# Patient Record
Sex: FEMALE | Race: WHITE | NOT HISPANIC OR LATINO | Employment: OTHER | URBAN - METROPOLITAN AREA
[De-identification: names, ages, dates, MRNs, and addresses within clinical notes are randomized per-mention and may not be internally consistent; named-entity substitution may affect disease eponyms.]

---

## 2017-01-16 RX ORDER — PANTOPRAZOLE SODIUM 40 MG/1
TABLET, DELAYED RELEASE ORAL
Qty: 90 TABLET | Refills: 1 | Status: SHIPPED | OUTPATIENT
Start: 2017-01-16 | End: 2017-05-30 | Stop reason: SDUPTHER

## 2017-02-09 ENCOUNTER — LAB (OUTPATIENT)
Dept: LAB | Facility: HOSPITAL | Age: 82
End: 2017-02-09
Attending: INTERNAL MEDICINE

## 2017-02-09 ENCOUNTER — OFFICE VISIT (OUTPATIENT)
Dept: ONCOLOGY | Facility: CLINIC | Age: 82
End: 2017-02-09
Attending: INTERNAL MEDICINE

## 2017-02-09 VITALS
RESPIRATION RATE: 16 BRPM | DIASTOLIC BLOOD PRESSURE: 70 MMHG | HEART RATE: 80 BPM | BODY MASS INDEX: 26.32 KG/M2 | HEIGHT: 61 IN | TEMPERATURE: 98.3 F | WEIGHT: 139.4 LBS | SYSTOLIC BLOOD PRESSURE: 122 MMHG

## 2017-02-09 DIAGNOSIS — E78.5 HYPERLIPIDEMIA, UNSPECIFIED HYPERLIPIDEMIA TYPE: Primary | ICD-10-CM

## 2017-02-09 DIAGNOSIS — C50.812 MALIGNANT NEOPLASM OF OVERLAPPING SITES OF LEFT FEMALE BREAST (HCC): Primary | ICD-10-CM

## 2017-02-09 LAB
ALBUMIN SERPL-MCNC: 4.1 G/DL (ref 3.5–5.2)
ALBUMIN/GLOB SERPL: 1.6 G/DL (ref 1.1–2.4)
ALP SERPL-CCNC: 51 U/L (ref 38–116)
ALT SERPL W P-5'-P-CCNC: 14 U/L (ref 0–33)
ANION GAP SERPL CALCULATED.3IONS-SCNC: 11.5 MMOL/L
AST SERPL-CCNC: 22 U/L (ref 0–32)
BILIRUB SERPL-MCNC: 0.4 MG/DL (ref 0.1–1.2)
BUN BLD-MCNC: 26 MG/DL (ref 6–20)
BUN/CREAT SERPL: 17.6 (ref 7.3–30)
CALCIUM SPEC-SCNC: 9.3 MG/DL (ref 8.5–10.2)
CHLORIDE SERPL-SCNC: 103 MMOL/L (ref 98–107)
CO2 SERPL-SCNC: 27.5 MMOL/L (ref 22–29)
CREAT BLD-MCNC: 1.48 MG/DL (ref 0.6–1.1)
GFR SERPL CREATININE-BSD FRML MDRD: 34 ML/MIN/1.73
GLOBULIN UR ELPH-MCNC: 2.5 GM/DL (ref 1.8–3.5)
GLUCOSE BLD-MCNC: 111 MG/DL (ref 74–124)
POTASSIUM BLD-SCNC: 4.3 MMOL/L (ref 3.5–4.7)
PROT SERPL-MCNC: 6.6 G/DL (ref 6.3–8)
SODIUM BLD-SCNC: 142 MMOL/L (ref 134–145)

## 2017-02-09 PROCEDURE — 36415 COLL VENOUS BLD VENIPUNCTURE: CPT

## 2017-02-09 PROCEDURE — 85025 COMPLETE CBC W/AUTO DIFF WBC: CPT | Performed by: INTERNAL MEDICINE

## 2017-02-09 PROCEDURE — 99214 OFFICE O/P EST MOD 30 MIN: CPT | Performed by: INTERNAL MEDICINE

## 2017-02-09 PROCEDURE — 80053 COMPREHEN METABOLIC PANEL: CPT

## 2017-02-09 NOTE — PROGRESS NOTES
Subjective .     REASONS FOR FOLLOWUP:  Breast cancer    HISTORY OF PRESENT ILLNESS:  The patient is a 83 y.o. year old female  who is here for follow-up with the above-mentioned history.  No change in baseline dizziness.  Denies nausea   Denies weight loss.  Denies pain.  No significant problems with hot flashes.        Past Medical History   Diagnosis Date   • Arthritis    • Breast cancer      Locally recurrent left breast   • Dizziness    • Drug therapy    • GERD (gastroesophageal reflux disease)    • History of breast cancer      LEFT   • Hyperlipidemia    • Hypertension    • Osteopenia      Past Surgical History   Procedure Laterality Date   • Breast lumpectomy Left 1995   • Mastectomy Left 2011   • Appendectomy  1943   • Hysterectomy  1964   • Colonoscopy N/A 5/12/2016     Procedure: COLONOSCOPY;  Surgeon: Israel Vance MD;  Location: [facility] ENDOSCOPY;  Service:    • Breast biopsy         HEMATOLOGIC/ONCOLOGIC HISTORY:  (History from previous dates can be found in the separate document.)    MEDICATIONS    Current Outpatient Prescriptions:   •  anastrozole (ARIMIDEX) 1 MG chemo tablet, TAKE 1 TABLET EVERY DAY, Disp: 90 tablet, Rfl: 4  •  calcium carbonate (OS-BORIS) 600 MG tablet, Take 600 mg by mouth 2 (two) times a day., Disp: , Rfl:   •  clonazePAM (KlonoPIN) 0.5 MG tablet, Take  by mouth., Disp: , Rfl:   •  meclizine (ANTIVERT) 25 MG tablet, Take 1 tablet by mouth 3 (three) times a day as needed for dizziness., Disp: 60 tablet, Rfl: 1  •  pantoprazole (PROTONIX) 40 MG EC tablet, TAKE 1 TABLET BY MOUTH DAILY., Disp: 90 tablet, Rfl: 1  •  simvastatin (ZOCOR) 20 MG tablet, TAKE 1 TABLET EVERY DAY, Disp: 90 tablet, Rfl: 1  •  triamterene-hydrochlorothiazide (DYAZIDE) 37.5-25 MG per capsule, Take  by mouth., Disp: , Rfl:     ALLERGIES:     Allergies   Allergen Reactions   • Penicillins Hives       SOCIAL HISTORY:       Social History     Social History   • Marital status:      Spouse name: Yves   •  "Number of children: N/A   • Years of education: High School     Occupational History   •  Retired     Social History Main Topics   • Smoking status: Never Smoker   • Smokeless tobacco: Not on file   • Alcohol use Yes      Comment: occassional   • Drug use: No   • Sexual activity: Defer     Other Topics Concern   • Not on file     Social History Narrative         FAMILY HISTORY:  Family History   Problem Relation Age of Onset   • Hypertension Mother    • Cancer Neg Hx        REVIEW OF SYSTEMS:  GENERAL: No change in appetite or weight;   No fevers, chills, sweats.    SKIN: No nonhealing lesions.   No rashes.  HEME/LYMPH: No easy bruising, bleeding.   No swollen nodes.   EYES: No vision changes or diplopia.   ENT: No tinnitus, hearing loss, gum bleeding, epistaxis, hoarseness or dysphagia.   RESPIRATORY: No cough, shortness of breath, hemoptysis or wheezing.   CVS: No chest pain, palpitations, orthopnea, dyspnea on exertion or PND.   GI: No melena or hematochezia.   No abdominal pain.  No nausea, vomiting, constipation, diarrhea  : No lower tract obstructive symptoms, dysuria or hematuria.   MUSCULOSKELETAL: No bone pain.  No joint stiffness.   NEUROLOGICAL: see HPI    PSYCHIATRIC: No increased nervousness, mood changes or depression.     Objective    Vitals:    02/09/17 1344   BP: 122/70   Pulse: 80   Resp: 16   Temp: 98.3 °F (36.8 °C)   Weight: 139 lb 6.4 oz (63.2 kg)   Height: 61.42\" (156 cm)  Comment: new ht.   PainSc: 0-No pain     Current Status 2/9/2017   ECOG score 0      PHYSICAL EXAM:    GENERAL:  Well-developed, well-nourished in no acute distress.   SKIN:  Warm, dry without rashes, purpura or petechiae.  HEAD:  Normocephalic.  EYES:  Pupils equal, round and reactive to light.  EOMs intact.  Conjunctivae normal.  EARS:  Hearing intact.  NOSE:  Septum midline.  No excoriations or nasal discharge.  MOUTH:  Tongue is well-papillated; no stomatitis or ulcers.  Lips normal.  THROAT:  " Oropharynx without lesions or exudates.  NECK:  Supple with good range of motion; no thyromegaly or masses, no JVD.  LYMPHATICS:  No cervical, supraclavicular, axillary or inguinal adenopathy.  CHEST:  Lungs clear to percussion and auscultation. Good airflow.  CARDIAC:  Regular rate and rhythm without murmurs, rubs or gallops. Normal S1,S2.  BREAST: no masses by palpation or inspection; left mastectomy  ABDOMEN:  Soft, nontender with no organomegaly or masses.  EXTREMITIES:  No clubbing, cyanosis or edema.  NEUROLOGICAL:  Cranial Nerves II-XII grossly intact.  No focal neurological deficits.  PSYCHIATRIC:  Normal affect and mood.      RECENT LABS:        WBC   Date/Time Value Ref Range Status   02/09/2017 01:35 PM 5.88 4.00 - 10.00 10*3/mm3 Final     HEMOGLOBIN   Date/Time Value Ref Range Status   02/09/2017 01:35 PM 12.4 11.5 - 14.9 g/dL Final     PLATELETS   Date/Time Value Ref Range Status   02/09/2017 01:35  (L) 150 - 375 10*3/mm3 Final       Assessment/Plan   Malignant neoplasm of overlapping sites of left female breast  - CBC & Differential  - Comprehensive Metabolic Panel      1.  T3N0M0 (stage IIB) invasive ductal breast carcinoma. Left lumpectomy and radiation therapy 1995. Five years of adjuvant tamoxifen treatment.   Per patient report, subsequent lumpectomy 04/17/2001, benign pathology. The patient states she did not require mastectomy or any radiation therapy; however, her medical oncologist gave her tamoxifen from April 2001 through October 2003. This was changed to Femara secondary to atypical glandular formations of the left breast. Femara was given from October 2003 through June 2007.     2.  T4Nx M0 poorly differentiated multifocal carcinoma.  Left mastectomy 09/02/2011. ER/TN strongly positive, HER-2/tatum negative. TC chemotherapy q. 3 weeks x 6 cycles, completed in [place] in February 2012. Anastrozole 1 mg q.d. since February 2012. Plan to continue this indefinitely.     3.  Osteopenia. She  is on calcium citrate and vitamin D. Due to problem with GERD, she is on a proton pump inhibitor.     Last bone density 7/13/16, worst T score -1.2 and left femoral neck, compared to -1.4 two years prior.        4.  Thrombocytopenia.  Mild.  New problem today.  Since it is so mild, I do not think he needs any further workup at this time.  Check this again in 6 months.  Patient informed.    PLAN:   · Continue anastrozole indefinitely.   · No surveillance scans were recommended to the patient.   · Last DEXA 7/13/16  · (MD visits every 6 months with CBC and CMP on day of MD visits.)   · Last mammogram 07/13/2016, BIRADS 1.

## 2017-03-28 RX ORDER — SIMVASTATIN 20 MG
TABLET ORAL
Qty: 90 TABLET | Refills: 1 | Status: SHIPPED | OUTPATIENT
Start: 2017-03-28 | End: 2017-09-27 | Stop reason: SDUPTHER

## 2017-05-22 ENCOUNTER — HOSPITAL ENCOUNTER (OUTPATIENT)
Dept: GENERAL RADIOLOGY | Facility: HOSPITAL | Age: 82
Discharge: HOME | End: 2017-05-22
Attending: INTERNAL MEDICINE

## 2017-05-22 ENCOUNTER — OFFICE VISIT (OUTPATIENT)
Dept: INTERNAL MEDICINE | Facility: CLINIC | Age: 82
End: 2017-05-22

## 2017-05-22 ENCOUNTER — HOSPITAL ENCOUNTER (OUTPATIENT)
Dept: GENERAL RADIOLOGY | Facility: HOSPITAL | Age: 82
Discharge: HOME | End: 2017-05-22
Attending: INTERNAL MEDICINE | Admitting: INTERNAL MEDICINE

## 2017-05-22 VITALS
OXYGEN SATURATION: 98 % | BODY MASS INDEX: 25.68 KG/M2 | HEART RATE: 68 BPM | WEIGHT: 136 LBS | SYSTOLIC BLOOD PRESSURE: 130 MMHG | TEMPERATURE: 97.5 F | RESPIRATION RATE: 16 BRPM | HEIGHT: 61 IN | DIASTOLIC BLOOD PRESSURE: 70 MMHG

## 2017-05-22 DIAGNOSIS — N18.30 CHRONIC KIDNEY DISEASE, STAGE 3 (MODERATE): ICD-10-CM

## 2017-05-22 DIAGNOSIS — M53.3 PAIN OF RIGHT SACROILIAC JOINT: ICD-10-CM

## 2017-05-22 DIAGNOSIS — Z85.3 HISTORY OF LEFT BREAST CANCER: ICD-10-CM

## 2017-05-22 DIAGNOSIS — I10 ESSENTIAL HYPERTENSION: Primary | ICD-10-CM

## 2017-05-22 DIAGNOSIS — K21.00 GASTROESOPHAGEAL REFLUX DISEASE WITH ESOPHAGITIS: ICD-10-CM

## 2017-05-22 DIAGNOSIS — I10 ESSENTIAL HYPERTENSION: ICD-10-CM

## 2017-05-22 DIAGNOSIS — R73.02 IMPAIRED GLUCOSE TOLERANCE: ICD-10-CM

## 2017-05-22 DIAGNOSIS — E78.5 HYPERLIPIDEMIA, UNSPECIFIED HYPERLIPIDEMIA TYPE: ICD-10-CM

## 2017-05-22 DIAGNOSIS — M85.80 OSTEOPENIA: ICD-10-CM

## 2017-05-22 PROCEDURE — 72100 X-RAY EXAM L-S SPINE 2/3 VWS: CPT

## 2017-05-22 PROCEDURE — 71020 HC CHEST PA AND LATERAL: CPT

## 2017-05-22 PROCEDURE — 99214 OFFICE O/P EST MOD 30 MIN: CPT | Performed by: INTERNAL MEDICINE

## 2017-05-23 LAB
ALBUMIN SERPL-MCNC: 4.3 G/DL (ref 3.5–5.2)
ALBUMIN/GLOB SERPL: 1.3 G/DL
ALP SERPL-CCNC: 57 U/L (ref 39–117)
ALT SERPL-CCNC: 13 U/L (ref 1–33)
APPEARANCE UR: CLEAR
AST SERPL-CCNC: 24 U/L (ref 1–32)
BACTERIA #/AREA URNS HPF: NORMAL /HPF
BILIRUB SERPL-MCNC: 0.5 MG/DL (ref 0.1–1.2)
BILIRUB UR QL STRIP: NEGATIVE
BUN SERPL-MCNC: 26 MG/DL (ref 8–23)
BUN/CREAT SERPL: 22 (ref 7–25)
CALCIUM SERPL-MCNC: 10 MG/DL (ref 8.6–10.5)
CASTS URNS MICRO: NORMAL
CHLORIDE SERPL-SCNC: 102 MMOL/L (ref 98–107)
CHOLEST SERPL-MCNC: 166 MG/DL (ref 0–200)
CO2 SERPL-SCNC: 25.4 MMOL/L (ref 22–29)
COLOR UR: YELLOW
CREAT SERPL-MCNC: 1.18 MG/DL (ref 0.57–1)
EPI CELLS #/AREA URNS HPF: NORMAL /HPF
GLOBULIN SER CALC-MCNC: 3.2 GM/DL
GLUCOSE SERPL-MCNC: 100 MG/DL (ref 65–99)
GLUCOSE UR QL: NEGATIVE
HDLC SERPL-MCNC: 48 MG/DL (ref 40–60)
HGB UR QL STRIP: NEGATIVE
KETONES UR QL STRIP: NEGATIVE
LDLC SERPL CALC-MCNC: 88 MG/DL (ref 0–100)
LEUKOCYTE ESTERASE UR QL STRIP: NEGATIVE
NITRITE UR QL STRIP: NEGATIVE
PH UR STRIP: 6 [PH] (ref 5–8)
POTASSIUM SERPL-SCNC: 4 MMOL/L (ref 3.5–5.2)
PROT SERPL-MCNC: 7.5 G/DL (ref 6–8.5)
PROT UR QL STRIP: NEGATIVE
RBC #/AREA URNS HPF: NORMAL /HPF
SODIUM SERPL-SCNC: 143 MMOL/L (ref 136–145)
SP GR UR: 1.01 (ref 1–1.03)
TRIGL SERPL-MCNC: 149 MG/DL (ref 0–150)
UROBILINOGEN UR STRIP-MCNC: (no result) MG/DL
VLDLC SERPL CALC-MCNC: 29.8 MG/DL (ref 5–40)
WBC #/AREA URNS HPF: NORMAL /HPF

## 2017-05-30 RX ORDER — PANTOPRAZOLE SODIUM 40 MG/1
TABLET, DELAYED RELEASE ORAL
Qty: 90 TABLET | Refills: 1 | Status: SHIPPED | OUTPATIENT
Start: 2017-05-30 | End: 2018-07-13 | Stop reason: SDUPTHER

## 2017-05-30 RX ORDER — ANASTROZOLE 1 MG/1
TABLET ORAL
Qty: 90 TABLET | Refills: 4 | Status: SHIPPED | OUTPATIENT
Start: 2017-05-30 | End: 2018-09-07 | Stop reason: SDUPTHER

## 2017-07-20 ENCOUNTER — TRANSCRIBE ORDERS (OUTPATIENT)
Dept: ADMINISTRATIVE | Facility: HOSPITAL | Age: 82
End: 2017-07-20

## 2017-07-20 DIAGNOSIS — Z12.31 VISIT FOR SCREENING MAMMOGRAM: Primary | ICD-10-CM

## 2017-07-27 ENCOUNTER — HOSPITAL ENCOUNTER (OUTPATIENT)
Dept: MAMMOGRAPHY | Facility: HOSPITAL | Age: 82
Discharge: HOME | End: 2017-07-27
Attending: INTERNAL MEDICINE | Admitting: INTERNAL MEDICINE

## 2017-07-27 DIAGNOSIS — Z12.31 VISIT FOR SCREENING MAMMOGRAM: ICD-10-CM

## 2017-07-27 PROCEDURE — G0202 SCR MAMMO BI INCL CAD: HCPCS

## 2017-08-08 ENCOUNTER — APPOINTMENT (OUTPATIENT)
Dept: LAB | Facility: HOSPITAL | Age: 82
End: 2017-08-08

## 2017-08-22 ENCOUNTER — LAB (OUTPATIENT)
Dept: LAB | Facility: HOSPITAL | Age: 82
End: 2017-08-22

## 2017-08-22 ENCOUNTER — OFFICE VISIT (OUTPATIENT)
Dept: ONCOLOGY | Facility: CLINIC | Age: 82
End: 2017-08-22

## 2017-08-22 VITALS
HEART RATE: 75 BPM | WEIGHT: 137.2 LBS | RESPIRATION RATE: 16 BRPM | OXYGEN SATURATION: 97 % | BODY MASS INDEX: 25.9 KG/M2 | HEIGHT: 61 IN | TEMPERATURE: 98.4 F | SYSTOLIC BLOOD PRESSURE: 138 MMHG | DIASTOLIC BLOOD PRESSURE: 64 MMHG

## 2017-08-22 DIAGNOSIS — I77.9 DISORDER OF AORTA (HCC): Primary | ICD-10-CM

## 2017-08-22 DIAGNOSIS — C50.812 MALIGNANT NEOPLASM OF OVERLAPPING SITES OF LEFT FEMALE BREAST (HCC): ICD-10-CM

## 2017-08-22 DIAGNOSIS — C50.812 MALIGNANT NEOPLASM OF OVERLAPPING SITES OF LEFT FEMALE BREAST (HCC): Primary | ICD-10-CM

## 2017-08-22 LAB
ALBUMIN SERPL-MCNC: 4.1 G/DL (ref 3.5–5.2)
ALBUMIN/GLOB SERPL: 1.4 G/DL (ref 1.1–2.4)
ALP SERPL-CCNC: 51 U/L (ref 38–116)
ALT SERPL W P-5'-P-CCNC: 14 U/L (ref 0–33)
ANION GAP SERPL CALCULATED.3IONS-SCNC: 12.8 MMOL/L
AST SERPL-CCNC: 20 U/L (ref 0–32)
BASOPHILS # BLD AUTO: 0.01 10*3/MM3 (ref 0–0.1)
BASOPHILS NFR BLD AUTO: 0.2 % (ref 0–1.1)
BILIRUB SERPL-MCNC: 0.4 MG/DL (ref 0.1–1.2)
BUN BLD-MCNC: 23 MG/DL (ref 6–20)
BUN/CREAT SERPL: 16.9 (ref 7.3–30)
CALCIUM SPEC-SCNC: 9.5 MG/DL (ref 8.5–10.2)
CHLORIDE SERPL-SCNC: 101 MMOL/L (ref 98–107)
CO2 SERPL-SCNC: 26.2 MMOL/L (ref 22–29)
CREAT BLD-MCNC: 1.36 MG/DL (ref 0.6–1.1)
DEPRECATED RDW RBC AUTO: 41.9 FL (ref 37–49)
EOSINOPHIL # BLD AUTO: 0.27 10*3/MM3 (ref 0–0.36)
EOSINOPHIL NFR BLD AUTO: 4.5 % (ref 1–5)
ERYTHROCYTE [DISTWIDTH] IN BLOOD BY AUTOMATED COUNT: 12.4 % (ref 11.7–14.5)
GFR SERPL CREATININE-BSD FRML MDRD: 37 ML/MIN/1.73
GLOBULIN UR ELPH-MCNC: 2.9 GM/DL (ref 1.8–3.5)
GLUCOSE BLD-MCNC: 105 MG/DL (ref 74–124)
HCT VFR BLD AUTO: 39.8 % (ref 34–45)
HGB BLD-MCNC: 13.5 G/DL (ref 11.5–14.9)
HOLD SPECIMEN: NORMAL
IMM GRANULOCYTES # BLD: 0.01 10*3/MM3 (ref 0–0.03)
IMM GRANULOCYTES NFR BLD: 0.2 % (ref 0–0.5)
LYMPHOCYTES # BLD AUTO: 2.07 10*3/MM3 (ref 1–3.5)
LYMPHOCYTES NFR BLD AUTO: 34.3 % (ref 20–49)
MCH RBC QN AUTO: 31.2 PG (ref 27–33)
MCHC RBC AUTO-ENTMCNC: 33.9 G/DL (ref 32–35)
MCV RBC AUTO: 91.9 FL (ref 83–97)
MONOCYTES # BLD AUTO: 0.59 10*3/MM3 (ref 0.25–0.8)
MONOCYTES NFR BLD AUTO: 9.8 % (ref 4–12)
NEUTROPHILS # BLD AUTO: 3.08 10*3/MM3 (ref 1.5–7)
NEUTROPHILS NFR BLD AUTO: 51 % (ref 39–75)
NRBC BLD MANUAL-RTO: 0 /100 WBC (ref 0–0)
PLATELET # BLD AUTO: 148 10*3/MM3 (ref 150–375)
PMV BLD AUTO: 10.1 FL (ref 8.9–12.1)
POTASSIUM BLD-SCNC: 4.6 MMOL/L (ref 3.5–4.7)
PROT SERPL-MCNC: 7 G/DL (ref 6.3–8)
RBC # BLD AUTO: 4.33 10*6/MM3 (ref 3.9–5)
SODIUM BLD-SCNC: 140 MMOL/L (ref 134–145)
WBC NRBC COR # BLD: 6.03 10*3/MM3 (ref 4–10)

## 2017-08-22 PROCEDURE — 36415 COLL VENOUS BLD VENIPUNCTURE: CPT | Performed by: INTERNAL MEDICINE

## 2017-08-22 PROCEDURE — 99214 OFFICE O/P EST MOD 30 MIN: CPT | Performed by: INTERNAL MEDICINE

## 2017-08-22 PROCEDURE — 80053 COMPREHEN METABOLIC PANEL: CPT | Performed by: INTERNAL MEDICINE

## 2017-08-22 PROCEDURE — 85025 COMPLETE CBC W/AUTO DIFF WBC: CPT | Performed by: INTERNAL MEDICINE

## 2017-08-22 NOTE — PROGRESS NOTES
Subjective .     REASONS FOR FOLLOWUP:  Breast cancer    HISTORY OF PRESENT ILLNESS:  The patient is a 84 y.o. year old female  who is here for follow-up with the above-mentioned history.  Denies neurological symptoms.  Denies nausea   Denies weight loss.  Denies pain.  No significant problems with hot flashes.      Past Medical History:   Diagnosis Date   • Arthritis    • Breast cancer     Locally recurrent left breast   • Dizziness    • Drug therapy    • GERD (gastroesophageal reflux disease)    • History of breast cancer     LEFT   • Hyperlipidemia    • Hypertension    • Osteopenia      Past Surgical History:   Procedure Laterality Date   • APPENDECTOMY  1943   • BREAST BIOPSY     • BREAST LUMPECTOMY Left 1995   • COLONOSCOPY N/A 5/12/2016    Procedure: COLONOSCOPY;  Surgeon: Israel Vance MD;  Location: [facility] ENDOSCOPY;  Service:    • HYSTERECTOMY  1964   • MASTECTOMY Left 2011       HEMATOLOGIC/ONCOLOGIC HISTORY:  (History from previous dates can be found in the separate document.)    MEDICATIONS    Current Outpatient Prescriptions:   •  anastrozole (ARIMIDEX) 1 MG tablet, TAKE 1 TABLET EVERY DAY, Disp: 90 tablet, Rfl: 4  •  calcium carbonate (OS-BORIS) 600 MG tablet, Take 600 mg by mouth 2 (two) times a day., Disp: , Rfl:   •  clonazePAM (KlonoPIN) 0.5 MG tablet, Take  by mouth., Disp: , Rfl:   •  meclizine (ANTIVERT) 25 MG tablet, Take 1 tablet by mouth 3 (three) times a day as needed for dizziness., Disp: 60 tablet, Rfl: 1  •  pantoprazole (PROTONIX) 40 MG EC tablet, TAKE 1 TABLET EVERY DAY, Disp: 90 tablet, Rfl: 1  •  simvastatin (ZOCOR) 20 MG tablet, TAKE 1 TABLET EVERY DAY, Disp: 90 tablet, Rfl: 1  •  triamterene-hydrochlorothiazide (DYAZIDE) 37.5-25 MG per capsule, Take  by mouth., Disp: , Rfl:     ALLERGIES:     Allergies   Allergen Reactions   • Penicillins Hives       SOCIAL HISTORY:       Social History     Social History   • Marital status:      Spouse name: Yves   • Number of children:  "N/A   • Years of education: High School     Occupational History   •  Retired     Social History Main Topics   • Smoking status: Never Smoker   • Smokeless tobacco: Not on file   • Alcohol use Yes      Comment: occassional   • Drug use: No   • Sexual activity: Defer     Other Topics Concern   • Not on file     Social History Narrative         FAMILY HISTORY:  Family History   Problem Relation Age of Onset   • Hypertension Mother    • Cancer Neg Hx        REVIEW OF SYSTEMS:  GENERAL: No change in appetite or weight;   No fevers, chills, sweats.    SKIN: No nonhealing lesions.   No rashes.  HEME/LYMPH: No easy bruising, bleeding.   No swollen nodes.   EYES: No vision changes or diplopia.   ENT: No tinnitus, hearing loss, gum bleeding, epistaxis, hoarseness or dysphagia.   RESPIRATORY: No cough, shortness of breath, hemoptysis or wheezing.   CVS: No chest pain, palpitations, orthopnea, dyspnea on exertion or PND.   GI: No melena or hematochezia.   No abdominal pain.  No nausea, vomiting, constipation, diarrhea  : No lower tract obstructive symptoms, dysuria or hematuria.   MUSCULOSKELETAL: No bone pain.  No joint stiffness.   NEUROLOGICAL: No global weakness, loss of consciousness or seizures.   PSYCHIATRIC: No increased nervousness, mood changes or depression.         Objective    Vitals:    08/22/17 1133   BP: 138/64   Pulse: 75   Resp: 16   Temp: 98.4 °F (36.9 °C)   TempSrc: Oral   SpO2: 97%   Weight: 137 lb 3.2 oz (62.2 kg)   Height: 61.42\" (156 cm)   PainSc: 0-No pain     Current Status 8/22/2017   ECOG score 0      PHYSICAL EXAM:    GENERAL:  Well-developed, well-nourished in no acute distress.   SKIN:  Warm, dry without rashes, purpura or petechiae.  HEAD:  Normocephalic.  EYES:  Pupils equal, round and reactive to light.  EOMs intact.  Conjunctivae normal.  EARS:  Hearing intact.  NOSE:  Septum midline.  No excoriations or nasal discharge.  MOUTH:  Tongue is well-papillated; no stomatitis " or ulcers.  Lips normal.  THROAT:  Oropharynx without lesions or exudates.  NECK:  Supple with good range of motion; no thyromegaly or masses, no JVD.  LYMPHATICS:  No cervical, supraclavicular, axillary or inguinal adenopathy.  CHEST:  Lungs clear to percussion and auscultation. Good airflow.  CARDIAC:  Regular rate and rhythm without murmurs, rubs or gallops. Normal S1,S2.  BREAST: no masses by palpation or inspection; left mastectomy  ABDOMEN:  Soft, nontender with no organomegaly or masses.  EXTREMITIES:  No clubbing, cyanosis or edema.  NEUROLOGICAL:  Cranial Nerves II-XII grossly intact.  No focal neurological deficits.  PSYCHIATRIC:  Normal affect and mood.      RECENT LABS:        WBC   Date/Time Value Ref Range Status   08/22/2017 11:20 AM 6.03 4.00 - 10.00 10*3/mm3 Final     Hemoglobin   Date/Time Value Ref Range Status   08/22/2017 11:20 AM 13.5 11.5 - 14.9 g/dL Final     Platelets   Date/Time Value Ref Range Status   08/22/2017 11:20  (L) 150 - 375 10*3/mm3 Final       Assessment/Plan   Malignant neoplasm of overlapping sites of left female breast  - CBC & Differential  - Comprehensive Metabolic Panel    1.  T3N0M0 (stage IIB) invasive ductal breast carcinoma. Left lumpectomy and radiation therapy 1995. Five years of adjuvant tamoxifen treatment.   Per patient report, subsequent lumpectomy 04/17/2001, benign pathology. The patient states she did not require mastectomy or any radiation therapy; however, her medical oncologist gave her tamoxifen from April 2001 through October 2003. This was changed to Femara secondary to atypical glandular formations of the left breast. Femara was given from October 2003 through June 2007.     2.  T4Nx M0 poorly differentiated multifocal carcinoma.  Left mastectomy 09/02/2011. ER/PA strongly positive, HER-2/tatum negative. TC chemotherapy q. 3 weeks x 6 cycles, completed in [place] in February 2012. Anastrozole 1 mg q.d. since February 2012. Plan to continue this  indefinitely.     3.  Osteopenia. She is on calcium citrate and vitamin D. Due to problem with GERD, she is on a proton pump inhibitor.     Last bone density 7/13/16, worst T score -1.2 and left femoral neck, compared to -1.4 two years prior.        4.  Thrombocytopenia.  Mild.  First noted February 2017.  Stable.      PLAN:   · Continue anastrozole indefinitely.   · No surveillance scans were recommended to the patient.   · Last DEXA 7/13/16  · (MD visits every 6 months with CBC and CMP on day of MD visits.)   · Last mammogram 7/27/17, BIRADS 1.

## 2017-09-27 RX ORDER — SIMVASTATIN 20 MG
TABLET ORAL
Qty: 90 TABLET | Refills: 1 | Status: SHIPPED | OUTPATIENT
Start: 2017-09-27 | End: 2018-02-12 | Stop reason: SDUPTHER

## 2017-10-06 ENCOUNTER — CLINICAL SUPPORT (OUTPATIENT)
Dept: INTERNAL MEDICINE | Facility: CLINIC | Age: 82
End: 2017-10-06

## 2017-10-06 DIAGNOSIS — Z23 NEED FOR INFLUENZA VACCINATION: Primary | ICD-10-CM

## 2017-10-06 PROCEDURE — G0008 ADMIN INFLUENZA VIRUS VAC: HCPCS | Performed by: INTERNAL MEDICINE

## 2017-10-06 PROCEDURE — 90662 IIV NO PRSV INCREASED AG IM: CPT | Performed by: INTERNAL MEDICINE

## 2017-11-15 ENCOUNTER — OFFICE VISIT (OUTPATIENT)
Dept: INTERNAL MEDICINE | Facility: CLINIC | Age: 82
End: 2017-11-15

## 2017-11-15 VITALS
RESPIRATION RATE: 16 BRPM | WEIGHT: 138 LBS | DIASTOLIC BLOOD PRESSURE: 76 MMHG | TEMPERATURE: 97.4 F | HEART RATE: 88 BPM | SYSTOLIC BLOOD PRESSURE: 136 MMHG | BODY MASS INDEX: 26.06 KG/M2 | OXYGEN SATURATION: 98 % | HEIGHT: 61 IN

## 2017-11-15 DIAGNOSIS — M85.80 OSTEOPENIA, UNSPECIFIED LOCATION: ICD-10-CM

## 2017-11-15 DIAGNOSIS — R73.02 IMPAIRED GLUCOSE TOLERANCE: ICD-10-CM

## 2017-11-15 DIAGNOSIS — I10 ESSENTIAL HYPERTENSION: Primary | ICD-10-CM

## 2017-11-15 DIAGNOSIS — K21.00 GASTROESOPHAGEAL REFLUX DISEASE WITH ESOPHAGITIS: ICD-10-CM

## 2017-11-15 DIAGNOSIS — Z85.3 HISTORY OF LEFT BREAST CANCER: ICD-10-CM

## 2017-11-15 DIAGNOSIS — E78.5 HYPERLIPIDEMIA, UNSPECIFIED HYPERLIPIDEMIA TYPE: ICD-10-CM

## 2017-11-15 DIAGNOSIS — N18.30 STAGE 3 CHRONIC KIDNEY DISEASE (HCC): ICD-10-CM

## 2017-11-15 PROCEDURE — 99213 OFFICE O/P EST LOW 20 MIN: CPT | Performed by: INTERNAL MEDICINE

## 2017-11-15 NOTE — PROGRESS NOTES
Subjective   Radha Luke is a 84 y.o. female.   9/27/2017  Wt Readings from Last 1 Encounters:   11/15/17 138 lb (62.6 kg)   She has last seen in May 2017    She returns for follow-up of hypertension, hyperlipidemia, chronic kidney disease, osteopenia, esophageal reflux, history left breast cancer    History of Present Illness   She has hypertension treated with triamterene/HCTZ 37.5/25 one each day.  She does not have any known heart disease in denies having exertional chest pain.    She has esophageal reflux treated with pantoprazole 40 mg each one with good results.  No dysphagia.    She has history of left breast cancer and had a left meniscectomy in 2011.  She is followed by the [facility] and is to continue Arimidex indefinitely.    She has chronic kidney disease which is being followed.  No uremic symptoms.  Laboratory studies in August showed serum creatinine level I.36 with estimated GFR 37 mL per minute.    She has hyperlipidemia treated with simvastatin 20 mg each evening.  No medication problems described.    She has some chronic back pain.  X-rays showed some degenerative disease.  No radiculopathy described.    She has osteopenia and continues with calcium and vitamin D supplements.    No new problems are described.  The following portions of the patient's history were reviewed and updated as appropriate: allergies, current medications, past family history, past medical history, past social history, past surgical history and problem list.    Review of Systems   Constitutional: Negative.    HENT: Negative.    Eyes: Negative.    Respiratory: Negative.    Cardiovascular: Negative.    Endocrine: Negative.    Genitourinary: Negative.    Musculoskeletal: Positive for back pain.   Skin: Negative.    Neurological: Negative.    Hematological: Negative.    Psychiatric/Behavioral: Negative.        Objective   Physical Exam   Constitutional: She appears well-developed and well-nourished.   HENT:   Head:  Normocephalic and atraumatic.   Cardiovascular: Normal rate and regular rhythm.  Exam reveals no gallop and no friction rub.    Murmur heard.  Grade 2/6 start murmur at the apex radiating to the base   Pulmonary/Chest: Effort normal and breath sounds normal. No respiratory distress. She has no wheezes. She has no rales.   Abdominal: Soft. Bowel sounds are normal. She exhibits no distension and no mass. There is no tenderness.   Neurological: She is alert.   Skin: Skin is warm.   Psychiatric: Her behavior is normal.   Vitals reviewed.      Assessment/Plan   Radha was seen today for hypertension, hyperlipidemia and heartburn.    Diagnoses and all orders for this visit:    Essential hypertension  Comments:  Continue triamterene/HCTZ 37.5/25 one each day  Orders:  -     Comprehensive Metabolic Panel  -     Urinalysis With Microscopic - Urine, Clean Catch    Hyperlipidemia, unspecified hyperlipidemia type  Comments:  Continue simvastatin 20 mg each evening, we will check chemistries and lipids  Orders:  -     Lipid Panel    Gastroesophageal reflux disease with esophagitis  Comments:  Continue pantoprazole 40 mg daily before breakfast    Impaired glucose tolerance  Comments:  We will check hemoglobin A1c level  Orders:  -     Hemoglobin A1c    Osteopenia, unspecified location  Comments:  Continue calcium supplement    Stage 3 chronic kidney disease  Comments:  We will check renal function and electrolytes    History of left breast cancer  Comments:  Followed by [facility], on Arimidex 1 mg daily indefinitely, right breast mammogram negative in July 2017          Schedule office follow-up about 6 months, return sooner if needed                     EMR Dragon/Transcription disclaimer:      Much of this encounter note is an electronic transcription/translation of spoken language to printed text. The electronic translation of spoken language may permit erroneous, or at times, nonsensical words or phrases to be  inadvertently transcribed; Although I have reviewed the note for such errors, some may still exist.

## 2017-11-16 LAB
ALBUMIN SERPL-MCNC: 3.9 G/DL (ref 3.5–5.2)
ALBUMIN/GLOB SERPL: 1.3 G/DL
ALP SERPL-CCNC: 55 U/L (ref 39–117)
ALT SERPL-CCNC: 9 U/L (ref 1–33)
APPEARANCE UR: CLEAR
AST SERPL-CCNC: 18 U/L (ref 1–32)
BACTERIA #/AREA URNS HPF: NORMAL /HPF
BILIRUB SERPL-MCNC: 0.4 MG/DL (ref 0.1–1.2)
BILIRUB UR QL STRIP: NEGATIVE
BUN SERPL-MCNC: 24 MG/DL (ref 8–23)
BUN/CREAT SERPL: 16.4 (ref 7–25)
CALCIUM SERPL-MCNC: 9.7 MG/DL (ref 8.6–10.5)
CHLORIDE SERPL-SCNC: 107 MMOL/L (ref 98–107)
CHOLEST SERPL-MCNC: 142 MG/DL (ref 0–200)
CO2 SERPL-SCNC: 25.8 MMOL/L (ref 22–29)
COLOR UR: YELLOW
CREAT SERPL-MCNC: 1.46 MG/DL (ref 0.57–1)
EPI CELLS #/AREA URNS HPF: NORMAL /HPF
GFR SERPLBLD CREATININE-BSD FMLA CKD-EPI: 34 ML/MIN/1.73
GFR SERPLBLD CREATININE-BSD FMLA CKD-EPI: 41 ML/MIN/1.73
GLOBULIN SER CALC-MCNC: 2.9 GM/DL
GLUCOSE SERPL-MCNC: 81 MG/DL (ref 65–99)
GLUCOSE UR QL: NEGATIVE
HBA1C MFR BLD: 5.82 % (ref 4.8–5.6)
HDLC SERPL-MCNC: 44 MG/DL (ref 40–60)
HGB UR QL STRIP: NEGATIVE
KETONES UR QL STRIP: NEGATIVE
LDLC SERPL CALC-MCNC: 73 MG/DL (ref 0–100)
LEUKOCYTE ESTERASE UR QL STRIP: NEGATIVE
NITRITE UR QL STRIP: NEGATIVE
PH UR STRIP: 7.5 [PH] (ref 5–8)
POTASSIUM SERPL-SCNC: 4.7 MMOL/L (ref 3.5–5.2)
PROT SERPL-MCNC: 6.8 G/DL (ref 6–8.5)
PROT UR QL STRIP: NEGATIVE
RBC #/AREA URNS HPF: NORMAL /HPF
SODIUM SERPL-SCNC: 146 MMOL/L (ref 136–145)
SP GR UR: 1.02 (ref 1–1.03)
TRIGL SERPL-MCNC: 127 MG/DL (ref 0–150)
UROBILINOGEN UR STRIP-MCNC: (no result) MG/DL
VLDLC SERPL CALC-MCNC: 25.4 MG/DL (ref 5–40)
WBC #/AREA URNS HPF: NORMAL /HPF

## 2017-12-01 ENCOUNTER — OFFICE VISIT (OUTPATIENT)
Dept: INTERNAL MEDICINE | Facility: CLINIC | Age: 82
End: 2017-12-01

## 2017-12-01 VITALS
SYSTOLIC BLOOD PRESSURE: 118 MMHG | WEIGHT: 139 LBS | TEMPERATURE: 97.7 F | HEIGHT: 61 IN | HEART RATE: 92 BPM | RESPIRATION RATE: 16 BRPM | OXYGEN SATURATION: 95 % | BODY MASS INDEX: 26.24 KG/M2 | DIASTOLIC BLOOD PRESSURE: 72 MMHG

## 2017-12-01 DIAGNOSIS — J01.00 ACUTE NON-RECURRENT MAXILLARY SINUSITIS: Primary | ICD-10-CM

## 2017-12-01 PROCEDURE — 99213 OFFICE O/P EST LOW 20 MIN: CPT | Performed by: NURSE PRACTITIONER

## 2017-12-01 RX ORDER — AZITHROMYCIN 250 MG/1
TABLET, FILM COATED ORAL
Qty: 6 TABLET | Refills: 0 | Status: SHIPPED | OUTPATIENT
Start: 2017-12-01 | End: 2017-12-20

## 2017-12-01 NOTE — PROGRESS NOTES
Vitals:    12/01/17 1245   BP: 118/72   Pulse: 92   Resp: 16   Temp: 97.7 °F (36.5 °C)   SpO2: 95%     Last 2 weights    12/01/17  1245   Weight: 139 lb (63 kg)     Social History   Substance Use Topics   • Smoking status: Never Smoker   • Smokeless tobacco: Not on file   • Alcohol use Yes      Comment: occassional       Subjective     HPI  Pt presents to office today with cough, weakness, fatigue, sinus pressure/pain, runny nose, sore throat for the 10 days. She has not tried anything for relief but does state she feels like it is progressively getting worse.    The following portions of the patient's history were reviewed and updated as appropriate: allergies, current medications, past medical history, past social history and problem list.    Review of Systems   Constitutional: Positive for fatigue.   HENT: Positive for congestion, postnasal drip, sinus pain, sinus pressure and sore throat.    Respiratory: Positive for cough.    Neurological: Positive for headaches.       Objective     Physical Exam   Constitutional: She is oriented to person, place, and time. Vital signs are normal. She appears well-developed and well-nourished.   HENT:   Head: Normocephalic and atraumatic.   Nose: Right sinus exhibits maxillary sinus tenderness. Left sinus exhibits maxillary sinus tenderness.   Mouth/Throat: Posterior oropharyngeal erythema present.   Neck: Normal range of motion.   Cardiovascular: Normal rate, regular rhythm and normal heart sounds.    Pulmonary/Chest: Effort normal and breath sounds normal.   Musculoskeletal: Normal range of motion.   Neurological: She is oriented to person, place, and time.   Nursing note and vitals reviewed.      Assessment/Plan   Radha was seen today for uri.    Diagnoses and all orders for this visit:    Acute non-recurrent maxillary sinusitis  -     azithromycin (ZITHROMAX) 250 MG tablet; Take 2 tablets the first day, then 1 tablet daily for 4 days.           -antibiotic  -cont fluid  intake  -steam therapy  -cont home meds  -FU prn or if symptoms persist/worsen

## 2017-12-05 ENCOUNTER — TELEPHONE (OUTPATIENT)
Dept: INTERNAL MEDICINE | Facility: CLINIC | Age: 82
End: 2017-12-05

## 2017-12-05 RX ORDER — DOXYCYCLINE 100 MG/1
100 CAPSULE ORAL 2 TIMES DAILY
Qty: 20 CAPSULE | Refills: 0 | Status: SHIPPED | OUTPATIENT
Start: 2017-12-05 | End: 2017-12-20

## 2017-12-05 RX ORDER — DOXYCYCLINE HYCLATE 100 MG/1
100 CAPSULE ORAL 2 TIMES DAILY
Qty: 60 CAPSULE | Refills: 0 | Status: SHIPPED | OUTPATIENT
Start: 2017-12-05 | End: 2017-12-05 | Stop reason: DRUGHIGH

## 2017-12-05 NOTE — TELEPHONE ENCOUNTER
Pt called and said she saw Maria Esther and she prescribed a z-pack and she is still not any better and would like to know if we could send in another prescription or something different please advise.

## 2017-12-05 NOTE — TELEPHONE ENCOUNTER
Please give her doxycycline 100 twice a day #20.  I accidentally prescribed #60 electronically and it should be #20.

## 2017-12-20 ENCOUNTER — HOSPITAL ENCOUNTER (OUTPATIENT)
Dept: GENERAL RADIOLOGY | Facility: HOSPITAL | Age: 82
Discharge: HOME | End: 2017-12-20
Attending: INTERNAL MEDICINE | Admitting: INTERNAL MEDICINE

## 2017-12-20 ENCOUNTER — OFFICE VISIT (OUTPATIENT)
Dept: INTERNAL MEDICINE | Facility: CLINIC | Age: 82
End: 2017-12-20

## 2017-12-20 VITALS
WEIGHT: 139.4 LBS | BODY MASS INDEX: 26.32 KG/M2 | OXYGEN SATURATION: 96 % | HEART RATE: 104 BPM | DIASTOLIC BLOOD PRESSURE: 80 MMHG | HEIGHT: 61 IN | RESPIRATION RATE: 16 BRPM | TEMPERATURE: 98 F | SYSTOLIC BLOOD PRESSURE: 120 MMHG

## 2017-12-20 DIAGNOSIS — R05.9 COUGH: ICD-10-CM

## 2017-12-20 DIAGNOSIS — R73.02 IMPAIRED GLUCOSE TOLERANCE: ICD-10-CM

## 2017-12-20 DIAGNOSIS — I10 ESSENTIAL HYPERTENSION: ICD-10-CM

## 2017-12-20 DIAGNOSIS — R05.9 COUGH: Primary | ICD-10-CM

## 2017-12-20 PROCEDURE — 71020 HC CHEST PA AND LATERAL: CPT

## 2017-12-20 PROCEDURE — 99213 OFFICE O/P EST LOW 20 MIN: CPT | Performed by: INTERNAL MEDICINE

## 2017-12-20 RX ORDER — LEVOFLOXACIN 500 MG/1
TABLET, FILM COATED ORAL
Qty: 7 TABLET | Refills: 0 | Status: SHIPPED | OUTPATIENT
Start: 2017-12-20 | End: 2017-12-22

## 2017-12-20 NOTE — PROGRESS NOTES
Subjective   Radha Luke is a 84 y.o. female.   11/15/2017  Wt Readings from Last 1 Encounters:   12/20/17 63.2 kg (139 lb 6.4 oz)   She was last seen by me in mid-November.    She returns for acute care visit because of persistent cough    History of Present Illness   She was seen in December 1 and at that time was sinusitis.  She was prescribed Zithromax but failed to respond and a few days later was prescribed a course of doxycycline 100 mg twice a day for 10 days.  She continues to have a lot of of cough and feel bad in general.  She has not had any GI disturbance.  She says that she does not think that she had a sinus infection previously.  She does not smoke and has no history of chronic lung disease.  The following portions of the patient's history were reviewed and updated as appropriate: allergies, current medications, past family history, past medical history, past social history, past surgical history and problem list.    Review of Systems   HENT: Positive for rhinorrhea.    Respiratory: Positive for cough.        Objective   Physical Exam   Constitutional: She appears well-developed and well-nourished.   HENT:   Head: Normocephalic and atraumatic.   Cardiovascular: Normal rate and regular rhythm.  Exam reveals no gallop and no friction rub.    No murmur heard.  Pulmonary/Chest: Effort normal. No respiratory distress. She has no wheezes. She has rales.   Inspiratory rales lower right chest   Abdominal: Soft. Bowel sounds are normal. She exhibits no distension and no mass. There is no tenderness.   Neurological: She is alert.   Skin: Skin is warm.   Psychiatric: Her behavior is normal.   Vitals reviewed.      Assessment/Plan   Radha was seen today for cough.    Diagnoses and all orders for this visit:    Cough  Comments:  We'll obtain CBC and chest x-ray, treat with levofloxacin 500 mg daily for 7 days.  HIV taken at the same time as calcium or dairy products.  Orders:  -     CBC & Differential  -     XR  Chest PA & Lateral; Future    Essential hypertension  Comments:  Continue triamterene/HCTZ 37.5/25 daily  Orders:  -     Comprehensive Metabolic Panel    Impaired glucose tolerance  -     Comprehensive Metabolic Panel    Other orders  -     levoFLOXacin (LEVAQUIN) 500 MG tablet; 1 by mouth daily until all taken    Keep follow-up as scheduled, return sooner if needed                       EMR Dragon/Transcription disclaimer:      Much of this encounter note is an electronic transcription/translation of spoken language to printed text. The electronic translation of spoken language may permit erroneous, or at times, nonsensical words or phrases to be inadvertently transcribed; Although I have reviewed the note for such errors, some may still exist.

## 2017-12-21 LAB
ALBUMIN SERPL-MCNC: 4 G/DL (ref 3.5–5.2)
ALBUMIN/GLOB SERPL: 1.3 G/DL
ALP SERPL-CCNC: 61 U/L (ref 39–117)
ALT SERPL-CCNC: 33 U/L (ref 1–33)
AST SERPL-CCNC: 42 U/L (ref 1–32)
BASOPHILS # BLD AUTO: 0.01 10*3/MM3 (ref 0–0.2)
BASOPHILS NFR BLD AUTO: 0.1 % (ref 0–1.5)
BILIRUB SERPL-MCNC: 0.3 MG/DL (ref 0.1–1.2)
BUN SERPL-MCNC: 32 MG/DL (ref 8–23)
BUN/CREAT SERPL: 23.7 (ref 7–25)
CALCIUM SERPL-MCNC: 9.8 MG/DL (ref 8.6–10.5)
CHLORIDE SERPL-SCNC: 103 MMOL/L (ref 98–107)
CO2 SERPL-SCNC: 21.2 MMOL/L (ref 22–29)
CREAT SERPL-MCNC: 1.35 MG/DL (ref 0.57–1)
EOSINOPHIL # BLD AUTO: 0.21 10*3/MM3 (ref 0–0.7)
EOSINOPHIL NFR BLD AUTO: 2.4 % (ref 0.3–6.2)
ERYTHROCYTE [DISTWIDTH] IN BLOOD BY AUTOMATED COUNT: 13.9 % (ref 11.7–13)
GLOBULIN SER CALC-MCNC: 3.1 GM/DL
GLUCOSE SERPL-MCNC: 138 MG/DL (ref 65–99)
HCT VFR BLD AUTO: 44.1 % (ref 35.6–45.5)
HGB BLD-MCNC: 14.1 G/DL (ref 11.9–15.5)
IMM GRANULOCYTES # BLD: 0 10*3/MM3 (ref 0–0.03)
IMM GRANULOCYTES NFR BLD: 0 % (ref 0–0.5)
LYMPHOCYTES # BLD AUTO: 2.22 10*3/MM3 (ref 0.9–4.8)
LYMPHOCYTES NFR BLD AUTO: 24.9 % (ref 19.6–45.3)
MCH RBC QN AUTO: 30.5 PG (ref 26.9–32)
MCHC RBC AUTO-ENTMCNC: 32 G/DL (ref 32.4–36.3)
MCV RBC AUTO: 95.2 FL (ref 80.5–98.2)
MONOCYTES # BLD AUTO: 0.85 10*3/MM3 (ref 0.2–1.2)
MONOCYTES NFR BLD AUTO: 9.5 % (ref 5–12)
NEUTROPHILS # BLD AUTO: 5.64 10*3/MM3 (ref 1.9–8.1)
NEUTROPHILS NFR BLD AUTO: 63.1 % (ref 42.7–76)
PLATELET # BLD AUTO: 194 10*3/MM3 (ref 140–500)
POTASSIUM SERPL-SCNC: 4.9 MMOL/L (ref 3.5–5.2)
PROT SERPL-MCNC: 7.1 G/DL (ref 6–8.5)
RBC # BLD AUTO: 4.63 10*6/MM3 (ref 3.9–5.2)
SODIUM SERPL-SCNC: 141 MMOL/L (ref 136–145)
WBC # BLD AUTO: 8.93 10*3/MM3 (ref 4.5–10.7)

## 2017-12-22 RX ORDER — SULFAMETHOXAZOLE AND TRIMETHOPRIM 400; 80 MG/1; MG/1
1 TABLET ORAL 2 TIMES DAILY
Qty: 16 TABLET | Refills: 0 | Status: SHIPPED | OUTPATIENT
Start: 2017-12-22 | End: 2017-12-22

## 2017-12-26 ENCOUNTER — TELEPHONE (OUTPATIENT)
Dept: INTERNAL MEDICINE | Facility: CLINIC | Age: 82
End: 2017-12-26

## 2017-12-26 NOTE — TELEPHONE ENCOUNTER
Pt called stating she had been taking Levaquin but had an allergic reaction to it causing swelling in ankles and legs.

## 2017-12-27 ENCOUNTER — OFFICE VISIT (OUTPATIENT)
Dept: INTERNAL MEDICINE | Facility: CLINIC | Age: 82
End: 2017-12-27

## 2017-12-27 VITALS
BODY MASS INDEX: 26.58 KG/M2 | HEART RATE: 96 BPM | WEIGHT: 140.8 LBS | HEIGHT: 61 IN | TEMPERATURE: 97.9 F | DIASTOLIC BLOOD PRESSURE: 70 MMHG | RESPIRATION RATE: 16 BRPM | OXYGEN SATURATION: 97 % | SYSTOLIC BLOOD PRESSURE: 132 MMHG

## 2017-12-27 DIAGNOSIS — R05.9 COUGH: Primary | ICD-10-CM

## 2017-12-27 DIAGNOSIS — I10 ESSENTIAL HYPERTENSION: ICD-10-CM

## 2017-12-27 PROCEDURE — 99213 OFFICE O/P EST LOW 20 MIN: CPT | Performed by: INTERNAL MEDICINE

## 2017-12-27 RX ORDER — FUROSEMIDE 20 MG/1
TABLET ORAL
Qty: 30 TABLET | Refills: 1 | Status: SHIPPED | OUTPATIENT
Start: 2017-12-27 | End: 2018-01-24

## 2017-12-27 NOTE — PROGRESS NOTES
Subjective   Radha Luke is a 84 y.o. female.   12/26/2017  Wt Readings from Last 1 Encounters:   12/27/17 63.9 kg (140 lb 12.8 oz)   She was last seen December 20, 2017 due to lower respiratory infection.    She returns for follow-up of lower respiratory infection    History of Present Illness   She was seen December 20 persistent cough.  She is been previously treated with Zithromax and doxycycline but failed to respond.  Clinical examination was consistent with pneumonia, probably partially treated due to previous antibiotic therapy.  She had inspiratory rales in the lower lobes.  She was prescribed levofloxacin 500 mg to be taken once a day for 7 days.  She took the medication for 6 days but then did not take the last pill because of a reaction.  She developed edema of her lower extremities.  She says that her cough is much less.  She is not had fever or chills.  Her CBC was normal with hemoglobin 14.1 g and platelet count of 8930.  Her CMP was stable with serum creatinine 1.35, estimated GFR 37 mL per minute.    She has history of esophageal reflux treated with pantoprazole 40 mg each morning.    She has hyperlipidemia treated with simvastatin 20 mg each evening.    She has hypertension treated with triamterene/HCTZ 37.5/25 one each day.    She has impaired glucose tolerance for which healthy diet and regular exercise been recommended.  Her hemoglobin A1c level in November was 5.2%.  The following portions of the patient's history were reviewed and updated as appropriate: allergies, current medications, past family history, past medical history, past social history, past surgical history and problem list.    Review of Systems   Respiratory: Positive for cough.    Cardiovascular: Positive for leg swelling.       Objective   Physical Exam   Constitutional: She appears well-developed and well-nourished.   HENT:   Head: Normocephalic and atraumatic.   Cardiovascular: Normal rate and regular rhythm.  Exam reveals  no gallop and no friction rub.    No murmur heard.  Pulmonary/Chest: Effort normal and breath sounds normal. No respiratory distress. She has no wheezes. She has no rales.   Some decreased breath sounds at the bases, right more than left    Oxygen saturation 96% on room air breast    Occasional cough noted   Abdominal: Soft. Bowel sounds are normal. She exhibits no distension and no mass. There is no tenderness.   Musculoskeletal: She exhibits edema.   2+ pretibial edema below mid tibia level bilaterally   Neurological: She is alert.   Skin: Skin is warm.   Psychiatric: Her behavior is normal.   Vitals reviewed.      Assessment/Plan   Radha was seen today for cough, hypertension, hyperlipidemia and prediabetes.    Diagnoses and all orders for this visit:    Cough  Comments:  We'll prescribe Robitussin-AC to use one teaspoonful every 4 hours as needed for cough    Essential hypertension  Comments:  Continue triamterene/HCTZ 37.5/25 one each day, start furosemide 20 mg daily for edema    Other orders  -     furosemide (LASIX) 20 MG tablet; 1 by mouth every morning for swelling          Schedule office follow-up 3-4 weeks                     EMR Dragon/Transcription disclaimer:      Much of this encounter note is an electronic transcription/translation of spoken language to printed text. The electronic translation of spoken language may permit erroneous, or at times, nonsensical words or phrases to be inadvertently transcribed; Although I have reviewed the note for such errors, some may still exist.

## 2018-01-08 ENCOUNTER — OFFICE VISIT (OUTPATIENT)
Dept: INTERNAL MEDICINE | Facility: CLINIC | Age: 83
End: 2018-01-08

## 2018-01-08 ENCOUNTER — HOSPITAL ENCOUNTER (OUTPATIENT)
Dept: GENERAL RADIOLOGY | Facility: HOSPITAL | Age: 83
Discharge: HOME | End: 2018-01-08
Attending: INTERNAL MEDICINE | Admitting: INTERNAL MEDICINE

## 2018-01-08 VITALS
BODY MASS INDEX: 26.06 KG/M2 | HEART RATE: 106 BPM | HEIGHT: 61 IN | WEIGHT: 138 LBS | SYSTOLIC BLOOD PRESSURE: 120 MMHG | OXYGEN SATURATION: 96 % | DIASTOLIC BLOOD PRESSURE: 80 MMHG | RESPIRATION RATE: 16 BRPM | TEMPERATURE: 97.9 F

## 2018-01-08 DIAGNOSIS — R05.9 COUGH: ICD-10-CM

## 2018-01-08 DIAGNOSIS — R60.9 EDEMA, UNSPECIFIED TYPE: ICD-10-CM

## 2018-01-08 DIAGNOSIS — R05.9 COUGH: Primary | ICD-10-CM

## 2018-01-08 DIAGNOSIS — I44.7 LEFT BUNDLE BRANCH BLOCK: ICD-10-CM

## 2018-01-08 PROCEDURE — 93000 ELECTROCARDIOGRAM COMPLETE: CPT | Performed by: INTERNAL MEDICINE

## 2018-01-08 PROCEDURE — 71046 X-RAY EXAM CHEST 2 VIEWS: CPT

## 2018-01-08 PROCEDURE — 99213 OFFICE O/P EST LOW 20 MIN: CPT | Performed by: INTERNAL MEDICINE

## 2018-01-08 RX ORDER — OXYBUTYNIN CHLORIDE 5 MG/1
TABLET ORAL
Refills: 0 | COMMUNITY
Start: 2018-01-04 | End: 2018-11-28

## 2018-01-08 RX ORDER — CARVEDILOL 3.12 MG/1
TABLET ORAL
Qty: 60 TABLET | Refills: 1 | Status: SHIPPED | OUTPATIENT
Start: 2018-01-08 | End: 2018-03-19 | Stop reason: SDUPTHER

## 2018-01-08 RX ORDER — CARVEDILOL 3.12 MG/1
TABLET ORAL
Qty: 60 TABLET | Refills: 1 | Status: SHIPPED | OUTPATIENT
Start: 2018-01-08 | End: 2018-01-08 | Stop reason: SDUPTHER

## 2018-01-08 NOTE — PROGRESS NOTES
Subjective   Radha Luke is a 84 y.o. female.   12/27/2017  Wt Readings from Last 1 Encounters:   01/08/18 62.6 kg (138 lb)   She was last seen December 27.    She returns for follow-up of respiratory symptoms    History of Present Illness   She has history of present illness a day back into the December.  She was seen December 1 80 with persistent cough which have been previously treated with Zithromax and doxycycline.  Clinical examination was consistent with pneumonia and she was changed to levofloxacin 500 mg daily for 7 days.  She took 6 days of therapy but had reaction and did not take the seventh pill.  She says that her cough is only occasionally present.  She denies having fever or chills.  She however describes orthopnea and dyspnea on exertion.  She does not have any history of coronary disease.  She had some edema previously and was prescribed furosemide 20 g to be taken in addition to triamterene/HCTZ 37.5/25.  Her weight is 2 pounds less then on December 27.    She had a chest x-ray obtained on December 20 which showed some bilateral pleural effusions and also some adjacent atelectasis or pneumonia.  The following portions of the patient's history were reviewed and updated as appropriate: allergies, current medications, past family history, past medical history, past social history, past surgical history and problem list.    Review of Systems   Constitutional: Negative.    HENT: Negative.    Eyes: Negative.    Respiratory: Positive for shortness of breath.    Cardiovascular: Positive for leg swelling.   Endocrine: Negative.    Genitourinary: Negative.    Skin: Negative.    Neurological: Negative.    Hematological: Negative.    Psychiatric/Behavioral: Negative.        Objective   Physical Exam   Constitutional: She appears well-developed and well-nourished.   HENT:   Head: Normocephalic and atraumatic.   Cardiovascular: Normal rate.  Exam reveals no gallop and no friction rub.    No murmur  heard.  Irregularly irregular rhythm   Pulmonary/Chest: Effort normal. No respiratory distress. She has no wheezes. She has no rales.   No rales rhonchi but breath sounds diminished in the lower chest bilaterally    Oxygen saturation 97% on room air at rest   Abdominal: Soft. Bowel sounds are normal. She exhibits no distension and no mass. There is no tenderness.   Musculoskeletal: She exhibits edema.   3+ pretibial edema bilateral lower extremities   Neurological: She is alert.   Skin: Skin is warm.   Psychiatric: Her behavior is normal.   Vitals reviewed.        ECG 12 Lead  Date/Time: 1/8/2018 1:20 PM  Performed by: CHAPARRITA LANG  Authorized by: CHAPARRITA LANG   Comparison: compared with previous ECG from 6/25/2012  Comparison to previous ECG: The EKG of 6/25/12 did not have left bundle branch block  Rhythm: sinus rhythm  Rate: tachycardic  BPM: 100  Conduction: left bundle branch block  QRS axis: left  Clinical impression: abnormal ECG  Comments: The resting EKG shows a sinus tachycardia 100/m, left bundle branch block.  A prior EKG of 6/25/2012, in [employer], did not have LBBB          Assessment/Plan   Radah was seen today for cough and leg swelling.    Diagnoses and all orders for this visit:    Cough  Comments:  We will recheck chest x-ray, compared with December 20  Orders:  -     ECG 12 Lead  -     CBC & Differential  -     Comprehensive Metabolic Panel  -     TSH  -     XR Chest PA & Lateral; Future  -     Ambulatory Referral to Cardiology    Edema, unspecified type   Comments:  Increase furosemide to 2 each morning, 20 mg each  Orders:  -     TSH    Left bundle branch block  Comments:  Left bundle-branch block, tachycardia on EKG, will add carvedilol 3.125 mg twice a day, recheck chest x-ray, check lab studies, refer to cardiology    Other orders  -     carvedilol (COREG) 3.125 MG tablet; 1 by mouth twice a day        I keep January 24                       EMR Dragon/Transcription disclaimer:       Much of this encounter note is an electronic transcription/translation of spoken language to printed text. The electronic translation of spoken language may permit erroneous, or at times, nonsensical words or phrases to be inadvertently transcribed; Although I have reviewed the note for such errors, some may still exist.

## 2018-01-09 LAB
ALBUMIN SERPL-MCNC: 4.1 G/DL (ref 3.5–5.2)
ALBUMIN/GLOB SERPL: 1.4 G/DL
ALP SERPL-CCNC: 68 U/L (ref 39–117)
ALT SERPL-CCNC: 15 U/L (ref 1–33)
AST SERPL-CCNC: 23 U/L (ref 1–32)
BASOPHILS # BLD AUTO: 0.01 10*3/MM3 (ref 0–0.2)
BASOPHILS NFR BLD AUTO: 0.1 % (ref 0–1.5)
BILIRUB SERPL-MCNC: 0.4 MG/DL (ref 0.1–1.2)
BUN SERPL-MCNC: 33 MG/DL (ref 8–23)
BUN/CREAT SERPL: 22.6 (ref 7–25)
CALCIUM SERPL-MCNC: 10.1 MG/DL (ref 8.6–10.5)
CHLORIDE SERPL-SCNC: 102 MMOL/L (ref 98–107)
CO2 SERPL-SCNC: 25.7 MMOL/L (ref 22–29)
CREAT SERPL-MCNC: 1.46 MG/DL (ref 0.57–1)
EOSINOPHIL # BLD AUTO: 0.13 10*3/MM3 (ref 0–0.7)
EOSINOPHIL NFR BLD AUTO: 1.4 % (ref 0.3–6.2)
ERYTHROCYTE [DISTWIDTH] IN BLOOD BY AUTOMATED COUNT: 13.8 % (ref 11.7–13)
GLOBULIN SER CALC-MCNC: 3 GM/DL
GLUCOSE SERPL-MCNC: 123 MG/DL (ref 65–99)
HCT VFR BLD AUTO: 45.6 % (ref 35.6–45.5)
HGB BLD-MCNC: 14.8 G/DL (ref 11.9–15.5)
IMM GRANULOCYTES # BLD: 0.02 10*3/MM3 (ref 0–0.03)
IMM GRANULOCYTES NFR BLD: 0.2 % (ref 0–0.5)
LYMPHOCYTES # BLD AUTO: 1.55 10*3/MM3 (ref 0.9–4.8)
LYMPHOCYTES NFR BLD AUTO: 16.8 % (ref 19.6–45.3)
MCH RBC QN AUTO: 31 PG (ref 26.9–32)
MCHC RBC AUTO-ENTMCNC: 32.5 G/DL (ref 32.4–36.3)
MCV RBC AUTO: 95.4 FL (ref 80.5–98.2)
MONOCYTES # BLD AUTO: 0.76 10*3/MM3 (ref 0.2–1.2)
MONOCYTES NFR BLD AUTO: 8.2 % (ref 5–12)
NEUTROPHILS # BLD AUTO: 6.75 10*3/MM3 (ref 1.9–8.1)
NEUTROPHILS NFR BLD AUTO: 73.3 % (ref 42.7–76)
PLATELET # BLD AUTO: 238 10*3/MM3 (ref 140–500)
POTASSIUM SERPL-SCNC: 4.9 MMOL/L (ref 3.5–5.2)
PROT SERPL-MCNC: 7.1 G/DL (ref 6–8.5)
RBC # BLD AUTO: 4.78 10*6/MM3 (ref 3.9–5.2)
SODIUM SERPL-SCNC: 143 MMOL/L (ref 136–145)
TSH SERPL DL<=0.005 MIU/L-ACNC: 2.59 MIU/ML (ref 0.27–4.2)
WBC # BLD AUTO: 9.22 10*3/MM3 (ref 4.5–10.7)

## 2018-01-24 ENCOUNTER — OFFICE VISIT (OUTPATIENT)
Dept: INTERNAL MEDICINE | Facility: CLINIC | Age: 83
End: 2018-01-24

## 2018-01-24 VITALS
TEMPERATURE: 97.8 F | RESPIRATION RATE: 16 BRPM | BODY MASS INDEX: 25.45 KG/M2 | SYSTOLIC BLOOD PRESSURE: 90 MMHG | HEART RATE: 59 BPM | HEIGHT: 61 IN | DIASTOLIC BLOOD PRESSURE: 70 MMHG | OXYGEN SATURATION: 95 % | WEIGHT: 134.8 LBS

## 2018-01-24 DIAGNOSIS — C50.812 MALIGNANT NEOPLASM OF OVERLAPPING SITES OF LEFT BREAST IN FEMALE, ESTROGEN RECEPTOR POSITIVE (HCC): ICD-10-CM

## 2018-01-24 DIAGNOSIS — Z17.0 MALIGNANT NEOPLASM OF OVERLAPPING SITES OF LEFT BREAST IN FEMALE, ESTROGEN RECEPTOR POSITIVE (HCC): ICD-10-CM

## 2018-01-24 DIAGNOSIS — I38 CONGESTIVE HEART FAILURE DUE TO VALVULAR DISEASE (HCC): Primary | ICD-10-CM

## 2018-01-24 DIAGNOSIS — I50.9 CONGESTIVE HEART FAILURE DUE TO VALVULAR DISEASE (HCC): Primary | ICD-10-CM

## 2018-01-24 DIAGNOSIS — E78.5 HYPERLIPIDEMIA, UNSPECIFIED HYPERLIPIDEMIA TYPE: ICD-10-CM

## 2018-01-24 DIAGNOSIS — N18.30 STAGE 3 CHRONIC KIDNEY DISEASE (HCC): ICD-10-CM

## 2018-01-24 DIAGNOSIS — I10 ESSENTIAL HYPERTENSION: ICD-10-CM

## 2018-01-24 PROCEDURE — 99213 OFFICE O/P EST LOW 20 MIN: CPT | Performed by: INTERNAL MEDICINE

## 2018-01-24 RX ORDER — FUROSEMIDE 20 MG/1
TABLET ORAL
Qty: 90 TABLET | Refills: 1 | Status: ON HOLD | OUTPATIENT
Start: 2018-01-24 | End: 2018-02-02

## 2018-01-24 NOTE — PROGRESS NOTES
Juliana Luke is a 84 y.o. female.   1/8/2018  Wt Readings from Last 1 Encounters:   01/24/18 61.1 kg (134 lb 12.8 oz)   She was last seen January 8, weight 138 pounds then, now 134 pounds.    She returns for follow-up of congestive heart failure    History of Present Illness   She was seen January 8.  She had some lower extremity edema and continued pulmonary complaints.  Her chest x-ray of that date showed cardiac enlargement and small bilateral pleural effusions.  No gross pulmonary edema was noted and no acute infiltrates seen.  This is fairly similar to chest x-ray in December 2017.  Her chest x-ray back in early 2017 indicated that the heart size was unremarkable.  She has been placed on diuretic therapy with furosemide.  Her breathing is much better.  She still has some peripheral edema.  She has not had any chest pain.  She is scheduled to see Dr. sadaf Earl for cardiology consultation on January 30.    She has chronic kidney disease stage III.  Laboratory studies in December showed serum creatinine level I.35, estimated GFR 37 mL per minute.  On January 8 the serum creatinine level was 1.46 and estimated GFR 34 mL per minute.    She has hypercholesterolemia treated with simvastatin 20 mg each evening.    She has soft reflux treated with pantoprazole 40 mg daily before breakfast.    She has history of breast cancer and is on hormonal therapy with Arimidex 1 mg daily.  She is followed by the [facility] for oncology.  The following portions of the patient's history were reviewed and updated as appropriate: allergies, current medications, past family history, past medical history, past social history, past surgical history and problem list.    Review of Systems   Constitutional: Negative.    HENT: Negative.    Eyes: Negative.    Respiratory: Negative.    Cardiovascular: Positive for leg swelling.   Endocrine: Negative.    Genitourinary: Negative.    Skin: Negative.    Neurological: Negative.     Hematological: Negative.    Psychiatric/Behavioral: Negative.        Objective   Physical Exam   Constitutional: She appears well-developed and well-nourished.   HENT:   Head: Normocephalic and atraumatic.   Cardiovascular: Normal rate and regular rhythm.  Exam reveals no gallop and no friction rub.    Murmur heard.  Grade 2/6 harsh systolic murmur at the apex with radiation base   Pulmonary/Chest: Effort normal and breath sounds normal. No respiratory distress. She has no wheezes. She has no rales.       Oxygen saturation 95% on room air breast   Abdominal: Soft. Bowel sounds are normal. She exhibits no distension and no mass. There is no tenderness.   Musculoskeletal: She exhibits edema.   3+ edema bilateral lower extremities below the knees   Neurological: She is alert.   Skin: Skin is warm.   Psychiatric: Her behavior is normal.   Vitals reviewed.      Assessment/Plan   Radha was seen today for cough, shortness of breath, hypertension and heartburn.    Diagnoses and all orders for this visit:    Congestive heart failure due to valvular disease  Comments:  Change furosemide 20 mg to 2 each morning one each afternoon, keep cardiology appointment January 30 with Dr. Earl    Essential hypertension  Comments:  Blood pressure 134/70 by my determination, right arm sitting  Orders:  -     Basic Metabolic Panel    Stage 3 chronic kidney disease  Comments:  We will check renal function and electrolytes  Orders:  -     Basic Metabolic Panel    Hyperlipidemia, unspecified hyperlipidemia type  Comments:  Continue simvastatin 20 mg each evening    Malignant neoplasm of overlapping sites of left breast in female, estrogen receptor positive    Other orders  -     furosemide (LASIX) 20 MG tablet; 1 by mouth every morning and one each afternoon for swelling          Schedule office follow-up about 2 months, return sooner if needed                     EMR Dragon/Transcription disclaimer:      Much of this encounter note is an  electronic transcription/translation of spoken language to printed text. The electronic translation of spoken language may permit erroneous, or at times, nonsensical words or phrases to be inadvertently transcribed; Although I have reviewed the note for such errors, some may still exist.

## 2018-01-25 LAB
BUN SERPL-MCNC: 35 MG/DL (ref 8–23)
BUN/CREAT SERPL: 22.6 (ref 7–25)
CALCIUM SERPL-MCNC: 8.6 MG/DL (ref 8.6–10.5)
CHLORIDE SERPL-SCNC: 99 MMOL/L (ref 98–107)
CO2 SERPL-SCNC: 25.6 MMOL/L (ref 22–29)
CREAT SERPL-MCNC: 1.55 MG/DL (ref 0.57–1)
GFR SERPLBLD CREATININE-BSD FMLA CKD-EPI: 32 ML/MIN/1.73
GFR SERPLBLD CREATININE-BSD FMLA CKD-EPI: 39 ML/MIN/1.73
GLUCOSE SERPL-MCNC: 157 MG/DL (ref 65–99)
POTASSIUM SERPL-SCNC: 4.2 MMOL/L (ref 3.5–5.2)
SODIUM SERPL-SCNC: 143 MMOL/L (ref 136–145)

## 2018-01-30 ENCOUNTER — APPOINTMENT (OUTPATIENT)
Dept: GENERAL RADIOLOGY | Facility: HOSPITAL | Age: 83
End: 2018-01-30
Attending: INTERNAL MEDICINE

## 2018-01-30 ENCOUNTER — HOSPITAL ENCOUNTER (OUTPATIENT)
Dept: CARDIOLOGY | Facility: HOSPITAL | Age: 83
Discharge: HOME | End: 2018-01-30
Attending: INTERNAL MEDICINE

## 2018-01-30 ENCOUNTER — OFFICE VISIT (OUTPATIENT)
Dept: CARDIOLOGY | Facility: CLINIC | Age: 83
End: 2018-01-30

## 2018-01-30 ENCOUNTER — HOSPITAL ENCOUNTER (INPATIENT)
Facility: HOSPITAL | Age: 83
LOS: 3 days | Discharge: HOME HEALTH | End: 2018-02-02
Attending: INTERNAL MEDICINE | Admitting: INTERNAL MEDICINE

## 2018-01-30 VITALS
DIASTOLIC BLOOD PRESSURE: 64 MMHG | HEIGHT: 61 IN | WEIGHT: 137.5 LBS | SYSTOLIC BLOOD PRESSURE: 132 MMHG | BODY MASS INDEX: 25.96 KG/M2 | HEART RATE: 94 BPM

## 2018-01-30 VITALS
DIASTOLIC BLOOD PRESSURE: 64 MMHG | SYSTOLIC BLOOD PRESSURE: 132 MMHG | HEIGHT: 61 IN | WEIGHT: 137 LBS | OXYGEN SATURATION: 95 % | HEART RATE: 96 BPM | BODY MASS INDEX: 25.86 KG/M2

## 2018-01-30 DIAGNOSIS — R06.02 SHORTNESS OF BREATH: ICD-10-CM

## 2018-01-30 DIAGNOSIS — R60.0 LOCALIZED EDEMA: ICD-10-CM

## 2018-01-30 DIAGNOSIS — M62.81 MUSCLE WEAKNESS (GENERALIZED): Primary | ICD-10-CM

## 2018-01-30 DIAGNOSIS — N18.30 STAGE 3 CHRONIC KIDNEY DISEASE (HCC): ICD-10-CM

## 2018-01-30 DIAGNOSIS — E78.5 HYPERLIPIDEMIA, UNSPECIFIED HYPERLIPIDEMIA TYPE: ICD-10-CM

## 2018-01-30 DIAGNOSIS — I50.9 ACUTE CONGESTIVE HEART FAILURE, UNSPECIFIED CONGESTIVE HEART FAILURE TYPE: ICD-10-CM

## 2018-01-30 DIAGNOSIS — I50.9 ACUTE CONGESTIVE HEART FAILURE, UNSPECIFIED CONGESTIVE HEART FAILURE TYPE: Primary | ICD-10-CM

## 2018-01-30 DIAGNOSIS — R26.81 UNSTEADY GAIT: ICD-10-CM

## 2018-01-30 DIAGNOSIS — R06.01 ORTHOPNEA: ICD-10-CM

## 2018-01-30 DIAGNOSIS — I50.23 ACUTE ON CHRONIC SYSTOLIC CONGESTIVE HEART FAILURE (HCC): ICD-10-CM

## 2018-01-30 DIAGNOSIS — I10 ESSENTIAL HYPERTENSION: ICD-10-CM

## 2018-01-30 LAB
ALBUMIN SERPL-MCNC: 3.6 G/DL (ref 3.5–5.2)
ALBUMIN/GLOB SERPL: 1 G/DL
ALP SERPL-CCNC: 78 U/L (ref 39–117)
ALT SERPL W P-5'-P-CCNC: 30 U/L (ref 1–33)
ANION GAP SERPL CALCULATED.3IONS-SCNC: 15.9 MMOL/L
ANION GAP SERPL CALCULATED.3IONS-SCNC: 15.9 MMOL/L
AST SERPL-CCNC: 37 U/L (ref 1–32)
BH CV ECHO MEAS - ACS: 1 CM
BH CV ECHO MEAS - AI DEC SLOPE: 292 CM/SEC^2
BH CV ECHO MEAS - AI MAX PG: 46 MMHG
BH CV ECHO MEAS - AI MAX VEL: 339.1 CM/SEC
BH CV ECHO MEAS - AI P1/2T: 340.2 MSEC
BH CV ECHO MEAS - AO MAX PG (FULL): 24.6 MMHG
BH CV ECHO MEAS - AO MAX PG: 26.1 MMHG
BH CV ECHO MEAS - AO MEAN PG (FULL): 11.9 MMHG
BH CV ECHO MEAS - AO MEAN PG: 12.6 MMHG
BH CV ECHO MEAS - AO ROOT AREA (BSA CORRECTED): 1.8
BH CV ECHO MEAS - AO ROOT AREA: 6.9 CM^2
BH CV ECHO MEAS - AO ROOT DIAM: 3 CM
BH CV ECHO MEAS - AO V2 MAX: 255.6 CM/SEC
BH CV ECHO MEAS - AO V2 MEAN: 154.6 CM/SEC
BH CV ECHO MEAS - AO V2 VTI: 41.4 CM
BH CV ECHO MEAS - AVA(I,A): 0.79 CM^2
BH CV ECHO MEAS - AVA(I,D): 0.79 CM^2
BH CV ECHO MEAS - AVA(V,A): 0.65 CM^2
BH CV ECHO MEAS - AVA(V,D): 0.65 CM^2
BH CV ECHO MEAS - BSA(HAYCOCK): 1.7 M^2
BH CV ECHO MEAS - BSA: 1.6 M^2
BH CV ECHO MEAS - BZI_BMI: 25.9 KILOGRAMS/M^2
BH CV ECHO MEAS - BZI_METRIC_HEIGHT: 154.9 CM
BH CV ECHO MEAS - BZI_METRIC_WEIGHT: 62.1 KG
BH CV ECHO MEAS - CONTRAST EF (2CH): 28.8 ML/M^2
BH CV ECHO MEAS - CONTRAST EF 4CH: 27.8 ML/M^2
BH CV ECHO MEAS - EDV(MOD-SP2): 132 ML
BH CV ECHO MEAS - EDV(MOD-SP4): 115 ML
BH CV ECHO MEAS - EDV(TEICH): 159.1 ML
BH CV ECHO MEAS - EF(CUBED): 49.3 %
BH CV ECHO MEAS - EF(MOD-SP2): 28.8 %
BH CV ECHO MEAS - EF(MOD-SP4): 27.8 %
BH CV ECHO MEAS - EF(TEICH): 40.9 %
BH CV ECHO MEAS - ESV(MOD-SP2): 94 ML
BH CV ECHO MEAS - ESV(MOD-SP4): 83 ML
BH CV ECHO MEAS - ESV(TEICH): 94 ML
BH CV ECHO MEAS - FS: 20.3 %
BH CV ECHO MEAS - IVS/LVPW: 1
BH CV ECHO MEAS - IVSD: 0.92 CM
BH CV ECHO MEAS - LAT PEAK E' VEL: 5 CM/SEC
BH CV ECHO MEAS - LV DIASTOLIC VOL/BSA (35-75): 71.5 ML/M^2
BH CV ECHO MEAS - LV MASS(C)D: 202.8 GRAMS
BH CV ECHO MEAS - LV MASS(C)DI: 126.1 GRAMS/M^2
BH CV ECHO MEAS - LV MAX PG: 1.5 MMHG
BH CV ECHO MEAS - LV MEAN PG: 0.7 MMHG
BH CV ECHO MEAS - LV SYSTOLIC VOL/BSA (12-30): 51.6 ML/M^2
BH CV ECHO MEAS - LV V1 MAX: 62.1 CM/SEC
BH CV ECHO MEAS - LV V1 MEAN: 37.2 CM/SEC
BH CV ECHO MEAS - LV V1 VTI: 12.2 CM
BH CV ECHO MEAS - LVIDD: 5.7 CM
BH CV ECHO MEAS - LVIDS: 4.5 CM
BH CV ECHO MEAS - LVLD AP2: 8.7 CM
BH CV ECHO MEAS - LVLD AP4: 7.9 CM
BH CV ECHO MEAS - LVLS AP2: 7.6 CM
BH CV ECHO MEAS - LVLS AP4: 7 CM
BH CV ECHO MEAS - LVOT AREA (M): 2.8 CM^2
BH CV ECHO MEAS - LVOT AREA: 2.7 CM^2
BH CV ECHO MEAS - LVOT DIAM: 1.9 CM
BH CV ECHO MEAS - LVPWD: 0.92 CM
BH CV ECHO MEAS - MED PEAK E' VEL: 5 CM/SEC
BH CV ECHO MEAS - MR MAX PG: 110.5 MMHG
BH CV ECHO MEAS - MR MAX VEL: 525.7 CM/SEC
BH CV ECHO MEAS - MR MEAN PG: 61.9 MMHG
BH CV ECHO MEAS - MR MEAN VEL: 361.1 CM/SEC
BH CV ECHO MEAS - MR VTI: 146.2 CM
BH CV ECHO MEAS - MV A DUR: 0.11 SEC
BH CV ECHO MEAS - MV A MAX VEL: 110.9 CM/SEC
BH CV ECHO MEAS - MV DEC SLOPE: 653.9 CM/SEC^2
BH CV ECHO MEAS - MV DEC TIME: 0.19 SEC
BH CV ECHO MEAS - MV E MAX VEL: 136 CM/SEC
BH CV ECHO MEAS - MV E/A: 1.2
BH CV ECHO MEAS - MV MAX PG: 9.2 MMHG
BH CV ECHO MEAS - MV MEAN PG: 4.4 MMHG
BH CV ECHO MEAS - MV P1/2T MAX VEL: 134.6 CM/SEC
BH CV ECHO MEAS - MV P1/2T: 60.3 MSEC
BH CV ECHO MEAS - MV V2 MAX: 152 CM/SEC
BH CV ECHO MEAS - MV V2 MEAN: 96.5 CM/SEC
BH CV ECHO MEAS - MV V2 VTI: 25.2 CM
BH CV ECHO MEAS - MVA P1/2T LCG: 1.6 CM^2
BH CV ECHO MEAS - MVA(P1/2T): 3.6 CM^2
BH CV ECHO MEAS - MVA(VTI): 1.3 CM^2
BH CV ECHO MEAS - PA MAX PG (FULL): 0.99 MMHG
BH CV ECHO MEAS - PA MAX PG: 1.7 MMHG
BH CV ECHO MEAS - PA V2 MAX: 64.3 CM/SEC
BH CV ECHO MEAS - PULM DIAS VEL: 21.3 CM/SEC
BH CV ECHO MEAS - PULM S/D: 2
BH CV ECHO MEAS - PULM SYS VEL: 43.2 CM/SEC
BH CV ECHO MEAS - PVA(V,A): 1.9 CM^2
BH CV ECHO MEAS - PVA(V,D): 1.9 CM^2
BH CV ECHO MEAS - QP/QS: 0.52
BH CV ECHO MEAS - RAP SYSTOLE: 15 MMHG
BH CV ECHO MEAS - RV MAX PG: 0.66 MMHG
BH CV ECHO MEAS - RV MEAN PG: 0.21 MMHG
BH CV ECHO MEAS - RV V1 MAX: 40.7 CM/SEC
BH CV ECHO MEAS - RV V1 MEAN: 18.7 CM/SEC
BH CV ECHO MEAS - RV V1 VTI: 5.9 CM
BH CV ECHO MEAS - RVOT AREA: 2.9 CM^2
BH CV ECHO MEAS - RVOT DIAM: 1.9 CM
BH CV ECHO MEAS - RVSP: 62.2 MMHG
BH CV ECHO MEAS - SI(AO): 177.4 ML/M^2
BH CV ECHO MEAS - SI(CUBED): 56.3 ML/M^2
BH CV ECHO MEAS - SI(LVOT): 20.4 ML/M^2
BH CV ECHO MEAS - SI(MOD-SP2): 23.6 ML/M^2
BH CV ECHO MEAS - SI(MOD-SP4): 19.9 ML/M^2
BH CV ECHO MEAS - SI(TEICH): 40.4 ML/M^2
BH CV ECHO MEAS - SUP REN AO DIAM: 1.5 CM
BH CV ECHO MEAS - SV(AO): 285.3 ML
BH CV ECHO MEAS - SV(CUBED): 90.6 ML
BH CV ECHO MEAS - SV(LVOT): 32.8 ML
BH CV ECHO MEAS - SV(MOD-SP2): 38 ML
BH CV ECHO MEAS - SV(MOD-SP4): 32 ML
BH CV ECHO MEAS - SV(RVOT): 17.1 ML
BH CV ECHO MEAS - SV(TEICH): 65 ML
BH CV ECHO MEAS - TAPSE (>1.6): 2.2 CM2
BH CV ECHO MEAS - TR MAX VEL: 343.4 CM/SEC
BH CV XLRA - RV BASE: 3 CM
BH CV XLRA - TDI S': 8 CM/SEC
BILIRUB SERPL-MCNC: 0.7 MG/DL (ref 0.1–1.2)
BUN BLD-MCNC: 40 MG/DL (ref 8–23)
BUN BLD-MCNC: 40 MG/DL (ref 8–23)
BUN/CREAT SERPL: 30.3 (ref 7–25)
BUN/CREAT SERPL: 30.3 (ref 7–25)
CALCIUM SPEC-SCNC: 8.9 MG/DL (ref 8.6–10.5)
CALCIUM SPEC-SCNC: 8.9 MG/DL (ref 8.6–10.5)
CHLORIDE SERPL-SCNC: 97 MMOL/L (ref 98–107)
CHLORIDE SERPL-SCNC: 97 MMOL/L (ref 98–107)
CO2 SERPL-SCNC: 26.1 MMOL/L (ref 22–29)
CO2 SERPL-SCNC: 26.1 MMOL/L (ref 22–29)
CREAT BLD-MCNC: 1.32 MG/DL (ref 0.57–1)
CREAT BLD-MCNC: 1.32 MG/DL (ref 0.57–1)
DEPRECATED RDW RBC AUTO: 46 FL (ref 37–54)
E/E' RATIO: 28
ERYTHROCYTE [DISTWIDTH] IN BLOOD BY AUTOMATED COUNT: 13.7 % (ref 11.7–13)
GFR SERPL CREATININE-BSD FRML MDRD: 38 ML/MIN/1.73
GFR SERPL CREATININE-BSD FRML MDRD: 38 ML/MIN/1.73
GLOBULIN UR ELPH-MCNC: 3.5 GM/DL
GLUCOSE BLD-MCNC: 126 MG/DL (ref 65–99)
GLUCOSE BLD-MCNC: 126 MG/DL (ref 65–99)
HCT VFR BLD AUTO: 45 % (ref 35.6–45.5)
HGB BLD-MCNC: 14.4 G/DL (ref 11.9–15.5)
LEFT ATRIUM VOLUME INDEX: 28 ML/M2
LEFT ATRIUM VOLUME: 45 CM3
LV EF 2D ECHO EST: 28 %
MAGNESIUM SERPL-MCNC: 2.4 MG/DL (ref 1.6–2.4)
MAXIMAL PREDICTED HEART RATE: 136 BPM
MCH RBC QN AUTO: 29.8 PG (ref 26.9–32)
MCHC RBC AUTO-ENTMCNC: 32 G/DL (ref 32.4–36.3)
MCV RBC AUTO: 93.2 FL (ref 80.5–98.2)
NT-PROBNP SERPL-MCNC: ABNORMAL PG/ML (ref 0–1800)
PLATELET # BLD AUTO: 211 10*3/MM3 (ref 140–500)
PMV BLD AUTO: 11.1 FL (ref 6–12)
POTASSIUM BLD-SCNC: 3.6 MMOL/L (ref 3.5–5.2)
POTASSIUM BLD-SCNC: 3.6 MMOL/L (ref 3.5–5.2)
PROT SERPL-MCNC: 7.1 G/DL (ref 6–8.5)
RBC # BLD AUTO: 4.83 10*6/MM3 (ref 3.9–5.2)
SINUS: 3 CM
SODIUM BLD-SCNC: 139 MMOL/L (ref 136–145)
SODIUM BLD-SCNC: 139 MMOL/L (ref 136–145)
STJ: 3 CM
STRESS TARGET HR: 116 BPM
WBC NRBC COR # BLD: 8.41 10*3/MM3 (ref 4.5–10.7)

## 2018-01-30 PROCEDURE — 93306 TTE W/DOPPLER COMPLETE: CPT

## 2018-01-30 PROCEDURE — 25010000002 PERFLUTREN (DEFINITY) 8.476 MG IN SODIUM CHLORIDE 0.9 % 10 ML INJECTION: Performed by: INTERNAL MEDICINE

## 2018-01-30 PROCEDURE — 93000 ELECTROCARDIOGRAM COMPLETE: CPT | Performed by: INTERNAL MEDICINE

## 2018-01-30 PROCEDURE — 83880 ASSAY OF NATRIURETIC PEPTIDE: CPT | Performed by: INTERNAL MEDICINE

## 2018-01-30 PROCEDURE — 25010000002 FUROSEMIDE PER 20 MG: Performed by: INTERNAL MEDICINE

## 2018-01-30 PROCEDURE — 71046 X-RAY EXAM CHEST 2 VIEWS: CPT

## 2018-01-30 PROCEDURE — 80053 COMPREHEN METABOLIC PANEL: CPT | Performed by: INTERNAL MEDICINE

## 2018-01-30 PROCEDURE — 25010000002 ENOXAPARIN PER 10 MG: Performed by: INTERNAL MEDICINE

## 2018-01-30 PROCEDURE — 99205 OFFICE O/P NEW HI 60 MIN: CPT | Performed by: INTERNAL MEDICINE

## 2018-01-30 PROCEDURE — 85027 COMPLETE CBC AUTOMATED: CPT | Performed by: INTERNAL MEDICINE

## 2018-01-30 PROCEDURE — 93306 TTE W/DOPPLER COMPLETE: CPT | Performed by: INTERNAL MEDICINE

## 2018-01-30 PROCEDURE — 83735 ASSAY OF MAGNESIUM: CPT | Performed by: INTERNAL MEDICINE

## 2018-01-30 RX ORDER — POTASSIUM CHLORIDE 750 MG/1
40 CAPSULE, EXTENDED RELEASE ORAL AS NEEDED
Status: DISCONTINUED | OUTPATIENT
Start: 2018-01-30 | End: 2018-02-02 | Stop reason: HOSPADM

## 2018-01-30 RX ORDER — MAGNESIUM SULFATE HEPTAHYDRATE 40 MG/ML
4 INJECTION, SOLUTION INTRAVENOUS AS NEEDED
Status: DISCONTINUED | OUTPATIENT
Start: 2018-01-30 | End: 2018-02-02 | Stop reason: HOSPADM

## 2018-01-30 RX ORDER — ATORVASTATIN CALCIUM 20 MG/1
20 TABLET, FILM COATED ORAL NIGHTLY
Status: DISCONTINUED | OUTPATIENT
Start: 2018-01-30 | End: 2018-02-02 | Stop reason: HOSPADM

## 2018-01-30 RX ORDER — CLONAZEPAM 0.5 MG/1
0.5 TABLET ORAL 2 TIMES DAILY PRN
Status: DISCONTINUED | OUTPATIENT
Start: 2018-01-30 | End: 2018-02-02 | Stop reason: HOSPADM

## 2018-01-30 RX ORDER — CODEINE PHOSPHATE AND GUAIFENESIN 10; 100 MG/5ML; MG/5ML
5 SOLUTION ORAL EVERY 6 HOURS PRN
Status: DISCONTINUED | OUTPATIENT
Start: 2018-01-30 | End: 2018-02-02 | Stop reason: HOSPADM

## 2018-01-30 RX ORDER — POTASSIUM CHLORIDE 1.5 G/1.77G
40 POWDER, FOR SOLUTION ORAL AS NEEDED
Status: DISCONTINUED | OUTPATIENT
Start: 2018-01-30 | End: 2018-02-02 | Stop reason: HOSPADM

## 2018-01-30 RX ORDER — PANTOPRAZOLE SODIUM 40 MG/1
40 TABLET, DELAYED RELEASE ORAL
Status: DISCONTINUED | OUTPATIENT
Start: 2018-01-31 | End: 2018-02-02 | Stop reason: HOSPADM

## 2018-01-30 RX ORDER — MAGNESIUM SULFATE HEPTAHYDRATE 40 MG/ML
2 INJECTION, SOLUTION INTRAVENOUS AS NEEDED
Status: DISCONTINUED | OUTPATIENT
Start: 2018-01-30 | End: 2018-02-02 | Stop reason: HOSPADM

## 2018-01-30 RX ORDER — ACETAMINOPHEN 325 MG/1
650 TABLET ORAL EVERY 6 HOURS PRN
Status: DISCONTINUED | OUTPATIENT
Start: 2018-01-30 | End: 2018-02-02 | Stop reason: HOSPADM

## 2018-01-30 RX ORDER — FUROSEMIDE 10 MG/ML
40 INJECTION INTRAMUSCULAR; INTRAVENOUS EVERY 6 HOURS
Status: COMPLETED | OUTPATIENT
Start: 2018-01-30 | End: 2018-01-31

## 2018-01-30 RX ORDER — SODIUM CHLORIDE 0.9 % (FLUSH) 0.9 %
1-10 SYRINGE (ML) INJECTION AS NEEDED
Status: DISCONTINUED | OUTPATIENT
Start: 2018-01-30 | End: 2018-02-02 | Stop reason: HOSPADM

## 2018-01-30 RX ORDER — ANASTROZOLE 1 MG/1
1 TABLET ORAL DAILY
Status: DISCONTINUED | OUTPATIENT
Start: 2018-01-31 | End: 2018-02-02 | Stop reason: HOSPADM

## 2018-01-30 RX ORDER — CARVEDILOL 3.12 MG/1
3.12 TABLET ORAL EVERY 12 HOURS SCHEDULED
Status: DISCONTINUED | OUTPATIENT
Start: 2018-01-30 | End: 2018-02-02 | Stop reason: HOSPADM

## 2018-01-30 RX ADMIN — PERFLUTREN 1.5 ML: 6.52 INJECTION, SUSPENSION INTRAVENOUS at 13:34

## 2018-01-30 RX ADMIN — FUROSEMIDE 40 MG: 10 INJECTION, SOLUTION INTRAMUSCULAR; INTRAVENOUS at 15:35

## 2018-01-30 RX ADMIN — FUROSEMIDE 40 MG: 10 INJECTION, SOLUTION INTRAMUSCULAR; INTRAVENOUS at 20:47

## 2018-01-30 RX ADMIN — ENOXAPARIN SODIUM 30 MG: 30 INJECTION SUBCUTANEOUS at 15:35

## 2018-01-30 RX ADMIN — ATORVASTATIN CALCIUM 20 MG: 20 TABLET, FILM COATED ORAL at 20:46

## 2018-01-30 RX ADMIN — CARVEDILOL 3.12 MG: 3.12 TABLET, FILM COATED ORAL at 20:46

## 2018-01-30 NOTE — PROGRESS NOTES
PATIENTINFORMATION    Date of Office Visit: 2018  Encounter Provider: Irene Earl MD  Place of Service: [facility] CARDIOLOGY  Patient Name: Radha Luke  : 3/14/1933    Subjective:     Encounter Date:2018      Patient ID: Radha Luke is a 84 y.o. female.      History of Present Illness    This is a pleasant woman who reports she has been ill since before Thanks.  She has felt weak and fatigued.  She had symptoms consistent with a sinus infection and was treated with antibiotics and steroids and actually felt worse.  Since early December she's had swelling in both her legs.  She is short of breath with exertion and when she tries to lay down flat.  She is generally weaker than normal.  She denies chest pain.  She has an occasional quick palpitation.    Review of Systems   Constitution: Positive for malaise/fatigue. Negative for fever, weight gain and weight loss.   HENT: Negative for ear pain, hearing loss, nosebleeds and sore throat.    Eyes: Negative for double vision, pain, vision loss in left eye and vision loss in right eye.   Cardiovascular: Positive for leg swelling.        See history of present illness.   Respiratory: Positive for cough, shortness of breath, snoring and wheezing. Negative for sleep disturbances due to breathing.    Endocrine: Negative for cold intolerance, heat intolerance and polyuria.   Skin: Negative for itching, poor wound healing and rash.   Musculoskeletal: Negative for joint pain, joint swelling and myalgias.   Gastrointestinal: Negative for abdominal pain, diarrhea, hematochezia, nausea and vomiting.   Genitourinary: Negative for hematuria and hesitancy.   Neurological: Negative for numbness, paresthesias and seizures.   Psychiatric/Behavioral: Positive for depression. The patient is nervous/anxious.            ECG 12 Lead  Date/Time: 2018 3:18 PM  Performed by: IRENE EARL  Authorized by: IRENE EARL  "  Comparison: compared with previous ECG from 1/8/2018  Similar to previous ECG  Rhythm: sinus rhythm  BPM: 94  Conduction: left bundle branch block  Clinical impression: abnormal ECG               Objective:     /64 (BP Location: Right arm)  Pulse 94  Ht 154.9 cm (61\")  Wt 62.4 kg (137 lb 8 oz)  BMI 25.98 kg/m2 Body mass index is 25.98 kg/(m^2).     Physical Exam   Constitutional: She appears well-developed.   HENT:   Head: Normocephalic and atraumatic.   Eyes: Conjunctivae and lids are normal. Pupils are equal, round, and reactive to light. Lids are everted and swept, no foreign bodies found.   Neck: Normal range of motion. JVD present. Carotid bruit is not present. No tracheal deviation present. No thyroid mass present.   Cardiovascular: Normal rate, regular rhythm and normal heart sounds.    Pulses:       Dorsalis pedis pulses are 2+ on the right side, and 2+ on the left side.   She has 3+ edema in both lower extremities   Pulmonary/Chest: Effort normal. She has decreased breath sounds in the right lower field and the left lower field. She has rales in the right middle field and the left middle field.   Abdominal: Normal appearance and bowel sounds are normal.   Musculoskeletal: Normal range of motion.   Neurological: She is alert. She has normal strength.   Skin: Skin is warm, dry and intact.   Psychiatric: She has a normal mood and affect. Her behavior is normal.   Vitals reviewed.      Lab Review: I reviewed recent lab work.    Echo 1/30/18:  · The left ventricular cavity is mildly dilated.  · Left ventricular systolic function is moderately decreased. Estimated EF = 28%.  · Moderate mitral valve regurgitation is present  · Mild tricuspid valve regurgitation is present.  · Calculated right ventricular systolic pressure from tricuspid regurgitation is 62.2 mmHg.  · There is moderate calcification of the aortic valve mainly affecting the non, left and right coronary cusp(s).Mild aortic valve " regurgitation is present. There is at least moderate aortic stenosis. The velocities are low due to low cardiac output..          Assessment/Plan:       1. Heart Failure  Assessment  • NYHA class IV - The patient is unable to carry on any physical activity without discomfort. There are symptoms of heart failure at rest.  • The patient was newly diagnosed with heart failure within the past 12 months  • Beta blocker prescribed  • Diuretics prescribed  • The most recent ejection fraction is 28%  • Left ventricular function is severely reduced by qualitative assessment  • The left ventricle was last assessed on 1/30/2018    Subjective/Objective  • The patient reports dyspnea  • Physical exam findings positive for rales, peripheral edema and elevated JVP.   • She is in acute systolic congestive heart failure.  I am going to admit her to the hospital for IV diuretics.  She may need a heart catheterization while she is in there.  She may end up needing a biventricular pacemaker.  2.  Pleural effusion.  I'm going to check a chest x-ray on her.  She may need a thoracentesis.  3.  Hypertension.  Controlled  4.  Hyperlipidemia  5.  Chronic kidney disease stage III.  I will monitor her renal function while she is hospitalized in consult nephrology if needed.  6.  History of breast cancer in 2011  7.  Left bundle branch block  8.  Aortic stenosis.  This is at least moderate if not severe.  9.  Moderate mitral regurgitation.  10.  Mild tricuspid regurgitation with a right ventricular systolic pressure of 62.    Orders Placed This Encounter   Procedures   • ECG 12 Lead     This order was created via procedure documentation   • Adult Transthoracic Echo Complete W/ Cont if Necessary Per Protocol     Standing Status:   Future     Number of Occurrences:   1     Standing Expiration Date:   1/30/2019     Order Specific Question:   Reason for exam?     Answer:   Dyspnea     Order Specific Question:   Reason for exam?     Answer:   Heart  Failure, Cardiomyopathy, or Sytemic or Pulmonary Hypertension     Order Specific Question:   Hypertension, Heart Failure, or Cardiomyopathy specification?     Answer:   Initial Evaluation of Heart Failure      Luke, Radha DELATORRE   Home Medication Instructions MELVA:    Printed on:01/30/18 0732   Medication Information                      anastrozole (ARIMIDEX) 1 MG tablet  TAKE 1 TABLET EVERY DAY             carvedilol (COREG) 3.125 MG tablet  1 by mouth twice a day             clonazePAM (KlonoPIN) 0.5 MG tablet  Take  by mouth.             furosemide (LASIX) 20 MG tablet  1 by mouth every morning and one each afternoon for swelling             meclizine (ANTIVERT) 25 MG tablet  Take 1 tablet by mouth 3 (three) times a day as needed for dizziness.             pantoprazole (PROTONIX) 40 MG EC tablet  TAKE 1 TABLET EVERY DAY             simvastatin (ZOCOR) 20 MG tablet  TAKE 1 TABLET EVERY DAY             triamterene-hydrochlorothiazide (DYAZIDE) 37.5-25 MG per capsule  Take  by mouth.             VIRTUSSIN A/C 100-10 MG/5ML liquid  TK 5ML PO Q 4 H PRF COUGH                        Irene Earl MD  01/30/18  3:20 PM

## 2018-01-31 ENCOUNTER — APPOINTMENT (OUTPATIENT)
Dept: ULTRASOUND IMAGING | Facility: HOSPITAL | Age: 83
End: 2018-01-31

## 2018-01-31 LAB
ALBUMIN FLD-MCNC: 1.1 G/DL
ANION GAP SERPL CALCULATED.3IONS-SCNC: 13.9 MMOL/L
APPEARANCE FLD: CLEAR
APTT PPP: 26.7 SECONDS (ref 22.7–35.4)
BUN BLD-MCNC: 36 MG/DL (ref 8–23)
BUN/CREAT SERPL: 26.5 (ref 7–25)
CALCIUM SPEC-SCNC: 8.5 MG/DL (ref 8.6–10.5)
CHLORIDE SERPL-SCNC: 100 MMOL/L (ref 98–107)
CO2 SERPL-SCNC: 27.1 MMOL/L (ref 22–29)
COLOR FLD: YELLOW
CREAT BLD-MCNC: 1.36 MG/DL (ref 0.57–1)
GFR SERPL CREATININE-BSD FRML MDRD: 37 ML/MIN/1.73
GLUCOSE BLD-MCNC: 116 MG/DL (ref 65–99)
GLUCOSE FLD-MCNC: 174 MG/DL
INR PPP: 1.38 (ref 0.9–1.1)
LDH FLD-CCNC: 87 U/L
LYMPHOCYTES NFR FLD MANUAL: 86 %
MAGNESIUM SERPL-MCNC: 2.2 MG/DL (ref 1.6–2.4)
MONOCYTES NFR FLD: 2 %
MONOS+MACROS NFR FLD: 2 %
NEUTROPHILS NFR FLD MANUAL: 10 %
PH FLD: 7 [PH]
POTASSIUM BLD-SCNC: 3.2 MMOL/L (ref 3.5–5.2)
POTASSIUM BLD-SCNC: 4.8 MMOL/L (ref 3.5–5.2)
PROT FLD-MCNC: 1.9 G/DL
PROTHROMBIN TIME: 16.5 SECONDS (ref 11.7–14.2)
RBC # FLD AUTO: 148 /MM3
SODIUM BLD-SCNC: 141 MMOL/L (ref 136–145)
WBC # FLD: 55 /MM3

## 2018-01-31 PROCEDURE — 84132 ASSAY OF SERUM POTASSIUM: CPT | Performed by: INTERNAL MEDICINE

## 2018-01-31 PROCEDURE — 85730 THROMBOPLASTIN TIME PARTIAL: CPT | Performed by: INTERNAL MEDICINE

## 2018-01-31 PROCEDURE — 76942 ECHO GUIDE FOR BIOPSY: CPT

## 2018-01-31 PROCEDURE — 99232 SBSQ HOSP IP/OBS MODERATE 35: CPT | Performed by: INTERNAL MEDICINE

## 2018-01-31 PROCEDURE — 84311 SPECTROPHOTOMETRY: CPT | Performed by: INTERNAL MEDICINE

## 2018-01-31 PROCEDURE — 84478 ASSAY OF TRIGLYCERIDES: CPT | Performed by: INTERNAL MEDICINE

## 2018-01-31 PROCEDURE — 87116 MYCOBACTERIA CULTURE: CPT | Performed by: INTERNAL MEDICINE

## 2018-01-31 PROCEDURE — 83615 LACTATE (LD) (LDH) ENZYME: CPT | Performed by: INTERNAL MEDICINE

## 2018-01-31 PROCEDURE — 83735 ASSAY OF MAGNESIUM: CPT | Performed by: INTERNAL MEDICINE

## 2018-01-31 PROCEDURE — 85610 PROTHROMBIN TIME: CPT | Performed by: INTERNAL MEDICINE

## 2018-01-31 PROCEDURE — 80048 BASIC METABOLIC PNL TOTAL CA: CPT | Performed by: INTERNAL MEDICINE

## 2018-01-31 PROCEDURE — 25010000002 FUROSEMIDE PER 20 MG: Performed by: INTERNAL MEDICINE

## 2018-01-31 PROCEDURE — 83986 ASSAY PH BODY FLUID NOS: CPT | Performed by: INTERNAL MEDICINE

## 2018-01-31 PROCEDURE — 84157 ASSAY OF PROTEIN OTHER: CPT | Performed by: INTERNAL MEDICINE

## 2018-01-31 PROCEDURE — 0W993ZZ DRAINAGE OF RIGHT PLEURAL CAVITY, PERCUTANEOUS APPROACH: ICD-10-PCS | Performed by: RADIOLOGY

## 2018-01-31 PROCEDURE — 88112 CYTOPATH CELL ENHANCE TECH: CPT | Performed by: INTERNAL MEDICINE

## 2018-01-31 PROCEDURE — 89051 BODY FLUID CELL COUNT: CPT | Performed by: INTERNAL MEDICINE

## 2018-01-31 PROCEDURE — 82945 GLUCOSE OTHER FLUID: CPT | Performed by: INTERNAL MEDICINE

## 2018-01-31 PROCEDURE — 88305 TISSUE EXAM BY PATHOLOGIST: CPT | Performed by: INTERNAL MEDICINE

## 2018-01-31 PROCEDURE — 82042 OTHER SOURCE ALBUMIN QUAN EA: CPT | Performed by: INTERNAL MEDICINE

## 2018-01-31 PROCEDURE — 87206 SMEAR FLUORESCENT/ACID STAI: CPT | Performed by: INTERNAL MEDICINE

## 2018-01-31 RX ORDER — FUROSEMIDE 10 MG/ML
40 INJECTION INTRAMUSCULAR; INTRAVENOUS EVERY 6 HOURS
Status: COMPLETED | OUTPATIENT
Start: 2018-01-31 | End: 2018-01-31

## 2018-01-31 RX ORDER — LIDOCAINE HYDROCHLORIDE 10 MG/ML
20 INJECTION, SOLUTION INFILTRATION; PERINEURAL ONCE
Status: COMPLETED | OUTPATIENT
Start: 2018-01-31 | End: 2018-01-31

## 2018-01-31 RX ADMIN — FUROSEMIDE 40 MG: 10 INJECTION, SOLUTION INTRAMUSCULAR; INTRAVENOUS at 12:36

## 2018-01-31 RX ADMIN — LIDOCAINE HYDROCHLORIDE 10 ML: 10 INJECTION, SOLUTION INFILTRATION; PERINEURAL at 14:16

## 2018-01-31 RX ADMIN — PANTOPRAZOLE SODIUM 40 MG: 40 TABLET, DELAYED RELEASE ORAL at 06:28

## 2018-01-31 RX ADMIN — FUROSEMIDE 40 MG: 10 INJECTION, SOLUTION INTRAMUSCULAR; INTRAVENOUS at 18:23

## 2018-01-31 RX ADMIN — CARVEDILOL 3.12 MG: 3.12 TABLET, FILM COATED ORAL at 10:23

## 2018-01-31 RX ADMIN — CARVEDILOL 3.12 MG: 3.12 TABLET, FILM COATED ORAL at 20:32

## 2018-01-31 RX ADMIN — ATORVASTATIN CALCIUM 20 MG: 20 TABLET, FILM COATED ORAL at 20:32

## 2018-01-31 RX ADMIN — POTASSIUM CHLORIDE 40 MEQ: 750 CAPSULE, EXTENDED RELEASE ORAL at 10:23

## 2018-01-31 RX ADMIN — ANASTROZOLE 1 MG: 1 TABLET, COATED ORAL at 10:24

## 2018-01-31 RX ADMIN — POTASSIUM CHLORIDE 40 MEQ: 750 CAPSULE, EXTENDED RELEASE ORAL at 17:02

## 2018-01-31 RX ADMIN — FUROSEMIDE 40 MG: 10 INJECTION, SOLUTION INTRAMUSCULAR; INTRAVENOUS at 06:28

## 2018-02-01 ENCOUNTER — TELEPHONE (OUTPATIENT)
Dept: CARDIOLOGY | Facility: CLINIC | Age: 83
End: 2018-02-01

## 2018-02-01 ENCOUNTER — APPOINTMENT (OUTPATIENT)
Dept: ULTRASOUND IMAGING | Facility: HOSPITAL | Age: 83
End: 2018-02-01
Attending: INTERNAL MEDICINE

## 2018-02-01 LAB
ANION GAP SERPL CALCULATED.3IONS-SCNC: 16.5 MMOL/L
BUN BLD-MCNC: 46 MG/DL (ref 8–23)
BUN/CREAT SERPL: 29.7 (ref 7–25)
CALCIUM SPEC-SCNC: 9.1 MG/DL (ref 8.6–10.5)
CHLORIDE SERPL-SCNC: 103 MMOL/L (ref 98–107)
CO2 SERPL-SCNC: 24.5 MMOL/L (ref 22–29)
CREAT BLD-MCNC: 1.55 MG/DL (ref 0.57–1)
GFR SERPL CREATININE-BSD FRML MDRD: 32 ML/MIN/1.73
GLUCOSE BLD-MCNC: 132 MG/DL (ref 65–99)
MAGNESIUM SERPL-MCNC: 2.2 MG/DL (ref 1.6–2.4)
POTASSIUM BLD-SCNC: 4.4 MMOL/L (ref 3.5–5.2)
REF LAB TEST METHOD: NORMAL
SODIUM BLD-SCNC: 144 MMOL/L (ref 136–145)

## 2018-02-01 PROCEDURE — 97110 THERAPEUTIC EXERCISES: CPT

## 2018-02-01 PROCEDURE — 76942 ECHO GUIDE FOR BIOPSY: CPT

## 2018-02-01 PROCEDURE — 97161 PT EVAL LOW COMPLEX 20 MIN: CPT

## 2018-02-01 PROCEDURE — 80048 BASIC METABOLIC PNL TOTAL CA: CPT | Performed by: INTERNAL MEDICINE

## 2018-02-01 PROCEDURE — 94799 UNLISTED PULMONARY SVC/PX: CPT

## 2018-02-01 PROCEDURE — 99233 SBSQ HOSP IP/OBS HIGH 50: CPT | Performed by: INTERNAL MEDICINE

## 2018-02-01 PROCEDURE — 83735 ASSAY OF MAGNESIUM: CPT | Performed by: INTERNAL MEDICINE

## 2018-02-01 RX ORDER — LIDOCAINE HYDROCHLORIDE 10 MG/ML
20 INJECTION, SOLUTION INFILTRATION; PERINEURAL ONCE
Status: COMPLETED | OUTPATIENT
Start: 2018-02-01 | End: 2018-02-01

## 2018-02-01 RX ADMIN — ATORVASTATIN CALCIUM 20 MG: 20 TABLET, FILM COATED ORAL at 20:36

## 2018-02-01 RX ADMIN — CARVEDILOL 3.12 MG: 3.12 TABLET, FILM COATED ORAL at 20:36

## 2018-02-01 RX ADMIN — ANASTROZOLE 1 MG: 1 TABLET, COATED ORAL at 10:05

## 2018-02-01 RX ADMIN — LIDOCAINE HYDROCHLORIDE 10 ML: 10 INJECTION, SOLUTION INFILTRATION; PERINEURAL at 13:40

## 2018-02-01 RX ADMIN — PANTOPRAZOLE SODIUM 40 MG: 40 TABLET, DELAYED RELEASE ORAL at 07:40

## 2018-02-01 RX ADMIN — CARVEDILOL 3.12 MG: 3.12 TABLET, FILM COATED ORAL at 10:05

## 2018-02-01 NOTE — THERAPY EVALUATION
Acute Care - Physical Therapy Initial Evaluation  [facility]     Patient Name: Radha Luke  : 3/14/1933  MRN: [id number]  Today's Date: 2018   Onset of Illness/Injury or Date of Surgery Date: 18  Date of Referral to PT: 18  Referring Physician: Irene Chase      Admit Date: 2018     Visit Dx:    ICD-10-CM ICD-9-CM   1. Muscle weakness (generalized) M62.81 728.87     Patient Active Problem List   Diagnosis   • Disorder of aorta   • Chronic kidney disease   • Diverticulosis of intestine   • Gastroesophageal reflux disease with esophagitis   • Hyperlipidemia   • Hypertension   • Osteopenia   • Health care maintenance   • History of left breast cancer   • Malignant neoplasm of overlapping sites of left female breast   • Pain of right sacroiliac joint   • Impaired glucose tolerance   • Cough   • Left bundle branch block   • Congestive heart failure due to valvular disease   • CHF exacerbation   • Acute congestive heart failure     Past Medical History:   Diagnosis Date   • Arthritis    • Breast cancer     Locally recurrent left breast   • Cough    • Dizziness    • Drug therapy    • Edema    • GERD (gastroesophageal reflux disease)    • History of breast cancer     LEFT   • Hyperlipidemia    • Hypertension    • LBBB (left bundle branch block)    • Osteopenia      Past Surgical History:   Procedure Laterality Date   • APPENDECTOMY     • BREAST BIOPSY     • BREAST LUMPECTOMY Left    • COLONOSCOPY N/A 2016    Procedure: COLONOSCOPY;  Surgeon: Israel Vance MD;  Location: [facility] ENDOSCOPY;  Service:    • HYSTERECTOMY     • MASTECTOMY Left           PT ASSESSMENT (last 72 hours)      PT Evaluation       18 1116 18 1513    Rehab Evaluation    Document Type evaluation  -MS     Subjective Information agree to therapy;complains of;fatigue;weakness  -MS     Patient Effort, Rehab Treatment good  -MS     Symptoms Noted Comment Pt. reports feeling fatigued and weak  "with upright mobility this AM.  Pt. does report it feels \"good\" to be up and ambulating however.  -MS     General Information    Onset of Illness/Injury or Date of Surgery Date 01/30/18  -MS     Referring Physician Irene Chase  -MS     Pertinent History Of Current Problem Pt. admitted with CHF exacerbation  -MS     Precautions/Limitations fall precautions  -MS     Prior Level of Function independent:  -MS     Equipment Currently Used at Home none  -MS none  -HB    Plans/Goals Discussed With patient;agreed upon  -MS     Risks Reviewed patient:  -MS     Benefits Reviewed patient:  -MS     Barriers to Rehab none identified  -MS     Living Environment    Lives With  spouse  -HB    Living Arrangements  apartment  -HB    Home Accessibility  no concerns  -HB    Stair Railings at Home  none  -HB    Type of Financial/Environmental Concern  none  -HB    Transportation Available  car;family or friend will provide  -HB    Clinical Impression    Date of Referral to PT 02/01/18  -MS     Criteria for Skilled Therapeutic Interventions Met treatment indicated  -MS     Rehab Potential good, to achieve stated therapy goals  -MS     Pain Assessment    Pain Assessment No/denies pain   No verbal/visual signs of pain.  -MS     Cognitive Assessment/Intervention    Current Cognitive/Communication Assessment functional  -MS     Orientation Status oriented x 4  -MS     Follows Commands/Answers Questions 100% of the time  -MS     Personal Safety WNL/WFL  -MS     Personal Safety Interventions fall prevention program maintained;gait belt;nonskid shoes/slippers when out of bed;supervised activity  -MS     ROM (Range of Motion)    General ROM no range of motion deficits identified  -MS     MMT (Manual Muscle Testing)    General MMT Assessment --   BUE/LE MMT (3+/5)  -MS     Bed Mobility, Assessment/Treatment    Bed Mob, Supine to Sit, [place] contact guard assist  -MS     Transfer Assessment/Treatment    Transfers, Sit-Stand " [place] contact guard assist  -MS     Transfers, Stand-Sit [place] contact guard assist  -MS     Gait Assessment/Treatment    Gait, [place] Level contact guard assist  -MS     Gait, Distance (Feet) 150  -MS     Gait, Gait Deviations lennox decreased;narrow base;step length decreased  -MS     Gait, Safety Issues step length decreased  -MS     Gait, Comment Limited in gait distance due to overall fatigue, weakness this AM.   -MS     Therapy Exercises    Bilateral Lower Extremities AROM:;10 reps;sitting;ankle pumps/circles;hip flexion;LAQ   Encouraged pt. to continue ther. ex. on her own.  -MS     Positioning and Restraints    Pre-Treatment Position in bed  -MS     Post Treatment Position chair  -MS     In Chair notified nsg;sitting;call light within reach;encouraged to call for assist;with nsg   All lines intact. V.S.S.  -MS       01/30/18 1512       Muscle Tone Assessment    Muscle Tone Assessment Bilateral Upper Extremities;Bilateral Lower Extremities  -HB     Bilateral Upper Extremities Muscle Tone Assessment mildly decreased tone  -HB     Bilateral Lower Extremities Muscle Tone Assessment mildly decreased tone  -HB       User Key  (r) = Recorded By, (t) = Taken By, (c) = Cosigned By    Initials Name Provider Type    HB Suzette Prince, RN Registered Nurse    MS Jorge L Ortiz, PT Physical Therapist          Physical Therapy Education     Title: PT OT SLP Therapies (Done)     Topic: Physical Therapy (Done)     Point: Mobility training (Done)    Learning Progress Summary    Learner Readiness Method Response Comment Documented by Status   Patient Acceptance ALTON HAYS NR  MS 02/01/18 1120 Done               Point: Home exercise program (Done)    Learning Progress Summary    Learner Readiness Method Response Comment Documented by Status   Patient Acceptance ALTON HAYS NR  MS 02/01/18 1120 Done               Point: Body mechanics (Done)    Learning Progress Summary    Learner Readiness Method Response  Comment Documented by Status   Patient Acceptance ALTON HAYS NR  MS 02/01/18 1120 Done               Point: Precautions (Done)    Learning Progress Summary    Learner Readiness Method Response Comment Documented by Status   Patient Acceptance ALTON HAYS NR  MS 02/01/18 1120 Done                      User Key     Initials Effective Dates Name Provider Type Discipline    MS 12/01/15 -  Jorge L Ortiz, PT Physical Therapist PT                PT Recommendation and Plan  Anticipated Discharge Disposition: home with assist, home with home health  Planned Therapy Interventions: balance training, bed mobility training, gait training, home exercise program, patient/family education, postural re-education, strengthening, transfer training  PT Frequency: daily  Plan of Care Review  Plan Of Care Reviewed With: patient  Outcome Summary/Follow up Plan: Pt. will benefit from skilled inpt. P.T. to address her functional deficits and to assist pt. in regaining her independence with functional mobility.          IP PT Goals       02/01/18 1120          Bed Mobility PT LTG    Bed Mobility PT LTG, Date Established 02/01/18  -MS      Bed Mobility PT LTG, Time to Achieve 5 - 7 days  -MS      Bed Mobility PT LTG, Activity Type all bed mobility  -MS      Bed Mobility PT LTG, [place] Level independent  -MS      Transfer Training PT LTG    Transfer Training PT LTG, Date Established 02/01/18  -MS      Transfer Training PT LTG, Time to Achieve 5 - 7 days  -MS      Transfer Training PT LTG, Activity Type all transfers  -MS      Transfer Training PT LTG, [place] Level independent  -MS      Gait Training PT LTG    Gait Training Goal PT LTG, Date Established 02/01/18  -MS      Gait Training Goal PT LTG, Time to Achieve 5 - 7 days  -MS      Gait Training Goal PT LTG, [place] Level independent  -MS      Gait Training Goal PT LTG, Distance to Achieve 300 feet  -MS        User Key  (r) = Recorded By, (t) = Taken By, (c) = Cosigned By     Initials Name Provider Type    MS Jorge L ALFREDO Angel, PT Physical Therapist                Outcome Measures       02/01/18 1100          How much help from another person do you currently need...    Turning from your back to your side while in flat bed without using bedrails? 4  -MS      Moving from lying on back to sitting on the side of a flat bed without bedrails? 3  -MS      Moving to and from a bed to a chair (including a wheelchair)? 3  -MS      Standing up from a chair using your arms (e.g., wheelchair, bedside chair)? 3  -MS      Climbing 3-5 steps with a railing? 3  -MS      To walk in hospital room? 3  -MS      AM-PAC 6 Clicks Score 19  -MS      Functional Assessment    Outcome Measure Options AM-PAC 6 Clicks Basic Mobility (PT)  -MS        User Key  (r) = Recorded By, (t) = Taken By, (c) = Cosigned By    Initials Name Provider Type    MS Jorge L ALFREDO Angel PT Physical Therapist           Time Calculation:         PT Charges       02/01/18 1122          Time Calculation    Start Time 1052  -MS      Stop Time 1107  -MS      Time Calculation (min) 15 min  -MS      PT Received On 02/01/18  -MS      PT - Next Appointment 02/02/18  -MS      PT Goal Re-Cert Due Date 02/08/18  -MS        User Key  (r) = Recorded By, (t) = Taken By, (c) = Cosigned By    Initials Name Provider Type    MS Jorge L JUNI RICARDO Ortiz Physical Therapist          Therapy Charges for Today     Code Description Service Date Service Provider Modifiers Qty    28771640245 HC PT EVAL LOW COMPLEXITY 1 2/1/2018 Jorge L Ortiz, PT GP 1    91395911126 HC PT THER PROC EA 15 MIN 2/1/2018 Jorge L Ortiz, PT GP 1          PT G-Codes  Outcome Measure Options: AM-PAC 6 Clicks Basic Mobility (PT)      Jorge L Ortiz, PT  2/1/2018

## 2018-02-01 NOTE — PROGRESS NOTES
"Patient Name: Radha Luke  :3/14/1933  84 y.o.      Patient Care Team:  Blas Urban MD as PCP - General  Blas Urban MD as PCP - Family Medicine  Blas Urban MD as PCP - Claims Attributed  Yves Lopez II, MD as Consulting Physician (Hematology and Oncology)  Blas Urban MD as Referring Physician (Internal Medicine)  Chrissy Pang, RN as Care Coordinator (Population Health)    Interval History:   Status post right-sided thoracentesis with 1200 cc out    Subjective:  Following for acute systolic congestive heart failure     Objective   Vital Signs  Temp:  [97.8 °F (36.6 °C)-98.7 °F (37.1 °C)] 98.7 °F (37.1 °C)  Heart Rate:  [72-85] 72  Resp:  [16-18] 16  BP: (101-131)/(61-68) 122/62    Intake/Output Summary (Last 24 hours) at 18 0859  Last data filed at 18 1435   Gross per 24 hour   Intake                0 ml   Output             1450 ml   Net            -1450 ml     Flowsheet Rows         First Filed Value    Admission Height  154.9 cm (61\") Documented at 2018 1511    Admission Weight  60.8 kg (134 lb) Documented at 2018 1511          Physical Exam:   General Appearance:    Alert, cooperative, in no acute distress   Lungs:     Clear to auscultation.  Normal respiratory effort and rate.      Heart:    Regular rhythm and normal rate, normal S1 and S2 + systolic murmur.     Chest Wall:    No abnormalities observed   Abdomen:     Soft, nontender, positive bowel sounds.     Extremities:   no cyanosis, clubbing or edema.  No marked joint deformities.  Adequate musculoskeletal strength.       Results Review:      Results from last 7 days  Lab Units 18  0640   SODIUM mmol/L 144   POTASSIUM mmol/L 4.4   CHLORIDE mmol/L 103   CO2 mmol/L 24.5   BUN mg/dL 46*   CREATININE mg/dL 1.55*   GLUCOSE mg/dL 132*   CALCIUM mg/dL 9.1           Results from last 7 days  Lab Units 18  1529   WBC 10*3/mm3 8.41   HEMOGLOBIN g/dL 14.4   HEMATOCRIT % 45.0   PLATELETS 10*3/mm3 211 "       Results from last 7 days  Lab Units 01/31/18  1047   INR  1.38*   APTT seconds 26.7           Results from last 7 days  Lab Units 02/01/18  0411   MAGNESIUM mg/dL 2.2             Medication Review:     anastrozole 1 mg Oral Daily   atorvastatin 20 mg Oral Nightly   carvedilol 3.125 mg Oral Q12H   enoxaparin 30 mg Subcutaneous Q24H   pantoprazole 40 mg Oral Q AM          Pharmacy to Dose enoxaparin (LOVENOX)        Assessment/Plan     1.Acute systolic congestive heart failure.  This is a new diagnosis for her.  She is on a low dose of carvedilol.  Her ejection fraction is 28% echo on January 30, 2018.  She may need a heart catheterization while she is in here.  She may end up needing a biventricular pacemaker.  2.  Pleural effusion.  Status post right-sided thoracentesis on January 31 with 1200 cc out.  3.  Hypertension.  Controlled  4.  Hyperlipidemia  5.  Chronic kidney disease stage III.    6.  History of breast cancer in 2011  7.  Left bundle branch block  8.  Aortic stenosis.  This is at least moderate if not severe.  9.  Moderate mitral regurgitation.  10.  Mild tricuspid regurgitation with a right ventricular systolic pressure of 62.  11.  Hypokalemia.  Replaced    - I am going to hold off on diuretics today.  Her BUN and creatinine have climbed.  - Left sided thoracentesis today  - Recheck labs in the morning and likely start oral diuretics.  - possibly home tomorrow with  and home PT  - F/U with Char early next week with a BMP from   - leaning toward a PET stress as an out pt vs cath  - maybe can start Entresto as an outpatient    Irene Earl MD, [facility]  02/01/18  8:59 AM

## 2018-02-01 NOTE — TELEPHONE ENCOUNTER
Paulina is the Pts daughter, she lives out of [place] and is on the Pt's PRINCESS to have info released to her. She would like an update on her mother, she can be reached at any time on the below number except between 110-145 our time.    [phone]

## 2018-02-01 NOTE — PLAN OF CARE
Problem: Patient Care Overview (Adult)  Goal: Plan of Care Review  Outcome: Ongoing (interventions implemented as appropriate)   02/01/18 7576   Coping/Psychosocial Response Interventions   Plan Of Care Reviewed With patient;spouse   Patient Care Overview   Progress improving   Outcome Evaluation   Outcome Summary/Follow up Plan Vitals stable. Left sided thoracentesis today - 750ml removed. Worked with skilled PT. Pt instructed on IS. possible d/c tomorrow. Will continue to monitor.      Goal: Adult Individualization and Mutuality  Outcome: Ongoing (interventions implemented as appropriate)    Goal: Discharge Needs Assessment  Outcome: Ongoing (interventions implemented as appropriate)      Problem: Cardiac: Heart Failure (Adult)  Goal: Signs and Symptoms of Listed Potential Problems Will be Absent or Manageable (Cardiac: Heart Failure)  Outcome: Ongoing (interventions implemented as appropriate)      Problem: Fluid Volume Excess (Adult,Obstetrics,Pediatric)  Goal: Stable Weight  Outcome: Ongoing (interventions implemented as appropriate)    Goal: Balanced Intake/Output  Outcome: Ongoing (interventions implemented as appropriate)

## 2018-02-01 NOTE — DISCHARGE PLACEMENT REQUEST
"Radha Luke (84 y.o. Female)     Date of Birth Social Security Number Address Home Phone MRN    03/14/1933  [address] [place], [place] [address] [phone] [id number]    [employer] Marital Status          Unknown        Admission Date Admission Type Admitting Provider Attending Provider Department, Room/Bed    1/30/18 Urgent Irene Earl MD Lash, Jennifer A, MD [address], 466/1    Discharge Date Discharge Disposition Discharge Destination                      Attending Provider: Irene Earl MD     Allergies:  Levofloxacin, Penicillins    Isolation:  None   Infection:  None   Code Status:  FULL    Ht:  154.9 cm (61\")   Wt:  59 kg (130 lb)    Admission Cmt:  None   Principal Problem:  None                Active Insurance as of 1/30/2018     Primary Coverage     Payor Plan Insurance Group Employer/Plan Group    MEDICARE MEDICARE A & B      Payor Plan Address Payor Plan Phone Number Effective From Effective To    [address] [phone] 3/1/1998     [place] [address]       Subscriber Name Subscriber Birth Date Member ID       RADHA LUKE 3/14/1933 [id number]           Secondary Coverage     Payor Plan Insurance Group Employer/Plan Group    AAR MED SUPP AAR HEALTH CARE OPTIONS      Payor Plan Address Payor Plan Phone Number Effective From Effective To    [facility] [phone] 1/1/2015     [address]       [place] [address]       Subscriber Name Subscriber Birth Date Member ID       RADHA LUKE 3/14/1933 [id number]                 Emergency Contacts      (Rel.) Home Phone Work Phone Mobile Phone    Yves Luke (Spouse) [phone] -- [phone]              "

## 2018-02-01 NOTE — PROGRESS NOTES
Discharge Planning Assessment  [facility]     Patient Name: Radha Luke  MRN: [id number]  Today's Date: 2/1/2018    Admit Date: 1/30/2018          Discharge Needs Assessment       02/01/18 1605    Living Environment    Lives With spouse    Living Arrangements independent living facility    Home Accessibility no concerns    Type of Financial/Environmental Concern none    Transportation Available car;family or friend will provide    Living Environment    Quality Of Family Relationships supportive    Able to Return to Prior Living Arrangements yes    Discharge Needs Assessment    Concerns To Be Addressed discharge planning concerns    Readmission Within The Last 30 Days no previous admission in last 30 days    Equipment Currently Used at Home none    Equipment Needed After Discharge none            Discharge Plan       02/01/18 1606    Case Management/Social Work Plan    Plan [facility]     Additional Comments Spoke with patient and spouse Yves Luke [phone] at bedside, [facility] her , patient stated she is independent with ADL's. [facility] discussed home health to follow patient for CHF as per Dr Earl request. Patient would like a referral called to [facility] home health. Call placed to Joanie/BRENDA [phone] and placed in her in basket. Call place to Jeanie/Rupa and placed in her in basket to follow for the [facility]. Patient will return home to the [facility] at discharge. Zuleika Rai RN        Discharge Placement     Facility/Agency Request Status Selected? Address Phone Number Fax Number    [facility] Pending - Request Sent     [address] [address] [phone]    [facility] Pending - Request Sent     [address] [place] [facility] [address] [phone]                Demographic Summary       02/01/18 1604    Referral  Information    Arrived From home or self-care    Primary Care Physician Information    Name Dr Blas Urban      02/01/18 1558    Referral Information    Admission Type inpatient            Functional Status       02/01/18 1601    Functional Status Current    Ambulation 2-->assistive person    Transferring 2-->assistive person    Toileting 2-->assistive person    Bathing 2-->assistive person    Dressing 2-->assistive person    Eating 0-->independent    Communication 0-->understands/communicates without difficulty    Swallowing (if score 2 or more for any item, consult Rehab Services) 0-->swallows foods/liquids without difficulty    Functional Status Prior    Ambulation 0-->independent    Transferring 0-->independent    Toileting 0-->independent    Bathing 0-->independent    Dressing 0-->independent    Eating 0-->independent    Communication 0-->understands/communicates without difficulty    Swallowing 0-->swallows foods/liquids without difficulty    IADL    Medications independent    Meal Preparation independent    Housekeeping independent    Laundry independent    Shopping independent    Oral Care independent            Psychosocial     None            Abuse/Neglect     None            Legal     None            Substance Abuse     None            Patient Forms     None          Zuleika Rai RN

## 2018-02-01 NOTE — PLAN OF CARE
Problem: Patient Care Overview (Adult)  Goal: Plan of Care Review   02/01/18 1120   Coping/Psychosocial Response Interventions   Plan Of Care Reviewed With patient   Outcome Evaluation   Outcome Summary/Follow up Plan Pt. will benefit from skilled inpt. P.T. to address her functional deficits and to assist pt. in regaining her independence with functional mobility.       Problem: Inpatient Physical Therapy  Goal: Bed Mobility Goal LTG- PT   02/01/18 1120   Bed Mobility PT LTG   Bed Mobility PT LTG, Date Established 02/01/18   Bed Mobility PT LTG, Time to Achieve 5 - 7 days   Bed Mobility PT LTG, Activity Type all bed mobility   Bed Mobility PT LTG, [place] Level independent     Goal: Transfer Training Goal 1 LTG- PT   02/01/18 1120   Transfer Training PT LTG   Transfer Training PT LTG, Date Established 02/01/18   Transfer Training PT LTG, Time to Achieve 5 - 7 days   Transfer Training PT LTG, Activity Type all transfers   Transfer Training PT LTG, [place] Level independent     Goal: Gait Training Goal LTG- PT   02/01/18 1120   Gait Training PT LTG   Gait Training Goal PT LTG, Date Established 02/01/18   Gait Training Goal PT LTG, Time to Achieve 5 - 7 days   Gait Training Goal PT LTG, [place] Level independent   Gait Training Goal PT LTG, Distance to Achieve 300 feet

## 2018-02-01 NOTE — PLAN OF CARE
Problem: Patient Care Overview (Adult)  Goal: Plan of Care Review  Outcome: Ongoing (interventions implemented as appropriate)   02/01/18 0748   Coping/Psychosocial Response Interventions   Plan Of Care Reviewed With patient   Patient Care Overview   Progress improving   Outcome Evaluation   Outcome Summary/Follow up Plan VSS: no c/o pain or nausea; no s/s of distress       Problem: Cardiac: Heart Failure (Adult)  Goal: Signs and Symptoms of Listed Potential Problems Will be Absent or Manageable (Cardiac: Heart Failure)  Outcome: Ongoing (interventions implemented as appropriate)      Problem: Fluid Volume Excess (Adult,Obstetrics,Pediatric)  Goal: Stable Weight  Outcome: Ongoing (interventions implemented as appropriate)    Goal: Balanced Intake/Output  Outcome: Ongoing (interventions implemented as appropriate)

## 2018-02-02 VITALS
TEMPERATURE: 97.4 F | OXYGEN SATURATION: 96 % | BODY MASS INDEX: 23.75 KG/M2 | WEIGHT: 125.8 LBS | RESPIRATION RATE: 16 BRPM | SYSTOLIC BLOOD PRESSURE: 126 MMHG | HEIGHT: 61 IN | DIASTOLIC BLOOD PRESSURE: 60 MMHG | HEART RATE: 78 BPM

## 2018-02-02 LAB
ANION GAP SERPL CALCULATED.3IONS-SCNC: 14.6 MMOL/L
BUN BLD-MCNC: 52 MG/DL (ref 8–23)
BUN/CREAT SERPL: 31.7 (ref 7–25)
CALCIUM SPEC-SCNC: 8.8 MG/DL (ref 8.6–10.5)
CHLORIDE SERPL-SCNC: 103 MMOL/L (ref 98–107)
CHOLEST FLD-MCNC: 16 MG/DL
CO2 SERPL-SCNC: 24.4 MMOL/L (ref 22–29)
CREAT BLD-MCNC: 1.64 MG/DL (ref 0.57–1)
CYTO UR: NORMAL
GFR SERPL CREATININE-BSD FRML MDRD: 30 ML/MIN/1.73
GLUCOSE BLD-MCNC: 136 MG/DL (ref 65–99)
LAB AP CASE REPORT: NORMAL
Lab: NORMAL
MAGNESIUM SERPL-MCNC: 2.2 MG/DL (ref 1.6–2.4)
NT-PROBNP SERPL-MCNC: ABNORMAL PG/ML (ref 0–1800)
PATH REPORT.FINAL DX SPEC: NORMAL
PATH REPORT.GROSS SPEC: NORMAL
POTASSIUM BLD-SCNC: 4.1 MMOL/L (ref 3.5–5.2)
SODIUM BLD-SCNC: 142 MMOL/L (ref 136–145)

## 2018-02-02 PROCEDURE — 83880 ASSAY OF NATRIURETIC PEPTIDE: CPT | Performed by: INTERNAL MEDICINE

## 2018-02-02 PROCEDURE — 0W9B3ZZ DRAINAGE OF LEFT PLEURAL CAVITY, PERCUTANEOUS APPROACH: ICD-10-PCS | Performed by: RADIOLOGY

## 2018-02-02 PROCEDURE — 83735 ASSAY OF MAGNESIUM: CPT | Performed by: INTERNAL MEDICINE

## 2018-02-02 PROCEDURE — 80048 BASIC METABOLIC PNL TOTAL CA: CPT | Performed by: INTERNAL MEDICINE

## 2018-02-02 PROCEDURE — 99239 HOSP IP/OBS DSCHRG MGMT >30: CPT | Performed by: INTERNAL MEDICINE

## 2018-02-02 PROCEDURE — 97110 THERAPEUTIC EXERCISES: CPT

## 2018-02-02 RX ORDER — FUROSEMIDE 40 MG/1
40 TABLET ORAL DAILY
Qty: 30 TABLET | Refills: 11 | Status: SHIPPED | OUTPATIENT
Start: 2018-02-02 | End: 2018-02-02

## 2018-02-02 RX ORDER — FUROSEMIDE 40 MG/1
40 TABLET ORAL DAILY
Qty: 30 TABLET | Refills: 11 | Status: SHIPPED | OUTPATIENT
Start: 2018-02-02 | End: 2018-03-23 | Stop reason: SDUPTHER

## 2018-02-02 RX ORDER — POTASSIUM CHLORIDE 750 MG/1
20 CAPSULE, EXTENDED RELEASE ORAL DAILY
Qty: 60 CAPSULE | Refills: 11 | Status: SHIPPED | OUTPATIENT
Start: 2018-02-02 | End: 2018-02-27 | Stop reason: SDUPTHER

## 2018-02-02 RX ADMIN — CARVEDILOL 3.12 MG: 3.12 TABLET, FILM COATED ORAL at 09:10

## 2018-02-02 RX ADMIN — ANASTROZOLE 1 MG: 1 TABLET, COATED ORAL at 09:10

## 2018-02-02 RX ADMIN — PANTOPRAZOLE SODIUM 40 MG: 40 TABLET, DELAYED RELEASE ORAL at 06:55

## 2018-02-02 NOTE — PLAN OF CARE
Problem: Patient Care Overview (Adult)  Goal: Plan of Care Review  Outcome: Ongoing (interventions implemented as appropriate)   02/02/18 1044   Coping/Psychosocial Response Interventions   Plan Of Care Reviewed With patient   Outcome Evaluation   Outcome Summary/Follow up Plan Pt agreeable to ambulation w/ encouragement. Unsteady w/ LOB x3, but able to self correct. Encouraged increased ambulation throughout day and notified CARLOS Hernandez.

## 2018-02-02 NOTE — PLAN OF CARE
Problem: Fall Risk (Adult)  Goal: Identify Related Risk Factors and Signs and Symptoms  Outcome: Outcome(s) achieved Date Met: 02/02/18    Goal: Absence of Falls  Outcome: Ongoing (interventions implemented as appropriate)

## 2018-02-02 NOTE — PROGRESS NOTES
Case Management Discharge Note    Final Note: Pt Dc'd home. Met at bedside with pt and her family. She would like a walker for home and would like to use [facility]. call to Randi to deliver. Pt would also like [facility], orders in system. Call to Joanie who will follow up with pt.  Plan at DC is home with [facility] and walker from [facility]    Discharge Placement     Facility/Agency Request Status Selected? Address Phone Number Fax Number    [facility] Accepted    Yes [address] [address] [phone]    [facility] Pending - Request Sent     [address] [place] [facility] [address] [phone]        Other: Other (car)    Discharge Codes: 06  Discharged/transferred to home under care of organized home health service organization in anticipation of skilled care

## 2018-02-02 NOTE — THERAPY TREATMENT NOTE
Acute Care - Physical Therapy Treatment Note  [facility]     Patient Name: Radha Luke  : 3/14/1933  MRN: [id number]  Today's Date: 2018  Onset of Illness/Injury or Date of Surgery Date: 18  Date of Referral to PT: 18  Referring Physician: Irene Chase    Admit Date: 2018    Visit Dx:    ICD-10-CM ICD-9-CM   1. Muscle weakness (generalized) M62.81 728.87     Patient Active Problem List   Diagnosis   • Disorder of aorta   • Chronic kidney disease   • Diverticulosis of intestine   • Gastroesophageal reflux disease with esophagitis   • Hyperlipidemia   • Hypertension   • Osteopenia   • Health care maintenance   • History of left breast cancer   • Malignant neoplasm of overlapping sites of left female breast   • Pain of right sacroiliac joint   • Impaired glucose tolerance   • Cough   • Left bundle branch block   • Congestive heart failure due to valvular disease   • CHF exacerbation   • Acute congestive heart failure               Adult Rehabilitation Note       18 1000          Rehab Assessment/Intervention    Discipline physical therapy assistant  -RW      Document Type therapy note (daily note)  -RW      Subjective Information agree to therapy;complains of;fatigue  -RW      Patient Effort, Rehab Treatment good  -RW      Precautions/Limitations fall precautions  -RW      Recorded by [RW] Dana Echevarria PTA      Pain Assessment    Pain Assessment No/denies pain  -RW      Recorded by [RW] Dana Echevarria PTA      Cognitive Assessment/Intervention    Current Cognitive/Communication Assessment functional  -RW      Orientation Status oriented x 4  -RW      Follows Commands/Answers Questions 100% of the time  -RW      Personal Safety WNL/WFL  -RW      Personal Safety Interventions fall prevention program maintained;gait belt;nonskid shoes/slippers when out of bed  -RW      Recorded by [RW] Dana Echevarria PTA      Bed Mobility, Assessment/Treatment    Bed Mobility, Assistive  Device head of bed elevated  -RW      Bed Mob, Supine to Sit, [place] supervision required  -RW      Bed Mob, Sit to Supine, [place]vin supervision required  -RW      Recorded by [RW] Dana Echevarria PTA      Transfer Assessment/Treatment    Transfers, Sit-Stand Garvin supervision required  -RW      Transfers, Stand-Sit Garvin supervision required  -RW      Recorded by [RW] Dana Echevarria PTA      Gait Assessment/Treatment    Gait, Garvin Level contact guard assist;verbal cues required  -RW      Gait, Distance (Feet) 150  -RW      Gait, Gait Pattern Analysis swing-through gait  -RW      Gait, Gait Deviations bilateral:;lennox decreased;narrow base;step length decreased;stride length decreased  -RW      Gait, Safety Issues step length decreased  -RW      Gait, Impairments strength decreased;impaired balance  -RW      Gait, Comment Unsteady w/ LOB x3. Pt able to self correct LOB.   -RW      Recorded by [RW] Dana Echevarria PTA      Positioning and Restraints    Pre-Treatment Position in bed  -RW      Post Treatment Position bed  -RW      In Bed sitting EOB;call light within reach;encouraged to call for assist;with family/caregiver;notified nsg  -RW      Recorded by [RW] Dana Echevarria PTA        User Key  (r) = Recorded By, (t) = Taken By, (c) = Cosigned By    Initials Name Effective Dates     Dana Echevarria PTA 04/06/16 -                 IP PT Goals       02/01/18 1120          Bed Mobility PT LTG    Bed Mobility PT LTG, Date Established 02/01/18  -MS      Bed Mobility PT LTG, Time to Achieve 5 - 7 days  -MS      Bed Mobility PT LTG, Activity Type all bed mobility  -MS      Bed Mobility PT LTG, [place]vin Level independent  -MS      Transfer Training PT LTG    Transfer Training PT LTG, Date Established 02/01/18  -MS      Transfer Training PT LTG, Time to Achieve 5 - 7 days  -MS      Transfer Training PT LTG, Activity Type all transfers  -MS      Transfer Training PT LTG,  [place] Level independent  -MS      Gait Training PT LTG    Gait Training Goal PT LTG, Date Established 02/01/18  -MS      Gait Training Goal PT LTG, Time to Achieve 5 - 7 days  -MS      Gait Training Goal PT LTG, [place] Level independent  -MS      Gait Training Goal PT LTG, Distance to Achieve 300 feet  -MS        User Key  (r) = Recorded By, (t) = Taken By, (c) = Cosigned By    Initials Name Provider Type    MS Jorge L Ortiz, PT Physical Therapist          Physical Therapy Education     Title: PT OT SLP Therapies (Done)     Topic: Physical Therapy (Done)     Point: Mobility training (Done)    Learning Progress Summary    Learner Readiness Method Response Comment Documented by Status   Patient Acceptance E VU   02/02/18 1044 Done    Acceptance D,E VU,NR  MS 02/01/18 1120 Done               Point: Home exercise program (Done)    Learning Progress Summary    Learner Readiness Method Response Comment Documented by Status   Patient Acceptance D,E VU,NR  MS 02/01/18 1120 Done               Point: Body mechanics (Done)    Learning Progress Summary    Learner Readiness Method Response Comment Documented by Status   Patient Acceptance E VU   02/02/18 1044 Done    Acceptance D,E VU,NR  MS 02/01/18 1120 Done               Point: Precautions (Done)    Learning Progress Summary    Learner Readiness Method Response Comment Documented by Status   Patient Acceptance E VU   02/02/18 1044 Done    Acceptance D,E VU,NR  MS 02/01/18 1120 Done                      User Key     Initials Effective Dates Name Provider Type Discipline    MS 12/01/15 -  Jorge L Ortiz, PT Physical Therapist PT     04/06/16 -  Dana Echevarria, PTA Physical Therapy Assistant PT                    PT Recommendation and Plan  Anticipated Discharge Disposition: home with assist, home with home health  Planned Therapy Interventions: balance training, bed mobility training, gait training, home exercise program, patient/family education,  postural re-education, strengthening, transfer training  PT Frequency: daily  Plan of Care Review  Plan Of Care Reviewed With: patient  Outcome Summary/Follow up Plan: Pt agreeable to ambulation w/ encouragement. Unsteady w/ LOB x3, but able to self correct. Encouraged increased ambulation throughout day and notified CARLOS Hernandez.          Outcome Measures       02/02/18 1000 02/01/18 1100       How much help from another person do you currently need...    Turning from your back to your side while in flat bed without using bedrails? 4  -RW 4  -MS     Moving from lying on back to sitting on the side of a flat bed without bedrails? 4  -RW 3  -MS     Moving to and from a bed to a chair (including a wheelchair)? 3  -RW 3  -MS     Standing up from a chair using your arms (e.g., wheelchair, bedside chair)? 3  -RW 3  -MS     Climbing 3-5 steps with a railing? 3  -RW 3  -MS     To walk in hospital room? 3  -RW 3  -MS     AM-PAC 6 Clicks Score 20  -RW 19  -MS     Functional Assessment    Outcome Measure Options AM-PAC 6 Clicks Basic Mobility (PT)  -RW AM-PAC 6 Clicks Basic Mobility (PT)  -MS       User Key  (r) = Recorded By, (t) = Taken By, (c) = Cosigned By    Initials Name Provider Type    MS Jorge L Ortiz, PT Physical Therapist    ALEXI Echevarria PTA Physical Therapy Assistant           Time Calculation:         PT Charges       02/02/18 1043          Time Calculation    Start Time 1024  -RW      Stop Time 1035  -RW      Time Calculation (min) 11 min  -RW      PT Received On 02/02/18  -RW      PT - Next Appointment 02/03/18  -RW        User Key  (r) = Recorded By, (t) = Taken By, (c) = Cosigned By    Initials Name Provider Type    ALEXI Echevarria PTA Physical Therapy Assistant          Therapy Charges for Today     Code Description Service Date Service Provider Modifiers Qty    [id number] HC PT THER PROC EA 15 MIN 2/2/2018 Dana Echevarria PTA GP 1          PT G-Codes  Outcome Measure Options: AM-PAC 6 Clicks  Basic Mobility (PT)    Dana Echevarria, PTA  2/2/2018

## 2018-02-02 NOTE — DISCHARGE SUMMARY
Patient Name: Radha Luke  :3/14/1933  84 y.o.    Date of Admit: 2018  Date of Discharge:  2018    Discharge Diagnosis:  Acute systolic congestive heart failure    Hospital Course:     1.Acute systolic congestive heart failure.  This is a new diagnosis for her.  She is on a low dose of carvedilol.  Her ejection fraction is 28% echo on 2018.  I admitted her to the hospital from clinic.  I started her on IV diuretics.  On  she had a thoracentesis on the right side with 1200 mL of fluid removed.  On  she had a thoracentesis on the left side with 750 mL removed.  Her weight is down 12 pounds since admission.  She appears to be euvolemic.  She is not on oxygen.  She is ambulating.  I am going to discharge her on oral diuretics.  Her creatinine is up a little bit.  She will go home with home health.  She will get a basic metabolic panel on Monday and see Char on Tuesday.  She will need a PET stress test as an outpatient to exclude ischemia.  If her renal function is stable, consider [place] down the road.  If her PET stress test is normal, she will need a repeat echocardiogram in 3 months.  2.  Pleural effusion.   status post bilateral thoracentesis  3.  Hypertension.  Controlled  4.  Hyperlipidemia  5.  Chronic kidney disease stage III.  This will need to be followed closely on diuretics.  6.  History of breast cancer in   7.  Left bundle branch block.  Consider biventricular pacemaker if her LV function does not improve with medical therapy.  8.  Aortic stenosis.  This is at least moderate if not severe.  9.  Moderate mitral regurgitation.  10.  Mild tricuspid regurgitation with a right ventricular systolic pressure of 62.  11.  Hypokalemia.  Replaced       Pertinent Test Results:     Results from last 7 days  Lab Units 18  0443  18  1529   SODIUM mmol/L 142  < > 139  139   POTASSIUM mmol/L 4.1  < > 3.6  3.6   CHLORIDE mmol/L 103  < > 97*  97*   CO2 mmol/L  24.4  < > 26.1  26.1   BUN mg/dL 52*  < > 40*  40*   CREATININE mg/dL 1.64*  < > 1.32*  1.32*   CALCIUM mg/dL 8.8  < > 8.9  8.9   BILIRUBIN mg/dL  --   --  0.7   ALK PHOS U/L  --   --  78   ALT (SGPT) U/L  --   --  30   AST (SGOT) U/L  --   --  37*   GLUCOSE mg/dL 136*  < > 126*  126*   < > = values in this interval not displayed.          Results from last 7 days  Lab Units 01/30/18  1529   WBC 10*3/mm3 8.41   HEMOGLOBIN g/dL 14.4   HEMATOCRIT % 45.0   PLATELETS 10*3/mm3 211       Results from last 7 days  Lab Units 01/31/18  1047   INR  1.38*   APTT seconds 26.7       Results from last 7 days  Lab Units 02/02/18  0443   MAGNESIUM mg/dL 2.2           Condition on Discharge: stable    Discharge Medications   Luke, Radha DELATORRE   Home Medication Instructions MELVA:[id number]    Printed on:02/02/18 9569   Medication Information                      anastrozole (ARIMIDEX) 1 MG tablet  TAKE 1 TABLET EVERY DAY             carvedilol (COREG) 3.125 MG tablet  1 by mouth twice a day             clonazePAM (KlonoPIN) 0.5 MG tablet  Take  by mouth.             furosemide (LASIX) 40 MG tablet  Take 1 tablet by mouth Daily.             meclizine (ANTIVERT) 25 MG tablet  Take 1 tablet by mouth 3 (three) times a day as needed for dizziness.             pantoprazole (PROTONIX) 40 MG EC tablet  TAKE 1 TABLET EVERY DAY             potassium chloride (MICRO-K) 10 MEQ CR capsule  Take 2 capsules by mouth Daily.             simvastatin (ZOCOR) 20 MG tablet  TAKE 1 TABLET EVERY DAY             VIRTUSSIN A/C 100-10 MG/5ML liquid  TK 5ML PO Q 4 H PRF COUGH                 Discharge Diet:   Diet Instructions     Diet: Cardiac; Thin       Discharge Diet:  Cardiac   Fluid Consistency:  Thin   Fluid Restriction per day:  1500 mL Fluid                 Activity at Discharge:   Activity Instructions     Activity as Tolerated                     Discharge disposition: home    Follow-up Appointments  Future Appointments  Date Time Provider  Department Center   2/6/2018 3:00 PM Christa MendezSTAN [facility] CD L[facility]KR None   2/15/2018 10:30 AM LAB CHAIR [facility]  LAB [facility]RES LAG   2/15/2018 11:00 AM Yves Lopez II, MD [facility] CBC KRES  CBC [place]   4/9/2018 2:40 PM Blas Urban MD [facility] PC KRSG1 None     Additional Instructions for the Follow-ups that You Need to Schedule     Referral to Home Health    As directed    Face to Face Visit Date:  2/2/2018    Follow-up Provider for Plan of Care?:  I will be treating the patient on an ongoing basis.  Please send me the Plan of Care for signature.    Follow-up Provider:  JOSEPHINE CARPIO [[id number]]    Reason/Clinical Findings:  CHF    Describe mobility limitations that make leaving home difficult:  Cannot leave home without someone to  her    Nursing/Therapeutic Services Requested:  Skilled Nursing    Skilled nursing orders:  CHF management    Frequency:  1 Week 1           Basic Metabolic Panel    Feb 07, 2018 (Approximate)    With home health on Monday 2/5   Order Comments:  With home health on Monday 2/5                      Test Results Pending at Discharge   Order Current Status    Cholesterol, Body Fluid - Pleural Fluid, Pleural Cavity In process    Non-gynecologic Cytology - Body Fluid, Pleural Cavity In process    AFB Culture - Body Fluid, Pleural Cavity Preliminary result           Josephine Carpio MD, [facility]  02/02/18  11:18 AM    Time: Discharge 45 min

## 2018-02-02 NOTE — PLAN OF CARE
Problem: Patient Care Overview (Adult)  Goal: Plan of Care Review  Outcome: Outcome(s) achieved Date Met: 02/02/18 02/02/18 1225   Coping/Psychosocial Response Interventions   Plan Of Care Reviewed With patient   Patient Care Overview   Progress improving   Outcome Evaluation   Outcome Summary/Follow up Plan Vitals stable. patient discharged. education complete.      Goal: Adult Individualization and Mutuality  Outcome: Outcome(s) achieved Date Met: 02/02/18    Goal: Discharge Needs Assessment  Outcome: Outcome(s) achieved Date Met: 02/02/18      Problem: Cardiac: Heart Failure (Adult)  Goal: Signs and Symptoms of Listed Potential Problems Will be Absent or Manageable (Cardiac: Heart Failure)  Outcome: Outcome(s) achieved Date Met: 02/02/18      Problem: Fluid Volume Excess (Adult,Obstetrics,Pediatric)  Goal: Stable Weight  Outcome: Outcome(s) achieved Date Met: 02/02/18    Goal: Balanced Intake/Output  Outcome: Outcome(s) achieved Date Met: 02/02/18      Problem: Fall Risk (Adult)  Goal: Absence of Falls  Outcome: Outcome(s) achieved Date Met: 02/02/18

## 2018-02-02 NOTE — PLAN OF CARE
Problem: Patient Care Overview (Adult)  Goal: Plan of Care Review  Outcome: Ongoing (interventions implemented as appropriate)   02/02/18 0301   Coping/Psychosocial Response Interventions   Plan Of Care Reviewed With patient   Patient Care Overview   Progress improving   Outcome Evaluation   Outcome Summary/Follow up Plan VSS; no c/o pain or nausea; no s/s of distress        Problem: Cardiac: Heart Failure (Adult)  Goal: Signs and Symptoms of Listed Potential Problems Will be Absent or Manageable (Cardiac: Heart Failure)  Outcome: Ongoing (interventions implemented as appropriate)      Problem: Fluid Volume Excess (Adult,Obstetrics,Pediatric)  Goal: Balanced Intake/Output  Outcome: Ongoing (interventions implemented as appropriate)

## 2018-02-05 ENCOUNTER — EPISODE CHANGES (OUTPATIENT)
Dept: CASE MANAGEMENT | Facility: OTHER | Age: 83
End: 2018-02-05

## 2018-02-05 PROCEDURE — 93000 ELECTROCARDIOGRAM COMPLETE: CPT | Performed by: NURSE PRACTITIONER

## 2018-02-06 ENCOUNTER — OFFICE VISIT (OUTPATIENT)
Dept: CARDIOLOGY | Facility: CLINIC | Age: 83
End: 2018-02-06

## 2018-02-06 VITALS
DIASTOLIC BLOOD PRESSURE: 72 MMHG | HEIGHT: 61 IN | SYSTOLIC BLOOD PRESSURE: 120 MMHG | HEART RATE: 79 BPM | WEIGHT: 129 LBS | BODY MASS INDEX: 24.35 KG/M2

## 2018-02-06 DIAGNOSIS — I10 ESSENTIAL HYPERTENSION: ICD-10-CM

## 2018-02-06 DIAGNOSIS — I50.21 ACUTE SYSTOLIC CONGESTIVE HEART FAILURE (HCC): Primary | ICD-10-CM

## 2018-02-06 DIAGNOSIS — I44.7 LEFT BUNDLE BRANCH BLOCK: ICD-10-CM

## 2018-02-06 DIAGNOSIS — E78.5 HYPERLIPIDEMIA, UNSPECIFIED HYPERLIPIDEMIA TYPE: ICD-10-CM

## 2018-02-06 DIAGNOSIS — N18.30 STAGE 3 CHRONIC KIDNEY DISEASE (HCC): ICD-10-CM

## 2018-02-06 PROCEDURE — 99214 OFFICE O/P EST MOD 30 MIN: CPT | Performed by: NURSE PRACTITIONER

## 2018-02-06 RX ORDER — MAGNESIUM GLUCONATE 27 MG(500)
500 TABLET ORAL DAILY
COMMUNITY

## 2018-02-06 NOTE — PROGRESS NOTES
Date of Office Visit: 2018  Encounter Provider: STAN Garcia  Place of Service: [facility] CARDIOLOGY  Patient Name: Radha Luke  :3/14/1933    Chief Complaint   Patient presents with   • Shortness of Breath   :     HPI: Radha Luke is a 84 y.o. female comes in today for follow up from the hospital. She is a patient of dr. Earl and I am seeing her for the first time today. I have reviewed her records.     She has a history of heart failure, aortic stenosis, chronic kidney disease.  She came in for evaluation on 2018 with complaints of being ill since before .  She's felt weak and fatigued.  Had an echocardiogram showing an EF of 28%, moderate mitral valve regurgitation, mild tricuspid valve regurgitation and RVSP of 62 mmHg.  She had at least moderate aortic stenosis on echocardiogram.  Dr. Earl admitted to her to the hospital for treatment.    She was started on medical therapy and diuretics.  Found to have pleural effusions and had thoracentesis to help her shortness of breath.  1200 ML's removed out of the right side and 750 removed the left side.  Ischemic workup will be ordered as an outpatient.  If renal function improves, the plan is to consider Entresto.    She comes in today with her . Feels very weak. Still with edema but stable. Weights have been stable. Has not heard from home health since being home. Denies palpitations or tachycardia,  dizziness, chest pain, fatigue, orthopnea or PND.      Past Medical History:   Diagnosis Date   • Acute systolic congestive heart failure    • Aortic stenosis    • Arthritis    • Breast cancer     Locally recurrent left breast   • CKD (chronic kidney disease), stage III    • Cough    • Dizziness    • Drug therapy    • Edema    • GERD (gastroesophageal reflux disease)    • History of breast cancer     LEFT   • Hyperlipidemia    • Hypertension    • Hypokalemia    • LBBB (left bundle branch  "block)    • Mild tricuspid regurgitation     with a right ventricular systolic pressure of 62   • Moderate mitral regurgitation    • Osteopenia    • Pleural effusion        Past Surgical History:   Procedure Laterality Date   • APPENDECTOMY  1943   • BREAST BIOPSY     • BREAST LUMPECTOMY Left 1995   • COLONOSCOPY N/A 5/12/2016    Procedure: COLONOSCOPY;  Surgeon: Israel Vance MD;  Location: [facility] ENDOSCOPY;  Service:    • HYSTERECTOMY  1964   • MASTECTOMY Left 2011   • THORACENTESIS Bilateral            Review of Systems   Cardiovascular: Positive for dyspnea on exertion and leg swelling.   Respiratory: Positive for shortness of breath.      All other systems reviewed and are negative    Allergies   Allergen Reactions   • Levofloxacin Swelling     Swelling in legs and ankle and tongue   • Penicillins Hives       All aspects of family and social history reviewed.          Objective:     Vitals:    02/06/18 1508   BP: 120/72   BP Location: Right arm   Pulse: 79   Weight: 58.5 kg (129 lb)   Height: 154.9 cm (61\")     Body mass index is 24.37 kg/(m^2).    PHYSICAL EXAM:  Physical Exam   Constitutional: She is oriented to person, place, and time. She appears well-developed and well-nourished.   Walks with a walker   HENT:   Head: Normocephalic and atraumatic.   Neck: Neck supple. No JVD present.   Cardiovascular: Normal rate, regular rhythm, normal heart sounds and intact distal pulses.    Pulses:       Carotid pulses are 2+ on the right side, and 2+ on the left side.       Radial pulses are 2+ on the right side, and 2+ on the left side.        Dorsalis pedis pulses are 2+ on the right side, and 2+ on the left side.   Pulmonary/Chest: Effort normal and breath sounds normal. No accessory muscle usage. No respiratory distress. She has no rales.   Abdominal: Soft. Normal appearance and bowel sounds are normal. There is no tenderness.   Musculoskeletal: Normal range of motion. She exhibits edema (1+).   Neurological: " She is alert and oriented to person, place, and time.   Skin: Skin is warm, dry and intact. She is not diaphoretic.   Psychiatric: She has a normal mood and affect. Her speech is normal and behavior is normal. Judgment and thought content normal. Cognition and memory are normal.         ECG 12 Lead  Date/Time: 2/5/2018 3:12 PM  Performed by: ALEKSEY WALLS  Authorized by: ALEKSEY WALLS   Comparison: compared with previous ECG from 1/8/2018  Similar to previous ECG  Rhythm: sinus rhythm  Rate: normal  BPM: 79  Conduction: non-specific intraventricular conduction delay  QRS axis: normal  Other findings: LVH  Clinical impression: abnormal ECG                Assessment:       Diagnosis Plan   1. Acute systolic congestive heart failure  Stress Test With Pet Myocardial Perfusion (Multi-Study)   2. Left bundle branch block     3. Essential hypertension     4. Hyperlipidemia, unspecified hyperlipidemia type     5. Stage 3 chronic kidney disease          Orders Placed This Encounter   Procedures   • Stress Test With Pet Myocardial Perfusion (Multi-Study)     Standing Status:   Future     Standing Expiration Date:   2/6/2019     Order Specific Question:   What stress agent will be used?     Answer:   Regadenoson (Lexiscan)     Order Specific Question:   Difficulty walking criteria?     Answer:   Musculoskeletal (hips, knees, feet, back, amputee)     Order Specific Question:   Reason for exam?     Answer:   Heart Failure   • ECG 12 Lead     This order was created via procedure documentation       Current Outpatient Prescriptions   Medication Sig Dispense Refill   • anastrozole (ARIMIDEX) 1 MG tablet TAKE 1 TABLET EVERY DAY 90 tablet 4   • Calcium Carb-Cholecalciferol (CALCIUM 1000 + D PO) Take 1 tablet by mouth Daily.     • carvedilol (COREG) 3.125 MG tablet 1 by mouth twice a day 60 tablet 1   • furosemide (LASIX) 40 MG tablet Take 1 tablet by mouth Daily. 30 tablet 11   • magnesium gluconate (MAGONATE) 500 MG tablet  Take 500 mg by mouth Daily.     • meclizine (ANTIVERT) 25 MG tablet Take 1 tablet by mouth 3 (three) times a day as needed for dizziness. 60 tablet 1   • Multiple Vitamins-Minerals (CENTRUM SILVER 50+WOMEN PO) Take 1 tablet by mouth Daily.     • pantoprazole (PROTONIX) 40 MG EC tablet TAKE 1 TABLET EVERY DAY 90 tablet 1   • potassium chloride (MICRO-K) 10 MEQ CR capsule Take 2 capsules by mouth Daily. 60 capsule 11   • simvastatin (ZOCOR) 20 MG tablet TAKE 1 TABLET EVERY DAY 90 tablet 1   • VIRTUSSIN A/C 100-10 MG/5ML liquid TK 5ML PO Q 4 H PRF COUGH - currently prn  0   • clonazePAM (KlonoPIN) 0.5 MG tablet Take  by mouth.       No current facility-administered medications for this visit.             Plan:       1. Heart Failure  Assessment  • NYHA class IV - The patient is unable to carry on any physical activity without discomfort. There are symptoms of heart failure at rest.  • The patient was newly diagnosed with heart failure within the past 12 months  • Beta blocker prescribed  • Diuretics prescribed  • The most recent ejection fraction is 28%  • Left ventricular function is severely reduced by qualitative assessment  • The left ventricle was last assessed on 1/30/2018    Subjective/Objective  • The patient reports dyspnea    • Physical exam findings negative for rales and elevated JVP.    New onset systolic heart failure. Will order PET stress as an outpatient. Recheck lab work through home health. Potentially start entresto. Still with lower extremity edema but shortness of breath improved.     2. HTN-hope to start entresto. If unable, will increase coreg    3. Hyperlipidemia-on simvastatin    4. Renal insufficiency-will check bmp with home health.         Follow up in office in 2 weeks    As always, it has been a pleasure to participate in this patient's care.      Sincerely,      STAN Garcia

## 2018-02-09 ENCOUNTER — TELEPHONE (OUTPATIENT)
Dept: CARDIOLOGY | Facility: CLINIC | Age: 83
End: 2018-02-09

## 2018-02-09 NOTE — TELEPHONE ENCOUNTER
Citlalli GUTIERREZ  calls to report that after three attempts they were unable to get any blood drawn today and they will be sending a RN out tomorrow to try again. She wanted to let you know that the Pt has no complaints today, and vitals are within normal limits however she has +2 edema bilateral leg swelling, no weeping. Would you want to make any changes or see how she is doing tomorrow when  comes out again?      Citlalli [phone]

## 2018-02-12 RX ORDER — SIMVASTATIN 20 MG
TABLET ORAL
Qty: 90 TABLET | Refills: 1 | Status: SHIPPED | OUTPATIENT
Start: 2018-02-12 | End: 2018-05-16 | Stop reason: SDUPTHER

## 2018-02-13 NOTE — TELEPHONE ENCOUNTER
Pt was seen by HH today, her vitals are within normal limits, her weight is back to baseline. Pt is alert and oriented, ambulating very well, all swelling is gone and she has no shortness of breath. HH would like to know if you want to continue lasix twice daily or return to once daily. Labs are being faxed to the office now.

## 2018-02-15 ENCOUNTER — APPOINTMENT (OUTPATIENT)
Dept: LAB | Facility: HOSPITAL | Age: 83
End: 2018-02-15

## 2018-02-15 ENCOUNTER — APPOINTMENT (OUTPATIENT)
Dept: ONCOLOGY | Facility: CLINIC | Age: 83
End: 2018-02-15

## 2018-02-23 ENCOUNTER — EPISODE CHANGES (OUTPATIENT)
Dept: CASE MANAGEMENT | Facility: OTHER | Age: 83
End: 2018-02-23

## 2018-02-26 ENCOUNTER — PATIENT OUTREACH (OUTPATIENT)
Dept: CASE MANAGEMENT | Facility: OTHER | Age: 83
End: 2018-02-26

## 2018-02-27 RX ORDER — POTASSIUM CHLORIDE 750 MG/1
20 CAPSULE, EXTENDED RELEASE ORAL DAILY
Qty: 60 CAPSULE | Refills: 11 | Status: SHIPPED | OUTPATIENT
Start: 2018-02-27 | End: 2018-03-21 | Stop reason: SDUPTHER

## 2018-02-28 ENCOUNTER — HOSPITAL ENCOUNTER (OUTPATIENT)
Dept: CARDIOLOGY | Facility: HOSPITAL | Age: 83
Discharge: HOME | End: 2018-02-28
Admitting: NURSE PRACTITIONER

## 2018-02-28 ENCOUNTER — OFFICE VISIT (OUTPATIENT)
Dept: CARDIOLOGY | Facility: CLINIC | Age: 83
End: 2018-02-28

## 2018-02-28 VITALS
BODY MASS INDEX: 23.45 KG/M2 | SYSTOLIC BLOOD PRESSURE: 112 MMHG | RESPIRATION RATE: 16 BRPM | HEART RATE: 94 BPM | WEIGHT: 124.2 LBS | HEIGHT: 61 IN | DIASTOLIC BLOOD PRESSURE: 68 MMHG

## 2018-02-28 DIAGNOSIS — I50.22 CHRONIC SYSTOLIC CONGESTIVE HEART FAILURE (HCC): Primary | ICD-10-CM

## 2018-02-28 DIAGNOSIS — N18.30 STAGE 3 CHRONIC KIDNEY DISEASE (HCC): ICD-10-CM

## 2018-02-28 DIAGNOSIS — I10 ESSENTIAL HYPERTENSION: ICD-10-CM

## 2018-02-28 DIAGNOSIS — I44.7 LEFT BUNDLE BRANCH BLOCK: ICD-10-CM

## 2018-02-28 DIAGNOSIS — E78.5 HYPERLIPIDEMIA, UNSPECIFIED HYPERLIPIDEMIA TYPE: ICD-10-CM

## 2018-02-28 DIAGNOSIS — I50.21 ACUTE SYSTOLIC CONGESTIVE HEART FAILURE (HCC): ICD-10-CM

## 2018-02-28 LAB
BH CV NUCLEAR PRIOR STUDY: 3
BH CV STRESS BP STAGE 1: NORMAL
BH CV STRESS COMMENTS STAGE 1: NORMAL
BH CV STRESS DOSE REGADENOSON STAGE 1: 0.4
BH CV STRESS DURATION MIN STAGE 1: 0
BH CV STRESS DURATION SEC STAGE 1: 10
BH CV STRESS HR STAGE 1: 94
BH CV STRESS PROTOCOL 1: NORMAL
BH CV STRESS RECOVERY BP: NORMAL MMHG
BH CV STRESS RECOVERY HR: 92 BPM
BH CV STRESS STAGE 1: 1
LV EF NUC BP: 24 %
MAXIMAL PREDICTED HEART RATE: 136 BPM
PERCENT MAX PREDICTED HR: 69.12 %
STRESS BASELINE BP: NORMAL MMHG
STRESS BASELINE HR: 89 BPM
STRESS PERCENT HR: 81 %
STRESS POST EXERCISE DUR SEC: 10 SEC
STRESS POST PEAK BP: NORMAL MMHG
STRESS POST PEAK HR: 94 BPM
STRESS TARGET HR: 116 BPM

## 2018-02-28 PROCEDURE — 0 RUBIDIUM CHLORIDE: Performed by: NURSE PRACTITIONER

## 2018-02-28 PROCEDURE — 99214 OFFICE O/P EST MOD 30 MIN: CPT | Performed by: INTERNAL MEDICINE

## 2018-02-28 PROCEDURE — 78492 MYOCRD IMG PET MLT RST&STRS: CPT | Performed by: INTERNAL MEDICINE

## 2018-02-28 PROCEDURE — 25010000002 REGADENOSON 0.4 MG/5ML SOLUTION: Performed by: NURSE PRACTITIONER

## 2018-02-28 PROCEDURE — 93016 CV STRESS TEST SUPVJ ONLY: CPT | Performed by: INTERNAL MEDICINE

## 2018-02-28 PROCEDURE — 78492 MYOCRD IMG PET MLT RST&STRS: CPT

## 2018-02-28 PROCEDURE — 93000 ELECTROCARDIOGRAM COMPLETE: CPT | Performed by: INTERNAL MEDICINE

## 2018-02-28 PROCEDURE — 93017 CV STRESS TEST TRACING ONLY: CPT

## 2018-02-28 PROCEDURE — A9555 RB82 RUBIDIUM: HCPCS | Performed by: NURSE PRACTITIONER

## 2018-02-28 PROCEDURE — 93018 CV STRESS TEST I&R ONLY: CPT | Performed by: INTERNAL MEDICINE

## 2018-02-28 RX ADMIN — REGADENOSON 0.4 MG: 0.08 INJECTION, SOLUTION INTRAVENOUS at 10:15

## 2018-02-28 RX ADMIN — RUBIDIUM CHLORIDE RB-82 1 DOSE: 150 INJECTION, SOLUTION INTRAVENOUS at 10:01

## 2018-02-28 RX ADMIN — RUBIDIUM CHLORIDE RB-82 1 DOSE: 150 INJECTION, SOLUTION INTRAVENOUS at 10:15

## 2018-02-28 NOTE — PROGRESS NOTES
PATIENTINFORMATION    Date of Office Visit: 2018  Encounter Provider: Irene Earl MD  Place of Service: [facility] CARDIOLOGY  Patient Name: Radha Luke  : 3/14/1933    Subjective:     Encounter Date:2018      Patient ID: Radha Luke is a 84 y.o. female.      History of Present Illness    This is a nice woman who was admitted in 2018 with new onset of acute congestive heart failure.  Her ejection fraction was 28% by echocardiogram on 2018.  She was started on a low dose of carvedilol.  She was not started on Entresto, ACE inhibitor or angiotensin receptor blocker because of kidney disease.  She had a right-sided thoracentesis with 1200 mL of fluid removed and a left-sided thoracentesis with 750 mL of fluid removed.      She comes in today and had a PET stress test.  By my read, it looks like an old infarction in the apex but no ischemia.  Her ejection fraction remains 25-30% on the stress test.  She has been feeling well.  She denies shortness of breath, orthopnea or paroxysmal nocturnal dyspnea.  Her weight has been stable.  She has not had chest pain or edema.  Her family reports her strength is improving as is her nutrition.        Review of Systems   Constitution: Negative for fever, malaise/fatigue, weight gain and weight loss.   HENT: Negative for ear pain, hearing loss, nosebleeds and sore throat.    Eyes: Negative for double vision, pain, vision loss in left eye and vision loss in right eye.   Cardiovascular:        See history of present illness.   Respiratory: Negative for cough, shortness of breath, sleep disturbances due to breathing, snoring and wheezing.    Endocrine: Negative for cold intolerance, heat intolerance and polyuria.   Skin: Negative for itching, poor wound healing and rash.   Musculoskeletal: Negative for joint pain, joint swelling and myalgias.   Gastrointestinal: Negative for abdominal pain, diarrhea, hematochezia, nausea and  "vomiting.   Genitourinary: Negative for hematuria and hesitancy.   Neurological: Negative for numbness, paresthesias and seizures.   Psychiatric/Behavioral: Negative for depression. The patient is not nervous/anxious.            ECG 12 Lead  Date/Time: 2/28/2018 11:45 AM  Performed by: JOSEPHINE CARPIO  Authorized by: JOSEPHINE CARPIO   Comparison: compared with previous ECG from 2/6/2018  Comparison to previous ECG: Heart rate is faster but she did not take her carvedilol this morning because of her stress test.  Rhythm: sinus rhythm  BPM: 94  Conduction: left bundle branch block  Clinical impression: abnormal ECG               Objective:     /68 (BP Location: Right arm, Patient Position: Sitting, Cuff Size: Adult)  Pulse 94  Resp 16  Ht 154.9 cm (61\")  Wt 56.3 kg (124 lb 3.2 oz)  BMI 23.47 kg/m2 Body mass index is 23.47 kg/(m^2).     Physical Exam   Constitutional: She appears well-developed.   HENT:   Head: Normocephalic and atraumatic.   Eyes: Conjunctivae and lids are normal. Pupils are equal, round, and reactive to light. Lids are everted and swept, no foreign bodies found.   Neck: Normal range of motion. No JVD present. Carotid bruit is not present. No tracheal deviation present. No thyroid mass present.   Cardiovascular: Normal rate, regular rhythm and normal heart sounds.    Pulses:       Dorsalis pedis pulses are 2+ on the right side, and 2+ on the left side.   Pulmonary/Chest: Effort normal and breath sounds normal. She has no rales.   Abdominal: Normal appearance and bowel sounds are normal.   Musculoskeletal: Normal range of motion.   Neurological: She is alert. She has normal strength.   Skin: Skin is warm, dry and intact.   Psychiatric: She has a normal mood and affect. Her behavior is normal.   Vitals reviewed.      Lab Review:       Assessment/Plan:       1. Chronic systolic congestive heart failure.  This is a new diagnosis for her.  She is on a low dose of carvedilol.  Her ejection " fraction is 28% echo on January 30, 2018.  I admitted her to the hospital from clinic.  I started her on IV diuretics.  On January 31 she had a thoracentesis on the right side with 1200 mL of fluid removed.  On February 1 she had a thoracentesis on the left side with 750 mL removed.  Her weight is down 12 pounds since admission.  She appears to be euvolemic.  I do not see ischemic changes on her PET stress test.  I don't think we she is going to tolerate Enteresto, ACE inhibitor or angiotensin receptor blocker due to chronic kidney disease.  Continue carvedilol.  She is currently taking Lasix 40 mg every morning and 40 mg every other afternoon.  Repeat echocardiogram in 3 months when she comes back to see Char.  Consider biventricular device as she does have a left bundle branch block..    Heart Failure  Assessment  • NYHA class II - There is slight limitation of physical activity. The patient is comfortable at rest, but physical activity results in fatigue, palpitations or shortness of breath.  • Beta blocker prescribed  • Diuretics prescribed  • The most recent ejection fraction is 28%  • The left ventricle was last assessed on 1/30/2018    Subjective/Objective    • Physical exam findings negative for rales, peripheral edema and elevated JVP.      2.  Pleural effusion.   status post bilateral thoracentesis  3.  Hypertension.  Controlled  4.  Hyperlipidemia  5.  Chronic kidney disease stage III.   6.  History of breast cancer in 2011  7.  Left bundle branch block.   8.  Aortic stenosis.  This is at least moderate if not severe.  9.  Moderate mitral regurgitation.  10.  Mild tricuspid regurgitation with a right ventricular systolic pressure of 62.  11.  Hypokalemia.     3 months with Char and an echo.    Orders Placed This Encounter   Procedures   • ECG 12 Lead     This order was created via procedure documentation      Luke, Radha DELATORRE   Home Medication Instructions MELVA:    Printed on:02/28/18 1337   Medication  Information                      anastrozole (ARIMIDEX) 1 MG tablet  TAKE 1 TABLET EVERY DAY             Calcium Carb-Cholecalciferol (CALCIUM 1000 + D PO)  Take 1 tablet by mouth Daily.             carvedilol (COREG) 3.125 MG tablet  1 by mouth twice a day             clonazePAM (KlonoPIN) 0.5 MG tablet  Take  by mouth.             furosemide (LASIX) 40 MG tablet  Take 1 tablet by mouth Daily.             magnesium gluconate (MAGONATE) 500 MG tablet  Take 500 mg by mouth Daily.             meclizine (ANTIVERT) 25 MG tablet  Take 1 tablet by mouth 3 (three) times a day as needed for dizziness.             Multiple Vitamins-Minerals (CENTRUM SILVER 50+WOMEN PO)  Take 1 tablet by mouth Daily.             pantoprazole (PROTONIX) 40 MG EC tablet  TAKE 1 TABLET EVERY DAY             potassium chloride (MICRO-K) 10 MEQ CR capsule  Take 2 capsules by mouth Daily.             simvastatin (ZOCOR) 20 MG tablet  TAKE 1 TABLET EVERY DAY             VIRTUSSIN A/C 100-10 MG/5ML liquid  TK 5ML PO Q 4 H PRF COUGH - currently prn                        Irene Earl MD  02/28/18  11:46 AM

## 2018-03-02 ENCOUNTER — TELEPHONE (OUTPATIENT)
Dept: CARDIOLOGY | Facility: CLINIC | Age: 83
End: 2018-03-02

## 2018-03-02 NOTE — TELEPHONE ENCOUNTER
Pt is being seen by [facility] PT, she has met all her PT goals and is being discharged from PT only at this time. Jessica advised OT will still see Pt.     Jessica [phone]

## 2018-03-14 LAB
MYCOBACTERIUM SPEC CULT: NORMAL
NIGHT BLUE STAIN TISS: NORMAL

## 2018-03-19 ENCOUNTER — TELEPHONE (OUTPATIENT)
Dept: CARDIOLOGY | Facility: CLINIC | Age: 83
End: 2018-03-19

## 2018-03-19 RX ORDER — CARVEDILOL 3.12 MG/1
TABLET ORAL
Qty: 180 TABLET | Refills: 3 | Status: SHIPPED | OUTPATIENT
Start: 2018-03-19 | End: 2018-03-23 | Stop reason: SDUPTHER

## 2018-03-19 RX ORDER — CARVEDILOL 3.12 MG/1
TABLET ORAL
Qty: 180 TABLET | Refills: 3 | Status: SHIPPED | OUTPATIENT
Start: 2018-03-19 | End: 2018-03-19 | Stop reason: SDUPTHER

## 2018-03-21 RX ORDER — POTASSIUM CHLORIDE 750 MG/1
20 CAPSULE, EXTENDED RELEASE ORAL DAILY
Qty: 90 CAPSULE | Refills: 0 | Status: SHIPPED | OUTPATIENT
Start: 2018-03-21 | End: 2018-04-03 | Stop reason: SDUPTHER

## 2018-03-23 RX ORDER — CARVEDILOL 3.12 MG/1
TABLET ORAL
Qty: 180 TABLET | Refills: 3 | Status: SHIPPED | OUTPATIENT
Start: 2018-03-23 | End: 2018-03-28 | Stop reason: SDUPTHER

## 2018-03-23 RX ORDER — FUROSEMIDE 40 MG/1
40 TABLET ORAL DAILY
Qty: 90 TABLET | Refills: 3 | Status: SHIPPED | OUTPATIENT
Start: 2018-03-23 | End: 2018-03-28 | Stop reason: SDUPTHER

## 2018-03-28 RX ORDER — FUROSEMIDE 40 MG/1
40 TABLET ORAL DAILY
Qty: 14 TABLET | Refills: 0 | Status: SHIPPED | OUTPATIENT
Start: 2018-03-28 | End: 2018-05-30 | Stop reason: SDUPTHER

## 2018-03-28 RX ORDER — CARVEDILOL 3.12 MG/1
TABLET ORAL
Qty: 28 TABLET | Refills: 0 | Status: SHIPPED | OUTPATIENT
Start: 2018-03-28 | End: 2019-03-09 | Stop reason: SDUPTHER

## 2018-04-03 RX ORDER — POTASSIUM CHLORIDE 750 MG/1
20 CAPSULE, EXTENDED RELEASE ORAL DAILY
Qty: 180 CAPSULE | Refills: 3 | Status: SHIPPED | OUTPATIENT
Start: 2018-04-03 | End: 2019-04-25 | Stop reason: SDUPTHER

## 2018-04-09 ENCOUNTER — OFFICE VISIT (OUTPATIENT)
Dept: INTERNAL MEDICINE | Facility: CLINIC | Age: 83
End: 2018-04-09

## 2018-04-09 VITALS
HEART RATE: 69 BPM | HEIGHT: 61 IN | SYSTOLIC BLOOD PRESSURE: 122 MMHG | BODY MASS INDEX: 23.18 KG/M2 | TEMPERATURE: 96.6 F | WEIGHT: 122.8 LBS | RESPIRATION RATE: 16 BRPM | OXYGEN SATURATION: 95 % | DIASTOLIC BLOOD PRESSURE: 70 MMHG

## 2018-04-09 DIAGNOSIS — Z85.3 HISTORY OF LEFT BREAST CANCER: ICD-10-CM

## 2018-04-09 DIAGNOSIS — I10 ESSENTIAL HYPERTENSION: ICD-10-CM

## 2018-04-09 DIAGNOSIS — I50.21 ACUTE SYSTOLIC CONGESTIVE HEART FAILURE (HCC): Primary | ICD-10-CM

## 2018-04-09 DIAGNOSIS — K21.00 GASTROESOPHAGEAL REFLUX DISEASE WITH ESOPHAGITIS: ICD-10-CM

## 2018-04-09 DIAGNOSIS — R41.3 MEMORY LOSS: ICD-10-CM

## 2018-04-09 DIAGNOSIS — E78.5 HYPERLIPIDEMIA, UNSPECIFIED HYPERLIPIDEMIA TYPE: ICD-10-CM

## 2018-04-09 DIAGNOSIS — N18.30 STAGE 3 CHRONIC KIDNEY DISEASE (HCC): ICD-10-CM

## 2018-04-09 DIAGNOSIS — R73.02 IMPAIRED GLUCOSE TOLERANCE: ICD-10-CM

## 2018-04-09 PROCEDURE — 99214 OFFICE O/P EST MOD 30 MIN: CPT | Performed by: INTERNAL MEDICINE

## 2018-04-09 NOTE — PROGRESS NOTES
Juliana Luke is a 85 y.o. female.   2/12/2018  Wt Readings from Last 1 Encounters:   04/09/18 55.7 kg (122 lb 12.8 oz)   She was last seen in January 2018, office weight 134 then, now 123.  Her graph she comes in for follow-up of acute and chronic systolic heart failure, chronic kidney disease, hypertension, hyperlipidemia, esophageal reflux, history breast cancer, and new problem with short-term memory    She has been found to have severe left ventricular dysfunction and acute systolic heart failure for which she was hospitalized in January.  Her ejection fraction was in the 25-30% range.  A PET scan showed an old apical infarct but no ischemia.  Her EKG was most recently checked by Dr. Earl on February 28 and showed normal sinus rhythm at 94/m, left bundle-branch block which was stable.  She was felt to not be a candidate for ACE inhibitor, ARB or Entresto because of renal concerns.  She has been treated with carvedilol and with furosemide.  Her furosemide dose is 40 mg each morning and neck are 40 mg every other afternoon.  Her home weights have been stable in the 114-116 range.    She has chronic kidney disease stage III.  Her appetite is diminished somewhat.    She has hypercholesterolemia treated with simvastatin 20 mg each evening.  New progression she has esophageal reflux treated with pantoprazole 40 mg daily before breakfast.    She has history of breast cancer and is on hormonal therapy with Arimidex 100 mg daily, followed by the [facility] group for oncology.    Patient's  describes her as having short-term memory problems of recent onset in the last few months.  She has not had any stroke or seizures.  She is not been previously evaluated for memory loss.    History of Present Illness     The following portions of the patient's history were reviewed and updated as appropriate: allergies, current medications, past family history, past medical history, past social history, past surgical  history and problem list.    Review of Systems   Constitutional: Negative.    HENT: Negative.    Eyes: Negative.    Respiratory: Negative.    Cardiovascular: Negative.    Endocrine: Negative.    Genitourinary: Negative.    Skin: Negative.    Neurological: Negative.    Hematological: Negative.    Psychiatric/Behavioral: Negative.        Objective   Physical Exam   Constitutional: She appears well-developed and well-nourished.   HENT:   Head: Normocephalic and atraumatic.   Cardiovascular: Normal rate and regular rhythm.  Exam reveals no gallop and no friction rub.    Murmur heard.  Blood pressure 120/60, right arm sitting.    Grade 2/6 systolic murmur at the apex   Pulmonary/Chest: Effort normal and breath sounds normal. No respiratory distress. She has no wheezes. She has no rales.       Abdominal: Soft. Bowel sounds are normal. She exhibits no distension and no mass. There is no tenderness.   Musculoskeletal: She exhibits edema.   Trace ankle edema bilaterally   Neurological: She is alert.   Patient says today is Monday, the month is April, she does not know the day of the month, the year is described as 2008, she knows that she is in Dr. Urban's office and the president isNaty   Skin: Skin is warm.   Psychiatric: Her behavior is normal.   Vitals reviewed.      Assessment/Plan   Radha was seen today for congestive heart failure, chronic kidney disease, hypertension, hyperlipidemia, heartburn and memory loss.    Diagnoses and all orders for this visit:    Acute systolic congestive heart failure  Comments:  Continue carvedilol 3.125 mg twice a day and furosemide 40 mg each morning and 40 mg every other afternoon, check daily weights    Hyperlipidemia, unspecified hyperlipidemia type  Comments:  Continue simvastatin 20 mg each evening, we will check chemistries and lipids  Orders:  -     Lipid Panel    Essential hypertension  Comments:  Blood pressure 120/60, good control, continue current therapy  Orders:  -      Urinalysis With Microscopic - Urine, Clean Catch    Gastroesophageal reflux disease with esophagitis  Comments:  Continue pantoprazole 40 mg daily before breakfast    Stage 3 chronic kidney disease  Comments:  We will check renal function and electrolytes  Orders:  -     Comprehensive Metabolic Panel  -     Urinalysis With Microscopic - Urine, Clean Catch    History of left breast cancer  Comments:  Left meniscectomy, followed by [facility], treated with Arimidex 1 mg daily    Impaired glucose tolerance  Comments:  Advise healthy diet, we'll check blood sugar    Memory loss  Comments:  Suspect vascular dementia, will check laboratory parameters and CT head without contrast  Orders:  -     CBC & Differential  -     TSH  -     T4, Free  -     Vitamin B12  -     CT Head Without Contrast; Future          Schedule office follow-up about 2 months, return sooner if needed                     EMR Dragon/Transcription disclaimer:      Much of this encounter note is an electronic transcription/translation of spoken language to printed text. The electronic translation of spoken language may permit erroneous, or at times, nonsensical words or phrases to be inadvertently transcribed; Although I have reviewed the note for such errors, some may still exist.

## 2018-04-10 LAB
ALBUMIN SERPL-MCNC: 3.9 G/DL (ref 3.5–5.2)
ALBUMIN/GLOB SERPL: 1.1 G/DL
ALP SERPL-CCNC: 61 U/L (ref 39–117)
ALT SERPL-CCNC: 12 U/L (ref 1–33)
AST SERPL-CCNC: 25 U/L (ref 1–32)
BASOPHILS # BLD AUTO: 0.01 10*3/MM3 (ref 0–0.2)
BASOPHILS NFR BLD AUTO: 0.1 % (ref 0–1.5)
BILIRUB SERPL-MCNC: 0.3 MG/DL (ref 0.1–1.2)
BUN SERPL-MCNC: 40 MG/DL (ref 8–23)
BUN/CREAT SERPL: 32.5 (ref 7–25)
CALCIUM SERPL-MCNC: 9.6 MG/DL (ref 8.6–10.5)
CHLORIDE SERPL-SCNC: 99 MMOL/L (ref 98–107)
CHOLEST SERPL-MCNC: 173 MG/DL (ref 0–200)
CO2 SERPL-SCNC: 27.4 MMOL/L (ref 22–29)
CREAT SERPL-MCNC: 1.23 MG/DL (ref 0.57–1)
EOSINOPHIL # BLD AUTO: 0.36 10*3/MM3 (ref 0–0.7)
EOSINOPHIL NFR BLD AUTO: 5.2 % (ref 0.3–6.2)
ERYTHROCYTE [DISTWIDTH] IN BLOOD BY AUTOMATED COUNT: 15.7 % (ref 11.7–13)
GFR SERPLBLD CREATININE-BSD FMLA CKD-EPI: 41 ML/MIN/1.73
GFR SERPLBLD CREATININE-BSD FMLA CKD-EPI: 50 ML/MIN/1.73
GLOBULIN SER CALC-MCNC: 3.5 GM/DL
GLUCOSE SERPL-MCNC: 138 MG/DL (ref 65–99)
HCT VFR BLD AUTO: 41.8 % (ref 35.6–45.5)
HDLC SERPL-MCNC: 41 MG/DL (ref 40–60)
HGB BLD-MCNC: 13.4 G/DL (ref 11.9–15.5)
IMM GRANULOCYTES # BLD: 0 10*3/MM3 (ref 0–0.03)
IMM GRANULOCYTES NFR BLD: 0 % (ref 0–0.5)
LDLC SERPL CALC-MCNC: 65 MG/DL (ref 0–100)
LYMPHOCYTES # BLD AUTO: 2.68 10*3/MM3 (ref 0.9–4.8)
LYMPHOCYTES NFR BLD AUTO: 39 % (ref 19.6–45.3)
MCH RBC QN AUTO: 29.3 PG (ref 26.9–32)
MCHC RBC AUTO-ENTMCNC: 32.1 G/DL (ref 32.4–36.3)
MCV RBC AUTO: 91.3 FL (ref 80.5–98.2)
MONOCYTES # BLD AUTO: 0.57 10*3/MM3 (ref 0.2–1.2)
MONOCYTES NFR BLD AUTO: 8.3 % (ref 5–12)
NEUTROPHILS # BLD AUTO: 3.25 10*3/MM3 (ref 1.9–8.1)
NEUTROPHILS NFR BLD AUTO: 47.4 % (ref 42.7–76)
PLATELET # BLD AUTO: 151 10*3/MM3 (ref 140–500)
POTASSIUM SERPL-SCNC: 4 MMOL/L (ref 3.5–5.2)
PROT SERPL-MCNC: 7.4 G/DL (ref 6–8.5)
RBC # BLD AUTO: 4.58 10*6/MM3 (ref 3.9–5.2)
SODIUM SERPL-SCNC: 141 MMOL/L (ref 136–145)
T4 FREE SERPL-MCNC: 1.11 NG/DL (ref 0.93–1.7)
TRIGL SERPL-MCNC: 336 MG/DL (ref 0–150)
TSH SERPL DL<=0.005 MIU/L-ACNC: 2.4 MIU/ML (ref 0.27–4.2)
UNABLE TO VOID: NORMAL
VIT B12 SERPL-MCNC: 777 PG/ML (ref 211–946)
VLDLC SERPL CALC-MCNC: 67.2 MG/DL (ref 5–40)
WBC # BLD AUTO: 6.87 10*3/MM3 (ref 4.5–10.7)

## 2018-04-12 ENCOUNTER — HOSPITAL ENCOUNTER (OUTPATIENT)
Dept: CT IMAGING | Facility: HOSPITAL | Age: 83
Discharge: HOME | End: 2018-04-12
Attending: INTERNAL MEDICINE | Admitting: INTERNAL MEDICINE

## 2018-04-12 DIAGNOSIS — R41.3 MEMORY LOSS: ICD-10-CM

## 2018-04-12 PROCEDURE — 70450 CT HEAD/BRAIN W/O DYE: CPT

## 2018-05-17 RX ORDER — SIMVASTATIN 20 MG
TABLET ORAL
Qty: 90 TABLET | Refills: 1 | Status: SHIPPED | OUTPATIENT
Start: 2018-05-17 | End: 2019-01-10 | Stop reason: SDUPTHER

## 2018-05-30 ENCOUNTER — HOSPITAL ENCOUNTER (OUTPATIENT)
Dept: CARDIOLOGY | Facility: HOSPITAL | Age: 83
Discharge: HOME | End: 2018-05-30
Attending: INTERNAL MEDICINE | Admitting: INTERNAL MEDICINE

## 2018-05-30 ENCOUNTER — OFFICE VISIT (OUTPATIENT)
Dept: CARDIOLOGY | Facility: CLINIC | Age: 83
End: 2018-05-30

## 2018-05-30 VITALS
DIASTOLIC BLOOD PRESSURE: 60 MMHG | WEIGHT: 118 LBS | BODY MASS INDEX: 22.28 KG/M2 | HEIGHT: 61 IN | SYSTOLIC BLOOD PRESSURE: 118 MMHG | HEART RATE: 74 BPM

## 2018-05-30 VITALS
HEIGHT: 61 IN | DIASTOLIC BLOOD PRESSURE: 60 MMHG | BODY MASS INDEX: 23.07 KG/M2 | SYSTOLIC BLOOD PRESSURE: 130 MMHG | HEART RATE: 51 BPM | WEIGHT: 122.2 LBS

## 2018-05-30 DIAGNOSIS — I44.7 LEFT BUNDLE BRANCH BLOCK: ICD-10-CM

## 2018-05-30 DIAGNOSIS — I50.22 CHRONIC SYSTOLIC CONGESTIVE HEART FAILURE (HCC): Primary | ICD-10-CM

## 2018-05-30 DIAGNOSIS — I10 ESSENTIAL HYPERTENSION: ICD-10-CM

## 2018-05-30 DIAGNOSIS — N18.30 STAGE 3 CHRONIC KIDNEY DISEASE (HCC): ICD-10-CM

## 2018-05-30 DIAGNOSIS — I50.22 CHRONIC SYSTOLIC CONGESTIVE HEART FAILURE (HCC): ICD-10-CM

## 2018-05-30 DIAGNOSIS — E78.5 HYPERLIPIDEMIA, UNSPECIFIED HYPERLIPIDEMIA TYPE: ICD-10-CM

## 2018-05-30 DIAGNOSIS — I35.0 NONRHEUMATIC AORTIC VALVE STENOSIS: ICD-10-CM

## 2018-05-30 PROCEDURE — 93000 ELECTROCARDIOGRAM COMPLETE: CPT | Performed by: NURSE PRACTITIONER

## 2018-05-30 PROCEDURE — 93306 TTE W/DOPPLER COMPLETE: CPT | Performed by: INTERNAL MEDICINE

## 2018-05-30 PROCEDURE — 99214 OFFICE O/P EST MOD 30 MIN: CPT | Performed by: NURSE PRACTITIONER

## 2018-05-30 PROCEDURE — 93306 TTE W/DOPPLER COMPLETE: CPT

## 2018-05-30 RX ORDER — FUROSEMIDE 40 MG/1
TABLET ORAL
Qty: 30 TABLET | Refills: 5
Start: 2018-05-30 | End: 2018-09-04 | Stop reason: SDUPTHER

## 2018-05-30 NOTE — PROGRESS NOTES
"Date of Office Visit: 2018  Encounter Provider: STAN Winters  Place of Service: [facility] CARDIOLOGY  Patient Name: Radha Luke  :3/14/1933    Chief Complaint   Patient presents with   • Congestive Heart Failure   :     HPI: Radha Luke is a 85 y.o. female is a patient of Dr. Earl. I am seeing her today and have reviewed the records.     Her past medical history is significant of hypertension, hyperlipidemia, left bundle branch block, systolic congestive heart failure, moderate mitral regurgitation, mild tricuspid regurgitation, aortic stenosis, kidney disease, pleural effusion and history of breast cancer.    She was admitted in the hospital in 2018 with new onset of  Acute stylet congestive heart failure.  Her echocardiogram 2018 showed an ejection fraction of 28%.  She was started on low dose carvedilol and loop diuretic.  She was not started on Entresto, Ace inhibitor, or ARB due to kidney disease.  He had a right sided thoracentesis with 1200 mL of fluid removed and a left-sided thoracentesis 750 mL of fluid removed.     She was last seen in the office on 2018.  At that time she was feeling well and was tolerating her medications. She was taking lasix 40 daily and 40 every other evening.  She was to have a repeat echocardiogram in 3 months and if her EF was less than 35%, it was recommended to consider a biventricular device as she does also have a left bundle branch block.  She also had a PET myocardial perfusion study that showed no evidence of ischemia.    She presents today for follow up. Overall she states that she is feeling \"great\".  She denies palpitations, shortness of breath, edema, dizziness, lightheadedness, fatigue, chest pain, or blood in the urine or stool.  She does not do any structured exercise but states that she periodically walks 500 feet multiple times a day before meals at her independent living facility.  She resides there " "with her .  They have been  over 60 years.  She has been monitoring her weights at home that him to be stable however they do fluctuate up pound or 2 on the days that she takes 80 mg of lasix.     Allergies   Allergen Reactions   • Levofloxacin Swelling     Swelling in legs and ankle and tongue   • Penicillins Hives       Past Medical History:   Diagnosis Date   • Acute systolic congestive heart failure    • Aortic stenosis    • Arthritis    • Breast cancer     Locally recurrent left breast   • CKD (chronic kidney disease), stage III    • Cough    • Dizziness    • Drug therapy    • Edema    • GERD (gastroesophageal reflux disease)    • History of breast cancer     LEFT   • Hyperlipidemia    • Hypertension    • Hypokalemia    • LBBB (left bundle branch block)    • Mild tricuspid regurgitation     with a right ventricular systolic pressure of 62   • Moderate mitral regurgitation    • Osteopenia    • Pleural effusion        Past Surgical History:   Procedure Laterality Date   • APPENDECTOMY  1943   • BREAST BIOPSY     • BREAST LUMPECTOMY Left 1995   • COLONOSCOPY N/A 5/12/2016    Procedure: COLONOSCOPY;  Surgeon: Israel Vance MD;  Location: [facility] ENDOSCOPY;  Service:    • HYSTERECTOMY  1964   • MASTECTOMY Left 2011   • THORACENTESIS Bilateral      Family and social history reviewed.   Review of Systems   Constitution: Negative for malaise/fatigue.   Cardiovascular: Negative for chest pain, leg swelling and paroxysmal nocturnal dyspnea.   Respiratory: Negative for shortness of breath.    All ther systems were reviewed and are negative        Objective:     Vitals:    05/30/18 1348   BP: 130/60   BP Location: Right arm   Pulse: 51   Weight: 55.4 kg (122 lb 3.2 oz)   Height: 154.9 cm (61\")     Body mass index is 23.09 kg/m².    PHYSICAL EXAM:  Physical Exam   Constitutional: She is oriented to person, place, and time. She appears well-developed and well-nourished. No distress.   HENT:   Head: " Normocephalic.   Eyes: Conjunctivae are normal.   glasses   Neck: Normal range of motion. No JVD present.   Cardiovascular: Normal rate, regular rhythm and intact distal pulses.    Murmur heard.   Systolic murmur is present with a grade of 2/6  at the upper right sternal border, upper left sternal border  Pulses:       Carotid pulses are 2+ on the right side, and 2+ on the left side.       Radial pulses are 2+ on the right side, and 2+ on the left side.        Posterior tibial pulses are 2+ on the right side, and 2+ on the left side.   Varicose veins bilaterally   Pulmonary/Chest: Effort normal and breath sounds normal. No respiratory distress. She has no wheezes. She has no rhonchi. She has no rales. She exhibits no tenderness.   Abdominal: Soft. Bowel sounds are normal. She exhibits no distension.   Musculoskeletal: Normal range of motion. She exhibits no edema.   Neurological: She is alert and oriented to person, place, and time.   Skin: Skin is warm, dry and intact. No rash noted. She is not diaphoretic. No cyanosis.   Psychiatric: She has a normal mood and affect. Her behavior is normal. Judgment and thought content normal.       ECG 12 Lead  Date/Time: 5/30/2018 2:37 PM  Performed by: TESSIE ROSALES  Authorized by: TESSIE ROSALES   Comparison: compared with previous ECG from 2/28/2018  Similar to previous ECG  Rhythm: sinus bradycardia  Rate: bradycardic  BPM: 51  Conduction: left bundle branch block  QRS axis: left  Clinical impression: abnormal ECG          Current Outpatient Prescriptions   Medication Sig Dispense Refill   • anastrozole (ARIMIDEX) 1 MG tablet TAKE 1 TABLET EVERY DAY 90 tablet 4   • Calcium Carb-Cholecalciferol (CALCIUM 1000 + D PO) Take 1 tablet by mouth Daily.     • carvedilol (COREG) 3.125 MG tablet 1 by mouth twice a day 28 tablet 0   • furosemide (LASIX) 40 MG tablet Take one tablet in am (40 mg) and 1/2 tablet in afternoon (20 mg) 30 tablet 5   • magnesium gluconate (MAGONATE) 500 MG  tablet Take 500 mg by mouth Daily.     • meclizine (ANTIVERT) 25 MG tablet Take 1 tablet by mouth 3 (three) times a day as needed for dizziness. 60 tablet 1   • Multiple Vitamins-Minerals (CENTRUM SILVER 50+WOMEN PO) Take 1 tablet by mouth Daily.     • pantoprazole (PROTONIX) 40 MG EC tablet TAKE 1 TABLET EVERY DAY 90 tablet 1   • potassium chloride (MICRO-K) 10 MEQ CR capsule Take 2 capsules by mouth Daily. 180 capsule 3   • simvastatin (ZOCOR) 20 MG tablet TAKE 1 TABLET EVERY DAY 90 tablet 1   • VIRTUSSIN A/C 100-10 MG/5ML liquid TK 5ML PO Q 4 H PRF COUGH - currently prn  0     No current facility-administered medications for this visit.      Assessment:       Diagnosis Plan   1. Chronic systolic congestive heart failure  ECG 12 Lead   2. Left bundle branch block  ECG 12 Lead   3. Nonrheumatic aortic valve stenosis     4. Essential hypertension     5. Hyperlipidemia, unspecified hyperlipidemia type     6. Stage 3 chronic kidney disease          Orders Placed This Encounter   Procedures   • ECG 12 Lead     This order was created via procedure documentation       Plan:       1. Systolic congestive heart failure. Repeat echocardiogram today with final results pending. Dr. Earl reviewed the images and read an EF of 45-50% which is improved from 28% in 01/2018.Due to complaint of weight fluctuation on days with 80 lasix versus 40 lasix days. She will now take 40 mg every morning and 20 mg every afternoon. She is to continue monitoring weights and call if weight gain or new symptoms develop.     Heart Failure  Assessment  • NYHA class II - There is slight limitation of physical activity. The patient is comfortable at rest, but physical activity results in fatigue, palpitations or shortness of breath.  • ACE inhibitor not prescribed for medical reasons  • Beta blocker prescribed  • Diuretics prescribed  • Angiotensin receptor blocker (ARB) not prescribed for medical reasons  • The most recent ejection fraction is 28%  •  The left ventricle was last assessed on 1/30/2018    Plan  • The patient has received heart failure education on the following topics: medication instructions, symptom management, physical activity and weight monitoring  • The heart failure care plan was discussed with the patient today including: continuing the current program    Subjective/Objective    • Physical exam findings negative for rales, peripheral edema and elevated JVP.      2. Left Bundle branch block- stable  3. Pleural effusion- status post left and right thoracentesis in  01/2018.lungs are clear today.   4.Hypertension- well controlled  5.Hyperlipidemia- on simvastatin 20 mg  6.CKD stage III- reason not on ace/arb/ or Entreso for heart failure  7.Aortic Stenosis- moderate with systolic murmur. Asymptomatic.  Continue to monitor  8.Mitral regurgitation- moderate on recent echo  9.Tricuspid regurgitation trace with normal RVSP on today's echo10. History of breast cancer in 2011    Plan of care reviewed with Dr. Earl  Follow up in 4 months with Dr. Earl    Patient was instructed to call the office if new symptoms develop or report to nearest ER if heart attack or stroke is suspected.      It has been a pleasure to participate in this patient's care.      Thank you,  STAN Winters      **Juan Disclaimer:**  Much of this encounter note is an electronic transcription/translation of spoken language to printed text. The electronic translation of spoken language may permit erroneous, or at times, nonsensical words or phrases to be inadvertently transcribed. Although I have reviewed the note for such errors, some may still exist.

## 2018-05-31 LAB
AORTIC DIMENSIONLESS INDEX: 0.2 (DI)
ASCENDING AORTA: 2.7 CM
BH CV ECHO MEAS - ACS: 1.1 CM
BH CV ECHO MEAS - AI DEC SLOPE: 163.6 CM/SEC^2
BH CV ECHO MEAS - AI MAX PG: 38.6 MMHG
BH CV ECHO MEAS - AI MAX VEL: 310.6 CM/SEC
BH CV ECHO MEAS - AI P1/2T: 556.1 MSEC
BH CV ECHO MEAS - AO MAX PG (FULL): 39.8 MMHG
BH CV ECHO MEAS - AO MAX PG: 49 MMHG
BH CV ECHO MEAS - AO MEAN PG (FULL): 26.9 MMHG
BH CV ECHO MEAS - AO MEAN PG: 25 MMHG
BH CV ECHO MEAS - AO ROOT AREA (BSA CORRECTED): 2.2
BH CV ECHO MEAS - AO ROOT AREA: 8.5 CM^2
BH CV ECHO MEAS - AO ROOT DIAM: 3.3 CM
BH CV ECHO MEAS - AO V2 MAX: 350 CM/SEC
BH CV ECHO MEAS - AO V2 MEAN: 255.6 CM/SEC
BH CV ECHO MEAS - AO V2 VTI: 96 CM
BH CV ECHO MEAS - AVA(I,A): 0.55 CM^2
BH CV ECHO MEAS - AVA(I,D): 0.8 CM^2
BH CV ECHO MEAS - AVA(V,A): 0.62 CM^2
BH CV ECHO MEAS - AVA(V,D): 0.62 CM^2
BH CV ECHO MEAS - BSA(HAYCOCK): 1.5 M^2
BH CV ECHO MEAS - BSA: 1.5 M^2
BH CV ECHO MEAS - BZI_BMI: 22.3 KILOGRAMS/M^2
BH CV ECHO MEAS - BZI_METRIC_HEIGHT: 154.9 CM
BH CV ECHO MEAS - BZI_METRIC_WEIGHT: 53.5 KG
BH CV ECHO MEAS - CONTRAST EF (2CH): 50 ML/M^2
BH CV ECHO MEAS - CONTRAST EF 4CH: 50 ML/M^2
BH CV ECHO MEAS - EDV(MOD-SP2): 140 ML
BH CV ECHO MEAS - EDV(MOD-SP4): 126 ML
BH CV ECHO MEAS - EDV(TEICH): 122.8 ML
BH CV ECHO MEAS - EF(CUBED): 67.1 %
BH CV ECHO MEAS - EF(MOD-BP): 50 %
BH CV ECHO MEAS - EF(MOD-SP2): 50 %
BH CV ECHO MEAS - EF(MOD-SP4): 50 %
BH CV ECHO MEAS - EF(TEICH): 58.4 %
BH CV ECHO MEAS - ESV(MOD-SP2): 70 ML
BH CV ECHO MEAS - ESV(MOD-SP4): 63 ML
BH CV ECHO MEAS - ESV(TEICH): 51.1 ML
BH CV ECHO MEAS - FS: 31 %
BH CV ECHO MEAS - IVS/LVPW: 1.1
BH CV ECHO MEAS - IVSD: 1.2 CM
BH CV ECHO MEAS - LAT PEAK E' VEL: 3 CM/SEC
BH CV ECHO MEAS - LV DIASTOLIC VOL/BSA (35-75): 83.5 ML/M^2
BH CV ECHO MEAS - LV MASS(C)D: 215.3 GRAMS
BH CV ECHO MEAS - LV MASS(C)DI: 142.6 GRAMS/M^2
BH CV ECHO MEAS - LV MAX PG: 3 MMHG
BH CV ECHO MEAS - LV MEAN PG: 1 MMHG
BH CV ECHO MEAS - LV SYSTOLIC VOL/BSA (12-30): 41.7 ML/M^2
BH CV ECHO MEAS - LV V1 MAX: 81 CM/SEC
BH CV ECHO MEAS - LV V1 MEAN: 49.6 CM/SEC
BH CV ECHO MEAS - LV V1 VTI: 23 CM
BH CV ECHO MEAS - LVIDD: 5.1 CM
BH CV ECHO MEAS - LVIDS: 3.5 CM
BH CV ECHO MEAS - LVLD AP2: 8.3 CM
BH CV ECHO MEAS - LVLD AP4: 8.2 CM
BH CV ECHO MEAS - LVLS AP2: 7.1 CM
BH CV ECHO MEAS - LVLS AP4: 7.6 CM
BH CV ECHO MEAS - LVOT AREA (M): 2.5 CM^2
BH CV ECHO MEAS - LVOT AREA: 2.6 CM^2
BH CV ECHO MEAS - LVOT DIAM: 2 CM
BH CV ECHO MEAS - LVPWD: 1 CM
BH CV ECHO MEAS - MED PEAK E' VEL: 3 CM/SEC
BH CV ECHO MEAS - MR MAX PG: 101.5 MMHG
BH CV ECHO MEAS - MR MAX VEL: 503.9 CM/SEC
BH CV ECHO MEAS - MV A DUR: 0.16 SEC
BH CV ECHO MEAS - MV A MAX VEL: 141.6 CM/SEC
BH CV ECHO MEAS - MV DEC SLOPE: 218.6 CM/SEC^2
BH CV ECHO MEAS - MV DEC TIME: 0.32 SEC
BH CV ECHO MEAS - MV E MAX VEL: 72.8 CM/SEC
BH CV ECHO MEAS - MV E/A: 0.51
BH CV ECHO MEAS - MV MAX PG: 8.7 MMHG
BH CV ECHO MEAS - MV MEAN PG: 2.2 MMHG
BH CV ECHO MEAS - MV P1/2T MAX VEL: 71.3 CM/SEC
BH CV ECHO MEAS - MV P1/2T: 95.5 MSEC
BH CV ECHO MEAS - MV V2 MAX: 147.7 CM/SEC
BH CV ECHO MEAS - MV V2 MEAN: 67.3 CM/SEC
BH CV ECHO MEAS - MV V2 VTI: 48.3 CM
BH CV ECHO MEAS - MVA P1/2T LCG: 3.1 CM^2
BH CV ECHO MEAS - MVA(P1/2T): 2.3 CM^2
BH CV ECHO MEAS - MVA(VTI): 1 CM^2
BH CV ECHO MEAS - PA ACC TIME: 0.13 SEC
BH CV ECHO MEAS - PA MAX PG (FULL): 0.81 MMHG
BH CV ECHO MEAS - PA MAX PG: 3.4 MMHG
BH CV ECHO MEAS - PA PR(ACCEL): 22 MMHG
BH CV ECHO MEAS - PA V2 MAX: 92.3 CM/SEC
BH CV ECHO MEAS - PI END-D VEL: 86.9 CM/SEC
BH CV ECHO MEAS - PULM A REVS DUR: 0.12 SEC
BH CV ECHO MEAS - PULM A REVS VEL: 23.3 CM/SEC
BH CV ECHO MEAS - PULM DIAS VEL: 41.2 CM/SEC
BH CV ECHO MEAS - PULM S/D: 0.78
BH CV ECHO MEAS - PULM SYS VEL: 32 CM/SEC
BH CV ECHO MEAS - PVA(V,A): 2.5 CM^2
BH CV ECHO MEAS - PVA(V,D): 2.5 CM^2
BH CV ECHO MEAS - QP/QS: 0.94
BH CV ECHO MEAS - RAP SYSTOLE: 3 MMHG
BH CV ECHO MEAS - RV MAX PG: 2.6 MMHG
BH CV ECHO MEAS - RV MEAN PG: 1.3 MMHG
BH CV ECHO MEAS - RV V1 MAX: 80.5 CM/SEC
BH CV ECHO MEAS - RV V1 MEAN: 53.6 CM/SEC
BH CV ECHO MEAS - RV V1 VTI: 16.4 CM
BH CV ECHO MEAS - RVOT AREA: 2.8 CM^2
BH CV ECHO MEAS - RVOT DIAM: 1.9 CM
BH CV ECHO MEAS - RVSP: 17.9 MMHG
BH CV ECHO MEAS - SI(AO): 504.3 ML/M^2
BH CV ECHO MEAS - SI(CUBED): 58.4 ML/M^2
BH CV ECHO MEAS - SI(LVOT): 32.7 ML/M^2
BH CV ECHO MEAS - SI(MOD-SP2): 46.4 ML/M^2
BH CV ECHO MEAS - SI(MOD-SP4): 41.7 ML/M^2
BH CV ECHO MEAS - SI(TEICH): 47.5 ML/M^2
BH CV ECHO MEAS - SUP REN AO DIAM: 1.3 CM
BH CV ECHO MEAS - SV(AO): 761.2 ML
BH CV ECHO MEAS - SV(CUBED): 88.1 ML
BH CV ECHO MEAS - SV(LVOT): 49.3 ML
BH CV ECHO MEAS - SV(MOD-SP2): 70 ML
BH CV ECHO MEAS - SV(MOD-SP4): 63 ML
BH CV ECHO MEAS - SV(RVOT): 46.5 ML
BH CV ECHO MEAS - SV(TEICH): 71.7 ML
BH CV ECHO MEAS - TAPSE (>1.6): 1.5 CM2
BH CV ECHO MEAS - TR MAX VEL: 193.3 CM/SEC
BH CV ECHO MEASUREMENTS AVERAGE E/E' RATIO: 24.27
BH CV XLRA - RV BASE: 2.2 CM
BH CV XLRA - TDI S': 9 CM/SEC
LEFT ATRIUM VOLUME INDEX: 24 ML/M2
MAXIMAL PREDICTED HEART RATE: 135 BPM
SINUS: 2.5 CM
STJ: 2.6 CM
STRESS TARGET HR: 115 BPM
Z-SCORE OF LEFT VENTRICULAR DIMENSION IN END SYSTOLE: 3

## 2018-06-11 ENCOUNTER — OFFICE VISIT (OUTPATIENT)
Dept: INTERNAL MEDICINE | Facility: CLINIC | Age: 83
End: 2018-06-11

## 2018-06-11 VITALS
OXYGEN SATURATION: 97 % | HEIGHT: 61 IN | HEART RATE: 70 BPM | WEIGHT: 123.45 LBS | SYSTOLIC BLOOD PRESSURE: 130 MMHG | DIASTOLIC BLOOD PRESSURE: 72 MMHG | BODY MASS INDEX: 23.31 KG/M2 | RESPIRATION RATE: 16 BRPM | TEMPERATURE: 97.2 F

## 2018-06-11 DIAGNOSIS — E78.5 HYPERLIPIDEMIA, UNSPECIFIED HYPERLIPIDEMIA TYPE: ICD-10-CM

## 2018-06-11 DIAGNOSIS — Z17.0 MALIGNANT NEOPLASM OF OVERLAPPING SITES OF LEFT BREAST IN FEMALE, ESTROGEN RECEPTOR POSITIVE (HCC): ICD-10-CM

## 2018-06-11 DIAGNOSIS — C50.812 MALIGNANT NEOPLASM OF OVERLAPPING SITES OF LEFT BREAST IN FEMALE, ESTROGEN RECEPTOR POSITIVE (HCC): ICD-10-CM

## 2018-06-11 DIAGNOSIS — R73.02 IMPAIRED GLUCOSE TOLERANCE: ICD-10-CM

## 2018-06-11 DIAGNOSIS — I44.7 LEFT BUNDLE BRANCH BLOCK: ICD-10-CM

## 2018-06-11 DIAGNOSIS — N18.30 STAGE 3 CHRONIC KIDNEY DISEASE (HCC): ICD-10-CM

## 2018-06-11 DIAGNOSIS — I50.21 ACUTE SYSTOLIC CONGESTIVE HEART FAILURE (HCC): ICD-10-CM

## 2018-06-11 DIAGNOSIS — I10 ESSENTIAL HYPERTENSION: Primary | ICD-10-CM

## 2018-06-11 DIAGNOSIS — K21.00 GASTROESOPHAGEAL REFLUX DISEASE WITH ESOPHAGITIS: ICD-10-CM

## 2018-06-11 PROBLEM — R05.9 COUGH: Status: RESOLVED | Noted: 2017-12-20 | Resolved: 2018-06-11

## 2018-06-11 PROCEDURE — 99213 OFFICE O/P EST LOW 20 MIN: CPT | Performed by: INTERNAL MEDICINE

## 2018-06-11 NOTE — PROGRESS NOTES
Subjective   Radha Luke is a 85 y.o. female.   5/16/2018  Wt Readings from Last 1 Encounters:   06/11/18 56 kg (123 lb 7.2 oz)   She was last seen in April 2018, weight 122 then, now 123.    She returns for follow-up of acute and chronic systolic heart failure, valvular heart disease, chronic kidney disease, hypertension, hyperlipidemia, esophageal reflux, history left breast cancer, short-term memory problems    History of Present Illness   She has history of congestive heart failure and had acute systolic heart failure in January.  Her ejection fraction was about 28%.  A PET scan showed an old apical infarct but no ischemia.  A stress nuclear study in February was low risk.  She has a known left bundle branch block.  She had recent cardiology follow-up in late May and her ejection fraction had improved to the 46-50% range.  She still has some valvular abnormalities with mild aortic regurgitation, moderate aortic stenosis, and moderate mitral regurgitation.  Her left Vish gram showed normal sinus rhythm at 51/m, left bundle-branch block.  She says that she is feeling well.  Her home weights are monitored daily INR staying in the 117-118 range.  Her current furosemide dose is 40 mg in the morning and 20 mg in the afternoon.  She continues on carvedilol 3.125 mg twice a day.    She has chronic kidney disease stage III.  No uremic symptoms are apparent.  Studies in April showed serum creatinine level I.23, estimated GFR 41 mL per minute    She has hypercholesterolemia treated with simvastatin 20 mg each evening.    She has esophageal reflux treated with pantoprazole 40 g daily before breakfast.    She is history of impaired glucose tolerance for which healthy diet and exercise been recommended.    She has history of left breast cancer and is on hormonal therapy with Arimidex 1 mg daily, followed by the [facility] for oncology.    She was seen in February with short-term memory loss.  Her CT scan at that time showed  moderate small vessel disease, no stroke tumor or hemorrhage.  The source of her short-term memory a problem is probably vascular disease.    She denies having any new problems.  The following portions of the patient's history were reviewed and updated as appropriate: allergies, current medications, past family history, past medical history, past social history, past surgical history and problem list.    Review of Systems   Constitutional: Negative.    HENT: Negative.    Eyes: Negative.    Respiratory: Negative.    Cardiovascular: Negative.    Endocrine: Negative.    Genitourinary: Negative.    Skin: Negative.    Neurological: Negative.    Hematological: Negative.    Psychiatric/Behavioral: Negative.        Objective   Physical Exam   Constitutional: She appears well-developed and well-nourished.   HENT:   Head: Normocephalic and atraumatic.   Cardiovascular: Normal rate and regular rhythm.  Exam reveals no gallop and no friction rub.    Murmur heard.  Blood pressure 126/84, left arm sitting    Grade 2/6 systolic murmur at the apex radiating to the base   Pulmonary/Chest: Effort normal and breath sounds normal. No respiratory distress. She has no wheezes. She has no rales.   Abdominal: Soft. Bowel sounds are normal. She exhibits no distension and no mass. There is no tenderness.   Neurological: She is alert.   Skin: Skin is warm.   Psychiatric: Her behavior is normal.   Vitals reviewed.      Assessment/Plan   Radha was seen today for congestive heart failure, chronic kidney disease, heartburn and hyperlipidemia.    Diagnoses and all orders for this visit:    Essential hypertension  Comments:  Continue furosemide 40 mg each morning and 20 mg each afternoon, carvedilol 3.125 mg twice daily  Orders:  -     Comprehensive Metabolic Panel  -     Urinalysis With Microscopic - Urine, Clean Catch    Left bundle branch block  Comments:  Chronic bundle-branch block    Acute systolic congestive heart  failure  Comments:  Echocardiogram late May 2018 showed improved ejection fraction in the range of 46-50%, stable mild AR, moderate left ear, moderate MR    Gastroesophageal reflux disease with esophagitis  Comments:  Continue pantoprazole 40 mg daily before breakfast    Impaired glucose tolerance  Comments:  Advise healthy diet and exercises tolerated, will check hemoglobin A1c level  Orders:  -     Hemoglobin A1c    Stage 3 chronic kidney disease  Comments:  We will check blood count renal function and electrolytes  Orders:  -     CBC & Differential  -     Comprehensive Metabolic Panel    Malignant neoplasm of overlapping sites of left breast in female, estrogen receptor positive  Comments:  Followed by the [facility], treated with Arimidex    Hyperlipidemia, unspecified hyperlipidemia type  Comments:  Continue simvastatin 20 mg each evening, we will check lipid studies  Orders:  -     Lipid Panel        Schedule office follow-up about 4 months, return sooner if needed                       EMR Dragon/Transcription disclaimer:      Much of this encounter note is an electronic transcription/translation of spoken language to printed text. The electronic translation of spoken language may permit erroneous, or at times, nonsensical words or phrases to be inadvertently transcribed; Although I have reviewed the note for such errors, some may still exist.

## 2018-06-12 LAB
ALBUMIN SERPL-MCNC: 4.2 G/DL (ref 3.5–5.2)
ALBUMIN/GLOB SERPL: 1.4 G/DL
ALP SERPL-CCNC: 59 U/L (ref 39–117)
ALT SERPL-CCNC: 9 U/L (ref 1–33)
APPEARANCE UR: CLEAR
AST SERPL-CCNC: 20 U/L (ref 1–32)
BACTERIA #/AREA URNS HPF: NORMAL /HPF
BASOPHILS # BLD AUTO: 0.01 10*3/MM3 (ref 0–0.2)
BASOPHILS NFR BLD AUTO: 0.1 % (ref 0–1.5)
BILIRUB SERPL-MCNC: 0.4 MG/DL (ref 0.1–1.2)
BILIRUB UR QL STRIP: NEGATIVE
BUN SERPL-MCNC: 39 MG/DL (ref 8–23)
BUN/CREAT SERPL: 25.7 (ref 7–25)
CALCIUM SERPL-MCNC: 9.4 MG/DL (ref 8.6–10.5)
CASTS URNS MICRO: NORMAL
CHLORIDE SERPL-SCNC: 99 MMOL/L (ref 98–107)
CHOLEST SERPL-MCNC: 158 MG/DL (ref 0–200)
CO2 SERPL-SCNC: 29 MMOL/L (ref 22–29)
COLOR UR: YELLOW
CREAT SERPL-MCNC: 1.52 MG/DL (ref 0.57–1)
EOSINOPHIL # BLD AUTO: 0.37 10*3/MM3 (ref 0–0.7)
EOSINOPHIL NFR BLD AUTO: 5.1 % (ref 0.3–6.2)
EPI CELLS #/AREA URNS HPF: NORMAL /HPF
ERYTHROCYTE [DISTWIDTH] IN BLOOD BY AUTOMATED COUNT: 14.2 % (ref 11.7–13)
GFR SERPLBLD CREATININE-BSD FMLA CKD-EPI: 33 ML/MIN/1.73
GFR SERPLBLD CREATININE-BSD FMLA CKD-EPI: 39 ML/MIN/1.73
GLOBULIN SER CALC-MCNC: 2.9 GM/DL
GLUCOSE SERPL-MCNC: 145 MG/DL (ref 65–99)
GLUCOSE UR QL: NEGATIVE
HBA1C MFR BLD: 6.3 % (ref 4.8–5.6)
HCT VFR BLD AUTO: 40.1 % (ref 35.6–45.5)
HDLC SERPL-MCNC: 39 MG/DL (ref 40–60)
HGB BLD-MCNC: 12.9 G/DL (ref 11.9–15.5)
HGB UR QL STRIP: NEGATIVE
IMM GRANULOCYTES # BLD: 0 10*3/MM3 (ref 0–0.03)
IMM GRANULOCYTES NFR BLD: 0 % (ref 0–0.5)
KETONES UR QL STRIP: NEGATIVE
LDLC SERPL CALC-MCNC: 51 MG/DL (ref 0–100)
LEUKOCYTE ESTERASE UR QL STRIP: NEGATIVE
LYMPHOCYTES # BLD AUTO: 2.99 10*3/MM3 (ref 0.9–4.8)
LYMPHOCYTES NFR BLD AUTO: 41.3 % (ref 19.6–45.3)
MCH RBC QN AUTO: 30.9 PG (ref 26.9–32)
MCHC RBC AUTO-ENTMCNC: 32.2 G/DL (ref 32.4–36.3)
MCV RBC AUTO: 96.2 FL (ref 80.5–98.2)
MONOCYTES # BLD AUTO: 0.67 10*3/MM3 (ref 0.2–1.2)
MONOCYTES NFR BLD AUTO: 9.3 % (ref 5–12)
NEUTROPHILS # BLD AUTO: 3.2 10*3/MM3 (ref 1.9–8.1)
NEUTROPHILS NFR BLD AUTO: 44.2 % (ref 42.7–76)
NITRITE UR QL STRIP: NEGATIVE
PH UR STRIP: 6 [PH] (ref 5–8)
PLATELET # BLD AUTO: 133 10*3/MM3 (ref 140–500)
POTASSIUM SERPL-SCNC: 3.9 MMOL/L (ref 3.5–5.2)
PROT SERPL-MCNC: 7.1 G/DL (ref 6–8.5)
PROT UR QL STRIP: NEGATIVE
RBC # BLD AUTO: 4.17 10*6/MM3 (ref 3.9–5.2)
RBC #/AREA URNS HPF: NORMAL /HPF
SODIUM SERPL-SCNC: 142 MMOL/L (ref 136–145)
SP GR UR: 1.01 (ref 1–1.03)
TRIGL SERPL-MCNC: 341 MG/DL (ref 0–150)
UROBILINOGEN UR STRIP-MCNC: (no result) MG/DL
VLDLC SERPL CALC-MCNC: 68.2 MG/DL (ref 5–40)
WBC # BLD AUTO: 7.24 10*3/MM3 (ref 4.5–10.7)
WBC #/AREA URNS HPF: NORMAL /HPF

## 2018-07-13 RX ORDER — PANTOPRAZOLE SODIUM 40 MG/1
TABLET, DELAYED RELEASE ORAL
Qty: 90 TABLET | Refills: 1 | Status: SHIPPED | OUTPATIENT
Start: 2018-07-13 | End: 2019-01-07 | Stop reason: SDUPTHER

## 2018-09-04 RX ORDER — FUROSEMIDE 40 MG/1
TABLET ORAL
Qty: 135 TABLET | Refills: 1 | Status: SHIPPED | OUTPATIENT
Start: 2018-09-04 | End: 2019-03-09 | Stop reason: SDUPTHER

## 2018-09-07 RX ORDER — ANASTROZOLE 1 MG/1
1 TABLET ORAL DAILY
Qty: 90 TABLET | Refills: 4 | Status: SHIPPED | OUTPATIENT
Start: 2018-09-07 | End: 2020-01-17

## 2018-10-03 ENCOUNTER — CLINICAL SUPPORT (OUTPATIENT)
Dept: INTERNAL MEDICINE | Facility: CLINIC | Age: 83
End: 2018-10-03

## 2018-10-03 DIAGNOSIS — Z23 FLU VACCINE NEED: Primary | ICD-10-CM

## 2018-10-03 PROCEDURE — 90662 IIV NO PRSV INCREASED AG IM: CPT | Performed by: INTERNAL MEDICINE

## 2018-10-03 PROCEDURE — G0008 ADMIN INFLUENZA VIRUS VAC: HCPCS | Performed by: INTERNAL MEDICINE

## 2018-10-10 ENCOUNTER — EPISODE CHANGES (OUTPATIENT)
Dept: CASE MANAGEMENT | Facility: OTHER | Age: 83
End: 2018-10-10

## 2018-10-24 ENCOUNTER — OFFICE VISIT (OUTPATIENT)
Dept: INTERNAL MEDICINE | Facility: CLINIC | Age: 83
End: 2018-10-24

## 2018-10-24 VITALS
BODY MASS INDEX: 23.18 KG/M2 | HEIGHT: 61 IN | SYSTOLIC BLOOD PRESSURE: 122 MMHG | HEART RATE: 84 BPM | RESPIRATION RATE: 16 BRPM | DIASTOLIC BLOOD PRESSURE: 80 MMHG | WEIGHT: 122.8 LBS | OXYGEN SATURATION: 96 % | TEMPERATURE: 96.8 F

## 2018-10-24 DIAGNOSIS — E78.5 HYPERLIPIDEMIA, UNSPECIFIED HYPERLIPIDEMIA TYPE: ICD-10-CM

## 2018-10-24 DIAGNOSIS — Z85.3 HISTORY OF LEFT BREAST CANCER: ICD-10-CM

## 2018-10-24 DIAGNOSIS — K57.30 DIVERTICULOSIS OF LARGE INTESTINE WITHOUT HEMORRHAGE: ICD-10-CM

## 2018-10-24 DIAGNOSIS — R73.02 IMPAIRED GLUCOSE TOLERANCE: ICD-10-CM

## 2018-10-24 DIAGNOSIS — N18.30 STAGE 3 CHRONIC KIDNEY DISEASE (HCC): ICD-10-CM

## 2018-10-24 DIAGNOSIS — I50.9 CONGESTIVE HEART FAILURE DUE TO VALVULAR DISEASE (HCC): ICD-10-CM

## 2018-10-24 DIAGNOSIS — I10 ESSENTIAL HYPERTENSION: Primary | ICD-10-CM

## 2018-10-24 DIAGNOSIS — I38 CONGESTIVE HEART FAILURE DUE TO VALVULAR DISEASE (HCC): ICD-10-CM

## 2018-10-24 PROCEDURE — 99213 OFFICE O/P EST LOW 20 MIN: CPT | Performed by: INTERNAL MEDICINE

## 2018-10-24 NOTE — PROGRESS NOTES
Subjective   Radha Luke is a 85 y.o. female.   7/13/2018  Wt Readings from Last 1 Encounters:   06/11/18 56 kg (123 lb 7.2 oz)   She was last seen in June 2018, weight 123 then, unchanged.    She returns for follow-up of chronic systolic heart failure, valvular heart disease, chronic kidney disease, hypertension, hyperlipidemia, gastroesophageal reflux, history of left breast cancer    History of Present Illness   She has congestive heart failure with systolic heart failure diagnosed.  Ejection fraction early in the year was 28%.  She has a known left bundle branch block.  On treatment, her ejection fraction improved about 50%.  She has some valvular abnormalities including mild aortic regurgitation, moderate aortic stenosis and moderate mitral regurgitation.  She currently denies having any shortness of air, peripheral edema, or palpitations.  She is treated with furosemide 40 mg in the morning and 20 afternoon, continuing on carvedilol 3.125 mg twice daily.  She is followed by Dr. Irene Earl for cardiology.    She has chronic kidney disease stage III.  No uremic symptoms are described.    She has hypercholesterolemia treated with simvastatin 20 mg each evening.    She has esophageal reflux treated with pantoprazole 40 mg daily before breakfast.    She has impaired glucose tolerance as been recommended healthy diet and exercises tolerated.    She has history of left breast cancer and continues on hormonal therapy with Arimidex 1 mg daily.  She is followed by the [facility] group for oncology.    She has some short-term memory problems.  Moderate small vessel disease has been seen on CT scan.  It is felt that her memory problems are probably vascular in origin.    No new problems are described.  The following portions of the patient's history were reviewed and updated as appropriate: allergies, current medications, past family history, past medical history, past social history, past surgical history and problem  list.    Review of Systems   Constitutional: Negative.    HENT: Negative.    Eyes: Negative.    Respiratory: Negative.    Cardiovascular: Negative.    Endocrine: Negative.    Genitourinary: Negative.    Skin: Negative.    Neurological: Negative.    Hematological: Negative.    Psychiatric/Behavioral: Negative.        Objective   Physical Exam   Constitutional: She appears well-developed and well-nourished.   HENT:   Head: Normocephalic and atraumatic.   Cardiovascular: Normal rate and regular rhythm.  Exam reveals no gallop and no friction rub.    Murmur heard.  Grade 2/6 systolic murmur at the apex with radiation to the base and second right intercostal space   Pulmonary/Chest: Effort normal and breath sounds normal. No respiratory distress. She has no wheezes. She has no rales. Right breast exhibits no inverted nipple, no mass, no nipple discharge, no skin change and no tenderness.       Abdominal: Soft. Bowel sounds are normal. She exhibits no distension and no mass. There is no tenderness.   Neurological: She is alert.   Skin: Skin is warm.   Psychiatric: Her behavior is normal.   Vitals reviewed.      Assessment/Plan   Radha was seen today for congestive heart failure, hypertension, hyperlipidemia and chronic kidney disease.    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 Irene Earl for cardiology    Diverticulosis of large intestine without  hemorrhage  Comments:  Recommend high-fiber diet    Impaired glucose tolerance    Stage 3 chronic kidney disease (CMS/HCC)  Comments:  We will check renal function and electrolytes    History of left breast cancer  Comments:  Followed by Dr. Lopez of the [facility].  Arimidex recommended indefinitely          Recommend office follow-up about 6 months, return sooner if needed                     EMR Dragon/Transcription disclaimer:      Much of this encounter note is an electronic transcription/translation of spoken language to printed text. The electronic translation of spoken language may permit erroneous, or at times, nonsensical words or phrases to be inadvertently transcribed; Although I have reviewed the note for such errors, some may still exist.

## 2018-10-26 LAB
ALBUMIN SERPL-MCNC: 4.1 G/DL (ref 3.5–5.2)
ALBUMIN/GLOB SERPL: 1.4 G/DL
ALP SERPL-CCNC: 54 U/L (ref 39–117)
ALT SERPL-CCNC: 12 U/L (ref 1–33)
APPEARANCE UR: CLEAR
AST SERPL-CCNC: 25 U/L (ref 1–32)
BACTERIA #/AREA URNS HPF: NORMAL /HPF
BILIRUB SERPL-MCNC: 0.5 MG/DL (ref 0.1–1.2)
BILIRUB UR QL STRIP: NEGATIVE
BUN SERPL-MCNC: 36 MG/DL (ref 8–23)
BUN/CREAT SERPL: 19.1 (ref 7–25)
CALCIUM SERPL-MCNC: 9.5 MG/DL (ref 8.6–10.5)
CASTS URNS MICRO: NORMAL
CHLORIDE SERPL-SCNC: 100 MMOL/L (ref 98–107)
CHOLEST SERPL-MCNC: 145 MG/DL (ref 0–200)
CO2 SERPL-SCNC: 27.5 MMOL/L (ref 22–29)
COLOR UR: YELLOW
CREAT SERPL-MCNC: 1.88 MG/DL (ref 0.57–1)
EPI CELLS #/AREA URNS HPF: NORMAL /HPF
GLOBULIN SER CALC-MCNC: 2.9 GM/DL
GLUCOSE SERPL-MCNC: 105 MG/DL (ref 65–99)
GLUCOSE UR QL: NEGATIVE
HDLC SERPL-MCNC: 39 MG/DL (ref 40–60)
HGB UR QL STRIP: NEGATIVE
KETONES UR QL STRIP: NEGATIVE
LDLC SERPL CALC-MCNC: 58 MG/DL (ref 0–100)
LEUKOCYTE ESTERASE UR QL STRIP: NEGATIVE
NITRITE UR QL STRIP: NEGATIVE
PH UR STRIP: 6.5 [PH] (ref 5–8)
POTASSIUM SERPL-SCNC: 4.1 MMOL/L (ref 3.5–5.2)
PROT SERPL-MCNC: 7 G/DL (ref 6–8.5)
PROT UR QL STRIP: NEGATIVE
RBC #/AREA URNS HPF: NORMAL /HPF
SODIUM SERPL-SCNC: 143 MMOL/L (ref 136–145)
SP GR UR: 1.01 (ref 1–1.03)
TRIGL SERPL-MCNC: 238 MG/DL (ref 0–150)
UROBILINOGEN UR STRIP-MCNC: (no result) MG/DL
VLDLC SERPL CALC-MCNC: 47.6 MG/DL (ref 5–40)
WBC #/AREA URNS HPF: NORMAL /HPF

## 2018-11-28 ENCOUNTER — OFFICE VISIT (OUTPATIENT)
Dept: CARDIOLOGY | Facility: CLINIC | Age: 83
End: 2018-11-28

## 2018-11-28 VITALS
BODY MASS INDEX: 23.37 KG/M2 | DIASTOLIC BLOOD PRESSURE: 62 MMHG | RESPIRATION RATE: 16 BRPM | WEIGHT: 123.8 LBS | SYSTOLIC BLOOD PRESSURE: 122 MMHG | HEIGHT: 61 IN | HEART RATE: 64 BPM

## 2018-11-28 DIAGNOSIS — E78.5 HYPERLIPIDEMIA, UNSPECIFIED HYPERLIPIDEMIA TYPE: ICD-10-CM

## 2018-11-28 DIAGNOSIS — I50.22 CHRONIC SYSTOLIC CONGESTIVE HEART FAILURE (HCC): Primary | ICD-10-CM

## 2018-11-28 DIAGNOSIS — I10 ESSENTIAL HYPERTENSION: ICD-10-CM

## 2018-11-28 DIAGNOSIS — I44.7 LEFT BUNDLE BRANCH BLOCK: ICD-10-CM

## 2018-11-28 PROCEDURE — 99214 OFFICE O/P EST MOD 30 MIN: CPT | Performed by: INTERNAL MEDICINE

## 2018-11-28 PROCEDURE — 93000 ELECTROCARDIOGRAM COMPLETE: CPT | Performed by: INTERNAL MEDICINE

## 2018-11-28 NOTE — PROGRESS NOTES
PATIENTINFORMATION    Date of Office Visit: 2018  Encounter Provider: Irene Earl MD  Place of Service: [facility] CARDIOLOGY  Patient Name: Radha Luke  : 3/14/1933    Subjective:     Encounter Date:2018      Patient ID: Radha Luke is a 85 y.o. female.      History of Present Illness    This is a nice woman who was admitted in 2018 with new onset of acute congestive heart failure.  Her ejection fraction was 28% by echocardiogram on 2018.  She was started on a low dose of carvedilol.  She was not started on Entresto, ACE inhibitor or angiotensin receptor blocker because of kidney disease.  She had a right-sided thoracentesis with 1200 mL of fluid removed and a left-sided thoracentesis with 750 mL of fluid removed.   she had a PET stress test in 2018 which showed no perfusion defects with an ejection fraction on 24%.    She comes in today for follow-up.  She has been feeling well.  She denies any shortness of breath, chest pain, edema or palpitations.    Review of Systems   Constitution: Negative for fever, malaise/fatigue, weight gain and weight loss.   HENT: Negative for ear pain, hearing loss, nosebleeds and sore throat.    Eyes: Negative for double vision, pain, vision loss in left eye and vision loss in right eye.   Cardiovascular:        See history of present illness.   Respiratory: Negative for cough, shortness of breath, sleep disturbances due to breathing, snoring and wheezing.    Endocrine: Negative for cold intolerance, heat intolerance and polyuria.   Skin: Negative for itching, poor wound healing and rash.   Musculoskeletal: Negative for joint pain, joint swelling and myalgias.   Gastrointestinal: Negative for abdominal pain, diarrhea, hematochezia, nausea and vomiting.   Genitourinary: Negative for hematuria and hesitancy.   Neurological: Negative for numbness, paresthesias and seizures.   Psychiatric/Behavioral: Negative for  "depression. The patient is not nervous/anxious.            ECG 12 Lead  Date/Time: 11/28/2018 1:47 PM  Performed by: Irene Earl MD  Authorized by: Irene Earl MD   Comparison: compared with previous ECG from 5/30/2018  Similar to previous ECG  Rhythm: sinus rhythm  BPM: 64  Conduction: complete LBBB  Clinical impression: abnormal ECG               Objective:     /62 (BP Location: Right arm, Patient Position: Sitting, Cuff Size: Adult)   Pulse 64   Resp 16   Ht 154.9 cm (61\")   Wt 56.2 kg (123 lb 12.8 oz)   BMI 23.39 kg/m²  Body mass index is 23.39 kg/m².     Physical Exam   Constitutional: She appears well-developed.   HENT:   Head: Normocephalic and atraumatic.   Eyes: Conjunctivae and lids are normal. Pupils are equal, round, and reactive to light. Lids are everted and swept, no foreign bodies found.   Neck: Normal range of motion. No JVD present. Carotid bruit is not present. No tracheal deviation present. No thyroid mass present.   Cardiovascular: Normal rate and regular rhythm.   Murmur heard.  Pulses:       Dorsalis pedis pulses are 2+ on the right side, and 2+ on the left side.   Pulmonary/Chest: Effort normal and breath sounds normal. She has no rales.   Abdominal: Normal appearance and bowel sounds are normal.   Musculoskeletal: Normal range of motion.   Neurological: She is alert. She has normal strength.   Skin: Skin is warm, dry and intact.   Psychiatric: She has a normal mood and affect. Her behavior is normal.   Vitals reviewed.          Assessment/Plan:       1. Chronic systolic congestive heart failure. She is on a low dose of carvedilol.  Her ejection fraction is 28% echo on January 30, 2018. On January 31, 2018 she had a thoracentesis on the right side with 1200 mL of fluid removed.  On February 1 she had a thoracentesis on the left side with 750 mL removed.  She has done well on oral diuretics.  She had a negative PET stress indicating this is a nonischemic cardiomyopathy.  " She has a left bundle branch block.  I don't recommend a repeat echo at this time.  I don't think is going to  given her age and lack of symptoms.  Heart Failure  Assessment  • NYHA class II - There is slight limitation of physical activity. The patient is comfortable at rest, but physical activity results in fatigue, palpitations or shortness of breath.  • Beta blocker prescribed  • Diuretics prescribed  • The most recent ejection fraction is 28%  • The left ventricle was last assessed on 1/30/2018    Subjective/Objective    • Physical exam findings negative for rales, peripheral edema and elevated JVP.      2.  Pleural effusion.   status post bilateral thoracentesis  3.  Hypertension.  Controlled  4.  Hyperlipidemia  5.  Chronic kidney disease stage III.    6.  History of breast cancer in 2011  7.  Left bundle branch block.  Given that she has remained out of the hospital and euvolemic, I do not think she qualifies for biventricular pacemaker at this time.  8.  Aortic stenosis.  This is at least moderate if not severe.  9.  Moderate mitral regurgitation.  10.  Mild tricuspid regurgitation with a right ventricular systolic pressure of 62.  11.  Hypokalemia.  Replaced    I will see her back in 6 months and after that hopefully yearly if she remains stable    Orders Placed This Encounter   Procedures   • ECG 12 Lead     This order was created via procedure documentation        Discharge Medications           Accurate as of 11/28/18  3:23 PM. If you have any questions, ask your nurse or doctor.               Continue These Medications      Instructions Start Date   anastrozole 1 MG tablet  Commonly known as:  ARIMIDEX   1 mg, Oral, Daily      CALCIUM 1000 + D PO   1 tablet, Oral, Daily      carvedilol 3.125 MG tablet  Commonly known as:  COREG   1 by mouth twice a day      CENTRUM SILVER 50+WOMEN PO   1 tablet, Oral, Daily      furosemide 40 MG tablet  Commonly known as:  LASIX   Take one tablet in am  (40 mg) and 1/2 tablet in afternoon (20 mg)      magnesium gluconate 500 MG tablet  Commonly known as:  MAGONATE   500 mg, Oral, Daily      meclizine 25 MG tablet  Commonly known as:  ANTIVERT   25 mg, Oral, 3 Times Daily PRN      pantoprazole 40 MG EC tablet  Commonly known as:  PROTONIX   TAKE 1 TABLET EVERY DAY      potassium chloride 10 MEQ CR capsule  Commonly known as:  MICRO-K   20 mEq, Oral, Daily      simvastatin 20 MG tablet  Commonly known as:  ZOCOR   TAKE 1 TABLET EVERY DAY         Stop These Medications    VIRTUSSIN A/C 100-10 MG/5ML liquid  Generic drug:  guaifenesin-codeine  Stopped by:  MD Irene Espitia MD  11/28/18  3:23 PM

## 2018-12-03 ENCOUNTER — OFFICE VISIT (OUTPATIENT)
Dept: INTERNAL MEDICINE | Facility: CLINIC | Age: 83
End: 2018-12-03

## 2018-12-03 VITALS
TEMPERATURE: 97.4 F | DIASTOLIC BLOOD PRESSURE: 80 MMHG | WEIGHT: 122 LBS | HEART RATE: 69 BPM | RESPIRATION RATE: 16 BRPM | OXYGEN SATURATION: 98 % | BODY MASS INDEX: 23.03 KG/M2 | HEIGHT: 61 IN | SYSTOLIC BLOOD PRESSURE: 130 MMHG

## 2018-12-03 DIAGNOSIS — M10.071 ACUTE IDIOPATHIC GOUT OF RIGHT FOOT: Primary | ICD-10-CM

## 2018-12-03 PROCEDURE — 99213 OFFICE O/P EST LOW 20 MIN: CPT | Performed by: INTERNAL MEDICINE

## 2018-12-03 RX ORDER — PREDNISONE 10 MG/1
TABLET ORAL
Qty: 20 TABLET | Refills: 0 | Status: SHIPPED | OUTPATIENT
Start: 2018-12-03 | End: 2019-03-26

## 2018-12-03 NOTE — PROGRESS NOTES
Subjective   Radha Luke is a 85 y.o. female.   10/24/2018  Wt Readings from Last 1 Encounters:   12/03/18 55.3 kg (122 lb)   She was last seen October 24, weight 123 then, 122 now.    She returns for acute care visit because of hand swelling of the right MTP joint    History of Present Illness   Patient presents with history of redness pain and swelling involving the base of the right great toe.  This started a few days ago with acute onset and is extremely painful.  She has not previously had gouty arthropathy.  She is known to have a mild degree of chronic kidney disease.  The following portions of the patient's history were reviewed and updated as appropriate: allergies, current medications, past family history, past medical history, past social history, past surgical history and problem list.    Review of Systems   Constitutional: Negative.    HENT: Negative.    Eyes: Negative.    Respiratory: Negative.    Cardiovascular: Negative.    Endocrine: Negative.    Genitourinary: Negative.    Musculoskeletal: Positive for arthralgias.   Skin: Negative.    Neurological: Negative.    Hematological: Negative.    Psychiatric/Behavioral: Negative.        Objective   Physical Exam   Constitutional: She appears well-developed and well-nourished.   Musculoskeletal:   The right first metatarsophalangeal joint has erythema and swelling.  It is so tender that she asked me not to touch   Vitals reviewed.      Assessment/Plan   Radha was seen today for arthritis.    Diagnoses and all orders for this visit:    Acute idiopathic gout of right foot  Comments:  I will treat with tapering dose of prednisone, check blood count and uric acid level, consider allopurinol for prevention  Orders:  -     CBC & Differential  -     Uric Acid    Other orders  -     predniSONE (DELTASONE) 10 MG tablet; 2 by mouth tonight then twice a day with food for 3 days, then 1 twice a day for 2 days, then 1 daily with breakfast                                EMR Dragon/Transcription disclaimer:      Much of this encounter note is an electronic transcription/translation of spoken language to printed text. The electronic translation of spoken language may permit erroneous, or at times, nonsensical words or phrases to be inadvertently transcribed; Although I have reviewed the note for such errors, some may still exist.

## 2018-12-04 LAB
BASOPHILS # BLD AUTO: 0.01 10*3/MM3 (ref 0–0.2)
BASOPHILS NFR BLD AUTO: 0.1 % (ref 0–1.5)
EOSINOPHIL # BLD AUTO: 0.33 10*3/MM3 (ref 0–0.7)
EOSINOPHIL NFR BLD AUTO: 4.3 % (ref 0.3–6.2)
ERYTHROCYTE [DISTWIDTH] IN BLOOD BY AUTOMATED COUNT: 12.8 % (ref 11.7–13)
HCT VFR BLD AUTO: 37.9 % (ref 35.6–45.5)
HGB BLD-MCNC: 12.3 G/DL (ref 11.9–15.5)
IMM GRANULOCYTES # BLD: 0 10*3/MM3 (ref 0–0.03)
IMM GRANULOCYTES NFR BLD: 0 % (ref 0–0.5)
LYMPHOCYTES # BLD AUTO: 2.6 10*3/MM3 (ref 0.9–4.8)
LYMPHOCYTES NFR BLD AUTO: 34.2 % (ref 19.6–45.3)
MCH RBC QN AUTO: 31.1 PG (ref 26.9–32)
MCHC RBC AUTO-ENTMCNC: 32.5 G/DL (ref 32.4–36.3)
MCV RBC AUTO: 95.9 FL (ref 80.5–98.2)
MONOCYTES # BLD AUTO: 0.56 10*3/MM3 (ref 0.2–1.2)
MONOCYTES NFR BLD AUTO: 7.4 % (ref 5–12)
NEUTROPHILS # BLD AUTO: 4.1 10*3/MM3 (ref 1.9–8.1)
NEUTROPHILS NFR BLD AUTO: 54 % (ref 42.7–76)
PLATELET # BLD AUTO: 196 10*3/MM3 (ref 140–500)
RBC # BLD AUTO: 3.95 10*6/MM3 (ref 3.9–5.2)
URATE SERPL-MCNC: 10.6 MG/DL (ref 2.4–5.7)
WBC # BLD AUTO: 7.6 10*3/MM3 (ref 4.5–10.7)

## 2018-12-21 RX ORDER — ALLOPURINOL 100 MG/1
TABLET ORAL
Qty: 180 TABLET | Refills: 3 | Status: SHIPPED | OUTPATIENT
Start: 2018-12-21 | End: 2019-03-26

## 2019-01-07 RX ORDER — PANTOPRAZOLE SODIUM 40 MG/1
TABLET, DELAYED RELEASE ORAL
Qty: 90 TABLET | Refills: 1 | Status: SHIPPED | OUTPATIENT
Start: 2019-01-07 | End: 2019-01-10 | Stop reason: SDUPTHER

## 2019-01-10 ENCOUNTER — TELEPHONE (OUTPATIENT)
Dept: FAMILY MEDICINE CLINIC | Facility: CLINIC | Age: 84
End: 2019-01-10

## 2019-01-10 RX ORDER — PANTOPRAZOLE SODIUM 40 MG/1
40 TABLET, DELAYED RELEASE ORAL DAILY
Qty: 90 TABLET | Refills: 3 | Status: SHIPPED | OUTPATIENT
Start: 2019-01-10 | End: 2020-04-17 | Stop reason: SDUPTHER

## 2019-01-10 RX ORDER — SIMVASTATIN 20 MG
20 TABLET ORAL DAILY
Qty: 90 TABLET | Refills: 3 | Status: SHIPPED | OUTPATIENT
Start: 2019-01-10 | End: 2020-04-17 | Stop reason: SDUPTHER

## 2019-03-11 RX ORDER — CARVEDILOL 3.12 MG/1
TABLET ORAL
Qty: 180 TABLET | Refills: 3 | Status: SHIPPED | OUTPATIENT
Start: 2019-03-11 | End: 2020-03-12

## 2019-03-11 RX ORDER — FUROSEMIDE 40 MG/1
TABLET ORAL
Qty: 135 TABLET | Refills: 1 | Status: SHIPPED | OUTPATIENT
Start: 2019-03-11 | End: 2019-07-15 | Stop reason: SDUPTHER

## 2019-03-26 ENCOUNTER — OFFICE VISIT (OUTPATIENT)
Dept: FAMILY MEDICINE CLINIC | Facility: CLINIC | Age: 84
End: 2019-03-26

## 2019-03-26 VITALS
HEART RATE: 64 BPM | RESPIRATION RATE: 14 BRPM | DIASTOLIC BLOOD PRESSURE: 64 MMHG | OXYGEN SATURATION: 96 % | HEIGHT: 61 IN | TEMPERATURE: 97.9 F | SYSTOLIC BLOOD PRESSURE: 116 MMHG | WEIGHT: 122 LBS | BODY MASS INDEX: 23.03 KG/M2

## 2019-03-26 DIAGNOSIS — I50.9 CONGESTIVE HEART FAILURE DUE TO VALVULAR DISEASE (HCC): ICD-10-CM

## 2019-03-26 DIAGNOSIS — I10 ESSENTIAL HYPERTENSION: ICD-10-CM

## 2019-03-26 DIAGNOSIS — R41.3 MEMORY LOSS: ICD-10-CM

## 2019-03-26 DIAGNOSIS — Z17.0 MALIGNANT NEOPLASM OF OVERLAPPING SITES OF LEFT BREAST IN FEMALE, ESTROGEN RECEPTOR POSITIVE (HCC): ICD-10-CM

## 2019-03-26 DIAGNOSIS — I38 CONGESTIVE HEART FAILURE DUE TO VALVULAR DISEASE (HCC): ICD-10-CM

## 2019-03-26 DIAGNOSIS — K21.00 GASTROESOPHAGEAL REFLUX DISEASE WITH ESOPHAGITIS: ICD-10-CM

## 2019-03-26 DIAGNOSIS — C50.812 MALIGNANT NEOPLASM OF OVERLAPPING SITES OF LEFT BREAST IN FEMALE, ESTROGEN RECEPTOR POSITIVE (HCC): ICD-10-CM

## 2019-03-26 DIAGNOSIS — E78.5 HYPERLIPIDEMIA, UNSPECIFIED HYPERLIPIDEMIA TYPE: Primary | ICD-10-CM

## 2019-03-26 PROBLEM — M79.673 FOOT PAIN: Status: ACTIVE | Noted: 2019-03-26

## 2019-03-26 PROBLEM — R42 VERTIGO: Status: ACTIVE | Noted: 2019-03-26

## 2019-03-26 PROCEDURE — 99214 OFFICE O/P EST MOD 30 MIN: CPT | Performed by: FAMILY MEDICINE

## 2019-03-26 NOTE — PROGRESS NOTES
Radha Luke is a 86 y.o. female.     Chief Complaint   Patient presents with   • Establish Care     patient needs to establish a care.   • Memory Loss     patient is having short memory problem.       HPI     Patient presents the office today to discuss issues that are all new to me.  Has a history of hyperlipidemia, congestive heart failure, hypertension, reflux, and memory loss.  Also has a distant history of breast cancer.  Currently taking anastrozole, carvedilol, furosemide, pantoprazole, and simvastatin.  Tolerating medications well with no adverse side effects noted.    The following portions of the patient's history were reviewed and updated as appropriate: allergies, current medications, past family history, past medical history, past social history, past surgical history and problem list.    Review of Systems   Constitutional: Negative for activity change.   Psychiatric/Behavioral: Positive for confusion.   All other systems reviewed and are negative.      Objective  Vitals:    03/26/19 1358   BP: 116/64   Pulse: 64   Resp: 14   Temp: 97.9 °F (36.6 °C)   SpO2: 96%       Physical Exam   Constitutional: She is oriented to person, place, and time. She appears well-developed and well-nourished. No distress.   HENT:   Head: Normocephalic and atraumatic.   Right Ear: External ear normal.   Left Ear: External ear normal.   Nose: Nose normal.   Mouth/Throat: Oropharynx is clear and moist.   Eyes: Conjunctivae and EOM are normal. Pupils are equal, round, and reactive to light. Right eye exhibits no discharge. Left eye exhibits no discharge. No scleral icterus.   Neck: Normal range of motion. Neck supple.   Cardiovascular: Normal rate, regular rhythm and normal heart sounds. Exam reveals no friction rub.   No murmur heard.  Pulmonary/Chest: Effort normal and breath sounds normal. No respiratory distress. She has no wheezes. She has no rales.   Abdominal: Soft. Bowel sounds are normal. She exhibits no  distension. There is no tenderness. There is no rebound and no guarding.   Lymphadenopathy:     She has no cervical adenopathy.   Neurological: She is alert and oriented to person, place, and time.   Skin: Skin is warm and dry. She is not diaphoretic.   Nursing note and vitals reviewed.        Current Outpatient Medications:   •  anastrozole (ARIMIDEX) 1 MG tablet, Take 1 tablet by mouth Daily., Disp: 90 tablet, Rfl: 4  •  Calcium Carb-Cholecalciferol (CALCIUM 1000 + D PO), Take 1 tablet by mouth Daily., Disp: , Rfl:   •  carvedilol (COREG) 3.125 MG tablet, TAKE 1 TABLET TWICE DAILY, Disp: 180 tablet, Rfl: 3  •  furosemide (LASIX) 40 MG tablet, TAKE 1 TABLET (40 MG) IN THE MORNING AND TAKE 1/2 TABLET (20 MG) IN THE AFTERNOON, Disp: 135 tablet, Rfl: 1  •  magnesium gluconate (MAGONATE) 500 MG tablet, Take 500 mg by mouth Daily., Disp: , Rfl:   •  meclizine (ANTIVERT) 25 MG tablet, Take 1 tablet by mouth 3 (three) times a day as needed for dizziness., Disp: 60 tablet, Rfl: 1  •  Multiple Vitamins-Minerals (CENTRUM SILVER 50+WOMEN PO), Take 1 tablet by mouth Daily., Disp: , Rfl:   •  pantoprazole (PROTONIX) 40 MG EC tablet, Take 1 tablet by mouth Daily., Disp: 90 tablet, Rfl: 3  •  potassium chloride (MICRO-K) 10 MEQ CR capsule, Take 2 capsules by mouth Daily., Disp: 180 capsule, Rfl: 3  •  simvastatin (ZOCOR) 20 MG tablet, Take 1 tablet by mouth Daily., Disp: 90 tablet, Rfl: 3  Current outpatient and discharge medications have been reconciled for the patient.  Reviewed by: Roverto Ahn MD      Procedures    Lab Results (most recent)     None                  Radha was seen today for establish care and memory loss.    Diagnoses and all orders for this visit:    Hyperlipidemia, unspecified hyperlipidemia type    Congestive heart failure due to valvular disease (CMS/HCC)    Essential hypertension    Gastroesophageal reflux disease with esophagitis    Malignant neoplasm of overlapping sites of left breast in female,  estrogen receptor positive (CMS/HCC)    Memory loss    Extensive conversation and chart review done with the patient regarding her past medical history.  Advised continued use of medications as prescribed.  Advised continued follow-up with specialist.      Return for As needed.      Roverto Ahn MD

## 2019-04-10 ENCOUNTER — TELEPHONE (OUTPATIENT)
Dept: CARDIOLOGY | Facility: CLINIC | Age: 84
End: 2019-04-10

## 2019-04-10 NOTE — TELEPHONE ENCOUNTER
Increase to 40mg BID until her wt returns to baseline then resume usual lasix dose. If no improvement or continued wt gain on Friday, [place] [facility] [place]

## 2019-04-10 NOTE — TELEPHONE ENCOUNTER
Pt calls to report that her weight is up from 116.4 to 120.6 since Monday of this week. She did not leave symptoms or BP/HR can you please call to get additional information

## 2019-04-10 NOTE — TELEPHONE ENCOUNTER
04/10/19  11:55 AM  Radha Luke  3/14/1933  Home Phone [phone]       Radha Luke is a patient of Dr Earl. Called to triage further.  Spoke with pts , Yves.  He stated her wt is up 4lbs in 2 days.  She is not soa, no edema, no cough.  They do not have hr and bp to give me.    Cardiac meds reviewed  carvedilol 3.125mg BID  Furosemide 40mg every am and 20mg every pm  zocor  Micro k10meq 2 caps daily    Does she need a med adjustment?  Marry Willoughby RN  Triage nurse

## 2019-04-12 NOTE — TELEPHONE ENCOUNTER
S/w pt's  this am, and he said she is back down to her baseline weight. So I told him to go back to her normal lasix dose, per the instructions in attached message.  agreeable and verbalized understanding.  Chaya Birmingham  Triage RN

## 2019-04-25 RX ORDER — POTASSIUM CHLORIDE 750 MG/1
CAPSULE, EXTENDED RELEASE ORAL
Qty: 180 CAPSULE | Refills: 3 | Status: SHIPPED | OUTPATIENT
Start: 2019-04-25 | End: 2020-04-06

## 2019-05-31 ENCOUNTER — OFFICE VISIT (OUTPATIENT)
Dept: CARDIOLOGY | Facility: CLINIC | Age: 84
End: 2019-05-31

## 2019-05-31 VITALS
BODY MASS INDEX: 22.92 KG/M2 | HEART RATE: 63 BPM | DIASTOLIC BLOOD PRESSURE: 52 MMHG | SYSTOLIC BLOOD PRESSURE: 140 MMHG | WEIGHT: 121.4 LBS | HEIGHT: 61 IN

## 2019-05-31 DIAGNOSIS — E78.5 HYPERLIPIDEMIA, UNSPECIFIED HYPERLIPIDEMIA TYPE: ICD-10-CM

## 2019-05-31 DIAGNOSIS — I34.0 NON-RHEUMATIC MITRAL REGURGITATION: ICD-10-CM

## 2019-05-31 DIAGNOSIS — I44.7 LEFT BUNDLE BRANCH BLOCK: ICD-10-CM

## 2019-05-31 DIAGNOSIS — I35.0 NONRHEUMATIC AORTIC VALVE STENOSIS: ICD-10-CM

## 2019-05-31 DIAGNOSIS — I50.22 CHRONIC SYSTOLIC CONGESTIVE HEART FAILURE (HCC): Primary | ICD-10-CM

## 2019-05-31 DIAGNOSIS — I10 ESSENTIAL HYPERTENSION: ICD-10-CM

## 2019-05-31 PROCEDURE — 99214 OFFICE O/P EST MOD 30 MIN: CPT | Performed by: NURSE PRACTITIONER

## 2019-05-31 PROCEDURE — 93000 ELECTROCARDIOGRAM COMPLETE: CPT | Performed by: NURSE PRACTITIONER

## 2019-05-31 NOTE — PROGRESS NOTES
Date of Office Visit: 2019  Encounter Provider: Lise Domingo, CHAVA, APRN  Place of Service: [facility]  Patient Name: Radha Luke  :3/14/1933        Subjective:     Chief Complaint:  Follow-up, chronic systolic congestive heart failure, hypertension, valvular dysfunction.      History of Present Illness:  Radha Luke is a 86 y.o. female patient of Dr. Earl.  This is my first time seeing this patient in the office today and I reviewed her records.    Patient has a history of chronic systolic congestive heart failure, hypertension, hyperlipidemia, pleural effusion, chronic kidney disease, history of breast cancer, left bundle branch block, aortic stenosis, moderate mitral regurgitation, mild tricuspid regurgitation with elevated RVSP, hypokalemia.    Patient was admitted 2018 with new onset of acute congestive heart failure.  EF was 28% by echocardiogram 2018.  She was started on low-dose carvedilol.  Due to her kidney disease she was not started on an ACE inhibitor, angiotensin receptor blocker, or Entresto.  She had a right-sided thoracentesis with 1200 mL of fluid removed and a left-sided thoracentesis was 750 mL of fluid removed.  PET stress test 2018 showed no perfusion deficits and EF of 24%.    Patient's last echocardiogram was 2018.  This showed EF between 46 to 50%.  Left ventricular cavity was borderline dilated.  Grade 1A diastolic dysfunction was present.  Mild aortic valve regurgitation was present, moderate aortic valve stenosis, moderate mitral regurgitation, mild tricuspid regurgitation with normal RVSP of 17.9 mmHg.  Mild pulmonic valve regurgitation also seen.      Patient presents to office today for follow-up appointment.  Patient's  is with her in the office today, per patient preference.  Patient reports she has been feeling well since last visit.  She denies any problems or concerns.  She occasionally checks her  blood pressure at home and reports that the systolic usually stays less than 140.  She denies any chest pain, shortness of breath, shortness of breath with exertion, palpitations, racing heartbeat sensation, lower extremity edema, dizziness, syncope, near syncope, falls, fatigue, or abnormal bleeding.  She appears euvolemic on exam today.  She is not doing any formal exercise but does some walking from her apartment to the dining area at the forum and denies any exertional symptoms with this.        Past Medical History:   Diagnosis Date   • Acute systolic congestive heart failure (CMS/HCC)    • Aortic stenosis    • Arthritis    • Breast cancer (CMS/HCC)     Locally recurrent left breast   • CKD (chronic kidney disease), stage III (CMS/HCC)    • Cough    • Dizziness    • Drug therapy    • Edema    • GERD (gastroesophageal reflux disease)    • History of breast cancer     LEFT   • Hyperlipidemia    • Hypertension    • Hypokalemia    • LBBB (left bundle branch block)    • Mild tricuspid regurgitation     with a right ventricular systolic pressure of 62   • Moderate mitral regurgitation    • Osteopenia    • Pleural effusion      Past Surgical History:   Procedure Laterality Date   • APPENDECTOMY  1943   • BREAST BIOPSY     • BREAST LUMPECTOMY Left 1995   • COLONOSCOPY N/A 5/12/2016    Procedure: COLONOSCOPY;  Surgeon: Israel Vance MD;  Location: [facility];  Service:    • HYSTERECTOMY  1964   • MASTECTOMY Left 2011   • THORACENTESIS Bilateral      Outpatient Medications Prior to Visit   Medication Sig Dispense Refill   • anastrozole (ARIMIDEX) 1 MG tablet Take 1 tablet by mouth Daily. 90 tablet 4   • Calcium Carb-Cholecalciferol (CALCIUM 1000 + D PO) Take 1 tablet by mouth Daily.     • carvedilol (COREG) 3.125 MG tablet TAKE 1 TABLET TWICE DAILY 180 tablet 3   • furosemide (LASIX) 40 MG tablet TAKE 1 TABLET (40 MG) IN THE MORNING AND TAKE 1/2 TABLET (20 MG) IN THE AFTERNOON 135 tablet 1   • magnesium  gluconate (MAGONATE) 500 MG tablet Take 500 mg by mouth Daily.     • meclizine (ANTIVERT) 25 MG tablet Take 1 tablet by mouth 3 (three) times a day as needed for dizziness. 60 tablet 1   • Multiple Vitamins-Minerals (CENTRUM SILVER 50+WOMEN PO) Take 1 tablet by mouth Daily.     • pantoprazole (PROTONIX) 40 MG EC tablet Take 1 tablet by mouth Daily. 90 tablet 3   • potassium chloride (MICRO-K) 10 MEQ CR capsule TAKE 2 CAPSULES EVERY  capsule 3   • simvastatin (ZOCOR) 20 MG tablet Take 1 tablet by mouth Daily. 90 tablet 3     No facility-administered medications prior to visit.        Allergies as of 05/31/2019 - Reviewed 05/31/2019   Allergen Reaction Noted   • Levofloxacin Swelling 12/27/2017   • Penicillins Hives 05/09/2016     Social History     Socioeconomic History   • Marital status:      Spouse name: Yves   • Number of children: Not on file   • Years of education: High School   • Highest education level: Not on file   Occupational History   • Occupation:      Employer: RETIRED   Tobacco Use   • Smoking status: Never Smoker   • Smokeless tobacco: Never Used   • Tobacco comment: no caffeine   Substance and Sexual Activity   • Alcohol use: Yes     Comment: occassional   • Drug use: No   • Sexual activity: Defer     Family History   Problem Relation Age of Onset   • Hypertension Mother    • Cancer Neg Hx        Review of Systems   Constitution: Negative for chills, fever, malaise/fatigue, weight gain and weight loss.   HENT: Negative for ear pain, hearing loss, nosebleeds and sore throat.    Eyes: Negative for blurred vision, double vision, redness, vision loss in left eye, vision loss in right eye and visual disturbance.   Cardiovascular:        SEE HPI.    Respiratory: Negative for cough, shortness of breath, snoring and wheezing.    Endocrine: Negative for cold intolerance and heat intolerance.   Skin: Negative for itching, rash and suspicious lesions.   Musculoskeletal:  "Negative for joint pain, joint swelling and myalgias.   Gastrointestinal: Negative for abdominal pain, diarrhea, hematemesis, melena, nausea and vomiting.   Genitourinary: Negative for dysuria, frequency and hematuria.   Neurological: Negative for dizziness, headaches, numbness, paresthesias and seizures.   Psychiatric/Behavioral: Negative for altered mental status and depression. The patient is not nervous/anxious.           Objective:     Vitals:    05/31/19 1338   BP: 140/52   BP Location: Right arm   Pulse: 63   Weight: 55.1 kg (121 lb 6.4 oz)   Height: 154.9 cm (61\")     Body mass index is 22.94 kg/m².      PHYSICAL EXAM:  Physical Exam   Constitutional: She is oriented to person, place, and time. She appears well-developed and well-nourished. No distress.   HENT:   Head: Normocephalic and atraumatic.   Eyes: Pupils are equal, round, and reactive to light.   Neck: Neck supple. No JVD present. Carotid bruit is not present. No tracheal deviation present.   Cardiovascular: Normal rate, regular rhythm and intact distal pulses. Exam reveals no gallop and no friction rub.   Murmur (Grade II/VI, systolic, RUSB) heard.  Pulses:       Radial pulses are 2+ on the right side, and 2+ on the left side.        Posterior tibial pulses are 2+ on the right side, and 2+ on the left side.   Pulmonary/Chest: Effort normal and breath sounds normal. No respiratory distress. She has no wheezes. She has no rales.   Abdominal: Soft. Bowel sounds are normal. She exhibits no distension. There is no tenderness.   Musculoskeletal: She exhibits no edema, tenderness or deformity.   Neurological: She is alert and oriented to person, place, and time.   Skin: Skin is warm and dry. No rash noted. She is not diaphoretic. No erythema.   Psychiatric: She has a normal mood and affect. Her behavior is normal. Judgment normal.           ECG 12 Lead  Date/Time: 5/31/2019 1:52 PM  Performed by: Lise Domingo, CHAVA, APRN  Authorized by: Jose L, " Lise BLOCK, CHAVA, APRN   Comparison: compared with previous ECG from 11/28/2018  Rhythm: sinus rhythm  Rate: normal  BPM: 63  Conduction: left bundle branch block    Clinical impression: abnormal EKG  Comments: No significant changes from previous ECG.              Assessment:       Diagnosis Plan   1. Chronic systolic congestive heart failure (CMS/HCC)     2. Essential hypertension     3. Hyperlipidemia, unspecified hyperlipidemia type     4. Nonrheumatic aortic valve stenosis     5. Non-rheumatic mitral regurgitation     6. Left bundle branch block           Plan:     1. Chronic systolic congestive heart failure: EF 28% on 1/30/2018 echo but improved to 46 to 50% on 5/2018 echocardiogram.  PET stress test was negative 2/2018.  Patient appears euvolemic in the office today.  Denies any issues or concerns.    Heart Failure  Assessment  • NYHA class II - There is slight limitation of physical activity. The patient is comfortable at rest, but physical activity results in fatigue, palpitations or shortness of breath.  • Beta blocker prescribed  • Diuretics prescribed  • Left ventricular function is mildly reduced by qualitative assessment    Subjective/Objective    • Physical exam findings negative for rales, peripheral edema and elevated JVP.      2. Pleural effusion: Status post bilateral thoracentesis 1/31/2018 and 2/1/2018.  3. Hypertension: Blood pressure well controlled for age in the office today.  4. Left bundle branch block  5. Aortic stenosis: Moderate on last echocardiogram done 5/2018.  6. Mitral regurgitation: Moderate on 5/2018 echo.  7. Mild tricuspid regurgitation: with normal RVSP on 5/2018 echo.  8. Hyperlipidemia: Managed by outside provider.  Remains on statin therapy.  9. Chronic kidney disease  10. History of hypokalemia      Patient to follow-up with Dr. Earl in 6 months or sooner if needed for any new, recurrent, or worsening symptoms or other problems/concerns.             Your medication list            Accurate as of 5/31/19  1:59 PM. If you have any questions, ask your nurse or doctor.               CONTINUE taking these medications      Instructions Last Dose Given Next Dose Due   anastrozole 1 MG tablet  Commonly known as:  ARIMIDEX      Take 1 tablet by mouth Daily.       CALCIUM 1000 + D PO      Take 1 tablet by mouth Daily.       carvedilol 3.125 MG tablet  Commonly known as:  COREG      TAKE 1 TABLET TWICE DAILY       CENTRUM SILVER 50+WOMEN PO      Take 1 tablet by mouth Daily.       furosemide 40 MG tablet  Commonly known as:  LASIX      TAKE 1 TABLET (40 MG) IN THE MORNING AND TAKE 1/2 TABLET (20 MG) IN THE AFTERNOON       magnesium gluconate 500 MG tablet  Commonly known as:  MAGONATE      Take 500 mg by mouth Daily.       meclizine 25 MG tablet  Commonly known as:  ANTIVERT      Take 1 tablet by mouth 3 (three) times a day as needed for dizziness.       pantoprazole 40 MG EC tablet  Commonly known as:  PROTONIX      Take 1 tablet by mouth Daily.       potassium chloride 10 MEQ CR capsule  Commonly known as:  MICRO-K      TAKE 2 CAPSULES EVERY DAY       simvastatin 20 MG tablet  Commonly known as:  ZOCOR      Take 1 tablet by mouth Daily.                Thanks,    Lise Domingo, CHAVA, APRN  05/31/2019           Dictated utilizing Dragon dictation

## 2019-06-19 ENCOUNTER — OFFICE VISIT (OUTPATIENT)
Dept: FAMILY MEDICINE CLINIC | Facility: CLINIC | Age: 84
End: 2019-06-19

## 2019-06-19 VITALS
BODY MASS INDEX: 22.47 KG/M2 | SYSTOLIC BLOOD PRESSURE: 120 MMHG | TEMPERATURE: 97.2 F | WEIGHT: 119 LBS | OXYGEN SATURATION: 98 % | RESPIRATION RATE: 17 BRPM | DIASTOLIC BLOOD PRESSURE: 60 MMHG | HEART RATE: 83 BPM | HEIGHT: 61 IN

## 2019-06-19 DIAGNOSIS — I50.22 CHRONIC SYSTOLIC CONGESTIVE HEART FAILURE (HCC): ICD-10-CM

## 2019-06-19 DIAGNOSIS — K21.00 GASTROESOPHAGEAL REFLUX DISEASE WITH ESOPHAGITIS: ICD-10-CM

## 2019-06-19 DIAGNOSIS — R73.02 IMPAIRED GLUCOSE TOLERANCE: ICD-10-CM

## 2019-06-19 DIAGNOSIS — I10 ESSENTIAL HYPERTENSION: ICD-10-CM

## 2019-06-19 DIAGNOSIS — Z78.0 POST-MENOPAUSAL: Primary | ICD-10-CM

## 2019-06-19 DIAGNOSIS — Z23 NEED FOR VACCINATION: ICD-10-CM

## 2019-06-19 DIAGNOSIS — Z17.0 MALIGNANT NEOPLASM OF OVERLAPPING SITES OF LEFT BREAST IN FEMALE, ESTROGEN RECEPTOR POSITIVE (HCC): ICD-10-CM

## 2019-06-19 DIAGNOSIS — M10.071 ACUTE IDIOPATHIC GOUT OF RIGHT FOOT: ICD-10-CM

## 2019-06-19 DIAGNOSIS — C50.812 MALIGNANT NEOPLASM OF OVERLAPPING SITES OF LEFT BREAST IN FEMALE, ESTROGEN RECEPTOR POSITIVE (HCC): ICD-10-CM

## 2019-06-19 DIAGNOSIS — M85.80 OSTEOPENIA, UNSPECIFIED LOCATION: ICD-10-CM

## 2019-06-19 PROCEDURE — G0009 ADMIN PNEUMOCOCCAL VACCINE: HCPCS | Performed by: INTERNAL MEDICINE

## 2019-06-19 PROCEDURE — 90670 PCV13 VACCINE IM: CPT | Performed by: INTERNAL MEDICINE

## 2019-06-19 PROCEDURE — 99214 OFFICE O/P EST MOD 30 MIN: CPT | Performed by: INTERNAL MEDICINE

## 2019-06-19 RX ORDER — ALLOPURINOL 300 MG/1
300 TABLET ORAL DAILY
Qty: 90 TABLET | Refills: 3 | Status: SHIPPED | OUTPATIENT
Start: 2019-06-19 | End: 2020-05-13

## 2019-06-19 NOTE — PROGRESS NOTES
Subjective   Radha Luke is a 86 y.o. female. Patient is here today for establishing care as a new patient.  She is feeling well currently and has no acute complaints.  She has known problems of chronic systolic heart failure, valvular heart disease, GERD, hyperglycemia, chronic kidney disease and a history of left breast cancer with mastectomy in 2011.  She sees the cardiologist and had a recent visit and was stable.  She currently is feeling well and has no orthopnea or PND and no pedal edema.  She has not had recent gout attacks.  Chief Complaint   Patient presents with   • Establish Care   • Hypertension          Vitals:    06/19/19 0948   BP: 120/60   Pulse: 83   Resp: 17   Temp: 97.2 °F (36.2 °C)   SpO2: 98%     The following portions of the patient's history were reviewed and updated as appropriate: allergies, current medications, past family history, past medical history, past social history, past surgical history and problem list.    Past Medical History:   Diagnosis Date   • Acute systolic congestive heart failure (CMS/HCC)    • Aortic stenosis    • Arthritis    • Breast cancer (CMS/HCC)     Locally recurrent left breast   • CKD (chronic kidney disease), stage III (CMS/HCC)    • Cough    • Dizziness    • Drug therapy    • Edema    • GERD (gastroesophageal reflux disease)    • History of breast cancer     LEFT   • Hyperlipidemia    • Hypertension    • Hypokalemia    • LBBB (left bundle branch block)    • Mild tricuspid regurgitation     with a right ventricular systolic pressure of 62   • Moderate mitral regurgitation    • Osteopenia    • Pleural effusion       Allergies   Allergen Reactions   • Levofloxacin Swelling     Swelling in legs and ankle and tongue   • Penicillins Hives      Social History     Socioeconomic History   • Marital status:      Spouse name: Yves   • Number of children: Not on file   • Years of education: High School   • Highest education level: Not on file   Occupational  History   • Occupation:      Employer: RETIRED   Tobacco Use   • Smoking status: Never Smoker   • Smokeless tobacco: Never Used   • Tobacco comment: no caffeine   Substance and Sexual Activity   • Alcohol use: Yes     Comment: occassional   • Drug use: No   • Sexual activity: Defer        Current Outpatient Medications:   •  allopurinol (ZYLOPRIM) 300 MG tablet, Take 1 tablet by mouth Daily., Disp: 90 tablet, Rfl: 3  •  anastrozole (ARIMIDEX) 1 MG tablet, Take 1 tablet by mouth Daily., Disp: 90 tablet, Rfl: 4  •  Calcium Carb-Cholecalciferol (CALCIUM 1000 + D PO), Take 1 tablet by mouth Daily., Disp: , Rfl:   •  carvedilol (COREG) 3.125 MG tablet, TAKE 1 TABLET TWICE DAILY, Disp: 180 tablet, Rfl: 3  •  furosemide (LASIX) 40 MG tablet, TAKE 1 TABLET (40 MG) IN THE MORNING AND TAKE 1/2 TABLET (20 MG) IN THE AFTERNOON, Disp: 135 tablet, Rfl: 1  •  magnesium gluconate (MAGONATE) 500 MG tablet, Take 500 mg by mouth Daily., Disp: , Rfl:   •  meclizine (ANTIVERT) 25 MG tablet, Take 1 tablet by mouth 3 (three) times a day as needed for dizziness., Disp: 60 tablet, Rfl: 1  •  Multiple Vitamins-Minerals (CENTRUM SILVER 50+WOMEN PO), Take 1 tablet by mouth Daily., Disp: , Rfl:   •  pantoprazole (PROTONIX) 40 MG EC tablet, Take 1 tablet by mouth Daily., Disp: 90 tablet, Rfl: 3  •  potassium chloride (MICRO-K) 10 MEQ CR capsule, TAKE 2 CAPSULES EVERY DAY, Disp: 180 capsule, Rfl: 3  •  simvastatin (ZOCOR) 20 MG tablet, Take 1 tablet by mouth Daily., Disp: 90 tablet, Rfl: 3     Objective     History of Present Illness     Review of Systems   Constitutional: Negative.    HENT: Negative.    Eyes: Negative.    Respiratory: Negative.    Cardiovascular: Negative.    Gastrointestinal: Negative.    Genitourinary: Negative.    Musculoskeletal: Negative.    Skin: Negative.    Neurological: Negative.    Psychiatric/Behavioral: Negative.        Physical Exam   Constitutional: She is oriented to person, place, and time. She  appears well-developed and well-nourished.   Pleasant, cooperative no distress, blood pressure 110/70   HENT:   Head: Normocephalic and atraumatic.   Eyes: Conjunctivae are normal. Pupils are equal, round, and reactive to light. No scleral icterus.   Neck: Normal range of motion. Neck supple.   Cardiovascular: Normal rate, regular rhythm and normal heart sounds.   Pulmonary/Chest: Effort normal and breath sounds normal. No respiratory distress. She has no wheezes. She has no rales.   Musculoskeletal: Normal range of motion. She exhibits no edema.   Neurological: She is alert and oriented to person, place, and time.   Skin: Skin is warm and dry.   Psychiatric: She has a normal mood and affect. Her behavior is normal.   Nursing note and vitals reviewed.      ASSESSMENT overall patient seems stable.  Her blood pressure is well controlled and heart and lungs sound fine and she has no edema.  Her GERD is controlled on medication.  Her no laboratory studies to review.  She has had no acute gout.     Problem List Items Addressed This Visit        Cardiovascular and Mediastinum    Hypertension    Chronic systolic congestive heart failure (CMS/HCC)       Digestive    Gastroesophageal reflux disease with esophagitis       Endocrine    Impaired glucose tolerance       Musculoskeletal and Integument    Osteopenia    Relevant Orders    DEXA Bone Density Axial    Acute idiopathic gout of right foot       Other    Malignant neoplasm of overlapping sites of left female breast (CMS/HCC)      Other Visit Diagnoses     Post-menopausal    -  Primary    Relevant Orders    DEXA Bone Density Axial          PLAN the patient received a Prevnar 13 vaccination today.  I have ordered a DEXA scan as she is due and has a history of osteopenia.  I want to recheck the patient in the next month or so with a CBC, CMP, hemoglobin A1c, lipid panel, uric acid level, TSH and free T4 and urine microalbumin.  Because of her heart failure I would also like  a chest x-ray done at the [facility] [place] facility    There are no Patient Instructions on file for this visit.  No Follow-up on file.

## 2019-07-16 DIAGNOSIS — R73.02 IMPAIRED GLUCOSE TOLERANCE: ICD-10-CM

## 2019-07-16 DIAGNOSIS — E78.5 HYPERLIPIDEMIA, UNSPECIFIED HYPERLIPIDEMIA TYPE: Primary | ICD-10-CM

## 2019-07-16 DIAGNOSIS — I10 ESSENTIAL HYPERTENSION: ICD-10-CM

## 2019-07-16 DIAGNOSIS — M10.9 GOUT, UNSPECIFIED CAUSE, UNSPECIFIED CHRONICITY, UNSPECIFIED SITE: ICD-10-CM

## 2019-07-16 RX ORDER — FUROSEMIDE 40 MG/1
TABLET ORAL
Qty: 135 TABLET | Refills: 1 | Status: SHIPPED | OUTPATIENT
Start: 2019-07-16 | End: 2020-03-12

## 2019-07-24 ENCOUNTER — HOSPITAL ENCOUNTER (OUTPATIENT)
Dept: BONE DENSITY | Facility: HOSPITAL | Age: 84
Discharge: HOME | End: 2019-07-24
Admitting: INTERNAL MEDICINE

## 2019-07-24 DIAGNOSIS — Z78.0 POST-MENOPAUSAL: ICD-10-CM

## 2019-07-24 DIAGNOSIS — M85.80 OSTEOPENIA, UNSPECIFIED LOCATION: ICD-10-CM

## 2019-07-24 PROCEDURE — 77080 DXA BONE DENSITY AXIAL: CPT

## 2019-07-30 LAB
ALBUMIN SERPL-MCNC: 4.1 G/DL (ref 3.5–5.2)
ALBUMIN/GLOB SERPL: 1.5 G/DL
ALP SERPL-CCNC: 63 U/L (ref 39–117)
ALT SERPL-CCNC: 7 U/L (ref 1–33)
AST SERPL-CCNC: 19 U/L (ref 1–32)
BASOPHILS # BLD AUTO: 0.02 10*3/MM3 (ref 0–0.2)
BASOPHILS NFR BLD AUTO: 0.3 % (ref 0–1.5)
BILIRUB SERPL-MCNC: 0.5 MG/DL (ref 0.2–1.2)
BUN SERPL-MCNC: 37 MG/DL (ref 8–23)
BUN/CREAT SERPL: 22.7 (ref 7–25)
CALCIUM SERPL-MCNC: 9.5 MG/DL (ref 8.6–10.5)
CHLORIDE SERPL-SCNC: 103 MMOL/L (ref 98–107)
CHOLEST SERPL-MCNC: 136 MG/DL (ref 0–200)
CO2 SERPL-SCNC: 29.6 MMOL/L (ref 22–29)
CREAT SERPL-MCNC: 1.63 MG/DL (ref 0.57–1)
EOSINOPHIL # BLD AUTO: 0.5 10*3/MM3 (ref 0–0.4)
EOSINOPHIL NFR BLD AUTO: 8.6 % (ref 0.3–6.2)
ERYTHROCYTE [DISTWIDTH] IN BLOOD BY AUTOMATED COUNT: 14.2 % (ref 12.3–15.4)
GLOBULIN SER CALC-MCNC: 2.7 GM/DL
GLUCOSE SERPL-MCNC: 136 MG/DL (ref 65–99)
HBA1C MFR BLD: 6 % (ref 4.8–5.6)
HCT VFR BLD AUTO: 37.3 % (ref 34–46.6)
HDLC SERPL-MCNC: 47 MG/DL (ref 40–60)
HGB BLD-MCNC: 11.9 G/DL (ref 12–15.9)
IMM GRANULOCYTES # BLD AUTO: 0.01 10*3/MM3 (ref 0–0.05)
IMM GRANULOCYTES NFR BLD AUTO: 0.2 % (ref 0–0.5)
LDLC SERPL CALC-MCNC: 63 MG/DL (ref 0–100)
LDLC/HDLC SERPL: 1.34 {RATIO}
LYMPHOCYTES # BLD AUTO: 2.02 10*3/MM3 (ref 0.7–3.1)
LYMPHOCYTES NFR BLD AUTO: 34.8 % (ref 19.6–45.3)
MCH RBC QN AUTO: 31.2 PG (ref 26.6–33)
MCHC RBC AUTO-ENTMCNC: 31.9 G/DL (ref 31.5–35.7)
MCV RBC AUTO: 97.9 FL (ref 79–97)
MICROALBUMIN UR-MCNC: 37.9 UG/ML
MONOCYTES # BLD AUTO: 0.44 10*3/MM3 (ref 0.1–0.9)
MONOCYTES NFR BLD AUTO: 7.6 % (ref 5–12)
NEUTROPHILS # BLD AUTO: 2.81 10*3/MM3 (ref 1.7–7)
NEUTROPHILS NFR BLD AUTO: 48.5 % (ref 42.7–76)
NRBC BLD AUTO-RTO: 0 /100 WBC (ref 0–0.2)
PLATELET # BLD AUTO: 125 10*3/MM3 (ref 140–450)
POTASSIUM SERPL-SCNC: 4.4 MMOL/L (ref 3.5–5.2)
PROT SERPL-MCNC: 6.8 G/DL (ref 6–8.5)
RBC # BLD AUTO: 3.81 10*6/MM3 (ref 3.77–5.28)
SODIUM SERPL-SCNC: 146 MMOL/L (ref 136–145)
T4 FREE SERPL-MCNC: 1.15 NG/DL (ref 0.93–1.7)
TRIGL SERPL-MCNC: 129 MG/DL (ref 0–150)
TSH SERPL DL<=0.005 MIU/L-ACNC: 4.87 MIU/ML (ref 0.27–4.2)
URATE SERPL-MCNC: 3.7 MG/DL (ref 2.4–5.7)
VLDLC SERPL CALC-MCNC: 25.8 MG/DL
WBC # BLD AUTO: 5.8 10*3/MM3 (ref 3.4–10.8)

## 2019-08-06 ENCOUNTER — OFFICE VISIT (OUTPATIENT)
Dept: FAMILY MEDICINE CLINIC | Facility: CLINIC | Age: 84
End: 2019-08-06

## 2019-08-06 VITALS
DIASTOLIC BLOOD PRESSURE: 62 MMHG | TEMPERATURE: 97.7 F | HEIGHT: 61 IN | HEART RATE: 67 BPM | RESPIRATION RATE: 16 BRPM | WEIGHT: 122.8 LBS | BODY MASS INDEX: 23.18 KG/M2 | OXYGEN SATURATION: 98 % | SYSTOLIC BLOOD PRESSURE: 110 MMHG

## 2019-08-06 DIAGNOSIS — R41.3 MEMORY LOSS: ICD-10-CM

## 2019-08-06 DIAGNOSIS — E78.5 HYPERLIPIDEMIA, UNSPECIFIED HYPERLIPIDEMIA TYPE: ICD-10-CM

## 2019-08-06 DIAGNOSIS — N18.30 STAGE 3 CHRONIC KIDNEY DISEASE (HCC): ICD-10-CM

## 2019-08-06 DIAGNOSIS — I35.0 NONRHEUMATIC AORTIC VALVE STENOSIS: ICD-10-CM

## 2019-08-06 DIAGNOSIS — K21.00 GASTROESOPHAGEAL REFLUX DISEASE WITH ESOPHAGITIS: ICD-10-CM

## 2019-08-06 DIAGNOSIS — M85.80 OSTEOPENIA, UNSPECIFIED LOCATION: ICD-10-CM

## 2019-08-06 DIAGNOSIS — I10 ESSENTIAL HYPERTENSION: Primary | ICD-10-CM

## 2019-08-06 DIAGNOSIS — R73.02 IMPAIRED GLUCOSE TOLERANCE: ICD-10-CM

## 2019-08-06 PROCEDURE — 99214 OFFICE O/P EST MOD 30 MIN: CPT | Performed by: INTERNAL MEDICINE

## 2019-08-06 NOTE — PROGRESS NOTES
Subjective   Radha Luke is a 86 y.o. female. Patient is here today for follow-up on her history of aortic valve stenosis, hypertension, hyperlipidemia, history of CHF, GERD, hyperglycemia, osteopenia, chronic kidney disease, history of breast cancer and short-term memory problems.  Overall the patient's been stable.  She says she feels quite well and denies any chest pain, shortness of breath, edema or myalgias.  Chief Complaint   Patient presents with   • Hyperlipidemia     HTN- FOLLOW UP LABS          Vitals:    08/06/19 1526   BP: 110/62   Pulse: 67   Resp: 16   Temp: 97.7 °F (36.5 °C)   SpO2: 98%     The following portions of the patient's history were reviewed and updated as appropriate: allergies, current medications, past family history, past medical history, past social history, past surgical history and problem list.    Past Medical History:   Diagnosis Date   • Acute systolic congestive heart failure (CMS/HCC)    • Aortic stenosis    • Arthritis    • Breast cancer (CMS/HCC)     Locally recurrent left breast   • CKD (chronic kidney disease), stage III (CMS/HCC)    • Cough    • Dizziness    • Drug therapy    • Edema    • GERD (gastroesophageal reflux disease)    • History of breast cancer     LEFT   • Hyperlipidemia    • Hypertension    • Hypokalemia    • LBBB (left bundle branch block)    • Mild tricuspid regurgitation     with a right ventricular systolic pressure of 62   • Moderate mitral regurgitation    • Osteopenia    • Pleural effusion       Allergies   Allergen Reactions   • Levofloxacin Swelling     Swelling in legs and ankle and tongue   • Penicillins Hives      Social History     Socioeconomic History   • Marital status:      Spouse name: Yves   • Number of children: Not on file   • Years of education: High School   • Highest education level: Not on file   Occupational History   • Occupation:      Employer: RETIRED   Tobacco Use   • Smoking status: Never Smoker   •  Smokeless tobacco: Never Used   • Tobacco comment: no caffeine   Substance and Sexual Activity   • Alcohol use: Yes     Comment: occassional   • Drug use: No   • Sexual activity: Defer        Current Outpatient Medications:   •  allopurinol (ZYLOPRIM) 300 MG tablet, Take 1 tablet by mouth Daily., Disp: 90 tablet, Rfl: 3  •  anastrozole (ARIMIDEX) 1 MG tablet, Take 1 tablet by mouth Daily., Disp: 90 tablet, Rfl: 4  •  Calcium Carb-Cholecalciferol (CALCIUM 1000 + D PO), Take 1 tablet by mouth Daily., Disp: , Rfl:   •  carvedilol (COREG) 3.125 MG tablet, TAKE 1 TABLET TWICE DAILY, Disp: 180 tablet, Rfl: 3  •  furosemide (LASIX) 40 MG tablet, TAKE 1 TABLET (40 MG) IN THE MORNING AND TAKE 1/2 TABLET (20 MG) IN THE AFTERNOON, Disp: 135 tablet, Rfl: 1  •  magnesium gluconate (MAGONATE) 500 MG tablet, Take 500 mg by mouth Daily., Disp: , Rfl:   •  meclizine (ANTIVERT) 25 MG tablet, Take 1 tablet by mouth 3 (three) times a day as needed for dizziness., Disp: 60 tablet, Rfl: 1  •  Multiple Vitamins-Minerals (CENTRUM SILVER 50+WOMEN PO), Take 1 tablet by mouth Daily., Disp: , Rfl:   •  pantoprazole (PROTONIX) 40 MG EC tablet, Take 1 tablet by mouth Daily., Disp: 90 tablet, Rfl: 3  •  potassium chloride (MICRO-K) 10 MEQ CR capsule, TAKE 2 CAPSULES EVERY DAY, Disp: 180 capsule, Rfl: 3  •  simvastatin (ZOCOR) 20 MG tablet, Take 1 tablet by mouth Daily., Disp: 90 tablet, Rfl: 3     Objective     History of Present Illness     Review of Systems   Constitutional: Negative.    HENT: Negative.    Respiratory: Negative.    Cardiovascular: Negative.    Gastrointestinal: Negative.    Genitourinary: Negative.    Musculoskeletal: Negative.    Skin: Negative.    Neurological: Negative.    Psychiatric/Behavioral: Negative.        Physical Exam   Constitutional: She is oriented to person, place, and time. She appears well-developed and well-nourished.   Pleasant, cooperative in no distress with a blood pressure 120/80   HENT:   Head:  Normocephalic and atraumatic.   Eyes: Conjunctivae are normal. Pupils are equal, round, and reactive to light. No scleral icterus.   Neck: Normal range of motion.   Cardiovascular: Normal rate, regular rhythm and normal heart sounds.   Pulmonary/Chest: Effort normal and breath sounds normal. No respiratory distress. She has no wheezes. She has no rales.   Abdominal: Soft.   Musculoskeletal: Normal range of motion. She exhibits no edema.   Neurological: She is alert and oriented to person, place, and time.   Skin: Skin is warm and dry.   Psychiatric: She has a normal mood and affect. Her behavior is normal.   Nursing note and vitals reviewed.      ASSESSMENT CBC is essentially normal.  CMP has elevated but stable sugar of 136 and creatinine 1.63 and was otherwise generally normal.  Hemoglobin A1c is stable and acceptable at 6.0.  Lipid panel was excellent with total cholesterol 136, HDL 47, LDL 63.  Uric acid is well controlled at 3.7.  TSH was mildly elevated but free T4 is normal and urine microalbumin is acceptable bone density test shows osteopenia but no osteoporosis.  #1-hyperglycemia, mild asymptomatic and stable with acceptable hemoglobin A1c  #2-hypertension, adequate control on medication  #3-osteopenia with relatively stable recent bone density test  #4-gout, asymptomatic and controlled  #5-chronic kidney disease, asymptomatic and stable  #6-systolic heart murmur consistent with aortic stenosis  #7-history of congestive heart failure, compensated     Problem List Items Addressed This Visit        Cardiovascular and Mediastinum    Hyperlipidemia    Hypertension - Primary    Nonrheumatic aortic valve stenosis       Digestive    Gastroesophageal reflux disease with esophagitis       Endocrine    Impaired glucose tolerance       Musculoskeletal and Integument    Osteopenia       Genitourinary    Chronic kidney disease          PLAN the patient will continue current medicines as now and I will recheck her in 6  months with a CBC, CMP, lipid panel, hemoglobin A1c, uric acid level and TSH and vitamin D level    There are no Patient Instructions on file for this visit.  Return in about 6 months (around 2/6/2020) for with labs.

## 2019-11-11 RX ORDER — ANASTROZOLE 1 MG/1
TABLET ORAL
Qty: 90 TABLET | Refills: 0 | OUTPATIENT
Start: 2019-11-11

## 2020-01-17 ENCOUNTER — OFFICE VISIT (OUTPATIENT)
Dept: CARDIOLOGY | Facility: CLINIC | Age: 85
End: 2020-01-17

## 2020-01-17 VITALS
OXYGEN SATURATION: 100 % | HEART RATE: 69 BPM | HEIGHT: 60 IN | BODY MASS INDEX: 24.54 KG/M2 | SYSTOLIC BLOOD PRESSURE: 124 MMHG | WEIGHT: 125 LBS | DIASTOLIC BLOOD PRESSURE: 74 MMHG

## 2020-01-17 DIAGNOSIS — I38 CONGESTIVE HEART FAILURE DUE TO VALVULAR DISEASE (HCC): Primary | ICD-10-CM

## 2020-01-17 DIAGNOSIS — I35.0 NONRHEUMATIC AORTIC VALVE STENOSIS: ICD-10-CM

## 2020-01-17 DIAGNOSIS — I10 ESSENTIAL HYPERTENSION: ICD-10-CM

## 2020-01-17 DIAGNOSIS — I44.7 LEFT BUNDLE BRANCH BLOCK: ICD-10-CM

## 2020-01-17 DIAGNOSIS — I34.0 NONRHEUMATIC MITRAL VALVE REGURGITATION: ICD-10-CM

## 2020-01-17 DIAGNOSIS — I35.1 NONRHEUMATIC AORTIC VALVE INSUFFICIENCY: ICD-10-CM

## 2020-01-17 DIAGNOSIS — E78.5 HYPERLIPIDEMIA, UNSPECIFIED HYPERLIPIDEMIA TYPE: ICD-10-CM

## 2020-01-17 DIAGNOSIS — I50.9 CONGESTIVE HEART FAILURE DUE TO VALVULAR DISEASE (HCC): Primary | ICD-10-CM

## 2020-01-17 PROCEDURE — 99214 OFFICE O/P EST MOD 30 MIN: CPT | Performed by: INTERNAL MEDICINE

## 2020-01-17 PROCEDURE — 93000 ELECTROCARDIOGRAM COMPLETE: CPT | Performed by: INTERNAL MEDICINE

## 2020-01-17 NOTE — PROGRESS NOTES
PATIENTINFORMATION    Date of Office Visit: 20  Encounter Provider: Irene Earl MD  Place of Service: [facility] CARDIOLOGY  Patient Name: Radha Luke  : 3/14/1933    Subjective:         Patient ID: Radha Luke is a 86 y.o. female.      History of Present Illness    This is a nice woman who was admitted in 2018 with new onset of acute congestive heart failure.  Her ejection fraction was 28% by echocardiogram on 2018.  She was started on a low dose of carvedilol.  She was not started on Entresto, ACE inhibitor or angiotensin receptor blocker because of kidney disease.  She had a right-sided thoracentesis with 1200 mL of fluid removed and a left-sided thoracentesis with 750 mL of fluid removed.   she had a PET stress test in 2018 which showed no perfusion defects with an ejection fraction on 24%.  Repeat echocardiogram was performed in May 2018 and showed her ejection fraction had improved to 45 to 50%.  She had grade 1A diastolic dysfunction, mild aortic regurgitation, moderate aortic stenosis, moderate mitral regurgitation, mild tricuspid regurgitation with a normal right ventricular systolic pressure.    She says she feels well.  [place] [facility] with her .  They walk 500 feet from there apartment to where they eat and then back.  They have not been routinely participating in any of the exercise classes.  She denies shortness of breath, chest pain, palpitations or edema.        Review of Systems   Constitution: Negative for fever, malaise/fatigue, weight gain and weight loss.   HENT: Negative for ear pain, hearing loss, nosebleeds and sore throat.    Eyes: Negative for double vision, pain, vision loss in left eye and vision loss in right eye.   Cardiovascular:        See history of present illness.   Respiratory: Negative for cough, shortness of breath, sleep disturbances due to breathing, snoring and wheezing.    Endocrine: Negative for  "cold intolerance, heat intolerance and polyuria.   Skin: Negative for itching, poor wound healing and rash.   Musculoskeletal: Negative for joint pain, joint swelling and myalgias.   Gastrointestinal: Negative for abdominal pain, diarrhea, hematochezia, nausea and vomiting.   Genitourinary: Negative for hematuria and hesitancy.   Neurological: Negative for numbness, paresthesias and seizures.   Psychiatric/Behavioral: Negative for depression. The patient is not nervous/anxious.            ECG 12 Lead  Date/Time: 1/17/2020 1:15 PM  Performed by: Irene Earl MD  Authorized by: Irene Earl MD   Comparison: compared with previous ECG from 5/31/2019  Similar to previous ECG  Rhythm: sinus rhythm  BPM: 66  Conduction: left bundle branch block    Clinical impression: abnormal EKG               Objective:     /74 (BP Location: Right arm)   Pulse 69   Ht 152.4 cm (60\")   Wt 56.7 kg (125 lb)   SpO2 100%   BMI 24.41 kg/m²  Body mass index is 24.41 kg/m².     Physical Exam   Constitutional: She appears well-developed.   HENT:   Head: Normocephalic and atraumatic.   Eyes: Pupils are equal, round, and reactive to light. Conjunctivae and lids are normal. Lids are everted and swept, no foreign bodies found.   Neck: Normal range of motion. No JVD present. Carotid bruit is not present. No tracheal deviation present. No thyroid mass present.   Cardiovascular: Normal rate, regular rhythm and normal heart sounds.   Pulses:       Dorsalis pedis pulses are 2+ on the right side, and 2+ on the left side.   3/6 systolic murmur RUSB   Pulmonary/Chest: Effort normal and breath sounds normal.   Abdominal: Normal appearance and bowel sounds are normal.   Musculoskeletal: Normal range of motion.   Neurological: She is alert. She has normal strength.   Skin: Skin is warm, dry and intact.   Psychiatric: She has a normal mood and affect. Her behavior is normal.   Vitals reviewed.          Assessment/Plan:       1. Chronic " systolic congestive heart failure. She is on a low dose of carvedilol.  Her ejection fraction was 28% echo on January 30, 2018. On January 31, 2018 she had a thoracentesis on the right side with 1200 mL of fluid removed.  On February 1 she had a thoracentesis on the left side with 750 mL removed.    Repeat echo in May 2018 showed her ejection fraction had improved to 45 to 50%.  She had a negative PET stress test.  Continue current medications.  She is euvolemic on exam.  2.  Pleural effusion.   status post bilateral thoracentesis  3.  Hypertension.  Controlled  4.  Hyperlipidemia  5.  Chronic kidney disease stage III.    6.  History of breast cancer in 2011  7.  Left bundle branch block.  Given that she has remained out of the hospital and euvolemic, I do not think she qualifies for biventricular pacemaker at this time.  8.  Aortic stenosis.  Moderate.  With mild aortic regurgitation.  9.  Moderate mitral regurgitation.  10.  Mild tricuspid regurgitation.  Initially she had pulmonary hypertension but after diuresis/bilateral thoracentesis, right ventricular systolic pressure was normal.    Overall I think she is doing well.  I encouraged more regular exercise.  We discussed the [facility] that are available at the [facility].  She is going to try to participate.  I told her if she is noticing a lot of shortness of breath to let me know.  Her aortic stenosis may get worse and require treatment in the future.    Given her lack of symptoms, I am not going to do any further testing or change any of her medications at this time.  Plan is for her to come back in a year unless she is having problems sooner.    If she is noticing worsening shortness of breath, I recommend a repeat echocardiogram.    Orders Placed This Encounter   Procedures   • ECG 12 Lead     This order was created via procedure documentation        Discharge Medications           Accurate as of January 17, 2020  1:15 PM. If you have any questions, ask your  nurse or doctor.               Continue These Medications      Instructions Start Date   allopurinol 300 MG tablet  Commonly known as:  ZYLOPRIM   300 mg, Oral, Daily      CALCIUM 1000 + D PO   1 tablet, Oral, Daily      carvedilol 3.125 MG tablet  Commonly known as:  COREG   TAKE 1 TABLET TWICE DAILY      CENTRUM SILVER 50+WOMEN PO   1 tablet, Oral, Daily      furosemide 40 MG tablet  Commonly known as:  LASIX   TAKE 1 TABLET (40 MG) IN THE MORNING AND TAKE 1/2 TABLET (20 MG) IN THE AFTERNOON      magnesium gluconate 500 MG tablet  Commonly known as:  MAGONATE   500 mg, Oral, Daily      meclizine 25 MG tablet  Commonly known as:  ANTIVERT   25 mg, Oral, 3 Times Daily PRN      pantoprazole 40 MG EC tablet  Commonly known as:  PROTONIX   40 mg, Oral, Daily      potassium chloride 10 MEQ CR capsule  Commonly known as:  MICRO-K   TAKE 2 CAPSULES EVERY DAY      simvastatin 20 MG tablet  Commonly known as:  ZOCOR   20 mg, Oral, Daily         Stop These Medications    anastrozole 1 MG tablet  Commonly known as:  ARIMIDEX  Stopped by:  MD Irene Espitia MD  01/17/20  1:15 PM

## 2020-01-28 DIAGNOSIS — M10.9 GOUT, UNSPECIFIED CAUSE, UNSPECIFIED CHRONICITY, UNSPECIFIED SITE: ICD-10-CM

## 2020-01-28 DIAGNOSIS — M85.80 OSTEOPENIA, UNSPECIFIED LOCATION: ICD-10-CM

## 2020-01-28 DIAGNOSIS — R73.02 IMPAIRED GLUCOSE TOLERANCE: ICD-10-CM

## 2020-01-28 DIAGNOSIS — E78.5 HYPERLIPIDEMIA, UNSPECIFIED HYPERLIPIDEMIA TYPE: ICD-10-CM

## 2020-02-04 LAB
25(OH)D3+25(OH)D2 SERPL-MCNC: 61 NG/ML (ref 30–100)
ALBUMIN SERPL-MCNC: 4.3 G/DL (ref 3.5–5.2)
ALBUMIN/GLOB SERPL: 1.8 G/DL
ALP SERPL-CCNC: 66 U/L (ref 39–117)
ALT SERPL-CCNC: 8 U/L (ref 1–33)
AST SERPL-CCNC: 19 U/L (ref 1–32)
BASOPHILS # BLD AUTO: 0.01 10*3/MM3 (ref 0–0.2)
BASOPHILS NFR BLD AUTO: 0.2 % (ref 0–1.5)
BILIRUB SERPL-MCNC: 0.5 MG/DL (ref 0.2–1.2)
BUN SERPL-MCNC: 33 MG/DL (ref 8–23)
BUN/CREAT SERPL: 19.1 (ref 7–25)
CALCIUM SERPL-MCNC: 9.4 MG/DL (ref 8.6–10.5)
CHLORIDE SERPL-SCNC: 103 MMOL/L (ref 98–107)
CHOLEST SERPL-MCNC: 143 MG/DL (ref 0–200)
CO2 SERPL-SCNC: 27.9 MMOL/L (ref 22–29)
CREAT SERPL-MCNC: 1.73 MG/DL (ref 0.57–1)
EOSINOPHIL # BLD AUTO: 0.4 10*3/MM3 (ref 0–0.4)
EOSINOPHIL NFR BLD AUTO: 7 % (ref 0.3–6.2)
ERYTHROCYTE [DISTWIDTH] IN BLOOD BY AUTOMATED COUNT: 13 % (ref 12.3–15.4)
GLOBULIN SER CALC-MCNC: 2.4 GM/DL
GLUCOSE SERPL-MCNC: 139 MG/DL (ref 65–99)
HBA1C MFR BLD: 6.3 % (ref 4.8–5.6)
HCT VFR BLD AUTO: 36.6 % (ref 34–46.6)
HDLC SERPL-MCNC: 48 MG/DL (ref 40–60)
HGB BLD-MCNC: 12.4 G/DL (ref 12–15.9)
IMM GRANULOCYTES # BLD AUTO: 0.02 10*3/MM3 (ref 0–0.05)
IMM GRANULOCYTES NFR BLD AUTO: 0.4 % (ref 0–0.5)
LDLC SERPL CALC-MCNC: 70 MG/DL (ref 0–100)
LDLC/HDLC SERPL: 1.46 {RATIO}
LYMPHOCYTES # BLD AUTO: 1.99 10*3/MM3 (ref 0.7–3.1)
LYMPHOCYTES NFR BLD AUTO: 34.9 % (ref 19.6–45.3)
MCH RBC QN AUTO: 32 PG (ref 26.6–33)
MCHC RBC AUTO-ENTMCNC: 33.9 G/DL (ref 31.5–35.7)
MCV RBC AUTO: 94.6 FL (ref 79–97)
MONOCYTES # BLD AUTO: 0.42 10*3/MM3 (ref 0.1–0.9)
MONOCYTES NFR BLD AUTO: 7.4 % (ref 5–12)
NEUTROPHILS # BLD AUTO: 2.86 10*3/MM3 (ref 1.7–7)
NEUTROPHILS NFR BLD AUTO: 50.1 % (ref 42.7–76)
NRBC BLD AUTO-RTO: 0 /100 WBC (ref 0–0.2)
PLATELET # BLD AUTO: 134 10*3/MM3 (ref 140–450)
POTASSIUM SERPL-SCNC: 4.5 MMOL/L (ref 3.5–5.2)
PROT SERPL-MCNC: 6.7 G/DL (ref 6–8.5)
RBC # BLD AUTO: 3.87 10*6/MM3 (ref 3.77–5.28)
SODIUM SERPL-SCNC: 144 MMOL/L (ref 136–145)
TRIGL SERPL-MCNC: 124 MG/DL (ref 0–150)
TSH SERPL DL<=0.005 MIU/L-ACNC: 3.82 UIU/ML (ref 0.27–4.2)
URATE SERPL-MCNC: 3.8 MG/DL (ref 2.4–5.7)
VLDLC SERPL CALC-MCNC: 24.8 MG/DL
WBC # BLD AUTO: 5.7 10*3/MM3 (ref 3.4–10.8)

## 2020-02-06 ENCOUNTER — OFFICE VISIT (OUTPATIENT)
Dept: FAMILY MEDICINE CLINIC | Facility: CLINIC | Age: 85
End: 2020-02-06

## 2020-02-06 VITALS
OXYGEN SATURATION: 98 % | DIASTOLIC BLOOD PRESSURE: 62 MMHG | HEIGHT: 60 IN | HEART RATE: 74 BPM | RESPIRATION RATE: 16 BRPM | TEMPERATURE: 97.8 F | WEIGHT: 126 LBS | BODY MASS INDEX: 24.74 KG/M2 | SYSTOLIC BLOOD PRESSURE: 120 MMHG

## 2020-02-06 DIAGNOSIS — R73.02 IMPAIRED GLUCOSE TOLERANCE: ICD-10-CM

## 2020-02-06 DIAGNOSIS — E78.5 HYPERLIPIDEMIA, UNSPECIFIED HYPERLIPIDEMIA TYPE: ICD-10-CM

## 2020-02-06 DIAGNOSIS — N18.30 STAGE 3 CHRONIC KIDNEY DISEASE (HCC): ICD-10-CM

## 2020-02-06 DIAGNOSIS — I50.22 CHRONIC SYSTOLIC CONGESTIVE HEART FAILURE (HCC): ICD-10-CM

## 2020-02-06 DIAGNOSIS — M10.071 ACUTE IDIOPATHIC GOUT OF RIGHT FOOT: ICD-10-CM

## 2020-02-06 DIAGNOSIS — K21.00 GASTROESOPHAGEAL REFLUX DISEASE WITH ESOPHAGITIS: ICD-10-CM

## 2020-02-06 DIAGNOSIS — I10 ESSENTIAL HYPERTENSION: Primary | ICD-10-CM

## 2020-02-06 PROCEDURE — 99214 OFFICE O/P EST MOD 30 MIN: CPT | Performed by: INTERNAL MEDICINE

## 2020-02-06 RX ORDER — TRIAMCINOLONE ACETONIDE 1 MG/G
CREAM TOPICAL
COMMUNITY
Start: 2019-12-03

## 2020-02-06 NOTE — PROGRESS NOTES
Subjective   Radha Luke is a 86 y.o. female. Patient is here today for follow-up on her hypertension, hyperlipidemia, hyperglycemia, history of gout valvular heart disease and history of congestive heart failure.  Patient's generally feeling well and has no acute complaints and has had no chest pain, shortness of breath, edema or myalgias  Chief Complaint   Patient presents with   • Hyperlipidemia     HYPERTENSION, CHF- FOLLOW UP LABS          Vitals:    02/06/20 1447   BP: 120/62   Pulse: 74   Resp: 16   Temp: 97.8 °F (36.6 °C)   SpO2: 98%     Body mass index is 24.61 kg/m².  The following portions of the patient's history were reviewed and updated as appropriate: allergies, current medications, past family history, past medical history, past social history, past surgical history and problem list.    Past Medical History:   Diagnosis Date   • Acute systolic congestive heart failure (CMS/HCC)    • Aortic stenosis    • Arthritis    • Breast cancer (CMS/HCC)     Locally recurrent left breast   • CKD (chronic kidney disease), stage III (CMS/HCC)    • Cough    • Dizziness    • Drug therapy    • Edema    • GERD (gastroesophageal reflux disease)    • History of breast cancer     LEFT   • Hyperlipidemia    • Hypertension    • Hypokalemia    • LBBB (left bundle branch block)    • Mild tricuspid regurgitation     with a right ventricular systolic pressure of 62   • Moderate mitral regurgitation    • Osteopenia    • Pleural effusion       Allergies   Allergen Reactions   • Levofloxacin Swelling     Swelling in legs and ankle and tongue   • Penicillins Hives      Social History     Socioeconomic History   • Marital status:      Spouse name: Yves   • Number of children: Not on file   • Years of education: High School   • Highest education level: Not on file   Occupational History   • Occupation:      Employer: RETIRED   Tobacco Use   • Smoking status: Never Smoker   • Smokeless tobacco: Never Used    • Tobacco comment: no caffeine   Substance and Sexual Activity   • Alcohol use: Not Currently     Comment: occassional   • Drug use: No   • Sexual activity: Defer        Current Outpatient Medications:   •  allopurinol (ZYLOPRIM) 300 MG tablet, Take 1 tablet by mouth Daily., Disp: 90 tablet, Rfl: 3  •  Calcium Carb-Cholecalciferol (CALCIUM 1000 + D PO), Take 1 tablet by mouth Daily., Disp: , Rfl:   •  carvedilol (COREG) 3.125 MG tablet, TAKE 1 TABLET TWICE DAILY, Disp: 180 tablet, Rfl: 3  •  furosemide (LASIX) 40 MG tablet, TAKE 1 TABLET (40 MG) IN THE MORNING AND TAKE 1/2 TABLET (20 MG) IN THE AFTERNOON, Disp: 135 tablet, Rfl: 1  •  magnesium gluconate (MAGONATE) 500 MG tablet, Take 500 mg by mouth Daily., Disp: , Rfl:   •  Multiple Vitamins-Minerals (CENTRUM SILVER 50+WOMEN PO), Take 1 tablet by mouth Daily., Disp: , Rfl:   •  pantoprazole (PROTONIX) 40 MG EC tablet, Take 1 tablet by mouth Daily., Disp: 90 tablet, Rfl: 3  •  potassium chloride (MICRO-K) 10 MEQ CR capsule, TAKE 2 CAPSULES EVERY DAY, Disp: 180 capsule, Rfl: 3  •  simvastatin (ZOCOR) 20 MG tablet, Take 1 tablet by mouth Daily., Disp: 90 tablet, Rfl: 3  •  triamcinolone (KENALOG) 0.1 % cream, , Disp: , Rfl:      Objective     History of Present Illness     Review of Systems   Constitutional: Negative.    HENT: Negative.    Eyes: Negative.    Respiratory: Negative.    Cardiovascular: Negative.    Gastrointestinal: Negative.    Genitourinary: Negative.    Musculoskeletal: Negative.    Skin: Negative.    Neurological: Negative.    Psychiatric/Behavioral: Negative.        Physical Exam   Constitutional: She is oriented to person, place, and time. She appears well-developed and well-nourished.   Pleasant, cooperative no acute distress   HENT:   Head: Normocephalic and atraumatic.   Eyes: Pupils are equal, round, and reactive to light. Conjunctivae are normal. No scleral icterus.   Neck: Normal range of motion. Neck supple.   Cardiovascular: Normal rate  and regular rhythm.   Murmur heard.  Pulmonary/Chest: Effort normal and breath sounds normal. No respiratory distress. She has no wheezes. She has no rales.   Musculoskeletal: Normal range of motion. She exhibits no edema.   Neurological: She is alert and oriented to person, place, and time.   Skin: Skin is warm and dry.   Psychiatric: She has a normal mood and affect. Her behavior is normal.   Nursing note and vitals reviewed.      ASSESSMENT CBC was normal aside from minimally low platelet count of 134,000.  Vitamin D level was normal.  TSH level is normal.  Uric acid level was well controlled at 3.8.  CMP has an elevated but stable sugar of 139, a creatinine of 1.73 that is stable.  Hemoglobin A1c is acceptable and stable at 6.3 and lipid panel is excellent with total cholesterol 143, HDL 48, LDL 70  #1-compensated congestive heart failure  #2-hypertension controlled  #3-hyperlipidemia controlled  #4-hyperglycemia, asymptomatic and stable with acceptable stable hemoglobin A1c  #5-gout, controlled on medication  #6-chronic kidney disease stage III, asymptomatic     Problem List Items Addressed This Visit        Cardiovascular and Mediastinum    Hyperlipidemia    Hypertension - Primary    Chronic systolic congestive heart failure (CMS/HCC)       Digestive    Gastroesophageal reflux disease with esophagitis       Endocrine    Impaired glucose tolerance       Musculoskeletal and Integument    Acute idiopathic gout of right foot       Genitourinary    Chronic kidney disease          PLAN the patient is doing well and will continue current medicines as now.  I would like to recheck her in 6 months with a CBC, CMP, lipid panel, hemoglobin A1c    There are no Patient Instructions on file for this visit.  Return in about 6 months (around 8/6/2020) for with labs.

## 2020-03-12 RX ORDER — FUROSEMIDE 40 MG/1
TABLET ORAL
Qty: 135 TABLET | Refills: 1 | Status: SHIPPED | OUTPATIENT
Start: 2020-03-12 | End: 2020-08-28

## 2020-03-12 RX ORDER — CARVEDILOL 3.12 MG/1
TABLET ORAL
Qty: 180 TABLET | Refills: 3 | Status: SHIPPED | OUTPATIENT
Start: 2020-03-12 | End: 2021-02-10 | Stop reason: SDUPTHER

## 2020-04-06 RX ORDER — POTASSIUM CHLORIDE 750 MG/1
CAPSULE, EXTENDED RELEASE ORAL
Qty: 180 CAPSULE | Refills: 3 | Status: SHIPPED | OUTPATIENT
Start: 2020-04-06 | End: 2021-02-10 | Stop reason: SDUPTHER

## 2020-04-17 RX ORDER — PANTOPRAZOLE SODIUM 40 MG/1
40 TABLET, DELAYED RELEASE ORAL DAILY
Qty: 90 TABLET | Refills: 3 | Status: SHIPPED | OUTPATIENT
Start: 2020-04-17 | End: 2021-03-08 | Stop reason: SDUPTHER

## 2020-04-17 RX ORDER — SIMVASTATIN 20 MG
20 TABLET ORAL DAILY
Qty: 90 TABLET | Refills: 3 | Status: SHIPPED | OUTPATIENT
Start: 2020-04-17 | End: 2021-02-10 | Stop reason: SDUPTHER

## 2020-05-13 RX ORDER — ALLOPURINOL 300 MG/1
TABLET ORAL
Qty: 90 TABLET | Refills: 3 | Status: SHIPPED | OUTPATIENT
Start: 2020-05-13 | End: 2021-06-11 | Stop reason: SDUPTHER

## 2020-08-27 DIAGNOSIS — E78.5 HYPERLIPIDEMIA, UNSPECIFIED HYPERLIPIDEMIA TYPE: Primary | ICD-10-CM

## 2020-08-27 DIAGNOSIS — I10 ESSENTIAL HYPERTENSION: ICD-10-CM

## 2020-08-27 DIAGNOSIS — R73.02 IMPAIRED GLUCOSE TOLERANCE: ICD-10-CM

## 2020-08-28 RX ORDER — FUROSEMIDE 40 MG/1
TABLET ORAL
Qty: 135 TABLET | Refills: 1 | Status: SHIPPED | OUTPATIENT
Start: 2020-08-28 | End: 2021-02-10 | Stop reason: SDUPTHER

## 2020-09-02 LAB
ALBUMIN SERPL-MCNC: 4.2 G/DL (ref 3.5–5.2)
ALBUMIN/GLOB SERPL: 1.4 G/DL
ALP SERPL-CCNC: 66 U/L (ref 39–117)
ALT SERPL-CCNC: 12 U/L (ref 1–33)
AST SERPL-CCNC: 18 U/L (ref 1–32)
BASOPHILS # BLD AUTO: 0.01 10*3/MM3 (ref 0–0.2)
BASOPHILS NFR BLD AUTO: 0.2 % (ref 0–1.5)
BILIRUB SERPL-MCNC: 0.6 MG/DL (ref 0–1.2)
BUN SERPL-MCNC: 34 MG/DL (ref 8–23)
BUN/CREAT SERPL: 21.5 (ref 7–25)
CALCIUM SERPL-MCNC: 9.8 MG/DL (ref 8.6–10.5)
CHLORIDE SERPL-SCNC: 102 MMOL/L (ref 98–107)
CHOLEST SERPL-MCNC: 152 MG/DL (ref 0–200)
CO2 SERPL-SCNC: 28.3 MMOL/L (ref 22–29)
CREAT SERPL-MCNC: 1.58 MG/DL (ref 0.57–1)
EOSINOPHIL # BLD AUTO: 0.38 10*3/MM3 (ref 0–0.4)
EOSINOPHIL NFR BLD AUTO: 6.7 % (ref 0.3–6.2)
ERYTHROCYTE [DISTWIDTH] IN BLOOD BY AUTOMATED COUNT: 13.4 % (ref 12.3–15.4)
GLOBULIN SER CALC-MCNC: 3 GM/DL
GLUCOSE SERPL-MCNC: 142 MG/DL (ref 65–99)
HBA1C MFR BLD: 5.9 % (ref 4.8–5.6)
HCT VFR BLD AUTO: 37 % (ref 34–46.6)
HDLC SERPL-MCNC: 47 MG/DL (ref 40–60)
HGB BLD-MCNC: 12.3 G/DL (ref 12–15.9)
IMM GRANULOCYTES # BLD AUTO: 0.01 10*3/MM3 (ref 0–0.05)
IMM GRANULOCYTES NFR BLD AUTO: 0.2 % (ref 0–0.5)
LDLC SERPL CALC-MCNC: 76 MG/DL (ref 0–100)
LDLC/HDLC SERPL: 1.63 {RATIO}
LYMPHOCYTES # BLD AUTO: 1.81 10*3/MM3 (ref 0.7–3.1)
LYMPHOCYTES NFR BLD AUTO: 32 % (ref 19.6–45.3)
MCH RBC QN AUTO: 31.7 PG (ref 26.6–33)
MCHC RBC AUTO-ENTMCNC: 33.2 G/DL (ref 31.5–35.7)
MCV RBC AUTO: 95.4 FL (ref 79–97)
MONOCYTES # BLD AUTO: 0.45 10*3/MM3 (ref 0.1–0.9)
MONOCYTES NFR BLD AUTO: 8 % (ref 5–12)
NEUTROPHILS # BLD AUTO: 3 10*3/MM3 (ref 1.7–7)
NEUTROPHILS NFR BLD AUTO: 52.9 % (ref 42.7–76)
NRBC BLD AUTO-RTO: 0 /100 WBC (ref 0–0.2)
PLATELET # BLD AUTO: 137 10*3/MM3 (ref 140–450)
POTASSIUM SERPL-SCNC: 4.4 MMOL/L (ref 3.5–5.2)
PROT SERPL-MCNC: 7.2 G/DL (ref 6–8.5)
RBC # BLD AUTO: 3.88 10*6/MM3 (ref 3.77–5.28)
SODIUM SERPL-SCNC: 142 MMOL/L (ref 136–145)
TRIGL SERPL-MCNC: 143 MG/DL (ref 0–150)
VLDLC SERPL CALC-MCNC: 28.6 MG/DL (ref 5–40)
WBC # BLD AUTO: 5.66 10*3/MM3 (ref 3.4–10.8)

## 2020-09-04 ENCOUNTER — OFFICE VISIT (OUTPATIENT)
Dept: FAMILY MEDICINE CLINIC | Facility: CLINIC | Age: 85
End: 2020-09-04

## 2020-09-04 VITALS
WEIGHT: 125.4 LBS | RESPIRATION RATE: 16 BRPM | HEIGHT: 60 IN | OXYGEN SATURATION: 100 % | TEMPERATURE: 97.3 F | SYSTOLIC BLOOD PRESSURE: 132 MMHG | BODY MASS INDEX: 24.62 KG/M2 | HEART RATE: 65 BPM | DIASTOLIC BLOOD PRESSURE: 60 MMHG

## 2020-09-04 DIAGNOSIS — I10 ESSENTIAL HYPERTENSION: Primary | ICD-10-CM

## 2020-09-04 DIAGNOSIS — E78.5 HYPERLIPIDEMIA, UNSPECIFIED HYPERLIPIDEMIA TYPE: ICD-10-CM

## 2020-09-04 DIAGNOSIS — Z85.3 HISTORY OF LEFT BREAST CANCER: ICD-10-CM

## 2020-09-04 DIAGNOSIS — R41.3 MEMORY LOSS: ICD-10-CM

## 2020-09-04 DIAGNOSIS — I50.22 CHRONIC SYSTOLIC CONGESTIVE HEART FAILURE (HCC): ICD-10-CM

## 2020-09-04 DIAGNOSIS — R73.02 IMPAIRED GLUCOSE TOLERANCE: ICD-10-CM

## 2020-09-04 DIAGNOSIS — K21.00 GASTROESOPHAGEAL REFLUX DISEASE WITH ESOPHAGITIS: ICD-10-CM

## 2020-09-04 DIAGNOSIS — N18.30 STAGE 3 CHRONIC KIDNEY DISEASE (HCC): ICD-10-CM

## 2020-09-04 PROCEDURE — 99214 OFFICE O/P EST MOD 30 MIN: CPT | Performed by: INTERNAL MEDICINE

## 2020-09-04 NOTE — PROGRESS NOTES
Subjective   Radha Luke is a 87 y.o. female. Patient is here today for follow-up on her hypertension, hyperlipidemia, valvular heart disease and history of heart failure, GERD, hyperglycemia, chronic kidney disease stage III, history of breast cancer and memory problems.  She is accompanied by her .  She seems to be doing well and there are no acute complaints.  Chief Complaint   Patient presents with   • Hyperlipidemia     f/u ON LABS   • Hypertension   • impaired glucose          Vitals:    09/04/20 1316   BP: 132/60   Pulse: 65   Resp: 16   Temp: 97.3 °F (36.3 °C)   SpO2: 100%     Body mass index is 24.49 kg/m².  The following portions of the patient's history were reviewed and updated as appropriate: allergies, current medications, past family history, past medical history, past social history, past surgical history and problem list.    Past Medical History:   Diagnosis Date   • Acute systolic congestive heart failure (CMS/HCC)    • Aortic stenosis    • Arthritis    • Breast cancer (CMS/HCC)     Locally recurrent left breast   • CKD (chronic kidney disease), stage III (CMS/HCC)    • Cough    • Dizziness    • Drug therapy    • Edema    • GERD (gastroesophageal reflux disease)    • History of breast cancer     LEFT   • Hyperlipidemia    • Hypertension    • Hypokalemia    • LBBB (left bundle branch block)    • Mild tricuspid regurgitation     with a right ventricular systolic pressure of 62   • Moderate mitral regurgitation    • Osteopenia    • Pleural effusion       Allergies   Allergen Reactions   • Levofloxacin Swelling     Swelling in legs and ankle and tongue   • Penicillins Hives      Social History     Socioeconomic History   • Marital status:      Spouse name: Yves   • Number of children: Not on file   • Years of education: High School   • Highest education level: Not on file   Occupational History   • Occupation:      Employer: RETIRED   Tobacco Use   • Smoking status:  Never Smoker   • Smokeless tobacco: Never Used   • Tobacco comment: no caffeine   Substance and Sexual Activity   • Alcohol use: Not Currently     Comment: occassional   • Drug use: No   • Sexual activity: Defer        Current Outpatient Medications:   •  allopurinol (ZYLOPRIM) 300 MG tablet, TAKE 1 TABLET EVERY DAY, Disp: 90 tablet, Rfl: 3  •  Calcium Carb-Cholecalciferol (CALCIUM 1000 + D PO), Take 1 tablet by mouth Daily., Disp: , Rfl:   •  carvedilol (COREG) 3.125 MG tablet, TAKE 1 TABLET TWICE DAILY, Disp: 180 tablet, Rfl: 3  •  furosemide (LASIX) 40 MG tablet, TAKE 1 TABLET IN THE MORNING AND TAKE 1/2 TABLET IN THE AFTERNOON, Disp: 135 tablet, Rfl: 1  •  magnesium gluconate (MAGONATE) 500 MG tablet, Take 500 mg by mouth Daily., Disp: , Rfl:   •  Multiple Vitamins-Minerals (CENTRUM SILVER 50+WOMEN PO), Take 1 tablet by mouth Daily., Disp: , Rfl:   •  pantoprazole (PROTONIX) 40 MG EC tablet, Take 1 tablet by mouth Daily., Disp: 90 tablet, Rfl: 3  •  potassium chloride (MICRO-K) 10 MEQ CR capsule, TAKE 2 CAPSULES EVERY DAY, Disp: 180 capsule, Rfl: 3  •  simvastatin (ZOCOR) 20 MG tablet, Take 1 tablet by mouth Daily., Disp: 90 tablet, Rfl: 3  •  triamcinolone (KENALOG) 0.1 % cream, , Disp: , Rfl:      Objective     History of Present Illness     Review of Systems   Constitutional: Negative.    HENT: Negative.    Respiratory: Negative.    Cardiovascular: Negative.    Gastrointestinal: Negative.    Genitourinary: Negative.    Musculoskeletal: Negative.    Skin: Negative.    Neurological: Negative.    Psychiatric/Behavioral: Negative.        Physical Exam   Constitutional: She appears well-developed and well-nourished.   Pleasant, cooperative no acute distress, 120/80   HENT:   Head: Normocephalic and atraumatic.   Eyes: Conjunctivae are normal. No scleral icterus.   Neck: Normal range of motion. Neck supple.   Cardiovascular: Normal rate, regular rhythm and normal heart sounds.   Pulmonary/Chest: Effort normal and  breath sounds normal. No respiratory distress. She has no wheezes. She has no rales.   Musculoskeletal: Normal range of motion. She exhibits no edema.   Neurological: She is alert.   Skin: Skin is warm and dry.   Psychiatric: She has a normal mood and affect. Her behavior is normal.   Nursing note and vitals reviewed.      ASSESSMENT CBC is quite normal aside from minimally low platelets of 137,000.  CMP is stable with a sugar of 142, creatinine 1.58 and is otherwise normal and hemoglobin A1c is improved and acceptable at 5.9.  Lipid panel is well controlled with total cholesterol 152, HDL 47 and LDL 76  #1-hypertension controlled  #2-hyperlipidemia, well controlled  #3-hyperglycemia, stable and asymptomatic with improved and acceptable hemoglobin A1c, diet controlled  #4-history of chronic congestive heart failure and valvular heart disease, asymptomatic and stable  #5-chronic kidney disease stage III, asymptomatic and stable  #6-GERD, controlled on medication  #7-history of breast cancer, asymptomatic  #8-history of hyperuricemia, asymptomatic     Problem List Items Addressed This Visit        Cardiovascular and Mediastinum    Hyperlipidemia    Hypertension - Primary       Endocrine    Impaired glucose tolerance       Genitourinary    Chronic kidney disease       Other    History of left breast cancer    Memory loss          PLAN the patient should get a Pneumovax 23 immunization at the next visit and I recommended a flu shot in the fall.  She will continue current medicines as now and I would like to recheck her in 6 months with a CBC, CMP, lipid panel, hemoglobin A1c, TSH and uric acid level    There are no Patient Instructions on file for this visit.  Return in about 6 months (around 3/4/2021) for with labs.

## 2020-12-09 ENCOUNTER — TELEPHONE (OUTPATIENT)
Dept: FAMILY MEDICINE CLINIC | Facility: CLINIC | Age: 85
End: 2020-12-09

## 2020-12-09 DIAGNOSIS — M54.31 RIGHT SCIATIC NERVE PAIN: Primary | ICD-10-CM

## 2020-12-09 DIAGNOSIS — R26.2 DIFFICULTY WALKING: ICD-10-CM

## 2020-12-09 NOTE — TELEPHONE ENCOUNTER
PATIENT SPOUSE CALLED-PATIENT HAS RIGHT SIDED SCIATIC NERVE PAIN AND DIFFICULTY WALKING     HE REQUESTS ORDER FOR PT    SAYS PATIENT HAS HAD PT BEFORE AND HELPED    PLEASE ADVISE  [phone]

## 2020-12-10 NOTE — TELEPHONE ENCOUNTER
ORDER HAS BEEN PLACED FOR PHYSICAL THERAPY. CALLED AND S/W HUSBANDS AND ADVISED HIM OF THIS. PT WANTS TO [facility]Y [facility]. HE SAID THE NUMBER TO CALL [phone]. THERAPIST IS JUAN JOSE ZUNIGA

## 2020-12-14 ENCOUNTER — TELEPHONE (OUTPATIENT)
Dept: FAMILY MEDICINE CLINIC | Facility: CLINIC | Age: 85
End: 2020-12-14

## 2020-12-14 NOTE — TELEPHONE ENCOUNTER
REFERRAL NEEDS TO BE RESENT TO [facility], THEY DID NOT RECIEVE THE ORDER FOR IT. CONFIRMED WITH THAT OFFICE IT WAS NOT RECEIVED.     FAX [phone]

## 2020-12-15 NOTE — TELEPHONE ENCOUNTER
ORDER/REFERRAL FAXED TO [facility] AT [place] FAX # [phone] X2 AND BOTH FAXES WENT THROUGH. [place]

## 2021-02-10 ENCOUNTER — OFFICE VISIT (OUTPATIENT)
Dept: CARDIOLOGY | Facility: CLINIC | Age: 86
End: 2021-02-10

## 2021-02-10 VITALS
DIASTOLIC BLOOD PRESSURE: 58 MMHG | BODY MASS INDEX: 24.42 KG/M2 | HEART RATE: 74 BPM | HEIGHT: 60 IN | WEIGHT: 124.4 LBS | SYSTOLIC BLOOD PRESSURE: 120 MMHG

## 2021-02-10 DIAGNOSIS — I50.22 CHRONIC SYSTOLIC CONGESTIVE HEART FAILURE (HCC): Primary | ICD-10-CM

## 2021-02-10 DIAGNOSIS — E78.5 HYPERLIPIDEMIA, UNSPECIFIED HYPERLIPIDEMIA TYPE: ICD-10-CM

## 2021-02-10 DIAGNOSIS — R09.89 CAROTID BRUIT, UNSPECIFIED LATERALITY: Primary | ICD-10-CM

## 2021-02-10 DIAGNOSIS — I10 ESSENTIAL HYPERTENSION: ICD-10-CM

## 2021-02-10 DIAGNOSIS — I35.0 AORTIC STENOSIS, MODERATE: ICD-10-CM

## 2021-02-10 DIAGNOSIS — I34.0 NONRHEUMATIC MITRAL VALVE REGURGITATION: ICD-10-CM

## 2021-02-10 PROCEDURE — 93000 ELECTROCARDIOGRAM COMPLETE: CPT | Performed by: NURSE PRACTITIONER

## 2021-02-10 PROCEDURE — 99214 OFFICE O/P EST MOD 30 MIN: CPT | Performed by: NURSE PRACTITIONER

## 2021-02-10 RX ORDER — POTASSIUM CHLORIDE 750 MG/1
20 CAPSULE, EXTENDED RELEASE ORAL DAILY
Qty: 180 CAPSULE | Refills: 0 | Status: SHIPPED | OUTPATIENT
Start: 2021-02-10 | End: 2021-07-01

## 2021-02-10 RX ORDER — PANTOPRAZOLE SODIUM 40 MG/1
40 TABLET, DELAYED RELEASE ORAL DAILY
Qty: 90 TABLET | Refills: 3 | Status: CANCELLED | OUTPATIENT
Start: 2021-02-10

## 2021-02-10 RX ORDER — CARVEDILOL 3.12 MG/1
3.12 TABLET ORAL 2 TIMES DAILY
Qty: 180 TABLET | Refills: 3 | Status: SHIPPED | OUTPATIENT
Start: 2021-02-10 | End: 2021-04-13 | Stop reason: SDUPTHER

## 2021-02-10 RX ORDER — SIMVASTATIN 20 MG
20 TABLET ORAL DAILY
Qty: 90 TABLET | Refills: 0 | Status: SHIPPED | OUTPATIENT
Start: 2021-02-10 | End: 2021-07-01

## 2021-02-10 RX ORDER — FUROSEMIDE 40 MG/1
TABLET ORAL
Qty: 135 TABLET | Refills: 0 | Status: SHIPPED | OUTPATIENT
Start: 2021-02-10 | End: 2021-04-13

## 2021-02-10 NOTE — PROGRESS NOTES
Date of Office Visit: 02/10/2021  Encounter Provider: Lise Domingo, CHAVA, APRN  Place of Service: [facility] CARDIOLOGY  Patient Name: Radha Luke  :3/14/1933        Subjective:     Chief Complaint:  Chronic systolic congestive heart failure, hypertension, aortic stenosis      History of Present Illness:  Radha Luke is a 87 y.o. female patient of Dr. Earl.  I am seeing this patient in the office today and I have reviewed her records.    Patient has a history of chronic systolic congestive heart failure, hypertension, hyperlipidemia, pleural effusion, chronic kidney disease, history of breast cancer, left bundle branch block, aortic stenosis, moderate mitral regurgitation, mild tricuspid regurgitation with elevated RVSP, hypokalemia.     Patient was admitted 2018 with new onset of acute congestive heart failure.  EF was 28% by echocardiogram 2018.  She was started on low-dose carvedilol.  Due to her kidney disease she was not started on an ACE inhibitor, angiotensin receptor blocker, or Entresto.  She had a right-sided thoracentesis with 1200 mL of fluid removed and a left-sided thoracentesis was 750 mL of fluid removed.  PET stress test 2018 showed no perfusion deficits and EF of 24%. Echocardiogram was 2018.  This showed EF between 46 to 50%.  Left ventricular cavity was borderline dilated.  Grade 1A diastolic dysfunction was present.  Mild aortic valve regurgitation was present, moderate aortic valve stenosis, moderate mitral regurgitation, mild tricuspid regurgitation with normal RVSP of 17.9 mmHg.  Mild pulmonic valve regurgitation also seen.      Patient presents to office today for follow-up appointment.  Patient reports she is feeling well since last visit, no complaints.  She stays somewhat active around the house and denies any exertional symptoms or concerns.  Denies chest pain or discomfort, shortness of breath, shortness of breath with exertion,  palpitations, racing heartbeat sensation, lower extremity edema, dizziness, syncope, falls, fatigue, or abnormal bleeding.  Blood pressure and heart rate well controlled in the office today and she reports that they run about the same at home as well.  She got her first coronavirus vaccine yesterday.          Past Medical History:   Diagnosis Date   • Acute systolic congestive heart failure (CMS/HCC)    • Aortic stenosis    • Arthritis    • Breast cancer (CMS/HCC)     Locally recurrent left breast   • CKD (chronic kidney disease), stage III (CMS/HCC)    • Cough    • Dizziness    • Drug therapy    • Edema    • GERD (gastroesophageal reflux disease)    • History of breast cancer     LEFT   • Hyperlipidemia    • Hypertension    • Hypokalemia    • LBBB (left bundle branch block)    • Mild tricuspid regurgitation     with a right ventricular systolic pressure of 62   • Moderate mitral regurgitation    • Osteopenia    • Pleural effusion      Past Surgical History:   Procedure Laterality Date   • APPENDECTOMY  1943   • BREAST BIOPSY     • BREAST LUMPECTOMY Left 1995   • COLONOSCOPY N/A 5/12/2016    Procedure: COLONOSCOPY;  Surgeon: Israel Vance MD;  Location: [facility];  Service:    • HYSTERECTOMY  1964   • MASTECTOMY Left 2011   • THORACENTESIS Bilateral      Outpatient Medications Prior to Visit   Medication Sig Dispense Refill   • allopurinol (ZYLOPRIM) 300 MG tablet TAKE 1 TABLET EVERY DAY 90 tablet 3   • Calcium Carb-Cholecalciferol (CALCIUM 1000 + D PO) Take 1 tablet by mouth Daily.     • carvedilol (COREG) 3.125 MG tablet TAKE 1 TABLET TWICE DAILY 180 tablet 3   • furosemide (LASIX) 40 MG tablet TAKE 1 TABLET IN THE MORNING AND TAKE 1/2 TABLET IN THE AFTERNOON 135 tablet 1   • magnesium gluconate (MAGONATE) 500 MG tablet Take 500 mg by mouth Daily.     • Multiple Vitamins-Minerals (CENTRUM SILVER 50+WOMEN PO) Take 1 tablet by mouth Daily.     • pantoprazole (PROTONIX) 40 MG EC tablet Take 1 tablet by  mouth Daily. 90 tablet 3   • potassium chloride (MICRO-K) 10 MEQ CR capsule TAKE 2 CAPSULES EVERY  capsule 3   • simvastatin (ZOCOR) 20 MG tablet Take 1 tablet by mouth Daily. 90 tablet 3   • triamcinolone (KENALOG) 0.1 % cream        No facility-administered medications prior to visit.        Allergies as of 02/10/2021 - Reviewed 02/10/2021   Allergen Reaction Noted   • Levofloxacin Swelling 12/27/2017   • Penicillins Hives 05/09/2016     Social History     Socioeconomic History   • Marital status:      Spouse name: Yves   • Number of children: Not on file   • Years of education: High School   • Highest education level: Not on file   Occupational History   • Occupation:      Employer: RETIRED   Tobacco Use   • Smoking status: Never Smoker   • Smokeless tobacco: Never Used   • Tobacco comment: no caffeine   Substance and Sexual Activity   • Alcohol use: Not Currently     Comment: occassional   • Drug use: No   • Sexual activity: Defer     Family History   Problem Relation Age of Onset   • Hypertension Mother    • Cancer Neg Hx        Review of Systems   Constitution: Positive for malaise/fatigue. Negative for chills, fever, weight gain and weight loss.   HENT: Negative for ear pain, hearing loss, nosebleeds and sore throat.    Eyes: Negative for blurred vision, double vision, redness, vision loss in left eye, vision loss in right eye and visual disturbance.   Cardiovascular:        SEE HPI   Respiratory: Negative for cough, shortness of breath, snoring and wheezing.    Endocrine: Negative for cold intolerance and heat intolerance.   Skin: Positive for itching. Negative for rash and suspicious lesions.   Musculoskeletal: Negative for joint pain, joint swelling and myalgias.   Gastrointestinal: Negative for abdominal pain, diarrhea, hematemesis, melena, nausea and vomiting.   Genitourinary: Negative for dysuria, frequency and hematuria.   Neurological: Positive for headaches. Negative  "for dizziness, numbness, paresthesias and seizures.   Psychiatric/Behavioral: Negative for altered mental status and depression. The patient is not nervous/anxious.           Objective:     Vitals:    02/10/21 1004   BP: 120/58   BP Location: Right arm   Cuff Size: Adult   Pulse: 74   Weight: 56.4 kg (124 lb 6.4 oz)   Height: 152.4 cm (60\")     Body mass index is 24.3 kg/m².      PHYSICAL EXAM:  Constitutional:       General: Not in acute distress.     Appearance: Well-developed. Not diaphoretic.   Eyes:      Pupils: Pupils are equal, round, and reactive to light.   HENT:      Head: Normocephalic and atraumatic.   Neck:      Musculoskeletal: Neck supple.      Vascular: Carotid bruit present. No JVD.   Pulmonary:      Effort: Pulmonary effort is normal. No respiratory distress.      Breath sounds: Normal breath sounds. No wheezing. No rales.   Cardiovascular:      Normal rate. Regular rhythm.      Murmurs: There is a grade 2/6 systolic murmur at the URSB and ULSB.      No gallop. No click. No rub.   Edema:     Peripheral edema absent.   Abdominal:      General: Bowel sounds are normal. There is no distension.      Palpations: Abdomen is soft.   Musculoskeletal:         General: No tenderness or deformity.   Skin:     General: Skin is warm and dry.      Findings: No erythema or rash.   Neurological:      Mental Status: Alert and oriented to person, place, and time.   Psychiatric:         Behavior: Behavior normal.         Judgment: Judgment normal.             ECG 12 Lead    Date/Time: 2/10/2021 10:20 AM  Performed by: Lise Domingo DNP, APRN  Authorized by: Lise Domingo DNP, APRN   Comparison: compared with previous ECG from 1/17/2020  Rhythm: sinus rhythm  Rate: normal  BPM: 74  Conduction: left bundle branch block  Comments: No significant changes from previous EKG              Assessment:       Diagnosis Plan   1. Chronic systolic congestive heart failure (CMS/HCC)     2. Aortic stenosis, moderate   "   3. Nonrheumatic mitral valve regurgitation     4. Essential hypertension     5. Hyperlipidemia, unspecified hyperlipidemia type           Plan:     1. Chronic systolic congestive heart failure: EF was 28% on 1/2018 echo and had improved to 45 to 50% on repeat 5/2018 echo.  PET stress test was negative for ischemia.  On guideline directed medical therapy with carvedilol.  Appears euvolemic on exam and clinically compensated.  2. Aortic stenosis: Moderate on 5/2018 echo with mild regurgitation.  She is about due for her 3-year follow-up echocardiogram.  We will get this scheduled in a couple of months after she has completed her second coronavirus vaccine as she is currently asymptomatic.  3. Carotid bruit: Would like to check carotid ultrasounds as well when she comes in for her echocardiogram to ensure no significant blockages.  4. Mitral regurgitation: Moderate on 5/2018 echo.  5. Hypertension: Well controlled.  Patient to continue to monitor.  6. Diastolic dysfunction: Grade 1A on 5/2018 echo.  Appears euvolemic on exam.  7. Hyperlipidemia: On statin therapy.  Lipid panel 9/2020 showed total cholesterol of 152, triglycerides of 143, HDL 47, LDL 76.  AST and ALT were normal on 9/2020 CMP.  8. Prediabetes: Managed by outside provider.  A1c 5.9% on 9/2020 labs.  9. Chronic kidney disease: Managed by outside provider.    Patient to follow-up with Dr. Earl in 1 year, per patient preference, or follow-up sooner if needed for any new, recurrent, or worsening symptoms or other issues or concerns.      Records reviewed including but not limited to 9/2020 lipid panel, CMP, A1c, CBC.         Your medication list          Accurate as of February 10, 2021 11:31 AM. If you have any questions, ask your nurse or doctor.            CONTINUE taking these medications      Instructions Last Dose Given Next Dose Due   allopurinol 300 MG tablet  Commonly known as: ZYLOPRIM      TAKE 1 TABLET EVERY DAY       CALCIUM 1000 + D PO       Take 1 tablet by mouth Daily.       carvedilol 3.125 MG tablet  Commonly known as: COREG      TAKE 1 TABLET TWICE DAILY       furosemide 40 MG tablet  Commonly known as: LASIX      TAKE 1 TABLET IN THE MORNING AND TAKE 1/2 TABLET IN THE AFTERNOON       magnesium gluconate 500 MG tablet  Commonly known as: MAGONATE      Take 500 mg by mouth Daily.       multivitamin with minerals tablet tablet      Take 1 tablet by mouth Daily.       pantoprazole 40 MG EC tablet  Commonly known as: PROTONIX      Take 1 tablet by mouth Daily.       potassium chloride 10 MEQ CR capsule  Commonly known as: MICRO-K      TAKE 2 CAPSULES EVERY DAY       simvastatin 20 MG tablet  Commonly known as: ZOCOR      Take 1 tablet by mouth Daily.       triamcinolone 0.1 % cream  Commonly known as: KENALOG                  The above medication changes may not have been made by this provider.  Patient's medication list was updated to reflect medications they are currently taking including medication changes made by other providers.            Thanks,    Lise Domingo, CHAVA, APRN  02/10/2021             Dictated utilizing Dragon dictation

## 2021-02-26 DIAGNOSIS — M10.071 ACUTE IDIOPATHIC GOUT OF RIGHT FOOT: ICD-10-CM

## 2021-02-26 DIAGNOSIS — R73.02 IMPAIRED GLUCOSE TOLERANCE: ICD-10-CM

## 2021-02-26 DIAGNOSIS — E78.5 HYPERLIPIDEMIA, UNSPECIFIED HYPERLIPIDEMIA TYPE: ICD-10-CM

## 2021-03-08 RX ORDER — PANTOPRAZOLE SODIUM 40 MG/1
40 TABLET, DELAYED RELEASE ORAL DAILY
Qty: 90 TABLET | Refills: 3 | Status: SHIPPED | OUTPATIENT
Start: 2021-03-08 | End: 2022-02-16 | Stop reason: SDUPTHER

## 2021-03-09 ENCOUNTER — HOSPITAL ENCOUNTER (OUTPATIENT)
Dept: CARDIOLOGY | Facility: HOSPITAL | Age: 86
Discharge: HOME | End: 2021-03-09

## 2021-03-09 VITALS
WEIGHT: 124 LBS | OXYGEN SATURATION: 96 % | BODY MASS INDEX: 24.35 KG/M2 | DIASTOLIC BLOOD PRESSURE: 60 MMHG | HEIGHT: 60 IN | HEART RATE: 60 BPM | SYSTOLIC BLOOD PRESSURE: 90 MMHG

## 2021-03-09 DIAGNOSIS — I35.0 AORTIC STENOSIS, MODERATE: ICD-10-CM

## 2021-03-09 DIAGNOSIS — Z23 IMMUNIZATION DUE: ICD-10-CM

## 2021-03-09 DIAGNOSIS — R09.89 CAROTID BRUIT, UNSPECIFIED LATERALITY: ICD-10-CM

## 2021-03-09 LAB
AORTIC ARCH: 2.4 CM
AORTIC DIMENSIONLESS INDEX: 0.2 (DI)
ASCENDING AORTA: 3.3 CM
BH CV ECHO MEAS - ACS: 0.7 CM
BH CV ECHO MEAS - AI MAX PG: 56 MMHG
BH CV ECHO MEAS - AI MAX VEL: 374 CM/SEC
BH CV ECHO MEAS - AO ARCH DIAM (PROXIMAL TRANS.): 2.4 CM
BH CV ECHO MEAS - AO MAX PG (FULL): 51.8 MMHG
BH CV ECHO MEAS - AO MAX PG: 53.7 MMHG
BH CV ECHO MEAS - AO MEAN PG (FULL): 25.8 MMHG
BH CV ECHO MEAS - AO MEAN PG: 26.8 MMHG
BH CV ECHO MEAS - AO ROOT AREA (BSA CORRECTED): 2.1
BH CV ECHO MEAS - AO ROOT AREA: 7.8 CM^2
BH CV ECHO MEAS - AO ROOT DIAM: 3.2 CM
BH CV ECHO MEAS - AO V2 MAX: 366.5 CM/SEC
BH CV ECHO MEAS - AO V2 MEAN: 236.6 CM/SEC
BH CV ECHO MEAS - AO V2 VTI: 85.8 CM
BH CV ECHO MEAS - ASC AORTA: 3.1 CM
BH CV ECHO MEAS - AVA(I,A): 0.63 CM^2
BH CV ECHO MEAS - AVA(I,D): 0.63 CM^2
BH CV ECHO MEAS - AVA(V,A): 0.59 CM^2
BH CV ECHO MEAS - AVA(V,D): 0.59 CM^2
BH CV ECHO MEAS - BSA(HAYCOCK): 1.6 M^2
BH CV ECHO MEAS - BSA: 1.5 M^2
BH CV ECHO MEAS - BZI_BMI: 24.2 KILOGRAMS/M^2
BH CV ECHO MEAS - BZI_METRIC_HEIGHT: 152.4 CM
BH CV ECHO MEAS - BZI_METRIC_WEIGHT: 56.2 KG
BH CV ECHO MEAS - EDV(MOD-SP2): 126 ML
BH CV ECHO MEAS - EDV(MOD-SP4): 161 ML
BH CV ECHO MEAS - EDV(TEICH): 70 ML
BH CV ECHO MEAS - EF(CUBED): 37.1 %
BH CV ECHO MEAS - EF(MOD-BP): 45.4 %
BH CV ECHO MEAS - EF(MOD-SP2): 44.4 %
BH CV ECHO MEAS - EF(MOD-SP4): 46.6 %
BH CV ECHO MEAS - EF(TEICH): 31 %
BH CV ECHO MEAS - ESV(MOD-SP2): 70 ML
BH CV ECHO MEAS - ESV(MOD-SP4): 86 ML
BH CV ECHO MEAS - ESV(TEICH): 48.3 ML
BH CV ECHO MEAS - FS: 14.3 %
BH CV ECHO MEAS - IVS/LVPW: 1
BH CV ECHO MEAS - IVSD: 1.4 CM
BH CV ECHO MEAS - LAT PEAK E' VEL: 3.1 CM/SEC
BH CV ECHO MEAS - LV DIASTOLIC VOL/BSA (35-75): 105.7 ML/M^2
BH CV ECHO MEAS - LV MASS(C)D: 212.6 GRAMS
BH CV ECHO MEAS - LV MASS(C)DI: 139.6 GRAMS/M^2
BH CV ECHO MEAS - LV MAX PG: 1.9 MMHG
BH CV ECHO MEAS - LV MEAN PG: 1 MMHG
BH CV ECHO MEAS - LV SYSTOLIC VOL/BSA (12-30): 56.5 ML/M^2
BH CV ECHO MEAS - LV V1 MAX: 68.6 CM/SEC
BH CV ECHO MEAS - LV V1 MEAN: 47.8 CM/SEC
BH CV ECHO MEAS - LV V1 VTI: 17.2 CM
BH CV ECHO MEAS - LVIDD: 4 CM
BH CV ECHO MEAS - LVIDS: 3.4 CM
BH CV ECHO MEAS - LVLD AP2: 8.7 CM
BH CV ECHO MEAS - LVLD AP4: 8.8 CM
BH CV ECHO MEAS - LVLS AP2: 7.9 CM
BH CV ECHO MEAS - LVLS AP4: 7.8 CM
BH CV ECHO MEAS - LVOT AREA (M): 3.1 CM^2
BH CV ECHO MEAS - LVOT AREA: 3.2 CM^2
BH CV ECHO MEAS - LVOT DIAM: 2 CM
BH CV ECHO MEAS - LVPWD: 1.4 CM
BH CV ECHO MEAS - MED PEAK E' VEL: 3 CM/SEC
BH CV ECHO MEAS - MR MAX PG: 89.8 MMHG
BH CV ECHO MEAS - MR MAX VEL: 473.8 CM/SEC
BH CV ECHO MEAS - MV A DUR: 0.15 SEC
BH CV ECHO MEAS - MV A MAX VEL: 127.3 CM/SEC
BH CV ECHO MEAS - MV DEC SLOPE: 550.7 CM/SEC^2
BH CV ECHO MEAS - MV DEC TIME: 0.23 SEC
BH CV ECHO MEAS - MV E MAX VEL: 59.6 CM/SEC
BH CV ECHO MEAS - MV E/A: 0.47
BH CV ECHO MEAS - MV MAX PG: 10.2 MMHG
BH CV ECHO MEAS - MV MEAN PG: 2.8 MMHG
BH CV ECHO MEAS - MV P1/2T MAX VEL: 86.3 CM/SEC
BH CV ECHO MEAS - MV P1/2T: 45.9 MSEC
BH CV ECHO MEAS - MV V2 MAX: 160 CM/SEC
BH CV ECHO MEAS - MV V2 MEAN: 73.8 CM/SEC
BH CV ECHO MEAS - MV V2 VTI: 33.9 CM
BH CV ECHO MEAS - MVA P1/2T LCG: 2.5 CM^2
BH CV ECHO MEAS - MVA(P1/2T): 4.8 CM^2
BH CV ECHO MEAS - MVA(VTI): 1.6 CM^2
BH CV ECHO MEAS - PA ACC TIME: 0.12 SEC
BH CV ECHO MEAS - PA MAX PG (FULL): 1.6 MMHG
BH CV ECHO MEAS - PA MAX PG: 3.9 MMHG
BH CV ECHO MEAS - PA PR(ACCEL): 25.5 MMHG
BH CV ECHO MEAS - PA V2 MAX: 98.5 CM/SEC
BH CV ECHO MEAS - PULM A REVS DUR: 0.12 SEC
BH CV ECHO MEAS - PULM A REVS VEL: 37.3 CM/SEC
BH CV ECHO MEAS - PULM DIAS VEL: 36 CM/SEC
BH CV ECHO MEAS - PULM S/D: 1.4
BH CV ECHO MEAS - PULM SYS VEL: 49.7 CM/SEC
BH CV ECHO MEAS - PVA(V,A): 2 CM^2
BH CV ECHO MEAS - PVA(V,D): 2 CM^2
BH CV ECHO MEAS - QP/QS: 0.67
BH CV ECHO MEAS - RV MAX PG: 2.3 MMHG
BH CV ECHO MEAS - RV MEAN PG: 1.5 MMHG
BH CV ECHO MEAS - RV V1 MAX: 75.8 CM/SEC
BH CV ECHO MEAS - RV V1 MEAN: 60.4 CM/SEC
BH CV ECHO MEAS - RV V1 VTI: 14.3 CM
BH CV ECHO MEAS - RVOT AREA: 2.6 CM^2
BH CV ECHO MEAS - RVOT DIAM: 1.8 CM
BH CV ECHO MEAS - SI(AO): 442 ML/M^2
BH CV ECHO MEAS - SI(CUBED): 15.6 ML/M^2
BH CV ECHO MEAS - SI(LVOT): 35.7 ML/M^2
BH CV ECHO MEAS - SI(MOD-SP2): 36.8 ML/M^2
BH CV ECHO MEAS - SI(MOD-SP4): 49.2 ML/M^2
BH CV ECHO MEAS - SI(TEICH): 14.2 ML/M^2
BH CV ECHO MEAS - SUP REN AO DIAM: 1.5 CM
BH CV ECHO MEAS - SV(AO): 673.4 ML
BH CV ECHO MEAS - SV(CUBED): 23.8 ML
BH CV ECHO MEAS - SV(LVOT): 54.4 ML
BH CV ECHO MEAS - SV(MOD-SP2): 56 ML
BH CV ECHO MEAS - SV(MOD-SP4): 75 ML
BH CV ECHO MEAS - SV(RVOT): 36.7 ML
BH CV ECHO MEAS - SV(TEICH): 21.7 ML
BH CV ECHO MEAS - TAPSE (>1.6): 1.2 CM
BH CV ECHO MEASUREMENTS AVERAGE E/E' RATIO: 19.54
BH CV XLRA - RV BASE: 2.6 CM
BH CV XLRA - RV LENGTH: 6.8 CM
BH CV XLRA - RV MID: 2.2 CM
BH CV XLRA - TDI S': 8.7 CM/SEC
LEFT ATRIUM VOLUME INDEX: 33 ML/M2
LV EF 2D ECHO EST: 45 %
MAXIMAL PREDICTED HEART RATE: 133 BPM
SINUS: 2.8 CM
STJ: 2.6 CM
STRESS TARGET HR: 113 BPM

## 2021-03-09 PROCEDURE — 93306 TTE W/DOPPLER COMPLETE: CPT | Performed by: INTERNAL MEDICINE

## 2021-03-09 PROCEDURE — 93306 TTE W/DOPPLER COMPLETE: CPT

## 2021-03-09 PROCEDURE — 25010000002 PERFLUTREN (DEFINITY) 8.476 MG IN SODIUM CHLORIDE (PF) 0.9 % 10 ML INJECTION: Performed by: NURSE PRACTITIONER

## 2021-03-09 PROCEDURE — 93880 EXTRACRANIAL BILAT STUDY: CPT | Performed by: INTERNAL MEDICINE

## 2021-03-09 PROCEDURE — 93880 EXTRACRANIAL BILAT STUDY: CPT

## 2021-03-09 PROCEDURE — 93356 MYOCRD STRAIN IMG SPCKL TRCK: CPT | Performed by: INTERNAL MEDICINE

## 2021-03-09 PROCEDURE — 93356 MYOCRD STRAIN IMG SPCKL TRCK: CPT

## 2021-03-09 RX ADMIN — PERFLUTREN 1.5 ML: 6.52 INJECTION, SUSPENSION INTRAVENOUS at 10:42

## 2021-03-10 ENCOUNTER — TELEPHONE (OUTPATIENT)
Dept: CARDIOLOGY | Facility: CLINIC | Age: 86
End: 2021-03-10

## 2021-03-10 LAB
BH CV XLRA MEAS LEFT DIST CCA EDV: 18.4 CM/SEC
BH CV XLRA MEAS LEFT DIST CCA PSV: 80.4 CM/SEC
BH CV XLRA MEAS LEFT DIST ICA EDV: -15.5 CM/SEC
BH CV XLRA MEAS LEFT DIST ICA PSV: -78.4 CM/SEC
BH CV XLRA MEAS LEFT ICA/CCA RATIO: 1
BH CV XLRA MEAS LEFT MID CCA EDV: 17.7 CM/SEC
BH CV XLRA MEAS LEFT MID CCA PSV: 70.2 CM/SEC
BH CV XLRA MEAS LEFT MID ICA EDV: -29.1 CM/SEC
BH CV XLRA MEAS LEFT MID ICA PSV: -74.6 CM/SEC
BH CV XLRA MEAS LEFT PROX CCA EDV: 7.6 CM/SEC
BH CV XLRA MEAS LEFT PROX CCA PSV: 63.8 CM/SEC
BH CV XLRA MEAS LEFT PROX ECA PSV: -91 CM/SEC
BH CV XLRA MEAS LEFT PROX ICA EDV: -16.5 CM/SEC
BH CV XLRA MEAS LEFT PROX ICA PSV: -63.9 CM/SEC
BH CV XLRA MEAS LEFT PROX SCLA PSV: -36.5 CM/SEC
BH CV XLRA MEAS LEFT VERTEBRAL A PSV: -17.2 CM/SEC
BH CV XLRA MEAS RIGHT DIST CCA EDV: -12.6 CM/SEC
BH CV XLRA MEAS RIGHT DIST CCA PSV: -62 CM/SEC
BH CV XLRA MEAS RIGHT DIST ICA EDV: -21 CM/SEC
BH CV XLRA MEAS RIGHT DIST ICA PSV: -74.2 CM/SEC
BH CV XLRA MEAS RIGHT ICA/CCA RATIO: 1.2
BH CV XLRA MEAS RIGHT MID CCA PSV: 83.3 CM/SEC
BH CV XLRA MEAS RIGHT MID ICA EDV: -11.7 CM/SEC
BH CV XLRA MEAS RIGHT MID ICA PSV: -52.6 CM/SEC
BH CV XLRA MEAS RIGHT PROX CCA PSV: 98.8 CM/SEC
BH CV XLRA MEAS RIGHT PROX ECA PSV: 107.5 CM/SEC
BH CV XLRA MEAS RIGHT PROX ICA EDV: -8.8 CM/SEC
BH CV XLRA MEAS RIGHT PROX ICA PSV: -46.7 CM/SEC
BH CV XLRA MEAS RIGHT PROX SCLA PSV: 78.4 CM/SEC
BH CV XLRA MEAS RIGHT VERTEBRAL A PSV: -46.2 CM/SEC

## 2021-03-10 NOTE — TELEPHONE ENCOUNTER
Carotid duplex shows bilateral carotid artery plaque without significant stenosis.  At this point I feel that auscultated bruit was likely a radiating murmur from heart valves but glad that we have ruled out any significant stenosis.  Would recommend following up with primary care to ensure cholesterol well controlled.  Would consider low-dose EC aspirin on a full stomach if no contraindications after discussing benefits versus risks in detail.    Echo overall without significant changes from previous.  EF remains low normal at around 45% with 1A diastolic dysfunction.  Aortic stenosis remains moderate with mild to moderate regurgitation.  She also has moderate mitral annular calcification with mild regurgitation.    -------------------------------------      Called and discussed results and recommendations with patient.  She will continue guideline directed medical therapy with carvedilol and Lasix.  We have not been able to add additional medications in the past or uptitrate due to blood pressure being on the lower side and also chronic renal insufficiency.  She will keep her follow-up with the office as scheduled or call sooner if she develops any symptoms or concerns prior to next visit, discussed in detail.

## 2021-03-10 NOTE — TELEPHONE ENCOUNTER
3/10/21  vmsg left by Ed pond - asked if Radha should be taking calcium if calcium is collecting around her valves?  Ph [phone]/xochitl

## 2021-03-10 NOTE — TELEPHONE ENCOUNTER
This is a different type of calcification affecting her heart valve.  She does not need to stop taking her calcium supplement but would defer to primary care how much calcium she needs a day.

## 2021-03-12 ENCOUNTER — OFFICE VISIT (OUTPATIENT)
Dept: FAMILY MEDICINE CLINIC | Facility: CLINIC | Age: 86
End: 2021-03-12

## 2021-03-12 VITALS
WEIGHT: 126.6 LBS | DIASTOLIC BLOOD PRESSURE: 62 MMHG | RESPIRATION RATE: 16 BRPM | OXYGEN SATURATION: 99 % | SYSTOLIC BLOOD PRESSURE: 130 MMHG | TEMPERATURE: 96.8 F | BODY MASS INDEX: 24.85 KG/M2 | HEIGHT: 60 IN | HEART RATE: 83 BPM

## 2021-03-12 DIAGNOSIS — R41.3 MEMORY LOSS: ICD-10-CM

## 2021-03-12 DIAGNOSIS — I35.0 AORTIC STENOSIS, MODERATE: Primary | ICD-10-CM

## 2021-03-12 DIAGNOSIS — I10 ESSENTIAL HYPERTENSION: ICD-10-CM

## 2021-03-12 DIAGNOSIS — C50.812 MALIGNANT NEOPLASM OF OVERLAPPING SITES OF LEFT FEMALE BREAST, UNSPECIFIED ESTROGEN RECEPTOR STATUS (HCC): ICD-10-CM

## 2021-03-12 DIAGNOSIS — N18.30 STAGE 3 CHRONIC KIDNEY DISEASE, UNSPECIFIED WHETHER STAGE 3A OR 3B CKD (HCC): ICD-10-CM

## 2021-03-12 DIAGNOSIS — R73.02 IMPAIRED GLUCOSE TOLERANCE: ICD-10-CM

## 2021-03-12 DIAGNOSIS — M10.9 GOUT, UNSPECIFIED CAUSE, UNSPECIFIED CHRONICITY, UNSPECIFIED SITE: ICD-10-CM

## 2021-03-12 DIAGNOSIS — I50.22 CHRONIC SYSTOLIC CONGESTIVE HEART FAILURE (HCC): ICD-10-CM

## 2021-03-12 DIAGNOSIS — E78.5 HYPERLIPIDEMIA, UNSPECIFIED HYPERLIPIDEMIA TYPE: ICD-10-CM

## 2021-03-12 PROCEDURE — 90732 PPSV23 VACC 2 YRS+ SUBQ/IM: CPT | Performed by: INTERNAL MEDICINE

## 2021-03-12 PROCEDURE — 99214 OFFICE O/P EST MOD 30 MIN: CPT | Performed by: INTERNAL MEDICINE

## 2021-03-12 PROCEDURE — G0009 ADMIN PNEUMOCOCCAL VACCINE: HCPCS | Performed by: INTERNAL MEDICINE

## 2021-03-12 NOTE — PROGRESS NOTES
Subjective   Radha Luke is a 87 y.o. female. Patient is here today for follow-up on her aortic stenosis, hypertension, hyperlipidemia, chronic congestive heart failure, hyperglycemia, chronic kidney disease and history of breast cancer.  Patient also has memory problems and is accompanied by her .  She is pleasant and has no complaints and denies any chest pain, shortness of breath, edema or myalgias  Chief Complaint   Patient presents with   • Hypertension     HYPERLIPIDEMIA- FOLLOW UP LABS          Vitals:    03/12/21 1300   BP: 130/62   Pulse: 83   Resp: 16   Temp: 96.8 °F (36 °C)   SpO2: 99%     Body mass index is 24.72 kg/m².  The following portions of the patient's history were reviewed and updated as appropriate: allergies, current medications, past family history, past medical history, past social history, past surgical history and problem list.    Past Medical History:   Diagnosis Date   • Acute systolic congestive heart failure (CMS/HCC)    • Aortic stenosis    • Arthritis    • Breast cancer (CMS/HCC)     Locally recurrent left breast   • CKD (chronic kidney disease), stage III (CMS/HCC)    • Cough    • Dizziness    • Drug therapy    • Edema    • GERD (gastroesophageal reflux disease)    • History of breast cancer     LEFT   • Hyperlipidemia    • Hypertension    • Hypokalemia    • LBBB (left bundle branch block)    • Mild tricuspid regurgitation     with a right ventricular systolic pressure of 62   • Moderate mitral regurgitation    • Osteopenia    • Pleural effusion       Allergies   Allergen Reactions   • Levofloxacin Swelling     Swelling in legs and ankle and tongue   • Penicillins Hives      Social History     Socioeconomic History   • Marital status:      Spouse name: Yves   • Number of children: Not on file   • Years of education: High School   • Highest education level: Not on file   Tobacco Use   • Smoking status: Never Smoker   • Smokeless tobacco: Never Used   • Tobacco  comment: no caffeine   Substance and Sexual Activity   • Alcohol use: Not Currently     Comment: occassional   • Drug use: No   • Sexual activity: Defer        Current Outpatient Medications:   •  allopurinol (ZYLOPRIM) 300 MG tablet, TAKE 1 TABLET EVERY DAY, Disp: 90 tablet, Rfl: 3  •  Calcium Carb-Cholecalciferol (CALCIUM 1000 + D PO), Take 1 tablet by mouth Daily., Disp: , Rfl:   •  carvedilol (COREG) 3.125 MG tablet, Take 1 tablet by mouth 2 (Two) Times a Day., Disp: 180 tablet, Rfl: 3  •  furosemide (LASIX) 40 MG tablet, TAKE 1 TABLET IN THE MORNING AND TAKE 1/2 TABLET IN THE AFTERNOON, Disp: 135 tablet, Rfl: 0  •  magnesium gluconate (MAGONATE) 500 MG tablet, Take 500 mg by mouth Daily., Disp: , Rfl:   •  Multiple Vitamins-Minerals (CENTRUM SILVER 50+WOMEN PO), Take 1 tablet by mouth Daily., Disp: , Rfl:   •  pantoprazole (PROTONIX) 40 MG EC tablet, Take 1 tablet by mouth Daily., Disp: 90 tablet, Rfl: 3  •  potassium chloride (MICRO-K) 10 MEQ CR capsule, Take 2 capsules by mouth Daily., Disp: 180 capsule, Rfl: 0  •  simvastatin (ZOCOR) 20 MG tablet, Take 1 tablet by mouth Daily., Disp: 90 tablet, Rfl: 0  •  triamcinolone (KENALOG) 0.1 % cream, , Disp: , Rfl:      Objective     History of Present Illness     Review of Systems   Constitutional: Negative.    HENT: Negative.    Eyes: Negative.    Respiratory: Negative.    Cardiovascular: Negative.    Gastrointestinal: Negative.    Genitourinary: Negative.    Musculoskeletal: Negative.    Skin: Negative.    Neurological: Negative.    Hematological: Negative.    Psychiatric/Behavioral: Negative.        Physical Exam  Vitals and nursing note reviewed.   Constitutional:       General: She is not in acute distress.     Appearance: Normal appearance. She is not ill-appearing.   HENT:      Head: Normocephalic and atraumatic.   Eyes:      General: No scleral icterus.     Conjunctiva/sclera: Conjunctivae normal.   Cardiovascular:      Rate and Rhythm: Normal rate and  regular rhythm.      Heart sounds: Normal heart sounds.   Pulmonary:      Effort: Pulmonary effort is normal. No respiratory distress.      Breath sounds: Normal breath sounds. No wheezing or rales.   Musculoskeletal:         General: Normal range of motion.      Cervical back: Normal range of motion and neck supple.      Right lower leg: No edema.      Left lower leg: No edema.   Skin:     General: Skin is warm and dry.   Neurological:      General: No focal deficit present.      Mental Status: She is alert and oriented to person, place, and time.   Psychiatric:         Mood and Affect: Mood normal.         Behavior: Behavior normal.         ASSESSMENT CBC was normal.  CMP has a sugar of 130 that stable and a creatinine of 1.59 that stable and was otherwise normal and hemoglobin A1c is acceptable at 6.2.  Uric acid is well controlled.  TSH was just minimally high at 4.82.  Lipid panel is controlled with total cholesterol 143, HDL 50 and LDL 68.  #1-hypertension, controlled  #2-hyperlipidemia controlled  #3-chronic congestive heart failure, compensated  #4-aortic stenosis  #5-hyperglycemia, stable  #6-chronic kidney disease, stable   #7-memory problems, stable  #8-gout, controlled     Problems Addressed this Visit        Cardiac and Vasculature    Hyperlipidemia    Hypertension    Chronic systolic congestive heart failure (CMS/HCC)    Aortic stenosis, moderate - Primary       Endocrine and Metabolic    Impaired glucose tolerance       Genitourinary and Reproductive     Chronic kidney disease       Hematology and Neoplasia    Malignant neoplasm of overlapping sites of left female breast (CMS/HCC)       Musculoskeletal and Injuries    Gout       Neuro    Memory loss      Diagnoses       Codes Comments    Aortic stenosis, moderate    -  Primary ICD-10-CM: I35.0  ICD-9-CM: 424.1     Essential hypertension     ICD-10-CM: I10  ICD-9-CM: 401.9     Hyperlipidemia, unspecified hyperlipidemia type     ICD-10-CM:  E78.5  ICD-9-CM: 272.4     Chronic systolic congestive heart failure (CMS/HCC)     ICD-10-CM: I50.22  ICD-9-CM: 428.22, 428.0     Impaired glucose tolerance     ICD-10-CM: R73.02  ICD-9-CM: 790.22     Stage 3 chronic kidney disease, unspecified whether stage 3a or 3b CKD (CMS/HCC)     ICD-10-CM: N18.30  ICD-9-CM: 585.3     Malignant neoplasm of overlapping sites of left female breast, unspecified estrogen receptor status (CMS/HCC)     ICD-10-CM: C50.812  ICD-9-CM: 174.8     Memory loss     ICD-10-CM: R41.3  ICD-9-CM: 780.93     Gout, unspecified cause, unspecified chronicity, unspecified site     ICD-10-CM: M10.9  ICD-9-CM: 274.9           PLAN the patient received a Pneumovax 23 immunization today.  Patient will continue current medicines as now and I plan on rechecking her in 6 months with a CBC, CMP, lipid panel, hemoglobin A1c, TSH and free T4    There are no Patient Instructions on file for this visit.  Return in about 6 months (around 9/12/2021) for with labs.

## 2021-04-13 RX ORDER — CARVEDILOL 3.12 MG/1
3.12 TABLET ORAL 2 TIMES DAILY
Qty: 180 TABLET | Refills: 3 | Status: SHIPPED | OUTPATIENT
Start: 2021-04-13 | End: 2021-04-16 | Stop reason: SDUPTHER

## 2021-04-13 RX ORDER — FUROSEMIDE 40 MG/1
TABLET ORAL
Qty: 135 TABLET | Refills: 3 | Status: SHIPPED | OUTPATIENT
Start: 2021-04-13 | End: 2022-02-16 | Stop reason: SDUPTHER

## 2021-04-16 RX ORDER — CARVEDILOL 3.12 MG/1
3.12 TABLET ORAL 2 TIMES DAILY
Qty: 180 TABLET | Refills: 3 | Status: SHIPPED | OUTPATIENT
Start: 2021-04-16 | End: 2021-04-27 | Stop reason: SDUPTHER

## 2021-04-27 RX ORDER — CARVEDILOL 3.12 MG/1
3.12 TABLET ORAL 2 TIMES DAILY
Qty: 180 TABLET | Refills: 3 | Status: SHIPPED | OUTPATIENT
Start: 2021-04-27 | End: 2022-02-16 | Stop reason: SDUPTHER

## 2021-06-11 RX ORDER — ALLOPURINOL 300 MG/1
300 TABLET ORAL DAILY
Qty: 90 TABLET | Refills: 2 | Status: SHIPPED | OUTPATIENT
Start: 2021-06-11 | End: 2022-03-15

## 2021-07-01 RX ORDER — POTASSIUM CHLORIDE 750 MG/1
20 CAPSULE, EXTENDED RELEASE ORAL DAILY
Qty: 180 CAPSULE | Refills: 2 | Status: SHIPPED | OUTPATIENT
Start: 2021-07-01 | End: 2022-02-16 | Stop reason: SDUPTHER

## 2021-07-01 RX ORDER — SIMVASTATIN 20 MG
TABLET ORAL
Qty: 90 TABLET | Refills: 2 | Status: SHIPPED | OUTPATIENT
Start: 2021-07-01 | End: 2022-02-16 | Stop reason: SDUPTHER

## 2021-09-13 DIAGNOSIS — N18.30 STAGE 3 CHRONIC KIDNEY DISEASE, UNSPECIFIED WHETHER STAGE 3A OR 3B CKD (HCC): ICD-10-CM

## 2021-09-13 DIAGNOSIS — E78.5 HYPERLIPIDEMIA, UNSPECIFIED HYPERLIPIDEMIA TYPE: ICD-10-CM

## 2021-09-13 DIAGNOSIS — R73.02 IMPAIRED GLUCOSE TOLERANCE: ICD-10-CM

## 2021-09-13 DIAGNOSIS — R94.6 ABNORMAL RESULTS OF THYROID FUNCTION STUDIES: ICD-10-CM

## 2021-09-15 LAB
ALBUMIN SERPL-MCNC: 4.2 G/DL (ref 3.5–5.2)
ALBUMIN/GLOB SERPL: 2 G/DL
ALP SERPL-CCNC: 53 U/L (ref 39–117)
ALT SERPL-CCNC: 9 U/L (ref 1–33)
AST SERPL-CCNC: 18 U/L (ref 1–32)
BASOPHILS # BLD AUTO: 0.02 10*3/MM3 (ref 0–0.2)
BASOPHILS NFR BLD AUTO: 0.4 % (ref 0–1.5)
BILIRUB SERPL-MCNC: 0.4 MG/DL (ref 0–1.2)
BUN SERPL-MCNC: 32 MG/DL (ref 8–23)
BUN/CREAT SERPL: 18.8 (ref 7–25)
CALCIUM SERPL-MCNC: 9.1 MG/DL (ref 8.6–10.5)
CHLORIDE SERPL-SCNC: 106 MMOL/L (ref 98–107)
CHOLEST SERPL-MCNC: 139 MG/DL (ref 0–200)
CO2 SERPL-SCNC: 24.9 MMOL/L (ref 22–29)
CREAT SERPL-MCNC: 1.7 MG/DL (ref 0.57–1)
EOSINOPHIL # BLD AUTO: 0.37 10*3/MM3 (ref 0–0.4)
EOSINOPHIL NFR BLD AUTO: 6.8 % (ref 0.3–6.2)
ERYTHROCYTE [DISTWIDTH] IN BLOOD BY AUTOMATED COUNT: 13.3 % (ref 12.3–15.4)
GLOBULIN SER CALC-MCNC: 2.1 GM/DL
GLUCOSE SERPL-MCNC: 128 MG/DL (ref 65–99)
HBA1C MFR BLD: 6 % (ref 4.8–5.6)
HCT VFR BLD AUTO: 38.7 % (ref 34–46.6)
HDLC SERPL-MCNC: 46 MG/DL (ref 40–60)
HGB BLD-MCNC: 12.4 G/DL (ref 12–15.9)
IMM GRANULOCYTES # BLD AUTO: 0.01 10*3/MM3 (ref 0–0.05)
IMM GRANULOCYTES NFR BLD AUTO: 0.2 % (ref 0–0.5)
LDLC SERPL CALC-MCNC: 67 MG/DL (ref 0–100)
LDLC/HDLC SERPL: 1.35 {RATIO}
LYMPHOCYTES # BLD AUTO: 1.83 10*3/MM3 (ref 0.7–3.1)
LYMPHOCYTES NFR BLD AUTO: 33.4 % (ref 19.6–45.3)
MCH RBC QN AUTO: 31.1 PG (ref 26.6–33)
MCHC RBC AUTO-ENTMCNC: 32 G/DL (ref 31.5–35.7)
MCV RBC AUTO: 97 FL (ref 79–97)
MONOCYTES # BLD AUTO: 0.41 10*3/MM3 (ref 0.1–0.9)
MONOCYTES NFR BLD AUTO: 7.5 % (ref 5–12)
NEUTROPHILS # BLD AUTO: 2.84 10*3/MM3 (ref 1.7–7)
NEUTROPHILS NFR BLD AUTO: 51.7 % (ref 42.7–76)
PLATELET # BLD AUTO: 139 10*3/MM3 (ref 140–450)
POTASSIUM SERPL-SCNC: 4 MMOL/L (ref 3.5–5.2)
PROT SERPL-MCNC: 6.3 G/DL (ref 6–8.5)
RBC # BLD AUTO: 3.99 10*6/MM3 (ref 3.77–5.28)
SODIUM SERPL-SCNC: 145 MMOL/L (ref 136–145)
T4 FREE SERPL-MCNC: 0.98 NG/DL (ref 0.93–1.7)
TRIGL SERPL-MCNC: 154 MG/DL (ref 0–150)
TSH SERPL DL<=0.005 MIU/L-ACNC: 4.47 UIU/ML (ref 0.27–4.2)
VLDLC SERPL CALC-MCNC: 26 MG/DL (ref 5–40)
WBC # BLD AUTO: 5.48 10*3/MM3 (ref 3.4–10.8)

## 2021-09-22 ENCOUNTER — OFFICE VISIT (OUTPATIENT)
Dept: FAMILY MEDICINE CLINIC | Facility: CLINIC | Age: 86
End: 2021-09-22

## 2021-09-22 VITALS
DIASTOLIC BLOOD PRESSURE: 72 MMHG | TEMPERATURE: 96.2 F | OXYGEN SATURATION: 100 % | WEIGHT: 124 LBS | HEIGHT: 60 IN | BODY MASS INDEX: 24.35 KG/M2 | SYSTOLIC BLOOD PRESSURE: 130 MMHG | HEART RATE: 71 BPM

## 2021-09-22 DIAGNOSIS — Z78.0 POST-MENOPAUSAL: ICD-10-CM

## 2021-09-22 DIAGNOSIS — N18.30 STAGE 3 CHRONIC KIDNEY DISEASE, UNSPECIFIED WHETHER STAGE 3A OR 3B CKD (HCC): ICD-10-CM

## 2021-09-22 DIAGNOSIS — R41.3 MEMORY LOSS: ICD-10-CM

## 2021-09-22 DIAGNOSIS — E78.5 HYPERLIPIDEMIA, UNSPECIFIED HYPERLIPIDEMIA TYPE: ICD-10-CM

## 2021-09-22 DIAGNOSIS — R79.89 TSH ELEVATION: ICD-10-CM

## 2021-09-22 DIAGNOSIS — I50.22 CHRONIC SYSTOLIC CONGESTIVE HEART FAILURE (HCC): ICD-10-CM

## 2021-09-22 DIAGNOSIS — Z12.31 SCREENING MAMMOGRAM, ENCOUNTER FOR: ICD-10-CM

## 2021-09-22 DIAGNOSIS — I10 ESSENTIAL HYPERTENSION: Primary | ICD-10-CM

## 2021-09-22 DIAGNOSIS — R73.02 IMPAIRED GLUCOSE TOLERANCE: ICD-10-CM

## 2021-09-22 DIAGNOSIS — K21.00 GASTROESOPHAGEAL REFLUX DISEASE WITH ESOPHAGITIS, UNSPECIFIED WHETHER HEMORRHAGE: ICD-10-CM

## 2021-09-22 PROCEDURE — 99214 OFFICE O/P EST MOD 30 MIN: CPT | Performed by: INTERNAL MEDICINE

## 2021-09-22 NOTE — PROGRESS NOTES
Subjective   Radha Luke is a 88 y.o. female. Patient is here today for follow-up on her hypertension, hyperlipidemia, history of chronic congestive heart failure compensated, hyperglycemia, GERD and chronic kidney disease stage III.  She is generally been stable and has no major new complaints.  Chief Complaint   Patient presents with   • Hypertension     6 MON/FU    • Hyperlipidemia   • Labs Only          Vitals:    09/22/21 1358   BP: 130/72   Pulse: 71   Temp: 96.2 °F (35.7 °C)   SpO2: 100%     Body mass index is 24.22 kg/m².  The following portions of the patient's history were reviewed and updated as appropriate: allergies, current medications, past family history, past medical history, past social history, past surgical history and problem list.    Past Medical History:   Diagnosis Date   • Acute systolic congestive heart failure (CMS/HCC)    • Aortic stenosis    • Arthritis    • Breast cancer (CMS/HCC)     Locally recurrent left breast   • CKD (chronic kidney disease), stage III (CMS/HCC)    • Cough    • Dizziness    • Drug therapy    • Edema    • GERD (gastroesophageal reflux disease)    • History of breast cancer     LEFT   • Hyperlipidemia    • Hypertension    • Hypokalemia    • LBBB (left bundle branch block)    • Mild tricuspid regurgitation     with a right ventricular systolic pressure of 62   • Moderate mitral regurgitation    • Osteopenia    • Pleural effusion       Allergies   Allergen Reactions   • Levofloxacin Swelling     Swelling in legs and ankle and tongue   • Penicillins Hives      Social History     Socioeconomic History   • Marital status:      Spouse name: Yves   • Number of children: Not on file   • Years of education: High School   • Highest education level: Not on file   Tobacco Use   • Smoking status: Never Smoker   • Smokeless tobacco: Never Used   • Tobacco comment: no caffeine   Vaping Use   • Vaping Use: Never used   Substance and Sexual Activity   • Alcohol use: Not  Currently     Comment: occassional   • Drug use: No   • Sexual activity: Defer        Current Outpatient Medications:   •  allopurinol (ZYLOPRIM) 300 MG tablet, Take 1 tablet by mouth Daily., Disp: 90 tablet, Rfl: 2  •  Calcium Carb-Cholecalciferol (CALCIUM 1000 + D PO), Take 1 tablet by mouth Daily., Disp: , Rfl:   •  carvedilol (COREG) 3.125 MG tablet, Take 1 tablet by mouth 2 (Two) Times a Day., Disp: 180 tablet, Rfl: 3  •  furosemide (LASIX) 40 MG tablet, TAKE 1 TABLET IN THE MORNING AND TAKE 1/2 TABLET IN THE AFTERNOON, Disp: 135 tablet, Rfl: 3  •  magnesium gluconate (MAGONATE) 500 MG tablet, Take 500 mg by mouth Daily., Disp: , Rfl:   •  Multiple Vitamins-Minerals (CENTRUM SILVER 50+WOMEN PO), Take 1 tablet by mouth Daily., Disp: , Rfl:   •  pantoprazole (PROTONIX) 40 MG EC tablet, Take 1 tablet by mouth Daily., Disp: 90 tablet, Rfl: 3  •  potassium chloride (MICRO-K) 10 MEQ CR capsule, TAKE 2 CAPSULES BY MOUTH DAILY., Disp: 180 capsule, Rfl: 2  •  simvastatin (ZOCOR) 20 MG tablet, TAKE 1 TABLET EVERY DAY, Disp: 90 tablet, Rfl: 2  •  triamcinolone (KENALOG) 0.1 % cream, , Disp: , Rfl:      Objective     History of Present Illness     Review of Systems   Constitutional: Negative.    HENT: Negative.    Respiratory: Negative.    Cardiovascular: Negative.    Gastrointestinal: Negative.    Genitourinary: Negative.    Musculoskeletal: Negative.    Skin: Negative.    Neurological: Negative.    Hematological: Negative.    Psychiatric/Behavioral: Negative.        Physical Exam  Vitals and nursing note reviewed.   Constitutional:       General: She is not in acute distress.     Appearance: Normal appearance. She is not ill-appearing.   HENT:      Head: Normocephalic and atraumatic.   Eyes:      General: No scleral icterus.     Conjunctiva/sclera: Conjunctivae normal.   Cardiovascular:      Rate and Rhythm: Normal rate and regular rhythm.      Heart sounds: Normal heart sounds.   Pulmonary:      Effort: Pulmonary effort  is normal. No respiratory distress.      Breath sounds: Normal breath sounds. No wheezing or rales.   Musculoskeletal:         General: Normal range of motion.      Cervical back: Normal range of motion and neck supple.      Right lower leg: No edema.      Left lower leg: No edema.   Skin:     General: Skin is warm and dry.   Neurological:      General: No focal deficit present.      Mental Status: She is alert and oriented to person, place, and time.   Psychiatric:         Mood and Affect: Mood normal.         ASSESSMENT CBC was normal.  CMP is stable with a sugar of 128, creatinine 1.7 and other values normal and hemoglobin A1c is stable at 6.0.  Lipid panel has a total cholesterol 139, HDL 46 LDL 67.  TSH was just minimally high at 4.47 but free T4 was normal at 0.98.  #1-hypertension, controlled  #2-hyperlipidemia controlled  #3-chronic congestive heart failure, compensated  #4-hyperglycemia with stable acceptable hemoglobin A1c, diet controlled  #5-GERD, stable on medication  #6-chronic kidney disease stage III, stable  #7-TSH elevation, clinically euthyroid  #8-memory loss, stable     Problems Addressed this Visit        Cardiac and Vasculature    Hyperlipidemia    Hypertension - Primary    Chronic systolic congestive heart failure (CMS/HCC)       Endocrine and Metabolic    Impaired glucose tolerance       Gastrointestinal Abdominal     Gastroesophageal reflux disease with esophagitis       Genitourinary and Reproductive     Chronic kidney disease      Other Visit Diagnoses     Post-menopausal        Relevant Orders    DEXA Bone Density Axial    Screening mammogram, encounter for        Relevant Orders    Mammo Screening Bilateral With CAD      Diagnoses       Codes Comments    Essential hypertension    -  Primary ICD-10-CM: I10  ICD-9-CM: 401.9     Hyperlipidemia, unspecified hyperlipidemia type     ICD-10-CM: E78.5  ICD-9-CM: 272.4     Chronic systolic congestive heart failure (CMS/HCC)     ICD-10-CM:  I50.22  ICD-9-CM: 428.22, 428.0     Impaired glucose tolerance     ICD-10-CM: R73.02  ICD-9-CM: 790.22     Gastroesophageal reflux disease with esophagitis, unspecified whether hemorrhage     ICD-10-CM: K21.00  ICD-9-CM: 530.11     Stage 3 chronic kidney disease, unspecified whether stage 3a or 3b CKD (HCC)     ICD-10-CM: N18.30  ICD-9-CM: 585.3     Post-menopausal     ICD-10-CM: Z78.0  ICD-9-CM: V49.81     Screening mammogram, encounter for     ICD-10-CM: Z12.31  ICD-9-CM: V76.12           PLAN the patient received a flu shot this morning.  She will continue current medicines as now.  I did order mammograms and a bone density test as she is due.  I plan on rechecking her in 6 months with a wellness visit and a CMP, lipid panel, hemoglobin A1c, TSH, free T4 and uric acid level    There are no Patient Instructions on file for this visit.  Return in about 6 months (around 3/22/2022) for with labs.

## 2022-02-16 ENCOUNTER — OFFICE VISIT (OUTPATIENT)
Dept: CARDIOLOGY | Facility: CLINIC | Age: 87
End: 2022-02-16

## 2022-02-16 VITALS
BODY MASS INDEX: 23.95 KG/M2 | HEART RATE: 67 BPM | OXYGEN SATURATION: 99 % | SYSTOLIC BLOOD PRESSURE: 122 MMHG | HEIGHT: 60 IN | DIASTOLIC BLOOD PRESSURE: 70 MMHG | RESPIRATION RATE: 16 BRPM | WEIGHT: 122 LBS

## 2022-02-16 DIAGNOSIS — I35.1 NONRHEUMATIC AORTIC VALVE INSUFFICIENCY: ICD-10-CM

## 2022-02-16 DIAGNOSIS — I34.0 NONRHEUMATIC MITRAL VALVE REGURGITATION: ICD-10-CM

## 2022-02-16 DIAGNOSIS — I35.0 AORTIC STENOSIS, MODERATE: Primary | ICD-10-CM

## 2022-02-16 DIAGNOSIS — I50.22 CHRONIC SYSTOLIC CONGESTIVE HEART FAILURE: ICD-10-CM

## 2022-02-16 DIAGNOSIS — E78.2 MIXED HYPERLIPIDEMIA: ICD-10-CM

## 2022-02-16 PROBLEM — I50.9 ACUTE CONGESTIVE HEART FAILURE (HCC): Status: RESOLVED | Noted: 2018-01-30 | Resolved: 2022-02-16

## 2022-02-16 PROBLEM — I50.9 CHF EXACERBATION (HCC): Status: RESOLVED | Noted: 2018-01-30 | Resolved: 2022-02-16

## 2022-02-16 PROCEDURE — 93000 ELECTROCARDIOGRAM COMPLETE: CPT | Performed by: INTERNAL MEDICINE

## 2022-02-16 PROCEDURE — 99214 OFFICE O/P EST MOD 30 MIN: CPT | Performed by: INTERNAL MEDICINE

## 2022-02-16 RX ORDER — SIMVASTATIN 20 MG
20 TABLET ORAL DAILY
Qty: 90 TABLET | Refills: 3 | Status: SHIPPED | OUTPATIENT
Start: 2022-02-16 | End: 2023-03-16 | Stop reason: SDUPTHER

## 2022-02-16 RX ORDER — CARVEDILOL 3.12 MG/1
3.12 TABLET ORAL 2 TIMES DAILY
Qty: 180 TABLET | Refills: 3 | Status: SHIPPED | OUTPATIENT
Start: 2022-02-16 | End: 2023-01-03

## 2022-02-16 RX ORDER — PANTOPRAZOLE SODIUM 40 MG/1
40 TABLET, DELAYED RELEASE ORAL DAILY
Qty: 90 TABLET | Refills: 3 | Status: SHIPPED | OUTPATIENT
Start: 2022-02-16

## 2022-02-16 RX ORDER — FUROSEMIDE 40 MG/1
TABLET ORAL
Qty: 135 TABLET | Refills: 3 | Status: SHIPPED | OUTPATIENT
Start: 2022-02-16 | End: 2023-03-09

## 2022-02-16 RX ORDER — POTASSIUM CHLORIDE 750 MG/1
20 CAPSULE, EXTENDED RELEASE ORAL DAILY
Qty: 180 CAPSULE | Refills: 3 | Status: SHIPPED | OUTPATIENT
Start: 2022-02-16 | End: 2023-03-16 | Stop reason: SDUPTHER

## 2022-02-16 NOTE — PROGRESS NOTES
CARDIOLOGY    Irene Earl MD    ENCOUNTER DATE:  02/16/2022    Radha Luke / 88 y.o. / female        CHIEF COMPLAINT / REASON FOR OFFICE VISIT     Heart Problem (1 year follow up: Carotid Bruit, Chronic Systolic CHF, Aortic Stenosis, Nonrheumatic Mitral Valve, LBBB, Hyperlipidemia )      HISTORY OF PRESENT ILLNESS       HPI    Radha Luke is a 88 y.o. female     This is a nice woman who was admitted in 01/2018 with new onset of acute congestive heart failure.  Her ejection fraction was 28% by echocardiogram on 01/30/2018.  She was started on a low dose of carvedilol.  She was not started on Entresto, ACE inhibitor or angiotensin receptor blocker because of kidney disease.  She had a right-sided thoracentesis with 1200 mL of fluid removed and a left-sided thoracentesis with 750 mL of fluid removed.   she had a PET stress test in February 2018 which showed no perfusion defects with an ejection fraction on 24%.  Repeat echocardiogram was performed in May 2018 and showed her ejection fraction had improved to 45 to 50%.  She had grade 1A diastolic dysfunction, mild aortic regurgitation, moderate aortic stenosis, moderate mitral regurgitation, mild tricuspid regurgitation with a normal right ventricular systolic pressure.  Echocardiogram in March 2021 showed her ejection fraction to be 45% with grade 1A diastolic dysfunction.  There was mild to moderate aortic regurgitation and moderate aortic stenosis noted.  Carotid Doppler and March 2021 showed plaque without stenosis bilaterally.     She is here for routine follow-up today.  She has been feeling well.  No palpitations, shortness of breath, edema or chest pain.  She does not get much exercise besides walking [place] where they eat.    REVIEW OF SYSTEMS     Review of Systems   Constitutional: Negative for chills, fever, weight gain and weight loss.   Cardiovascular: Negative for leg swelling.   Respiratory: Negative for  "cough, snoring and wheezing.    Hematologic/Lymphatic: Negative for bleeding problem. Bruises/bleeds easily.   Skin: Negative for color change.   Musculoskeletal: Positive for joint pain. Negative for falls and myalgias.   Gastrointestinal: Negative for melena.   Genitourinary: Negative for hematuria.   Neurological: Negative for excessive daytime sleepiness.   Psychiatric/Behavioral: Negative for depression. The patient is not nervous/anxious.          VITAL SIGNS     Visit Vitals  /70 (BP Location: Right arm, Patient Position: Sitting, Cuff Size: Adult)   Pulse 67   Resp 16   Ht 152.4 cm (60\")   Wt 55.3 kg (122 lb)   SpO2 99%   BMI 23.83 kg/m²         Wt Readings from Last 3 Encounters:   02/16/22 55.3 kg (122 lb)   09/22/21 56.2 kg (124 lb)   03/12/21 57.4 kg (126 lb 9.6 oz)     Body mass index is 23.83 kg/m².      PHYSICAL EXAMINATION     Constitutional:       General: Not in acute distress.  Neck:      Vascular: No carotid bruit or JVD.   Pulmonary:      Effort: Pulmonary effort is normal.      Breath sounds: Normal breath sounds.   Cardiovascular:      Normal rate. Regular rhythm.      Murmurs: There is a grade 3/6 systolic murmur at the URSB.   Psychiatric:         Mood and Affect: Mood and affect normal.           REVIEWED DATA       ECG 12 Lead    Date/Time: 2/16/2022 10:42 AM  Performed by: Irene Earl MD  Authorized by: Irene Earl MD   Comparison: compared with previous ECG from 2/10/2021  Rhythm: sinus rhythm  BPM: 65  Conduction: left bundle branch block    Clinical impression: abnormal EKG              Lipid Panel    Lipid Panel 3/8/21 9/14/21   Total Cholesterol 143 139   Triglycerides 144 154 (A)   HDL Cholesterol 50 46   VLDL Cholesterol 25 26   LDL Cholesterol  68 67   LDL/HDL Ratio 1.28 1.35   (A) Abnormal value       Comments are available for some flowsheets but are not being displayed.             Lab Results   Component Value Date    GLUCOSE 128 (H) 09/14/2021    BUN 32 (H) " 09/14/2021    CREATININE 1.70 (H) 09/14/2021    EGFRIFNONA 28 (L) 09/14/2021    EGFRIFAFRI 34 (L) 09/14/2021    BCR 18.8 09/14/2021    K 4.0 09/14/2021    CO2 24.9 09/14/2021    CALCIUM 9.1 09/14/2021    PROTENTOTREF 6.3 09/14/2021    ALBUMIN 4.20 09/14/2021    LABIL2 2.0 09/14/2021    AST 18 09/14/2021    ALT 9 09/14/2021       ASSESSMENT & PLAN      Diagnosis Plan   1. Aortic stenosis, moderate     2. Chronic systolic congestive heart failure (HCC)     3. Mixed hyperlipidemia     4. Nonrheumatic aortic valve insufficiency     5. Nonrheumatic mitral valve regurgitation         1. Chronic systolic congestive heart failure. She is on a low dose of carvedilol.  Her ejection fraction was 28% echo on January 30, 2018. On January 31, 2018 she had a thoracentesis on the right side with 1200 mL of fluid removed.  On February 1 she had a thoracentesis on the left side with 750 mL removed.    Repeat echo in May 2018 showed her ejection fraction had improved to 45 to 50%.  And in March 2020 when her ejection fraction was stable at 45%. She had a negative PET stress test.  Continue current medications.  She is euvolemic on exam.  2.  Pleural effusion. Status post bilateral thoracentesis  3.  Hypertension.  Controlled  4.  Hyperlipidemia.  I reviewed her lipid panel and we will continue her current dose of simvastatin  5.  Chronic kidney disease stage III.    6.  History of breast cancer in 2011  7.  Left bundle branch block.  Given that she has remained out of the hospital and euvolemic, she does not qualify for biventricular pacemaker at this time.  8.  Aortic stenosis.  Moderate.  With mild to moderate aortic regurgitation.  Her murmur is consistent with what was seen on her echo last year.  I am not going to order any new test this year.  9.  Mild mitral regurgitation.  10.  Mild tricuspid regurgitation.  Initially she had pulmonary hypertension but after diuresis/bilateral thoracentesis, right ventricular systolic pressure  was normal.    Follow-up in 1 year.    Orders Placed This Encounter   Procedures   • ECG 12 Lead     This order was created via procedure documentation     Order Specific Question:   Release to patient     Answer:   Immediate           MEDICATIONS         Discharge Medications          Accurate as of February 16, 2022 10:46 AM. If you have any questions, ask your nurse or doctor.            Changes to Medications      Instructions Start Date   furosemide 40 MG tablet  Commonly known as: LASIX  What changed: See the new instructions.  Changed by: Irene Earl MD   1 tablet in the morning and a half a tablet in the afternoon         Continue These Medications      Instructions Start Date   allopurinol 300 MG tablet  Commonly known as: ZYLOPRIM   300 mg, Oral, Daily      CALCIUM 1000 + D PO   1 tablet, Oral, Daily      carvedilol 3.125 MG tablet  Commonly known as: COREG   3.125 mg, Oral, 2 Times Daily      magnesium gluconate 500 MG tablet  Commonly known as: MAGONATE   500 mg, Oral, Daily      multivitamin with minerals tablet tablet   1 tablet, Oral, Daily      pantoprazole 40 MG EC tablet  Commonly known as: PROTONIX   40 mg, Oral, Daily      potassium chloride 10 MEQ CR capsule  Commonly known as: MICRO-K   20 mEq, Oral, Daily      simvastatin 20 MG tablet  Commonly known as: ZOCOR   20 mg, Oral, Daily      triamcinolone 0.1 % cream  Commonly known as: KENALOG   No dose, route, or frequency recorded.               Irene Earl MD  02/16/22  10:46 EST    **Dragon Disclaimer:   Much of this encounter note is an electronic transcription/translation of spoken language to printed text. The electronic translation of spoken language may permit erroneous, or at times, nonsensical words or phrases to be inadvertently transcribed. Although I have reviewed the note for such errors, some may still exist.

## 2022-02-18 ENCOUNTER — TELEPHONE (OUTPATIENT)
Dept: FAMILY MEDICINE CLINIC | Facility: CLINIC | Age: 87
End: 2022-02-18

## 2022-02-18 NOTE — TELEPHONE ENCOUNTER
PT IS HAVING PAIN IN HIP AGAIN. PT  CALLED AND IS REQUESTING PHYSICAL THERAPY. HE SAID SHE HAD IT TWO YEARS AGO AND IT HELPED HER A LOT.    WOULD LIKE A REFERRAL SENT TO THE [facility]UM.  AND ALSO PLEASE CALL MR. SHARP TO LET HIM KNOW IF THE REFERRAL WAS SENT.  [phone]

## 2022-02-24 DIAGNOSIS — M54.31 RIGHT SCIATIC NERVE PAIN: ICD-10-CM

## 2022-02-24 DIAGNOSIS — R26.2 DIFFICULTY WALKING: Primary | ICD-10-CM

## 2022-03-15 ENCOUNTER — HOSPITAL ENCOUNTER (OUTPATIENT)
Dept: MAMMOGRAPHY | Facility: HOSPITAL | Age: 87
Discharge: HOME | End: 2022-03-15

## 2022-03-15 ENCOUNTER — HOSPITAL ENCOUNTER (OUTPATIENT)
Dept: BONE DENSITY | Facility: HOSPITAL | Age: 87
Discharge: HOME | End: 2022-03-15

## 2022-03-15 DIAGNOSIS — Z78.0 POST-MENOPAUSAL: ICD-10-CM

## 2022-03-15 DIAGNOSIS — Z12.31 SCREENING MAMMOGRAM, ENCOUNTER FOR: ICD-10-CM

## 2022-03-15 PROCEDURE — 77063 BREAST TOMOSYNTHESIS BI: CPT

## 2022-03-15 PROCEDURE — 77080 DXA BONE DENSITY AXIAL: CPT

## 2022-03-15 PROCEDURE — 77067 SCR MAMMO BI INCL CAD: CPT

## 2022-03-15 RX ORDER — ALLOPURINOL 300 MG/1
TABLET ORAL
Qty: 90 TABLET | Refills: 2 | Status: SHIPPED | OUTPATIENT
Start: 2022-03-15 | End: 2022-04-28 | Stop reason: SDUPTHER

## 2022-03-21 ENCOUNTER — APPOINTMENT (OUTPATIENT)
Dept: BONE DENSITY | Facility: HOSPITAL | Age: 87
End: 2022-03-21

## 2022-03-31 DIAGNOSIS — R73.02 IMPAIRED GLUCOSE TOLERANCE: ICD-10-CM

## 2022-03-31 DIAGNOSIS — M10.9 GOUT, UNSPECIFIED CAUSE, UNSPECIFIED CHRONICITY, UNSPECIFIED SITE: ICD-10-CM

## 2022-03-31 DIAGNOSIS — R94.6 ABNORMAL RESULTS OF THYROID FUNCTION STUDIES: ICD-10-CM

## 2022-03-31 DIAGNOSIS — E78.2 MIXED HYPERLIPIDEMIA: Primary | ICD-10-CM

## 2022-04-28 ENCOUNTER — OFFICE VISIT (OUTPATIENT)
Dept: FAMILY MEDICINE CLINIC | Facility: CLINIC | Age: 87
End: 2022-04-28

## 2022-04-28 VITALS
RESPIRATION RATE: 18 BRPM | WEIGHT: 119.6 LBS | SYSTOLIC BLOOD PRESSURE: 120 MMHG | DIASTOLIC BLOOD PRESSURE: 72 MMHG | BODY MASS INDEX: 23.48 KG/M2 | HEIGHT: 60 IN | TEMPERATURE: 96.9 F | HEART RATE: 72 BPM | OXYGEN SATURATION: 99 %

## 2022-04-28 DIAGNOSIS — R41.3 MEMORY LOSS: ICD-10-CM

## 2022-04-28 DIAGNOSIS — Z00.00 HEALTH CARE MAINTENANCE: ICD-10-CM

## 2022-04-28 DIAGNOSIS — K21.00 GASTROESOPHAGEAL REFLUX DISEASE WITH ESOPHAGITIS, UNSPECIFIED WHETHER HEMORRHAGE: ICD-10-CM

## 2022-04-28 DIAGNOSIS — Z85.3 HISTORY OF LEFT BREAST CANCER: ICD-10-CM

## 2022-04-28 DIAGNOSIS — I50.22 CHRONIC SYSTOLIC CONGESTIVE HEART FAILURE: Primary | ICD-10-CM

## 2022-04-28 DIAGNOSIS — R73.02 IMPAIRED GLUCOSE TOLERANCE: ICD-10-CM

## 2022-04-28 DIAGNOSIS — M10.9 GOUT, UNSPECIFIED CAUSE, UNSPECIFIED CHRONICITY, UNSPECIFIED SITE: ICD-10-CM

## 2022-04-28 DIAGNOSIS — N18.32 STAGE 3B CHRONIC KIDNEY DISEASE: ICD-10-CM

## 2022-04-28 DIAGNOSIS — R79.89 TSH ELEVATION: ICD-10-CM

## 2022-04-28 DIAGNOSIS — I10 PRIMARY HYPERTENSION: ICD-10-CM

## 2022-04-28 DIAGNOSIS — E78.2 MIXED HYPERLIPIDEMIA: ICD-10-CM

## 2022-04-28 PROCEDURE — 99214 OFFICE O/P EST MOD 30 MIN: CPT | Performed by: INTERNAL MEDICINE

## 2022-04-28 RX ORDER — ALLOPURINOL 100 MG/1
100 TABLET ORAL DAILY
Qty: 90 TABLET | Refills: 3 | Status: SHIPPED | OUTPATIENT
Start: 2022-04-28

## 2022-04-28 NOTE — PROGRESS NOTES
Subjective   Radha Luke is a 89 y.o. female. Patient is here today for follow-up on her gout, TSH elevation, compensated congestive heart failure and cardiac valvular disease, hyperglycemia, hyperlipidemia and hypertension.  She is generally stable.  She saw cardiology several months ago and medicines were maintained.  Her only acute complaint today is of a tender skin mass on the right posterior lower leg that just started hurting recently.  She does have a dermatologist.    Chief Complaint   Patient presents with   • Results     PT HERE FOR FOLLOW UP ON LABS          Vitals:    04/28/22 1030   BP: 120/72   Pulse: 72   Resp: 18   Temp: 96.9 °F (36.1 °C)   SpO2: 99%     Body mass index is 23.36 kg/m².  The following portions of the patient's history were reviewed and updated as appropriate: allergies, current medications, past family history, past medical history, past social history, past surgical history and problem list.    Past Medical History:   Diagnosis Date   • Acute systolic congestive heart failure (HCC)    • Allergic 1970?   • Aortic stenosis    • Arthritis    • Breast cancer (HCC)     Locally recurrent left breast   • Cataract 2005?   • CKD (chronic kidney disease), stage III (HCC)    • Colon polyp 1997?   • Coronary artery disease 2018   • Cough    • Diverticulosis 2012   • Dizziness    • Drug therapy    • Edema    • GERD (gastroesophageal reflux disease)    • History of breast cancer     LEFT   • Hyperlipidemia    • Hypertension    • Hypokalemia    • LBBB (left bundle branch block)    • Mild tricuspid regurgitation     with a right ventricular systolic pressure of 62   • Moderate mitral regurgitation    • Osteopenia    • Pleural effusion    • Pneumonia 2018      Allergies   Allergen Reactions   • Levofloxacin Swelling     Swelling in legs and ankle and tongue   • Penicillins Hives      Social History     Socioeconomic History   • Marital status:      Spouse name: Yves   • Years of education:  High School   Tobacco Use   • Smoking status: Never Smoker   • Smokeless tobacco: Never Used   • Tobacco comment: no caffeine   Vaping Use   • Vaping Use: Never used   Substance and Sexual Activity   • Alcohol use: Not Currently     Comment: occassional   • Drug use: No   • Sexual activity: Not Currently     Partners: Male        Current Outpatient Medications:   •  allopurinol (ZYLOPRIM) 100 MG tablet, Take 1 tablet by mouth Daily., Disp: 90 tablet, Rfl: 3  •  Calcium Carb-Cholecalciferol (CALCIUM 1000 + D PO), Take 1 tablet by mouth Daily., Disp: , Rfl:   •  carvedilol (COREG) 3.125 MG tablet, Take 1 tablet by mouth 2 (Two) Times a Day., Disp: 180 tablet, Rfl: 3  •  furosemide (LASIX) 40 MG tablet, 1 tablet in the morning and a half a tablet in the afternoon, Disp: 135 tablet, Rfl: 3  •  magnesium gluconate (MAGONATE) 500 MG tablet, Take 500 mg by mouth Daily., Disp: , Rfl:   •  Multiple Vitamins-Minerals (CENTRUM SILVER 50+WOMEN PO), Take 1 tablet by mouth Daily., Disp: , Rfl:   •  pantoprazole (PROTONIX) 40 MG EC tablet, Take 1 tablet by mouth Daily., Disp: 90 tablet, Rfl: 3  •  potassium chloride (MICRO-K) 10 MEQ CR capsule, Take 2 capsules by mouth Daily., Disp: 180 capsule, Rfl: 3  •  simvastatin (ZOCOR) 20 MG tablet, Take 1 tablet by mouth Daily., Disp: 90 tablet, Rfl: 3  •  triamcinolone (KENALOG) 0.1 % cream, , Disp: , Rfl:      Objective     History of Present Illness     Review of Systems   All other systems reviewed and are negative.      Physical Exam  Vitals and nursing note reviewed.   Constitutional:       General: She is not in acute distress.     Appearance: Normal appearance. She is not ill-appearing.   HENT:      Head: Normocephalic and atraumatic.   Cardiovascular:      Rate and Rhythm: Normal rate and regular rhythm.      Heart sounds: Normal heart sounds.   Pulmonary:      Effort: Pulmonary effort is normal. No respiratory distress.      Breath sounds: Normal breath sounds. No wheezing or  rales.   Musculoskeletal:         General: Normal range of motion.   Skin:     General: Skin is warm and dry.      Comments: 1 cm raised skin lesion on the right calf that is tender   Neurological:      General: No focal deficit present.      Mental Status: She is alert and oriented to person, place, and time.   Psychiatric:         Mood and Affect: Mood normal.         Behavior: Behavior normal.         ASSESSMENT CBC was normal.  CMP has a stable sugar of 126, creatinine 1.65 and other values normal.  Hemoglobin A1c is stable at 6.2.  Lipid panel is controlled.  TSH is minimally high at 4.79 and free T4 was normal.  Uric acid level is 3.2.  #1-hypertension controlled on medication  #2-hyperlipidemia controlled on medication  #3-compensated congestive heart failure  #4-hyperglycemia with stable acceptable hemoglobin A1c  #5-gout with excellent uric acid level on medication  #6-chronic kidney disease stage III, asymptomatic and stable       Problems Addressed this Visit        Cardiac and Vasculature    Mixed hyperlipidemia    Hypertension    Chronic systolic congestive heart failure (HCC) - Primary       Endocrine and Metabolic    Impaired glucose tolerance       Gastrointestinal Abdominal     Gastroesophageal reflux disease with esophagitis       Genitourinary and Reproductive     Chronic kidney disease       Health Encounters    Health care maintenance       Hematology and Neoplasia    History of left breast cancer       Musculoskeletal and Injuries    Gout       Neuro    Memory loss       Symptoms and Signs    TSH elevation      Diagnoses       Codes Comments    Chronic systolic congestive heart failure (HCC)    -  Primary ICD-10-CM: I50.22  ICD-9-CM: 428.22, 428.0     Primary hypertension     ICD-10-CM: I10  ICD-9-CM: 401.9     Mixed hyperlipidemia     ICD-10-CM: E78.2  ICD-9-CM: 272.2     Impaired glucose tolerance     ICD-10-CM: R73.02  ICD-9-CM: 790.22     Gastroesophageal reflux disease with esophagitis,  unspecified whether hemorrhage     ICD-10-CM: K21.00  ICD-9-CM: 530.11     Stage 3b chronic kidney disease (HCC)     ICD-10-CM: N18.32  ICD-9-CM: 585.3     Health care maintenance     ICD-10-CM: Z00.00  ICD-9-CM: V70.0     History of left breast cancer     ICD-10-CM: Z85.3  ICD-9-CM: V10.3     Gout, unspecified cause, unspecified chronicity, unspecified site     ICD-10-CM: M10.9  ICD-9-CM: 274.9     Memory loss     ICD-10-CM: R41.3  ICD-9-CM: 780.93     TSH elevation     ICD-10-CM: R79.89  ICD-9-CM: 794.5           PLAN I am going to decrease the patient's allopurinol to 100 mg daily.  She will continue other medicines as now.  I plan on rechecking her in 6 months with laboratory studies    There are no Patient Instructions on file for this visit.  Return in about 6 months (around 10/28/2022) for with labs.

## 2022-10-14 ENCOUNTER — OFFICE VISIT (OUTPATIENT)
Dept: FAMILY MEDICINE CLINIC | Facility: CLINIC | Age: 87
End: 2022-10-14

## 2022-10-14 ENCOUNTER — TELEPHONE (OUTPATIENT)
Dept: FAMILY MEDICINE CLINIC | Facility: CLINIC | Age: 87
End: 2022-10-14

## 2022-10-14 VITALS
SYSTOLIC BLOOD PRESSURE: 137 MMHG | TEMPERATURE: 97.8 F | HEART RATE: 71 BPM | BODY MASS INDEX: 22.93 KG/M2 | WEIGHT: 116.8 LBS | OXYGEN SATURATION: 100 % | HEIGHT: 60 IN | DIASTOLIC BLOOD PRESSURE: 58 MMHG

## 2022-10-14 DIAGNOSIS — U07.1 UPPER RESPIRATORY TRACT INFECTION DUE TO COVID-19 VIRUS: Primary | ICD-10-CM

## 2022-10-14 DIAGNOSIS — J06.9 UPPER RESPIRATORY TRACT INFECTION, UNSPECIFIED TYPE: ICD-10-CM

## 2022-10-14 DIAGNOSIS — J06.9 UPPER RESPIRATORY TRACT INFECTION DUE TO COVID-19 VIRUS: Primary | ICD-10-CM

## 2022-10-14 PROCEDURE — 99213 OFFICE O/P EST LOW 20 MIN: CPT | Performed by: STUDENT IN AN ORGANIZED HEALTH CARE EDUCATION/TRAINING PROGRAM

## 2022-10-14 RX ORDER — GUAIFENESIN/DEXTROMETHORPHAN 100-10MG/5
5 SYRUP ORAL 3 TIMES DAILY PRN
Qty: 237 ML | Refills: 0 | Status: SHIPPED | OUTPATIENT
Start: 2022-10-14 | End: 2022-10-29

## 2022-10-14 NOTE — TELEPHONE ENCOUNTER
HUB TO READ: CALLED AND LEFT MESSAGE FOR PT THAT SHE WILL NEED AN APPT. DR. VILLANUEVA IS OUT OF THE OFFICE BUT SHE CAN SCHEDULE WITH ANOTHER PROVIDER IN OUR OFFICE.

## 2022-10-14 NOTE — TELEPHONE ENCOUNTER
Caller: Yves Luke    Relationship: Emergency Contact    Best call back number:  [phone]    What is the best time to reach you: ANYTIME    Who are you requesting to speak with (clinical staff, provider,  specific staff member): CLINICAL    What was the call regarding: PATIENT'S  IS CALLING IN TO STATE THAT PATIENT IS LOOSING HER VOICE, COUGHING AND SORE THROAT.     PATIENT WOULD LIKE TO KNOW WHAT CAN BE DONE AND IF DR VILLANUEVA WOULD CALL IN SOMETHING FOR HER     PLEASE CALL PATIENT TO DISCUSS AND ADVISE      UNABLE TO WARM TRANSFER

## 2022-10-15 LAB
LABCORP SARS-COV-2, NAA 2 DAY TAT: NORMAL
SARS-COV-2 RNA RESP QL NAA+PROBE: DETECTED

## 2022-11-08 ENCOUNTER — OFFICE VISIT (OUTPATIENT)
Dept: FAMILY MEDICINE CLINIC | Facility: CLINIC | Age: 87
End: 2022-11-08

## 2022-11-08 VITALS
BODY MASS INDEX: 22.58 KG/M2 | HEART RATE: 82 BPM | OXYGEN SATURATION: 99 % | WEIGHT: 115 LBS | SYSTOLIC BLOOD PRESSURE: 128 MMHG | DIASTOLIC BLOOD PRESSURE: 60 MMHG | TEMPERATURE: 97.3 F | HEIGHT: 60 IN | RESPIRATION RATE: 16 BRPM

## 2022-11-08 DIAGNOSIS — I50.22 CHRONIC SYSTOLIC CONGESTIVE HEART FAILURE: ICD-10-CM

## 2022-11-08 DIAGNOSIS — R73.02 IMPAIRED GLUCOSE TOLERANCE: ICD-10-CM

## 2022-11-08 DIAGNOSIS — N18.32 STAGE 3B CHRONIC KIDNEY DISEASE: ICD-10-CM

## 2022-11-08 DIAGNOSIS — R73.03 PREDIABETES: ICD-10-CM

## 2022-11-08 DIAGNOSIS — R79.89 TSH ELEVATION: ICD-10-CM

## 2022-11-08 DIAGNOSIS — E78.2 MIXED HYPERLIPIDEMIA: ICD-10-CM

## 2022-11-08 DIAGNOSIS — M81.0 AGE-RELATED OSTEOPOROSIS WITHOUT CURRENT PATHOLOGICAL FRACTURE: ICD-10-CM

## 2022-11-08 DIAGNOSIS — E08.22 DIABETES MELLITUS DUE TO UNDERLYING CONDITION WITH CHRONIC KIDNEY DISEASE, WITHOUT LONG-TERM CURRENT USE OF INSULIN, UNSPECIFIED CKD STAGE: ICD-10-CM

## 2022-11-08 DIAGNOSIS — I10 PRIMARY HYPERTENSION: ICD-10-CM

## 2022-11-08 DIAGNOSIS — I35.0 AORTIC STENOSIS, MODERATE: Primary | ICD-10-CM

## 2022-11-08 PROCEDURE — 99214 OFFICE O/P EST MOD 30 MIN: CPT | Performed by: INTERNAL MEDICINE

## 2022-11-08 RX ORDER — ANASTROZOLE 1 MG/1
TABLET ORAL
COMMUNITY
Start: 2022-10-07

## 2022-11-08 RX ORDER — ALLOPURINOL 300 MG/1
TABLET ORAL
COMMUNITY
Start: 2022-09-22 | End: 2022-11-08

## 2022-11-08 NOTE — PROGRESS NOTES
Subjective   Radha Luke is a 89 y.o. female. Patient is here today for follow-up on her history of congestive heart failure, aortic stenosis, hypertension, hyperlipidemia, diabetes mellitus type 2, chronic kidney disease stage III and osteoporosis.  She has had no gout attacks.  She is generally stable    Chief Complaint   Patient presents with   • Follow-up          Vitals:    11/08/22 0859   BP: 128/60   Pulse: 82   Resp: 16   Temp: 97.3 °F (36.3 °C)   SpO2: 99%     Body mass index is 22.46 kg/m².  The following portions of the patient's history were reviewed and updated as appropriate: allergies, current medications, past family history, past medical history, past social history, past surgical history and problem list.    Past Medical History:   Diagnosis Date   • Acute systolic congestive heart failure (HCC)    • Allergic 1970?   • Aortic stenosis    • Arthritis    • Breast cancer (HCC)     Locally recurrent left breast   • Cataract 2005?   • CKD (chronic kidney disease), stage III (HCC)    • Colon polyp 1997?   • Coronary artery disease 2018   • Cough    • Diverticulosis 2012   • Dizziness    • Drug therapy    • Edema    • GERD (gastroesophageal reflux disease)    • History of breast cancer     LEFT   • Hyperlipidemia    • Hypertension    • Hypokalemia    • LBBB (left bundle branch block)    • Mild tricuspid regurgitation     with a right ventricular systolic pressure of 62   • Moderate mitral regurgitation    • Osteopenia    • Pleural effusion    • Pneumonia 2018      Allergies   Allergen Reactions   • Levofloxacin Swelling     Swelling in legs and ankle and tongue   • Penicillins Hives      Social History     Socioeconomic History   • Marital status:      Spouse name: Yves   • Years of education: High School   Tobacco Use   • Smoking status: Never   • Smokeless tobacco: Never   • Tobacco comments:     no caffeine   Vaping Use   • Vaping Use: Never used   Substance and Sexual Activity   • Alcohol  use: Not Currently     Comment: occassional   • Drug use: No   • Sexual activity: Not Currently     Partners: Male        Current Outpatient Medications:   •  allopurinol (ZYLOPRIM) 100 MG tablet, Take 1 tablet by mouth Daily., Disp: 90 tablet, Rfl: 3  •  anastrozole (ARIMIDEX) 1 MG tablet, , Disp: , Rfl:   •  Calcium Carb-Cholecalciferol (CALCIUM 1000 + D PO), Take 1 tablet by mouth Daily., Disp: , Rfl:   •  carvedilol (COREG) 3.125 MG tablet, Take 1 tablet by mouth 2 (Two) Times a Day., Disp: 180 tablet, Rfl: 3  •  furosemide (LASIX) 40 MG tablet, 1 tablet in the morning and a half a tablet in the afternoon, Disp: 135 tablet, Rfl: 3  •  magnesium gluconate (MAGONATE) 500 MG tablet, Take 500 mg by mouth Daily., Disp: , Rfl:   •  Multiple Vitamins-Minerals (CENTRUM SILVER 50+WOMEN PO), Take 1 tablet by mouth Daily., Disp: , Rfl:   •  pantoprazole (PROTONIX) 40 MG EC tablet, Take 1 tablet by mouth Daily., Disp: 90 tablet, Rfl: 3  •  potassium chloride (MICRO-K) 10 MEQ CR capsule, Take 2 capsules by mouth Daily., Disp: 180 capsule, Rfl: 3  •  simvastatin (ZOCOR) 20 MG tablet, Take 1 tablet by mouth Daily., Disp: 90 tablet, Rfl: 3  •  triamcinolone (KENALOG) 0.1 % cream, , Disp: , Rfl:      Objective     History of Present Illness     Review of Systems    Physical Exam  Vitals and nursing note reviewed.   Constitutional:       General: She is not in acute distress.     Appearance: Normal appearance. She is not ill-appearing.   HENT:      Head: Normocephalic and atraumatic.   Cardiovascular:      Rate and Rhythm: Normal rate and regular rhythm.      Heart sounds: Normal heart sounds.   Pulmonary:      Effort: Pulmonary effort is normal. No respiratory distress.      Breath sounds: Normal breath sounds. No wheezing or rales.   Skin:     General: Skin is warm.   Neurological:      General: No focal deficit present.      Mental Status: She is alert and oriented to person, place, and time.   Psychiatric:         Mood and  Affect: Mood normal.         Behavior: Behavior normal.         ASSESSMENT CMP has a sugar of 129 that stable, creatinine 1.55 that stable and other values normal and CBC was normal.  Lipid panel has total cholesterol 147, HDL 48, LDL 76 and hemoglobin A1c was a bit higher at 6.6.  TSH was somewhat high at 6 but free T4 was normal at 1.32 and clinically the patient's euthyroid and uric acid level is 5.9  #1-hypertension controlled  #2-hyperlipidemia, controlled  #3-hypothyroidism clinically euthyroid  #4-diabetes mellitus type 2, diet controlled   #5-osteoporosis, will be starting Prolia injections  #6-chronic kidney disease stage III         Problems Addressed this Visit        Cardiac and Vasculature    Mixed hyperlipidemia    Hypertension    Chronic systolic congestive heart failure (HCC)    Aortic stenosis, moderate - Primary       Endocrine and Metabolic    Impaired glucose tolerance       Genitourinary and Reproductive     Chronic kidney disease       Musculoskeletal and Injuries    Age-related osteoporosis without current pathological fracture       Symptoms and Signs    TSH elevation   Diagnoses       Codes Comments    Aortic stenosis, moderate    -  Primary ICD-10-CM: I35.0  ICD-9-CM: 424.1     Chronic systolic congestive heart failure (HCC)     ICD-10-CM: I50.22  ICD-9-CM: 428.22, 428.0     Primary hypertension     ICD-10-CM: I10  ICD-9-CM: 401.9     Mixed hyperlipidemia     ICD-10-CM: E78.2  ICD-9-CM: 272.2     Impaired glucose tolerance     ICD-10-CM: R73.02  ICD-9-CM: 790.22     Stage 3b chronic kidney disease (HCC)     ICD-10-CM: N18.32  ICD-9-CM: 585.3     TSH elevation     ICD-10-CM: R79.89  ICD-9-CM: 794.5     Age-related osteoporosis without current pathological fracture     ICD-10-CM: M81.0  ICD-9-CM: 733.01           PLAN the patient will continue current medicines as now and I did recommend the shingles immunizations.  She will be starting Prolia injections.  I plan on rechecking her in 6 months  with laboratory studies    There are no Patient Instructions on file for this visit.  Return in about 6 months (around 5/8/2023) for with labs.

## 2023-01-03 RX ORDER — CARVEDILOL 3.12 MG/1
TABLET ORAL
Qty: 180 TABLET | Refills: 1 | Status: SHIPPED | OUTPATIENT
Start: 2023-01-03 | End: 2023-03-16 | Stop reason: SDUPTHER

## 2023-01-17 ENCOUNTER — TRANSCRIBE ORDERS (OUTPATIENT)
Dept: ADMINISTRATIVE | Facility: HOSPITAL | Age: 88
End: 2023-01-17
Payer: MEDICARE

## 2023-01-17 DIAGNOSIS — Z12.31 ENCOUNTER FOR SCREENING MAMMOGRAM FOR MALIGNANT NEOPLASM OF BREAST: Primary | ICD-10-CM

## 2023-02-15 ENCOUNTER — TELEPHONE (OUTPATIENT)
Dept: FAMILY MEDICINE CLINIC | Facility: CLINIC | Age: 88
End: 2023-02-15
Payer: MEDICARE

## 2023-02-15 DIAGNOSIS — M25.551 RIGHT HIP PAIN: Primary | ICD-10-CM

## 2023-02-15 NOTE — TELEPHONE ENCOUNTER
Caller: Yves Luke    Relationship: Emergency Contact    Best call back number: [phone]    What orders are you requesting (i.e. lab or imaging): PT ON [facility]    In what timeframe would the patient need to come in: ASAP    Where will you receive your lab/imaging services: [facility] [phone]

## 2023-03-09 RX ORDER — FUROSEMIDE 40 MG/1
TABLET ORAL
Qty: 135 TABLET | Refills: 3 | Status: SHIPPED | OUTPATIENT
Start: 2023-03-09 | End: 2023-03-16 | Stop reason: SDUPTHER

## 2023-03-09 NOTE — TELEPHONE ENCOUNTER
Rx Refill Note  Requested Prescriptions     Pending Prescriptions Disp Refills    furosemide (LASIX) 40 MG tablet [Pharmacy Med Name: FUROSEMIDE 40 MG Tablet] 135 tablet 3     Sig: TAKE 1 TABLET IN THE MORNING  AND TAKE 1/2 TABLET  IN  THE  AFTERNOON      Last office visit with prescribing clinician: 2/16/2022   Last telemedicine visit with prescribing clinician: Visit date not found   Next office visit with prescribing clinician: 3/16/2023                         Would you like a call back once the refill request has been completed: [] Yes [] No    If the office needs to give you a call back, can they leave a voicemail: [] Yes [] No    Paige Adams MA  03/09/23, 07:59 EST

## 2023-03-16 ENCOUNTER — OFFICE VISIT (OUTPATIENT)
Dept: CARDIOLOGY | Facility: CLINIC | Age: 88
End: 2023-03-16
Payer: MEDICARE

## 2023-03-16 VITALS
HEART RATE: 68 BPM | BODY MASS INDEX: 22.38 KG/M2 | RESPIRATION RATE: 18 BRPM | WEIGHT: 114 LBS | OXYGEN SATURATION: 99 % | HEIGHT: 60 IN | SYSTOLIC BLOOD PRESSURE: 124 MMHG | DIASTOLIC BLOOD PRESSURE: 84 MMHG

## 2023-03-16 DIAGNOSIS — I50.22 CHRONIC SYSTOLIC CONGESTIVE HEART FAILURE: ICD-10-CM

## 2023-03-16 DIAGNOSIS — I35.0 AORTIC STENOSIS, MODERATE: Primary | ICD-10-CM

## 2023-03-16 DIAGNOSIS — I44.7 LEFT BUNDLE BRANCH BLOCK: ICD-10-CM

## 2023-03-16 DIAGNOSIS — I35.1 NONRHEUMATIC AORTIC VALVE INSUFFICIENCY: ICD-10-CM

## 2023-03-16 DIAGNOSIS — I10 PRIMARY HYPERTENSION: ICD-10-CM

## 2023-03-16 DIAGNOSIS — I34.0 NONRHEUMATIC MITRAL VALVE REGURGITATION: ICD-10-CM

## 2023-03-16 DIAGNOSIS — E78.2 MIXED HYPERLIPIDEMIA: ICD-10-CM

## 2023-03-16 PROCEDURE — 1159F MED LIST DOCD IN RCRD: CPT | Performed by: INTERNAL MEDICINE

## 2023-03-16 PROCEDURE — 93000 ELECTROCARDIOGRAM COMPLETE: CPT | Performed by: INTERNAL MEDICINE

## 2023-03-16 PROCEDURE — 99213 OFFICE O/P EST LOW 20 MIN: CPT | Performed by: INTERNAL MEDICINE

## 2023-03-16 PROCEDURE — 1160F RVW MEDS BY RX/DR IN RCRD: CPT | Performed by: INTERNAL MEDICINE

## 2023-03-16 RX ORDER — SIMVASTATIN 20 MG
20 TABLET ORAL DAILY
Qty: 90 TABLET | Refills: 3 | Status: SHIPPED | OUTPATIENT
Start: 2023-03-16

## 2023-03-16 RX ORDER — CARVEDILOL 3.12 MG/1
3.12 TABLET ORAL 2 TIMES DAILY
Qty: 180 TABLET | Refills: 3 | Status: SHIPPED | OUTPATIENT
Start: 2023-03-16

## 2023-03-16 RX ORDER — FUROSEMIDE 40 MG/1
TABLET ORAL
Qty: 135 TABLET | Refills: 3 | Status: SHIPPED | OUTPATIENT
Start: 2023-03-16

## 2023-03-16 RX ORDER — POTASSIUM CHLORIDE 750 MG/1
20 CAPSULE, EXTENDED RELEASE ORAL DAILY
Qty: 180 CAPSULE | Refills: 3 | Status: SHIPPED | OUTPATIENT
Start: 2023-03-16

## 2023-03-16 NOTE — PROGRESS NOTES
CARDIOLOGY    Irene Earl MD    ENCOUNTER DATE:  03/16/2023    Radha Luke / 89 y.o. / female        CHIEF COMPLAINT / REASON FOR OFFICE VISIT     Heart Problem (1  year )      HISTORY OF PRESENT ILLNESS       HPI    Radha Luke is a 89 y.o. female     This is a nice woman who was admitted in 01/2018 with new onset of acute congestive heart failure.  Her ejection fraction was 28% by echocardiogram on 01/30/2018.  She was started on a low dose of carvedilol.  She was not started on Entresto, ACE inhibitor or angiotensin receptor blocker because of kidney disease.  She had a right-sided thoracentesis with 1200 mL of fluid removed and a left-sided thoracentesis with 750 mL of fluid removed.   she had a PET stress test in February 2018 which showed no perfusion defects with an ejection fraction on 24%.  Repeat echocardiogram was performed in May 2018 and showed her ejection fraction had improved to 45 to 50%.  She had grade 1A diastolic dysfunction, mild aortic regurgitation, moderate aortic stenosis, moderate mitral regurgitation, mild tricuspid regurgitation with a normal right ventricular systolic pressure.  Echocardiogram in March 2021 showed her ejection fraction to be 45% with grade 1A diastolic dysfunction.  There was mild to moderate aortic regurgitation and moderate aortic stenosis noted.  Carotid Doppler and March 2021 showed plaque without stenosis bilaterally.    She comes in today with her  who is also a patient of mine for routine follow-up.  She denies any chest pain.  She has been having low back pain doing physical therapy.  No shortness of breath or edema    REVIEW OF SYSTEMS     Review of Systems   Constitutional: Negative for chills, fever, weight gain and weight loss.   Cardiovascular: Negative for leg swelling.   Respiratory: Negative for cough, snoring and wheezing.    Hematologic/Lymphatic: Negative for bleeding problem. Does not bruise/bleed easily.   Skin: Negative for  "color change.   Musculoskeletal: Positive for joint pain. Negative for falls and myalgias.   Gastrointestinal: Negative for melena.   Genitourinary: Negative for hematuria.   Neurological: Negative for excessive daytime sleepiness.   Psychiatric/Behavioral: Negative for depression. The patient is not nervous/anxious.          VITAL SIGNS     Visit Vitals  /84 (BP Location: Left arm, Patient Position: Sitting, Cuff Size: Adult)   Pulse 68   Resp 18   Ht 152.4 cm (60\")   Wt 51.7 kg (114 lb)   SpO2 99%   BMI 22.26 kg/m²         Wt Readings from Last 3 Encounters:   03/16/23 51.7 kg (114 lb)   11/08/22 52.2 kg (115 lb)   10/14/22 53 kg (116 lb 12.8 oz)     Body mass index is 22.26 kg/m².      PHYSICAL EXAMINATION     Constitutional:       General: Not in acute distress.  Neck:      Vascular: No carotid bruit or JVD.   Pulmonary:      Effort: Pulmonary effort is normal.      Breath sounds: Normal breath sounds.   Cardiovascular:      Normal rate. Regular rhythm.      Murmurs: There is no murmur.   Psychiatric:         Mood and Affect: Mood and affect normal.           REVIEWED DATA       ECG 12 Lead    Date/Time: 3/16/2023 10:03 AM  Performed by: Irene Earl MD  Authorized by: Irene Earl MD   Comparison: compared with previous ECG from 2/16/2022  Rhythm: sinus rhythm  BPM: 68  Conduction: left bundle branch block    Clinical impression: abnormal EKG              Lipid Panel    Lipid Panel 4/6/22 11/1/22   Total Cholesterol 143 147   Triglycerides 107 127   HDL Cholesterol 53 48   VLDL Cholesterol 19 23   LDL Cholesterol  71 76   LDL/HDL Ratio 1.3 1.53      Comments are available for some flowsheets but are not being displayed.             Lab Results   Component Value Date    GLUCOSE 129 (H) 11/01/2022    BUN 32 (H) 11/01/2022    CREATININE 1.55 (H) 11/01/2022    EGFRRESULT 31.9 (L) 11/01/2022    BCR 20.6 11/01/2022    K 4.3 11/01/2022    CO2 28.0 11/01/2022    CALCIUM 9.5 11/01/2022    PROTENTOTREF " 6.5 11/01/2022    ALBUMIN 4.10 11/01/2022    BILITOT 0.4 11/01/2022    AST 17 11/01/2022    ALT 10 11/01/2022       ASSESSMENT & PLAN      Diagnosis Plan   1. Aortic stenosis, moderate        2. Chronic systolic congestive heart failure (HCC)        3. Left bundle branch block        4. Mixed hyperlipidemia        5. Nonrheumatic aortic valve insufficiency        6. Nonrheumatic mitral valve regurgitation        7. Primary hypertension            1. Chronic systolic congestive heart failure. She is on a low dose of carvedilol.  Her ejection fraction was 28% echo on January 30, 2018. On January 31, 2018 she had a thoracentesis on the right side with 1200 mL of fluid removed.  On February 1 she had a thoracentesis on the left side with 750 mL removed.    Repeat echo in May 2018 showed her ejection fraction had improved to 45 to 50%.  And in March 2020 when her ejection fraction was stable at 45%. She had a negative PET stress test.  Continue current medications.  She is euvolemic on exam.  2.  Pleural effusion. Status post bilateral thoracentesis  3.  Hypertension.  Controlled  4.  Hyperlipidemia.  I reviewed her lipid panel and we will continue her current dose of simvastatin  5.  Chronic kidney disease stage III.    6.  History of breast cancer in 2011  7.  Left bundle branch block.  Given that she has remained out of the hospital and euvolemic, she does not qualify for biventricular pacemaker at this time.  8.  Aortic stenosis.  Moderate.  With mild to moderate aortic regurgitation.  Her murmur is consistent with what was seen on her echo last year.  I am not going to order any new test this year.  9.  Mild mitral regurgitation.  10.  Mild tricuspid regurgitation.  Initially she had pulmonary hypertension but after diuresis/bilateral thoracentesis, right ventricular systolic pressure was normal.     Follow-up in 1 year.    Orders Placed This Encounter   Procedures   • ECG 12 Lead     This order was created via  procedure documentation     Order Specific Question:   Release to patient     Answer:   Routine Release           MEDICATIONS         Discharge Medications          Accurate as of March 16, 2023 10:03 AM. If you have any questions, ask your nurse or doctor.            Continue These Medications      Instructions Start Date   allopurinol 100 MG tablet  Commonly known as: ZYLOPRIM   100 mg, Oral, Daily      anastrozole 1 MG tablet  Commonly known as: ARIMIDEX   No dose, route, or frequency recorded.      CALCIUM 1000 + D PO   1 tablet, Oral, Daily      carvedilol 3.125 MG tablet  Commonly known as: COREG   3.125 mg, Oral, 2 Times Daily      furosemide 40 MG tablet  Commonly known as: LASIX   TAKE 1 TABLET IN THE MORNING  AND TAKE 1/2 TABLET  IN  THE  AFTERNOON      magnesium gluconate 500 MG tablet  Commonly known as: MAGONATE   500 mg, Oral, Daily      multivitamin with minerals tablet tablet   1 tablet, Oral, Daily      pantoprazole 40 MG EC tablet  Commonly known as: PROTONIX   40 mg, Oral, Daily      potassium chloride 10 MEQ CR capsule  Commonly known as: MICRO-K   20 mEq, Oral, Daily      simvastatin 20 MG tablet  Commonly known as: ZOCOR   20 mg, Oral, Daily      triamcinolone 0.1 % cream  Commonly known as: KENALOG   No dose, route, or frequency recorded.               Irene Earl MD  03/16/23  10:03 EDT    Part of this note may be an electronic transcription/translation of spoken language to printed text using the Dragon dictation system.

## 2023-03-17 ENCOUNTER — HOSPITAL ENCOUNTER (OUTPATIENT)
Dept: MAMMOGRAPHY | Facility: HOSPITAL | Age: 88
Discharge: HOME | End: 2023-03-17
Admitting: INTERNAL MEDICINE
Payer: MEDICARE

## 2023-03-17 DIAGNOSIS — Z12.31 ENCOUNTER FOR SCREENING MAMMOGRAM FOR MALIGNANT NEOPLASM OF BREAST: ICD-10-CM

## 2023-03-17 PROCEDURE — 77063 BREAST TOMOSYNTHESIS BI: CPT

## 2023-03-17 PROCEDURE — 77067 SCR MAMMO BI INCL CAD: CPT

## 2023-04-18 RX ORDER — PANTOPRAZOLE SODIUM 40 MG/1
TABLET, DELAYED RELEASE ORAL
Qty: 90 TABLET | Refills: 3 | OUTPATIENT
Start: 2023-04-18

## 2023-05-10 LAB
25(OH)D3+25(OH)D2 SERPL-MCNC: 58.5 NG/ML (ref 30–100)
ALBUMIN SERPL-MCNC: 4.4 G/DL (ref 3.5–5.2)
ALBUMIN/CREAT UR: 53 MG/G CREAT (ref 0–29)
ALBUMIN/GLOB SERPL: 1.7 G/DL
ALP SERPL-CCNC: 44 U/L (ref 39–117)
ALT SERPL-CCNC: 14 U/L (ref 1–33)
AST SERPL-CCNC: 18 U/L (ref 1–32)
BASOPHILS # BLD AUTO: 0.01 10*3/MM3 (ref 0–0.2)
BASOPHILS NFR BLD AUTO: 0.2 % (ref 0–1.5)
BILIRUB SERPL-MCNC: 0.6 MG/DL (ref 0–1.2)
BUN SERPL-MCNC: 33 MG/DL (ref 8–23)
BUN/CREAT SERPL: 22 (ref 7–25)
CALCIUM SERPL-MCNC: 10.5 MG/DL (ref 8.2–9.6)
CHLORIDE SERPL-SCNC: 102 MMOL/L (ref 98–107)
CHOLEST SERPL-MCNC: 164 MG/DL (ref 0–200)
CO2 SERPL-SCNC: 31.4 MMOL/L (ref 22–29)
CREAT SERPL-MCNC: 1.5 MG/DL (ref 0.57–1)
CREAT UR-MCNC: 109.4 MG/DL
EGFRCR SERPLBLD CKD-EPI 2021: 33 ML/MIN/1.73
EOSINOPHIL # BLD AUTO: 0.24 10*3/MM3 (ref 0–0.4)
EOSINOPHIL NFR BLD AUTO: 4.1 % (ref 0.3–6.2)
ERYTHROCYTE [DISTWIDTH] IN BLOOD BY AUTOMATED COUNT: 12.8 % (ref 12.3–15.4)
GLOBULIN SER CALC-MCNC: 2.6 GM/DL
GLUCOSE SERPL-MCNC: 132 MG/DL (ref 65–99)
HBA1C MFR BLD: 6.2 % (ref 4.8–5.6)
HCT VFR BLD AUTO: 38.3 % (ref 34–46.6)
HDLC SERPL-MCNC: 51 MG/DL (ref 40–60)
HGB BLD-MCNC: 13.2 G/DL (ref 12–15.9)
IMM GRANULOCYTES # BLD AUTO: 0 10*3/MM3 (ref 0–0.05)
IMM GRANULOCYTES NFR BLD AUTO: 0 % (ref 0–0.5)
LDLC SERPL CALC-MCNC: 91 MG/DL (ref 0–100)
LDLC/HDLC SERPL: 1.73 {RATIO}
LYMPHOCYTES # BLD AUTO: 2.37 10*3/MM3 (ref 0.7–3.1)
LYMPHOCYTES NFR BLD AUTO: 40.7 % (ref 19.6–45.3)
MCH RBC QN AUTO: 32.8 PG (ref 26.6–33)
MCHC RBC AUTO-ENTMCNC: 34.5 G/DL (ref 31.5–35.7)
MCV RBC AUTO: 95.3 FL (ref 79–97)
MICROALBUMIN UR-MCNC: 57.8 UG/ML
MONOCYTES # BLD AUTO: 0.42 10*3/MM3 (ref 0.1–0.9)
MONOCYTES NFR BLD AUTO: 7.2 % (ref 5–12)
NEUTROPHILS # BLD AUTO: 2.78 10*3/MM3 (ref 1.7–7)
NEUTROPHILS NFR BLD AUTO: 47.8 % (ref 42.7–76)
NRBC BLD AUTO-RTO: 0 /100 WBC (ref 0–0.2)
PLATELET # BLD AUTO: 172 10*3/MM3 (ref 140–450)
POTASSIUM SERPL-SCNC: 4.5 MMOL/L (ref 3.5–5.2)
PROT SERPL-MCNC: 7 G/DL (ref 6–8.5)
RBC # BLD AUTO: 4.02 10*6/MM3 (ref 3.77–5.28)
SODIUM SERPL-SCNC: 143 MMOL/L (ref 136–145)
T4 FREE SERPL-MCNC: 1.21 NG/DL (ref 0.93–1.7)
TRIGL SERPL-MCNC: 125 MG/DL (ref 0–150)
TSH SERPL DL<=0.005 MIU/L-ACNC: 4.42 UIU/ML (ref 0.27–4.2)
URATE SERPL-MCNC: 7 MG/DL (ref 2.4–5.7)
VLDLC SERPL CALC-MCNC: 22 MG/DL (ref 5–40)
WBC # BLD AUTO: 5.82 10*3/MM3 (ref 3.4–10.8)

## 2023-05-11 ENCOUNTER — OFFICE VISIT (OUTPATIENT)
Dept: FAMILY MEDICINE CLINIC | Facility: CLINIC | Age: 88
End: 2023-05-11
Payer: MEDICARE

## 2023-05-11 VITALS
HEIGHT: 60 IN | OXYGEN SATURATION: 96 % | RESPIRATION RATE: 16 BRPM | DIASTOLIC BLOOD PRESSURE: 84 MMHG | BODY MASS INDEX: 22.38 KG/M2 | WEIGHT: 114 LBS | HEART RATE: 83 BPM | SYSTOLIC BLOOD PRESSURE: 122 MMHG | TEMPERATURE: 96.9 F

## 2023-05-11 DIAGNOSIS — I10 PRIMARY HYPERTENSION: Primary | ICD-10-CM

## 2023-05-11 DIAGNOSIS — R73.02 IMPAIRED GLUCOSE TOLERANCE: ICD-10-CM

## 2023-05-11 DIAGNOSIS — N18.32 STAGE 3B CHRONIC KIDNEY DISEASE: ICD-10-CM

## 2023-05-11 DIAGNOSIS — K21.00 GASTROESOPHAGEAL REFLUX DISEASE WITH ESOPHAGITIS, UNSPECIFIED WHETHER HEMORRHAGE: ICD-10-CM

## 2023-05-11 DIAGNOSIS — Z00.00 MEDICARE ANNUAL WELLNESS VISIT, SUBSEQUENT: ICD-10-CM

## 2023-05-11 DIAGNOSIS — E78.2 MIXED HYPERLIPIDEMIA: ICD-10-CM

## 2023-05-11 DIAGNOSIS — I50.22 CHRONIC SYSTOLIC CONGESTIVE HEART FAILURE: ICD-10-CM

## 2023-05-11 DIAGNOSIS — M10.9 GOUT, UNSPECIFIED CAUSE, UNSPECIFIED CHRONICITY, UNSPECIFIED SITE: ICD-10-CM

## 2023-05-11 DIAGNOSIS — R79.89 TSH ELEVATION: ICD-10-CM

## 2023-05-11 RX ORDER — ALLOPURINOL 100 MG/1
100 TABLET ORAL DAILY
Qty: 90 TABLET | Refills: 3 | Status: SHIPPED | OUTPATIENT
Start: 2023-05-11

## 2023-05-11 RX ORDER — PANTOPRAZOLE SODIUM 40 MG/1
40 TABLET, DELAYED RELEASE ORAL DAILY
Qty: 90 TABLET | Refills: 3 | Status: SHIPPED | OUTPATIENT
Start: 2023-05-11

## 2023-05-11 NOTE — PROGRESS NOTES
The ABCs of the Annual Wellness Visit  Subsequent Medicare Wellness Visit    Subjective    Radha Luke is a 89 y.o. female who presents for a Subsequent Medicare Wellness Visit.    The following portions of the patient's history were reviewed and   updated as appropriate: allergies, current medications, past family history, past medical history, past social history, past surgical history and problem list.    Compared to one year ago, the patient feels her physical   health is the same.    Compared to one year ago, the patient feels her mental   health is the same.    Recent Hospitalizations:  She was not admitted to the hospital during the last year.       Current Medical Providers:  Patient Care Team:  Blanco Chandra MD as PCP - General (Internal Medicine)  Code, Yves REY II, MD as Consulting Physician (Hematology and Oncology)    Outpatient Medications Prior to Visit   Medication Sig Dispense Refill   • allopurinol (ZYLOPRIM) 100 MG tablet Take 1 tablet by mouth Daily. 90 tablet 3   • anastrozole (ARIMIDEX) 1 MG tablet      • Calcium Carb-Cholecalciferol (CALCIUM 1000 + D PO) Take 1 tablet by mouth Daily.     • carvedilol (COREG) 3.125 MG tablet Take 1 tablet by mouth 2 (Two) Times a Day. 180 tablet 3   • furosemide (LASIX) 40 MG tablet TAKE 1 TABLET IN THE MORNING  AND TAKE 1/2 TABLET  IN  THE  AFTERNOON 135 tablet 3   • magnesium gluconate (MAGONATE) 500 MG tablet Take 500 mg by mouth Daily.     • Multiple Vitamins-Minerals (CENTRUM SILVER 50+WOMEN PO) Take 1 tablet by mouth Daily.     • pantoprazole (PROTONIX) 40 MG EC tablet Take 1 tablet by mouth Daily. 90 tablet 3   • potassium chloride (MICRO-K) 10 MEQ CR capsule Take 2 capsules by mouth Daily. 180 capsule 3   • simvastatin (ZOCOR) 20 MG tablet Take 1 tablet by mouth Daily. 90 tablet 3   • triamcinolone (KENALOG) 0.1 % cream        No facility-administered medications prior to visit.       No opioid medication identified on active medication list. I  "have reviewed chart for other potential  high risk medication/s and harmful drug interactions in the elderly.          Aspirin is not on active medication list.  Aspirin use is not indicated based on review of current medical condition/s. Risk of harm outweighs potential benefits.  .    Patient Active Problem List   Diagnosis   • Disorder of aorta   • Chronic kidney disease   • Diverticulosis of intestine   • Gastroesophageal reflux disease with esophagitis   • Mixed hyperlipidemia   • Primary hypertension   • Age-related osteoporosis without current pathological fracture   • Health care maintenance   • History of left breast cancer   • Malignant neoplasm of overlapping sites of left female breast   • Pain of right sacroiliac joint   • Impaired glucose tolerance   • Left bundle branch block   • Congestive heart failure due to valvular disease   • Memory loss   • Gout   • Foot pain   • Vertigo   • Chronic systolic congestive heart failure   • Nonrheumatic mitral valve regurgitation   • Nonrheumatic aortic valve insufficiency   • Aortic stenosis, moderate   • TSH elevation     Advance Care Planning   Advance Care Planning     Advance Directive is on file.  ACP discussion was held with the patient during this visit. Patient has an advance directive in EMR which is still valid.      Objective    Vitals:    05/11/23 0913   BP: 122/84   BP Location: Left arm   Patient Position: Sitting   Cuff Size: Adult   Pulse: 83   Resp: 16   Temp: 96.9 °F (36.1 °C)   TempSrc: Temporal   SpO2: 96%   Weight: 51.7 kg (114 lb)   Height: 152.4 cm (60\")     Estimated body mass index is 22.26 kg/m² as calculated from the following:    Height as of this encounter: 152.4 cm (60\").    Weight as of this encounter: 51.7 kg (114 lb).    BMI is within normal parameters. No other follow-up for BMI required.      Does the patient have evidence of cognitive impairment? No    Lab Results   Component Value Date    CHLPL 164 05/09/2023    TRIG 125 " 2023    HDL 51 2023    LDL 91 2023    VLDL 22 2023    HGBA1C 6.20 (H) 2023        HEALTH RISK ASSESSMENT    Smoking Status:  Social History     Tobacco Use   Smoking Status Never   Smokeless Tobacco Never   Tobacco Comments    no caffeine     Alcohol Consumption:  Social History     Substance and Sexual Activity   Alcohol Use Not Currently    Comment: occassional     Fall Risk Screen:    OSBALDO Fall Risk Assessment was completed, and patient is at HIGH risk for falls. Assessment completed on:2023    Depression Screenin/11/2023     9:00 AM   PHQ-2/PHQ-9 Depression Screening   Little Interest or Pleasure in Doing Things 0-->not at all   Feeling Down, Depressed or Hopeless 0-->not at all   PHQ-9: Brief Depression Severity Measure Score 0       Health Habits and Functional and Cognitive Screenin/11/2023     9:00 AM   Functional & Cognitive Status   Do you have difficulty preparing food and eating? No   Do you have difficulty bathing yourself, getting dressed or grooming yourself? No   Do you have difficulty using the toilet? No   Do you have difficulty moving around from place to place? No   Do you have trouble with steps or getting out of a bed or a chair? No   Current Diet Well Balanced Diet   Dental Exam Up to date   Eye Exam Up to date   Exercise (times per week) 0 times per week   Current Exercises Include No Regular Exercise   Do you need help using the phone?  No   Are you deaf or do you have serious difficulty hearing?  Yes   Do you need help with transportation? No   Do you need help shopping? No   Do you need help preparing meals?  No   Do you need help with housework?  Yes   Do you need help with laundry? No   Do you need help taking your medications? No   Do you need help managing money? No   Do you ever drive or ride in a car without wearing a seat belt? No   Have you felt unusual stress, anger or loneliness in the last month? No   Who do you live with?  Spouse   If you need help, do you have trouble finding someone available to you? No   Do you have difficulty concentrating, remembering or making decisions? Yes       Age-appropriate Screening Schedule:  Refer to the list below for future screening recommendations based on patient's age, sex and/or medical conditions. Orders for these recommended tests are listed in the plan section. The patient has been provided with a written plan.    Health Maintenance   Topic Date Due   • DIABETIC EYE EXAM  04/12/2016   • ANNUAL WELLNESS VISIT  04/13/2023   • ZOSTER VACCINE (1 of 2) 10/14/2023 (Originally 3/14/1983)   • INFLUENZA VACCINE  08/01/2023   • HEMOGLOBIN A1C  11/09/2023   • MAMMOGRAM  03/17/2024   • LIPID PANEL  05/09/2024   • URINE MICROALBUMIN  05/09/2024   • DXA SCAN  10/25/2024   • TDAP/TD VACCINES (2 - Td or Tdap) 11/09/2025   • COVID-19 Vaccine  Completed   • Pneumococcal Vaccine 65+  Completed                  CMS Preventative Services Quick Reference  Risk Factors Identified During Encounter  Immunizations Discussed/Encouraged: Shingrix and COVID19  The above risks/problems have been discussed with the patient.  Pertinent information has been shared with the patient in the After Visit Summary.  An After Visit Summary and PPPS were made available to the patient.    Follow Up:   Next Medicare Wellness visit to be scheduled in 1 year.       Additional E&M Note during same encounter follows:  Patient has multiple medical problems which are significant and separately identifiable that require additional work above and beyond the Medicare Wellness Visit.      Chief Complaint  Medicare Wellness-subsequent    Subjective        HPI  Radha Luke is also being seen today for follow-up on her hypertension, hyperlipidemia, chronic compensated heart failure, hyperglycemia, GERD, chronic kidney disease stage III, history of gout and history of TSH elevation.  Patient is accompanied by her  and is generally stable and  "has no acute complaints.         Objective   Vital Signs:  /84 (BP Location: Left arm, Patient Position: Sitting, Cuff Size: Adult)   Pulse 83   Temp 96.9 °F (36.1 °C) (Temporal)   Resp 16   Ht 152.4 cm (60\")   Wt 51.7 kg (114 lb)   SpO2 96%   BMI 22.26 kg/m²     Physical Exam  Vitals and nursing note reviewed.   Constitutional:       General: She is not in acute distress.     Appearance: Normal appearance. She is not ill-appearing.   HENT:      Head: Normocephalic and atraumatic.   Cardiovascular:      Rate and Rhythm: Normal rate and regular rhythm.      Heart sounds: Normal heart sounds.   Pulmonary:      Effort: Pulmonary effort is normal. No respiratory distress.      Breath sounds: Normal breath sounds. No wheezing or rales.   Musculoskeletal:      Right lower leg: No edema.      Left lower leg: No edema.   Skin:     General: Skin is warm and dry.   Neurological:      General: No focal deficit present.      Mental Status: She is alert. Mental status is at baseline.   Psychiatric:         Mood and Affect: Mood normal.         Behavior: Behavior normal.                         Assessment and Plan CBC was normal.  CMP was stable with a sugar of 132, creatinine 1.5 and calcium of 10.5 and other values normal.  Hemoglobin A1c was improved at 6.2.  Lipid panel has total cholesterol 164, HDL 51, LDL 91.  Urine microalbumin is relatively stable at 57.8.  TSH was improved at 4.42 and free T4 was normal.  Uric acid level was 7.0 and vitamin D low is normal at 58.5.  #1-hypertension controlled on medication  #2-hyperlipidemia controlled on medication #3-compensated congestive heart failure, asymptomatic    #4-hyperglycemia with improved acceptable hemoglobin A1c, diet controlled  #5-GERD, controlled on pantoprazole  #6-chronic kidney disease stage III, asymptomatic and stable  #7-history of gout, asymptomatic  #8-TSH elevation with clinically euthyroid         There are no diagnoses linked to this " encounter.         Follow Up the patient will continue current medicines as now.  I did recommend a COVID-19 booster and consider the shingles immunizations.  I plan on rechecking her in 6 months with a CBC, CMP, hemoglobin A1c, lipid panel, urine microalbumin and TSH and free T4 and uric acid level       No follow-ups on file.  Patient was given instructions and counseling regarding her condition or for health maintenance advice. Please see specific information pulled into the AVS if appropriate.         Answers for HPI/ROS submitted by the patient on 5/4/2023  Please describe your symptoms.: 6 mo check up  Have you had these symptoms before?: Yes  How long have you been having these symptoms?: Greater than 2 weeks  What is the primary reason for your visit?: Other

## 2023-10-20 ENCOUNTER — OFFICE VISIT (OUTPATIENT)
Dept: FAMILY MEDICINE CLINIC | Facility: CLINIC | Age: 88
End: 2023-10-20
Payer: MEDICARE

## 2023-10-20 VITALS
DIASTOLIC BLOOD PRESSURE: 68 MMHG | BODY MASS INDEX: 21.01 KG/M2 | RESPIRATION RATE: 16 BRPM | OXYGEN SATURATION: 98 % | HEIGHT: 60 IN | HEART RATE: 68 BPM | SYSTOLIC BLOOD PRESSURE: 114 MMHG | WEIGHT: 107 LBS | TEMPERATURE: 98.1 F

## 2023-10-20 DIAGNOSIS — R41.0 CONFUSION: Primary | ICD-10-CM

## 2023-10-20 DIAGNOSIS — R63.0 DECREASED APPETITE: ICD-10-CM

## 2023-10-20 DIAGNOSIS — R41.3 MEMORY LOSS: ICD-10-CM

## 2023-10-20 PROBLEM — C50.919 BREAST CANCER: Status: ACTIVE | Noted: 2023-10-20

## 2023-10-20 LAB
BILIRUB BLD-MCNC: NEGATIVE MG/DL
CLARITY, POC: CLEAR
COLOR UR: YELLOW
GLUCOSE UR STRIP-MCNC: NEGATIVE MG/DL
KETONES UR QL: NEGATIVE
LEUKOCYTE EST, POC: ABNORMAL
NITRITE UR-MCNC: NEGATIVE MG/ML
PH UR: 6.5 [PH] (ref 5–8)
PROT UR STRIP-MCNC: NEGATIVE MG/DL
RBC # UR STRIP: NEGATIVE /UL
SP GR UR: 1 (ref 1–1.03)
UROBILINOGEN UR QL: NORMAL

## 2023-10-20 PROCEDURE — 1159F MED LIST DOCD IN RCRD: CPT | Performed by: NURSE PRACTITIONER

## 2023-10-20 PROCEDURE — 99214 OFFICE O/P EST MOD 30 MIN: CPT | Performed by: NURSE PRACTITIONER

## 2023-10-20 PROCEDURE — 1160F RVW MEDS BY RX/DR IN RCRD: CPT | Performed by: NURSE PRACTITIONER

## 2023-10-20 PROCEDURE — 81002 URINALYSIS NONAUTO W/O SCOPE: CPT | Performed by: NURSE PRACTITIONER

## 2023-10-20 NOTE — PROGRESS NOTES
Subjective     Radha Luke is a 89 y.o.. female.     Patient here today with her son. Patient's son stating his mother has been getting more confused as or recent. [facility]. Patient's son stating Patient's  recently had a stroke and is not living with her at this time and he thinks this is adding to his mother's confusion. Patient son is also concerned his mother may be getting depressed, feeling sad about  status.     Patient has history of memory loss since 2018 but at this time appears to be worsening.    Patient and Patient's son stating Patient is eating ok but with less appetite. Patient has lost 7 lb since May 2023.     Altered Mental Status  This is a chronic problem. Episode onset: 5 years. Progression since onset: worsening over the past 2 weeks. Associated symptoms include fatigue. Pertinent negatives include no fever, headaches, nausea or vomiting.       The following portions of the patient's history were reviewed and updated as appropriate: allergies, current medications, past family history, past medical history, past social history, past surgical history and problem list.    Past Medical History:   Diagnosis Date    Acute systolic congestive heart failure     Allergic 1970?    Aortic stenosis     Arthritis     Breast cancer     Locally recurrent left breast    Cataract 2005?    CKD (chronic kidney disease), stage III     Colon polyp 1997?    Coronary artery disease 2018    Cough     Diverticulosis 2012    Dizziness     Drug therapy     Edema     GERD (gastroesophageal reflux disease)     History of breast cancer     LEFT    Hyperlipidemia     Hypertension     Hypokalemia     LBBB (left bundle branch block)     Mild tricuspid regurgitation     with a right ventricular systolic pressure of 62    Moderate mitral regurgitation     Pleural effusion     Pneumonia 2018       Past Surgical History:   Procedure Laterality Date    APPENDECTOMY  1943    BREAST  "BIOPSY      BREAST LUMPECTOMY Left 1995    COLONOSCOPY N/A 5/12/2016    Procedure: COLONOSCOPY;  Surgeon: Israel Vance MD;  Location: [facility] ENDOSCOPY;  Service:     HYSTERECTOMY  1964    MASTECTOMY Left 2011    THORACENTESIS Bilateral        Review of Systems   Constitutional:  Positive for fatigue. Negative for fever.   Respiratory: Negative.     Cardiovascular: Negative.    Gastrointestinal:  Negative for nausea and vomiting.   Neurological:  Negative for headaches.   Psychiatric/Behavioral:  Positive for agitation, confusion and dysphoric mood. Negative for sleep disturbance. The patient is nervous/anxious.        Allergies   Allergen Reactions    Levofloxacin Swelling     Swelling in legs and ankle and tongue    Penicillins Hives       Objective     Vitals:    10/20/23 0954   BP: 114/68   Pulse: 68   Resp: 16   Temp: 98.1 °F (36.7 °C)   SpO2: 98%   Weight: 48.5 kg (107 lb)   Height: 152.4 cm (60\")     Body mass index is 20.9 kg/m².    Physical Exam  Vitals reviewed.   HENT:      Head: Normocephalic.   Eyes:      Pupils: Pupils are equal, round, and reactive to light.   Cardiovascular:      Rate and Rhythm: Normal rate and regular rhythm.   Pulmonary:      Effort: Pulmonary effort is normal.      Breath sounds: Normal breath sounds.   Skin:     General: Skin is warm and dry.   Neurological:      Mental Status: She is alert.      Cranial Nerves: No dysarthria or facial asymmetry.   Psychiatric:         Mood and Affect: Affect is blunt.         Speech: Speech normal.         Behavior: Behavior is cooperative.         Cognition and Memory: Cognition is impaired. Memory is impaired.           Current Outpatient Medications:     allopurinol (ZYLOPRIM) 100 MG tablet, Take 1 tablet by mouth Daily., Disp: 90 tablet, Rfl: 3    anastrozole (ARIMIDEX) 1 MG tablet, , Disp: , Rfl:     Calcium Carb-Cholecalciferol (CALCIUM 1000 + D PO), Take 1 tablet by mouth Daily., Disp: , Rfl:     carvedilol (COREG) 3.125 MG tablet, " Take 1 tablet by mouth 2 (Two) Times a Day., Disp: 180 tablet, Rfl: 3    furosemide (LASIX) 40 MG tablet, TAKE 1 TABLET IN THE MORNING  AND TAKE 1/2 TABLET  IN  THE  AFTERNOON, Disp: 135 tablet, Rfl: 3    magnesium gluconate (MAGONATE) 500 MG tablet, Take 500 mg by mouth Daily., Disp: , Rfl:     Multiple Vitamins-Minerals (CENTRUM SILVER 50+WOMEN PO), Take 1 tablet by mouth Daily., Disp: , Rfl:     pantoprazole (PROTONIX) 40 MG EC tablet, Take 1 tablet by mouth Daily., Disp: 90 tablet, Rfl: 3    potassium chloride (MICRO-K) 10 MEQ CR capsule, Take 2 capsules by mouth Daily., Disp: 180 capsule, Rfl: 3    simvastatin (ZOCOR) 20 MG tablet, Take 1 tablet by mouth Daily., Disp: 90 tablet, Rfl: 3    triamcinolone (KENALOG) 0.1 % cream, , Disp: , Rfl:     Recent Results (from the past 2016 hour(s))   POCT urinalysis dipstick, manual    Collection Time: 10/20/23 10:45 AM    Specimen: Urine   Result Value Ref Range    Color Yellow Yellow, Straw, Dark Yellow, Priscila    Clarity, UA Clear Clear    Glucose, UA Negative Negative mg/dL    Bilirubin Negative Negative    Ketones, UA Negative Negative    Specific Gravity  1.005 1.005 - 1.030    Blood, UA Negative Negative    pH, Urine 6.5 5.0 - 8.0    Protein, POC Negative Negative mg/dL    Urobilinogen, UA Normal Normal, 0.2 E.U./dL    Leukocytes Trace (A) Negative    Nitrite, UA Negative Negative         Diagnoses and all orders for this visit:    1. Confusion (Primary)  -     POCT urinalysis dipstick, manual  -     Ambulatory Referral to Neurology    2. Memory loss  -     Ambulatory Referral to Neurology    3. Decreased appetite        Patient Instructions   U/A negative for infection today. Due believe Patient with increase memory loss and confusion, referring to neurology for further evaluation (r/o alzheimer's/dementia). Discussed in depth with Patient's son. Discussed monitoring Patient's diet and make sure she is eating a healthy diet and drinking her liquids through out  day.    Return if symptoms worsen or fail to improve, for Dr. Chandra as needed/as recommended.

## 2023-10-24 NOTE — PATIENT INSTRUCTIONS
U/A negative for infection today. Due believe Patient with increase memory loss and confusion, referring to neurology for further evaluation (r/o alzheimer's/dementia). Discussed in depth with Patient's son. Discussed monitoring Patient's diet and make sure she is eating a healthy diet and drinking her liquids through out day.

## 2023-10-27 ENCOUNTER — PATIENT MESSAGE (OUTPATIENT)
Dept: FAMILY MEDICINE CLINIC | Facility: CLINIC | Age: 88
End: 2023-10-27
Payer: MEDICARE

## 2023-10-30 ENCOUNTER — TELEPHONE (OUTPATIENT)
Dept: FAMILY MEDICINE CLINIC | Facility: CLINIC | Age: 88
End: 2023-10-30
Payer: MEDICARE

## 2023-10-30 NOTE — TELEPHONE ENCOUNTER
[facility], [facility], for PT. Son, Alfonzo Luke IS REQUESTING INFO BE SENT TO GET PT.     Fax:[phone]    P) [phone]      Called Thaddeus and [facility] is going to have her return call to discuss what orders she is requesting.

## 2023-10-30 NOTE — TELEPHONE ENCOUNTER
Aicha with [facility] returned call. She is needing orders for PT to eval.treat. Son requesting order due to mom having back pain per Aicha.    Ok to order?     patient

## 2023-10-31 DIAGNOSIS — M54.9 BACK PAIN, UNSPECIFIED BACK LOCATION, UNSPECIFIED BACK PAIN LATERALITY, UNSPECIFIED CHRONICITY: ICD-10-CM

## 2023-10-31 DIAGNOSIS — M54.31 RIGHT SCIATIC NERVE PAIN: Primary | ICD-10-CM

## 2023-10-31 NOTE — TELEPHONE ENCOUNTER
Son is following back up regarding [facility] PT request for back pain.    Approve or what for  to return?

## 2023-11-07 DIAGNOSIS — M10.9 GOUT, UNSPECIFIED CAUSE, UNSPECIFIED CHRONICITY, UNSPECIFIED SITE: ICD-10-CM

## 2023-11-07 DIAGNOSIS — E78.2 MIXED HYPERLIPIDEMIA: Primary | ICD-10-CM

## 2023-11-07 DIAGNOSIS — R73.02 IMPAIRED GLUCOSE TOLERANCE: ICD-10-CM

## 2023-11-07 DIAGNOSIS — Z00.00 HEALTH CARE MAINTENANCE: ICD-10-CM

## 2023-11-07 DIAGNOSIS — R79.89 TSH ELEVATION: ICD-10-CM

## 2023-11-17 ENCOUNTER — TELEPHONE (OUTPATIENT)
Dept: FAMILY MEDICINE CLINIC | Facility: CLINIC | Age: 88
End: 2023-11-17

## 2023-11-17 NOTE — TELEPHONE ENCOUNTER
Caller: [facility]    Relationship: Child    Best call back number: [phone]     What is the best time to reach you: ANY    Who are you requesting to speak with (clinical staff, provider,  specific staff member): CLINICAL STAFF     Do you know the name of the person who called:      What was the call regarding: PATIENT SON [facility] CALLED PATIENT HAS BEEN MOVED TO THE [place] AT [facility] AND THEY RECEIVED H & P FROM OUR OFFICE.  HE REVIEWED THE H & P AND WHAT MEDICATIONS SHE HAD BEEN ON AT [facility] DOES NOT MATCH.  HE NEEDS TO TALK WITH DR VILLANUEVA ABOUT THE MEDICATIONS THAT PATIENT IS SUPPOSE TO BE ON AND WAS IS CORRECT.  YOU REQUESTING FOR CALL BACK TODAY.      Is it okay if the provider responds through MyChart:

## 2023-11-20 ENCOUNTER — TELEPHONE (OUTPATIENT)
Dept: FAMILY MEDICINE CLINIC | Facility: CLINIC | Age: 88
End: 2023-11-20

## 2023-11-27 ENCOUNTER — HOSPITAL ENCOUNTER (INPATIENT)
Facility: HOSPITAL | Age: 88
LOS: 3 days | Discharge: NURSING, REHAB OR LONG-TERM CARE | DRG: 689 | End: 2023-12-02
Attending: EMERGENCY MEDICINE
Payer: MEDICARE

## 2023-11-27 ENCOUNTER — APPOINTMENT (OUTPATIENT)
Dept: GENERAL RADIOLOGY | Facility: HOSPITAL | Age: 88
DRG: 689 | End: 2023-11-27
Payer: MEDICARE

## 2023-11-27 DIAGNOSIS — E86.9 VOLUME DEPLETION: ICD-10-CM

## 2023-11-27 DIAGNOSIS — N17.9 AKI (ACUTE KIDNEY INJURY): ICD-10-CM

## 2023-11-27 DIAGNOSIS — E83.52 HYPERCALCEMIA: Primary | ICD-10-CM

## 2023-11-27 PROBLEM — I50.42 CHRONIC COMBINED SYSTOLIC AND DIASTOLIC HEART FAILURE: Status: ACTIVE | Noted: 2019-05-31

## 2023-11-27 LAB
ANION GAP SERPL CALCULATED.3IONS-SCNC: 15 MMOL/L (ref 5–15)
BACTERIA UR QL AUTO: ABNORMAL /HPF
BASOPHILS # BLD AUTO: 0.02 10*3/MM3 (ref 0–0.2)
BASOPHILS NFR BLD AUTO: 0.1 % (ref 0–1.5)
BILIRUB UR QL STRIP: NEGATIVE
BUN SERPL-MCNC: 86 MG/DL (ref 8–23)
BUN/CREAT SERPL: 47.3 (ref 7–25)
CALCIUM SPEC-SCNC: 11.5 MG/DL (ref 8.2–9.6)
CHLORIDE SERPL-SCNC: 95 MMOL/L (ref 98–107)
CLARITY UR: CLEAR
CO2 SERPL-SCNC: 29 MMOL/L (ref 22–29)
COLOR UR: YELLOW
CREAT SERPL-MCNC: 1.82 MG/DL (ref 0.57–1)
D-LACTATE SERPL-SCNC: 1.5 MMOL/L (ref 0.5–2)
DEPRECATED RDW RBC AUTO: 41.6 FL (ref 37–54)
EGFRCR SERPLBLD CKD-EPI 2021: 26.1 ML/MIN/1.73
EOSINOPHIL # BLD AUTO: 0.08 10*3/MM3 (ref 0–0.4)
EOSINOPHIL NFR BLD AUTO: 0.5 % (ref 0.3–6.2)
ERYTHROCYTE [DISTWIDTH] IN BLOOD BY AUTOMATED COUNT: 12.4 % (ref 12.3–15.4)
GLUCOSE SERPL-MCNC: 153 MG/DL (ref 65–99)
GLUCOSE UR STRIP-MCNC: NEGATIVE MG/DL
HCT VFR BLD AUTO: 47 % (ref 34–46.6)
HGB BLD-MCNC: 16.4 G/DL (ref 12–15.9)
HGB UR QL STRIP.AUTO: NEGATIVE
HYALINE CASTS UR QL AUTO: ABNORMAL /LPF
IMM GRANULOCYTES # BLD AUTO: 0.05 10*3/MM3 (ref 0–0.05)
IMM GRANULOCYTES NFR BLD AUTO: 0.3 % (ref 0–0.5)
KETONES UR QL STRIP: NEGATIVE
LEUKOCYTE ESTERASE UR QL STRIP.AUTO: ABNORMAL
LYMPHOCYTES # BLD AUTO: 3.19 10*3/MM3 (ref 0.7–3.1)
LYMPHOCYTES NFR BLD AUTO: 21.9 % (ref 19.6–45.3)
MCH RBC QN AUTO: 32.3 PG (ref 26.6–33)
MCHC RBC AUTO-ENTMCNC: 34.9 G/DL (ref 31.5–35.7)
MCV RBC AUTO: 92.7 FL (ref 79–97)
MONOCYTES # BLD AUTO: 1.04 10*3/MM3 (ref 0.1–0.9)
MONOCYTES NFR BLD AUTO: 7.1 % (ref 5–12)
NEUTROPHILS NFR BLD AUTO: 10.19 10*3/MM3 (ref 1.7–7)
NEUTROPHILS NFR BLD AUTO: 70.1 % (ref 42.7–76)
NITRITE UR QL STRIP: POSITIVE
NRBC BLD AUTO-RTO: 0 /100 WBC (ref 0–0.2)
PH UR STRIP.AUTO: 6 [PH] (ref 5–8)
PLATELET # BLD AUTO: 186 10*3/MM3 (ref 140–450)
PMV BLD AUTO: 10.7 FL (ref 6–12)
POTASSIUM SERPL-SCNC: 4.5 MMOL/L (ref 3.5–5.2)
PROT UR QL STRIP: NEGATIVE
PTH-INTACT SERPL-MCNC: 24.4 PG/ML (ref 15–65)
RBC # BLD AUTO: 5.07 10*6/MM3 (ref 3.77–5.28)
RBC # UR STRIP: ABNORMAL /HPF
REF LAB TEST METHOD: ABNORMAL
SODIUM SERPL-SCNC: 139 MMOL/L (ref 136–145)
SP GR UR STRIP: 1.01 (ref 1–1.03)
SQUAMOUS #/AREA URNS HPF: ABNORMAL /HPF
UROBILINOGEN UR QL STRIP: ABNORMAL
WBC # UR STRIP: ABNORMAL /HPF
WBC NRBC COR # BLD AUTO: 14.57 10*3/MM3 (ref 3.4–10.8)

## 2023-11-27 PROCEDURE — 93010 ELECTROCARDIOGRAM REPORT: CPT | Performed by: INTERNAL MEDICINE

## 2023-11-27 PROCEDURE — 87086 URINE CULTURE/COLONY COUNT: CPT | Performed by: STUDENT IN AN ORGANIZED HEALTH CARE EDUCATION/TRAINING PROGRAM

## 2023-11-27 PROCEDURE — 87077 CULTURE AEROBIC IDENTIFY: CPT | Performed by: STUDENT IN AN ORGANIZED HEALTH CARE EDUCATION/TRAINING PROGRAM

## 2023-11-27 PROCEDURE — G0378 HOSPITAL OBSERVATION PER HR: HCPCS

## 2023-11-27 PROCEDURE — 81001 URINALYSIS AUTO W/SCOPE: CPT | Performed by: STUDENT IN AN ORGANIZED HEALTH CARE EDUCATION/TRAINING PROGRAM

## 2023-11-27 PROCEDURE — 36415 COLL VENOUS BLD VENIPUNCTURE: CPT | Performed by: STUDENT IN AN ORGANIZED HEALTH CARE EDUCATION/TRAINING PROGRAM

## 2023-11-27 PROCEDURE — 85025 COMPLETE CBC W/AUTO DIFF WBC: CPT | Performed by: EMERGENCY MEDICINE

## 2023-11-27 PROCEDURE — 93005 ELECTROCARDIOGRAM TRACING: CPT | Performed by: EMERGENCY MEDICINE

## 2023-11-27 PROCEDURE — 83605 ASSAY OF LACTIC ACID: CPT | Performed by: STUDENT IN AN ORGANIZED HEALTH CARE EDUCATION/TRAINING PROGRAM

## 2023-11-27 PROCEDURE — 83970 ASSAY OF PARATHORMONE: CPT | Performed by: STUDENT IN AN ORGANIZED HEALTH CARE EDUCATION/TRAINING PROGRAM

## 2023-11-27 PROCEDURE — 80048 BASIC METABOLIC PNL TOTAL CA: CPT | Performed by: EMERGENCY MEDICINE

## 2023-11-27 PROCEDURE — 99285 EMERGENCY DEPT VISIT HI MDM: CPT

## 2023-11-27 PROCEDURE — 25810000003 SODIUM CHLORIDE 0.9 % SOLUTION: Performed by: STUDENT IN AN ORGANIZED HEALTH CARE EDUCATION/TRAINING PROGRAM

## 2023-11-27 PROCEDURE — 25010000002 CEFTRIAXONE PER 250 MG: Performed by: STUDENT IN AN ORGANIZED HEALTH CARE EDUCATION/TRAINING PROGRAM

## 2023-11-27 PROCEDURE — 71045 X-RAY EXAM CHEST 1 VIEW: CPT

## 2023-11-27 PROCEDURE — 87040 BLOOD CULTURE FOR BACTERIA: CPT | Performed by: STUDENT IN AN ORGANIZED HEALTH CARE EDUCATION/TRAINING PROGRAM

## 2023-11-27 PROCEDURE — 25810000003 SODIUM CHLORIDE 0.9 % SOLUTION: Performed by: EMERGENCY MEDICINE

## 2023-11-27 PROCEDURE — 87186 SC STD MICRODIL/AGAR DIL: CPT | Performed by: STUDENT IN AN ORGANIZED HEALTH CARE EDUCATION/TRAINING PROGRAM

## 2023-11-27 RX ORDER — MIRTAZAPINE 15 MG/1
15 TABLET, ORALLY DISINTEGRATING ORAL NIGHTLY
COMMUNITY
End: 2023-12-02 | Stop reason: HOSPADM

## 2023-11-27 RX ORDER — SODIUM CHLORIDE 0.9 % (FLUSH) 0.9 %
10 SYRINGE (ML) INJECTION AS NEEDED
Status: DISCONTINUED | OUTPATIENT
Start: 2023-11-27 | End: 2023-12-02 | Stop reason: HOSPADM

## 2023-11-27 RX ORDER — SODIUM CHLORIDE 9 MG/ML
40 INJECTION, SOLUTION INTRAVENOUS AS NEEDED
Status: DISCONTINUED | OUTPATIENT
Start: 2023-11-27 | End: 2023-12-02 | Stop reason: HOSPADM

## 2023-11-27 RX ORDER — ALLOPURINOL 100 MG/1
100 TABLET ORAL DAILY
Status: DISCONTINUED | OUTPATIENT
Start: 2023-11-27 | End: 2023-12-02 | Stop reason: HOSPADM

## 2023-11-27 RX ORDER — BISACODYL 5 MG/1
5 TABLET, DELAYED RELEASE ORAL DAILY PRN
Status: DISCONTINUED | OUTPATIENT
Start: 2023-11-27 | End: 2023-11-28

## 2023-11-27 RX ORDER — AMOXICILLIN 250 MG
2 CAPSULE ORAL 2 TIMES DAILY
Status: DISCONTINUED | OUTPATIENT
Start: 2023-11-27 | End: 2023-11-28

## 2023-11-27 RX ORDER — MIRTAZAPINE 15 MG/1
15 TABLET, ORALLY DISINTEGRATING ORAL NIGHTLY
Status: DISCONTINUED | OUTPATIENT
Start: 2023-11-27 | End: 2023-11-28

## 2023-11-27 RX ORDER — POLYETHYLENE GLYCOL 3350 17 G/17G
17 POWDER, FOR SOLUTION ORAL DAILY PRN
Status: DISCONTINUED | OUTPATIENT
Start: 2023-11-27 | End: 2023-11-28

## 2023-11-27 RX ORDER — SODIUM CHLORIDE 9 MG/ML
50 INJECTION, SOLUTION INTRAVENOUS CONTINUOUS
Status: DISCONTINUED | OUTPATIENT
Start: 2023-11-27 | End: 2023-11-29

## 2023-11-27 RX ORDER — BISACODYL 10 MG
10 SUPPOSITORY, RECTAL RECTAL DAILY PRN
Status: DISCONTINUED | OUTPATIENT
Start: 2023-11-27 | End: 2023-11-28

## 2023-11-27 RX ORDER — SODIUM CHLORIDE 0.9 % (FLUSH) 0.9 %
10 SYRINGE (ML) INJECTION EVERY 12 HOURS SCHEDULED
Status: DISCONTINUED | OUTPATIENT
Start: 2023-11-27 | End: 2023-12-02 | Stop reason: HOSPADM

## 2023-11-27 RX ORDER — CARVEDILOL 3.12 MG/1
3.12 TABLET ORAL 2 TIMES DAILY
Status: DISCONTINUED | OUTPATIENT
Start: 2023-11-27 | End: 2023-12-02 | Stop reason: HOSPADM

## 2023-11-27 RX ADMIN — Medication 10 ML: at 20:21

## 2023-11-27 RX ADMIN — CEFTRIAXONE SODIUM 1000 MG: 1 INJECTION, POWDER, FOR SOLUTION INTRAMUSCULAR; INTRAVENOUS at 20:20

## 2023-11-27 RX ADMIN — SODIUM CHLORIDE 100 ML/HR: 9 INJECTION, SOLUTION INTRAVENOUS at 17:47

## 2023-11-27 RX ADMIN — CARVEDILOL 3.12 MG: 3.12 TABLET, FILM COATED ORAL at 20:20

## 2023-11-27 RX ADMIN — ALLOPURINOL 100 MG: 100 TABLET ORAL at 20:21

## 2023-11-27 RX ADMIN — DOCUSATE SODIUM 50MG AND SENNOSIDES 8.6MG 2 TABLET: 8.6; 5 TABLET, FILM COATED ORAL at 20:20

## 2023-11-27 RX ADMIN — SODIUM CHLORIDE 500 ML: 9 INJECTION, SOLUTION INTRAVENOUS at 15:50

## 2023-11-27 RX ADMIN — MIRTAZAPINE 15 MG: 15 TABLET, ORALLY DISINTEGRATING ORAL at 20:21

## 2023-11-27 RX ADMIN — SODIUM CHLORIDE 500 ML: 9 INJECTION, SOLUTION INTRAVENOUS at 13:56

## 2023-11-27 NOTE — ED NOTES
Nursing report ED to floor  [facility]  89 y.o.  female    HPI :   Chief Complaint   Patient presents with    Abnormal Lab       Admitting doctor:   Fredo Currie MD    Admitting diagnosis:   The primary encounter diagnosis was Hypercalcemia. Diagnoses of Volume depletion and WILFRIDO (acute kidney injury) were also pertinent to this visit.    Code status:   Current Code Status       Date Active Code Status Order ID Comments User Context       Prior            Allergies:   Levofloxacin and Penicillins    Isolation:   No active isolations    Intake and Output  No intake or output data in the 24 hours ending 11/27/23 1526    Weight:   There were no vitals filed for this visit.    Most recent vitals:   Vitals:    11/27/23 1255 11/27/23 1501   BP: 90/60 152/63   Pulse: 78 69   Resp: 16    Temp: 97.5 °F (36.4 °C)    SpO2: 98% 99%       Active LDAs/IV Access:   Lines, Drains & Airways       Active LDAs       Name Placement date Placement time Site Days    Peripheral IV 11/27/23 1356 Anterior;Proximal;Right Forearm 11/27/23  1356  Forearm  less than 1                    Labs (abnormal labs have a star):   Labs Reviewed   BASIC METABOLIC PANEL - Abnormal; Notable for the following components:       Result Value    Glucose 153 (*)     BUN 86 (*)     Creatinine 1.82 (*)     Chloride 95 (*)     Calcium 11.5 (*)     BUN/Creatinine Ratio 47.3 (*)     eGFR 26.1 (*)     All other components within normal limits    Narrative:     GFR Normal >60  Chronic Kidney Disease <60  Kidney Failure <15    The GFR formula is only valid for adults with stable renal function between ages 18 and 70.   CBC WITH AUTO DIFFERENTIAL - Abnormal; Notable for the following components:    WBC 14.57 (*)     Hemoglobin 16.4 (*)     Hematocrit 47.0 (*)     Neutrophils, Absolute 10.19 (*)     Lymphocytes, Absolute 3.19 (*)     Monocytes, Absolute 1.04 (*)     All other components within normal limits   CBC AND DIFFERENTIAL    Narrative:     The following orders  were created for panel order CBC & Differential.  Procedure                               Abnormality         Status                     ---------                               -----------         ------                     CBC Auto Differential[[id number]]        Abnormal            Final result                 Please view results for these tests on the individual orders.       EKG:   ECG 12 Lead Electrolyte Imbalance   Preliminary Result   HEART RATE= 70  bpm   RR Interval= 857  ms   ND Interval= 193  ms   P Horizontal Axis= -5  deg   P Front Axis= 55  deg   QRSD Interval= 158  ms   QT Interval= 488  ms   QTcB= 527  ms   QRS Axis= -3  deg   T Wave Axis= 217  deg   - ABNORMAL ECG -   Sinus rhythm   Atrial premature complex   Probable left atrial enlargement   Left bundle branch block   Electronically Signed By:    Date and Time of Study: 2023-11-27 14:47:08          Meds given in ED:   Medications   sodium chloride 0.9 % flush 10 mL (has no administration in time range)   sodium chloride 0.9 % bolus 500 mL (has no administration in time range)   sodium chloride 0.9 % bolus 500 mL (500 mL Intravenous New Bag 11/27/23 1356)       Imaging results:  XR Chest 1 View    Result Date: 11/27/2023  No focal pulmonary consolidation or mass identified. Follow-up/further evaluation can be obtained as clinical indications persist.  This report was finalized on 11/27/2023 1:45 PM by Dr. Nicho Singh M.D on Workstation: [employer]       Ambulatory status:   Assist x1    Social issues:   Social History     Socioeconomic History    Marital status:      Spouse name: Yves    Years of education: High School   Tobacco Use    Smoking status: Never    Smokeless tobacco: Never    Tobacco comments:     no caffeine   Vaping Use    Vaping Use: Never used   Substance and Sexual Activity    Alcohol use: Not Currently     Comment: occassional    Drug use: No    Sexual activity: Not Currently     Partners: Male       NIH Stroke Scale:        Irene Pearson RN  11/27/23 15:26 EST

## 2023-11-27 NOTE — ED TRIAGE NOTES
Pt from [facility] [place] via ems, called for abnormal labs BUN 90, staff does report pt is more lethargic than usual, has dementia but is ambulatory and conversive

## 2023-11-27 NOTE — H&P
Patient Name:  Radha Luke  YOB: 1933  MRN:  [id number]  Admit Date:  11/27/2023  Patient Care Team:  Blanco Chandra MD as PCP - General (Internal Medicine)  Code, Yves REY II, MD as Consulting Physician (Hematology and Oncology)      Subjective   History Present Illness     Chief Complaint   Patient presents with    Abnormal Lab     This is a 89-year-old woman with a history of hypertension, hyperlipidemia, heart failure, CKD who presents to the hospital from assisted living facility with failure to thrive.  Reportedly she has had very poor p.o. intake which is caused her to lose 10 pounds of weight recently.  Laboratory work-up was done at her facility which revealed that she had a BUN of 90.  Creatinine was also noted to be 1.82, which is increased from her previous baseline approximately 1.5.  Otherwise she had a leukocytosis to 15.   Chest x-ray was unremarkable. UA consistent with UTI.     She received 1000 mL of IV fluids in the emergency department.        Personal History     Past Medical History:   Diagnosis Date    Acute systolic congestive heart failure     Allergic 1970?    Aortic stenosis     Arthritis     Breast cancer     Locally recurrent left breast    Cataract 2005?    CKD (chronic kidney disease), stage III     Colon polyp 1997?    Coronary artery disease 2018    Cough     Diverticulosis 2012    Dizziness     Drug therapy     Edema     GERD (gastroesophageal reflux disease)     History of breast cancer     LEFT    Hyperlipidemia     Hypertension     Hypokalemia     LBBB (left bundle branch block)     Mild tricuspid regurgitation     with a right ventricular systolic pressure of 62    Moderate mitral regurgitation     Pleural effusion     Pneumonia 2018     Past Surgical History:   Procedure Laterality Date    APPENDECTOMY  1943    BREAST BIOPSY      BREAST LUMPECTOMY Left 1995    COLONOSCOPY N/A 5/12/2016    Procedure: COLONOSCOPY;  Surgeon: Israel Vance MD;   Location: [facility] ENDOSCOPY;  Service:     HYSTERECTOMY  1964    MASTECTOMY Left 2011    THORACENTESIS Bilateral      Family History   Problem Relation Age of Onset    Hypertension Mother     Cancer Neg Hx      Social History     Tobacco Use    Smoking status: Never    Smokeless tobacco: Never    Tobacco comments:     no caffeine   Vaping Use    Vaping Use: Never used   Substance Use Topics    Alcohol use: Not Currently     Comment: occassional    Drug use: No     No current facility-administered medications on file prior to encounter.     Current Outpatient Medications on File Prior to Encounter   Medication Sig Dispense Refill    allopurinol (ZYLOPRIM) 100 MG tablet Take 1 tablet by mouth Daily. 90 tablet 3    anastrozole (ARIMIDEX) 1 MG tablet       Calcium Carb-Cholecalciferol (CALCIUM 1000 + D PO) Take 1 tablet by mouth Daily.      carvedilol (COREG) 3.125 MG tablet Take 1 tablet by mouth 2 (Two) Times a Day. 180 tablet 3    furosemide (LASIX) 40 MG tablet TAKE 1 TABLET IN THE MORNING  AND TAKE 1/2 TABLET  IN  THE  AFTERNOON 135 tablet 3    magnesium gluconate (MAGONATE) 500 MG tablet Take 500 mg by mouth Daily.      Multiple Vitamins-Minerals (CENTRUM SILVER 50+WOMEN PO) Take 1 tablet by mouth Daily.      potassium chloride (MICRO-K) 10 MEQ CR capsule Take 2 capsules by mouth Daily. 180 capsule 3    simvastatin (ZOCOR) 20 MG tablet Take 1 tablet by mouth Daily. 90 tablet 3    mirtazapine (REMERON SOL-TAB) 15 MG disintegrating tablet Place 1 tablet on the tongue Every Night.       Allergies   Allergen Reactions    Levofloxacin Swelling     Swelling in legs and ankle and tongue    Penicillins Hives       Objective    Objective     Vital Signs  Temp:  [97.5 °F (36.4 °C)] 97.5 °F (36.4 °C)  Heart Rate:  [69-78] 72  Resp:  [16] 16  BP: ()/(54-63) 148/54  SpO2:  [98 %-100 %] 100 %  on   ;   Device (Oxygen Therapy): room air  Body mass index is 20.24 kg/m².    Physical Exam  Constitutional:       General: She is  not in acute distress.     Comments: Elderly, frail   HENT:      Head: Normocephalic.   Eyes:      Extraocular Movements: Extraocular movements intact.      Pupils: Pupils are equal, round, and reactive to light.   Cardiovascular:      Rate and Rhythm: Normal rate and regular rhythm.   Pulmonary:      Effort: Pulmonary effort is normal. No respiratory distress.   Abdominal:      General: There is no distension.      Palpations: Abdomen is soft.      Tenderness: There is no abdominal tenderness.   Musculoskeletal:      Right lower leg: No edema.      Left lower leg: No edema.   Skin:     General: Skin is warm and dry.   Neurological:      General: No focal deficit present.      Mental Status: She is oriented to person, place, and time.         Results Review:  I reviewed the patient's new clinical results.  I reviewed the patient's new imaging results and agree with the interpretation.  I reviewed the patient's other test results and agree with the interpretation  I personally viewed and interpreted the patient's EKG/Telemetry data  Discussed with ED provider.    Lab Results (last 24 hours)       Procedure Component Value Units Date/Time    Basic Metabolic Panel [[id number]]  (Abnormal) Collected: 11/27/23 1353    Specimen: Blood Updated: 11/27/23 1428     Glucose 153 mg/dL      BUN 86 mg/dL      Creatinine 1.82 mg/dL      Sodium 139 mmol/L      Potassium 4.5 mmol/L      Comment: Specimen hemolyzed.  Result may be falsely elevated.        Chloride 95 mmol/L      CO2 29.0 mmol/L      Calcium 11.5 mg/dL      BUN/Creatinine Ratio 47.3     Anion Gap 15.0 mmol/L      eGFR 26.1 mL/min/1.73     Narrative:      GFR Normal >60  Chronic Kidney Disease <60  Kidney Failure <15    The GFR formula is only valid for adults with stable renal function between ages 18 and 70.    CBC & Differential [[id number]]  (Abnormal) Collected: 11/27/23 1353    Specimen: Blood Updated: 11/27/23 3983    Narrative:      The following orders were  created for panel order CBC & Differential.  Procedure                               Abnormality         Status                     ---------                               -----------         ------                     CBC Auto Differential[[id number]]        Abnormal            Final result                 Please view results for these tests on the individual orders.    CBC Auto Differential [[id number]]  (Abnormal) Collected: 11/27/23 1353    Specimen: Blood Updated: 11/27/23 1409     WBC 14.57 10*3/mm3      RBC 5.07 10*6/mm3      Hemoglobin 16.4 g/dL      Hematocrit 47.0 %      MCV 92.7 fL      MCH 32.3 pg      MCHC 34.9 g/dL      RDW 12.4 %      RDW-SD 41.6 fl      MPV 10.7 fL      Platelets 186 10*3/mm3      Neutrophil % 70.1 %      Lymphocyte % 21.9 %      Monocyte % 7.1 %      Eosinophil % 0.5 %      Basophil % 0.1 %      Immature Grans % 0.3 %      Neutrophils, Absolute 10.19 10*3/mm3      Lymphocytes, Absolute 3.19 10*3/mm3      Monocytes, Absolute 1.04 10*3/mm3      Eosinophils, Absolute 0.08 10*3/mm3      Basophils, Absolute 0.02 10*3/mm3      Immature Grans, Absolute 0.05 10*3/mm3      nRBC 0.0 /100 WBC     Urinalysis With Culture If Indicated - Urine, Clean Catch [[id number]]  (Abnormal) Collected: 11/27/23 1559    Specimen: Urine, Clean Catch Updated: 11/27/23 1613     Color, UA Yellow     Appearance, UA Clear     pH, UA 6.0     Specific Gravity, UA 1.015     Glucose, UA Negative     Ketones, UA Negative     Bilirubin, UA Negative     Blood, UA Negative     Protein, UA Negative     Leuk Esterase, UA Large (3+)     Nitrite, UA Positive     Urobilinogen, UA 0.2 E.U./dL    Narrative:      In absence of clinical symptoms, the presence of pyuria, bacteria, and/or nitrites on the urinalysis result does not correlate with infection.    Urinalysis, Microscopic Only - Urine, Clean Catch [[id number]]  (Abnormal) Collected: 11/27/23 1559    Specimen: Urine, Clean Catch Updated: 11/27/23 1613     RBC, UA 0-2 /HPF       WBC, UA 21-50 /HPF      Bacteria, UA 2+ /HPF      Squamous Epithelial Cells, UA 0-2 /HPF      Hyaline Casts, UA None Seen /LPF      Methodology Automated Microscopy    Urine Culture - Urine, Urine, Clean Catch [[id number]] Collected: 11/27/23 1559    Specimen: Urine, Clean Catch Updated: 11/27/23 1613    PTH, Intact [[id number]] Collected: 11/27/23 1730    Specimen: Blood Updated: 11/27/23 1804            No results found for this or any previous visit.    Imaging Results (Last 24 Hours)       Procedure Component Value Units Date/Time    XR Chest 1 View [[id number]] Collected: 11/27/23 1344     Updated: 11/27/23 1348    Narrative:      XR CHEST 1 VW-     HISTORY: Female who is 89 years-old, weight loss     TECHNIQUE: Frontal view of the chest     COMPARISON: 1/30/2018     FINDINGS: The heart size is normal. Aorta is calcified. Pulmonary  vasculature is unremarkable. No focal pulmonary consolidation, pleural  effusion, or pneumothorax. Skinfolds are apparent. No acute osseous  process.       Impression:      No focal pulmonary consolidation or mass identified.  Follow-up/further evaluation can be obtained as clinical indications  persist.     This report was finalized on 11/27/2023 1:45 PM by Dr. Nicho Singh M.D on Workstation: [id number]               Results for orders placed during the hospital encounter of 03/09/21    Adult Transthoracic Echo Complete w/ Color, Spectral and Contrast if Necessary Per Protocol    Interpretation Summary  · Left ventricular wall thickness is consistent with mild to moderate concentric hypertrophy.  · Estimated left ventricular EF = 45% Left ventricular systolic function is low normal.  · Left ventricular diastolic function is consistent with (grade Ia w/high LAP) impaired relaxation.  · There is moderate calcification of the aortic valve. Mild to moderate aortic valve regurgitation is present. Moderate aortic valve stenosis is present. Peak velocity of the flow distal to the aortic  valve is 366.5 cm/s. Aortic valve maximum pressure gradient is 53.7 mmHg. Aortic valve mean pressure gradient is 26.8 mmHg. Aortic valve area is 0.63 cm2. Aortic valve dimensionless index is 0.20 .  · Moderate mitral annular calcification is present. Mild mitral valve regurgitation is present      ECG 12 Lead Electrolyte Imbalance   Preliminary Result   HEART RATE= 70  bpm   RR Interval= 857  ms   NV Interval= 193  ms   P Horizontal Axis= -5  deg   P Front Axis= 55  deg   QRSD Interval= 158  ms   QT Interval= 488  ms   QTcB= 527  ms   QRS Axis= -3  deg   T Wave Axis= 217  deg   - ABNORMAL ECG -   Sinus rhythm   Atrial premature complex   Probable left atrial enlargement   Left bundle branch block   Electronically Signed By:    Date and Time of Study: 2023-11-27 14:47:08                 Assessment/Plan     Active Hospital Problems    Diagnosis  POA    **WILFRIDO (acute kidney injury) [N17.9]  Yes      Resolved Hospital Problems   No resolved problems to display.     Urinary tract infection  Urinalysis showed 21-50 WBCs, large leukocyte esterase, positive nitrites, and 2+ bacteria  Started on ceftriaxone, anticipate 3 day course  Check lactic acid and blood cultures     Hypercalcemia  Check PTH, administer IV fluids  Monitor calcium levels    Chronic kidney disease  Scr is actually around baseline, maybe slightly elevated  Continue gentle IV fluids     Combined systolic and diastolic heart failure  Coronary artery disease  Hypertension  Echocardiogram in February 2021 showed an EF of 45% and grade 1 diastolic dysfunction  Hold Lasix for now, monitor volume status  Continue coreg    Gout  Resume allopurinol    History of Breast cancer      I discussed the patient's findings and my recommendations with patient and ED provider.    VTE Prophylaxis - SCDs.  Code Status - Limited code (no CPR, no intubation).       Fredo Currie MD  [facility]  11/27/23  18:31 EST

## 2023-11-27 NOTE — ED PROVIDER NOTES
EMERGENCY DEPARTMENT ENCOUNTER    Room Number:  35/35  PCP: Blanco Chandra MD      HPI:  Chief Complaint: Elevated BUN  A complete HPI/ROS/PMH/PSH/SH/FH are unobtainable due to: None  Context: Radha Luke is a 89 y.o. female who presents to the ED c/o elevated BUN.  Patient recently moved into the [facility] for her care.  This that she has had very poor p.o. intake and which is caused her to lose 10 pounds recently.  She had labs checked yesterday at her facility which was found to have a critically high BUN of 90.  Patient herself denies having any pain and states that she wants to go home.  She has a history of CHF and is on Lasix.          PAST MEDICAL HISTORY  Active Ambulatory Problems     Diagnosis Date Noted    Disorder of aorta 05/15/2016    Chronic kidney disease 05/15/2016    Diverticulosis of intestine 05/15/2016    Gastroesophageal reflux disease with esophagitis 05/15/2016    Mixed hyperlipidemia 05/15/2016    Primary hypertension 05/15/2016    Age-related osteoporosis without current pathological fracture 05/15/2016    Health care maintenance 05/15/2016    History of left breast cancer 05/18/2016    Malignant neoplasm of overlapping sites of left female breast 08/25/2016    Pain of right sacroiliac joint 11/16/2016    Impaired glucose tolerance 12/16/2016    Left bundle branch block 01/08/2018    Congestive heart failure due to valvular disease 01/24/2018    Memory loss 04/09/2018    Gout 12/03/2018    Foot pain 03/26/2019    Vertigo 03/26/2019    Chronic systolic congestive heart failure 05/31/2019    Nonrheumatic mitral valve regurgitation 05/31/2019    Nonrheumatic aortic valve insufficiency 01/17/2020    Aortic stenosis, moderate 02/10/2021    TSH elevation 09/22/2021    Breast cancer 10/20/2023    Short-term memory loss 02/05/2018    Confusion 10/20/2023    Decreased appetite 10/20/2023     Resolved Ambulatory Problems     Diagnosis Date Noted    Cough 12/20/2017    CHF  exacerbation 01/30/2018    Acute congestive heart failure 01/30/2018    Nonrheumatic aortic valve stenosis 05/31/2019     Past Medical History:   Diagnosis Date    Acute systolic congestive heart failure     Allergic 1970?    Aortic stenosis     Arthritis     Cataract 2005?    CKD (chronic kidney disease), stage III     Colon polyp 1997?    Coronary artery disease 2018    Diverticulosis 2012    Dizziness     Drug therapy     Edema     GERD (gastroesophageal reflux disease)     History of breast cancer     Hyperlipidemia     Hypertension     Hypokalemia     LBBB (left bundle branch block)     Mild tricuspid regurgitation     Moderate mitral regurgitation     Pleural effusion     Pneumonia 2018         PAST SURGICAL HISTORY  Past Surgical History:   Procedure Laterality Date    APPENDECTOMY  1943    BREAST BIOPSY      BREAST LUMPECTOMY Left 1995    COLONOSCOPY N/A 5/12/2016    Procedure: COLONOSCOPY;  Surgeon: Israel Vance MD;  Location: [facility] ENDOSCOPY;  Service:     HYSTERECTOMY  1964    MASTECTOMY Left 2011    THORACENTESIS Bilateral          FAMILY HISTORY  Family History   Problem Relation Age of Onset    Hypertension Mother     Cancer Neg Hx          SOCIAL HISTORY  Social History     Socioeconomic History    Marital status:      Spouse name: Yves    Years of education: High School   Tobacco Use    Smoking status: Never    Smokeless tobacco: Never    Tobacco comments:     no caffeine   Vaping Use    Vaping Use: Never used   Substance and Sexual Activity    Alcohol use: Not Currently     Comment: occassional    Drug use: No    Sexual activity: Not Currently     Partners: Male         ALLERGIES  Levofloxacin and Penicillins        REVIEW OF SYSTEMS  Review of Systems     All systems reviewed and negative except for those discussed in HPI.       PHYSICAL EXAM  ED Triage Vitals [11/27/23 1255]   Temp Heart Rate Resp BP SpO2   97.5 °F (36.4 °C) 78 16 90/60 98 %      Temp src Heart Rate Source Patient  Position BP Location FiO2 (%)   -- -- -- -- --       Physical Exam      GENERAL: no acute distress  HENT: nares patent  EYES: no scleral icterus  CV: regular rhythm, normal rate with systolic murmur at the right upper sternal border  RESPIRATORY: normal effort, clear to auscultation bilaterally  ABDOMEN: soft, nontender  MUSCULOSKELETAL: no deformity  NEURO: alert, moves all extremities, follows commands  PSYCH:  calm, cooperative  SKIN: warm, dry    Vital signs and nursing notes reviewed.          LAB RESULTS  No results found for this or any previous visit (from the past 24 hour(s)).    Ordered the above labs and reviewed the results.        RADIOLOGY  No Radiology Exams Resulted Within Past 24 Hours    Ordered the above noted radiological studies. Reviewed by me in PACS.          PROCEDURES  Procedures        MEDICATIONS GIVEN IN ER  Medications   sodium chloride 0.9 % flush 10 mL (has no administration in time range)   sodium chloride 0.9 % bolus 500 mL (has no administration in time range)         MEDICAL DECISION MAKING, PROGRESS, and CONSULTS    Discussion below represents my analysis of pertinent findings related to patient's condition, differential diagnosis, treatment plan and final disposition.      Orders placed during this visit:  Orders Placed This Encounter   Procedures    Basic Metabolic Panel    CBC Auto Differential    Insert Peripheral IV    CBC & Differential         Additional sources:  - Discussed/obtained information from independent historians: Family at bedside including , son, daughter-in-law  Additional information was obtained to confirm the patient's history.        Differential diagnosis:    Dehydration, GI bleed, underlying infectious process        Independent interpretation of labs, radiology studies, and discussions with consultants:  ED Course as of 11/27/23 1629   Mon Nov 27, 2023   1329 On medical chart review, reviewed Dr. Earl's most recent cardiology note on 3/16/2023.   Patient is history of CHF with last ejection fraction 28% in 2018.  Started on low-dose of carvedilol.  Later in 2018, ejection fraction improved to 45 to 50%.  She had grade 1A diastolic dysfunction with mild aortic regurgitation and moderate aortic stenosis.  March 2020 she had a stable ejection fraction at 45%. [TD]   1415 WBC(!): 14.57 [TD]   1415 Hemoglobin(!): 16.4 [TD]   1436 BUN(!): 86 [TD]   1436 Creatinine(!): 1.82 [TD]   1436 Calcium(!): 11.5 [TD]   1502 I discussed the case with Dr. Currie, hospitalist.  We reviewed the patient's labs and history.  He will admit. [TD]      ED Course User Index  [TD] Emanuel Rain II, MD           DIAGNOSIS  Final diagnoses:   Hypercalcemia   Volume depletion   WILFRIDO (acute kidney injury)         DISPOSITION  Admit      Latest Documented Vital Signs:  As of 13:26 EST  BP- 90/60 HR- 78 Temp- 97.5 °F (36.4 °C) O2 sat- 98%      --    Please note that portions of this were completed with a voice recognition program.       Note Disclaimer: At [facility], we believe that sharing information builds trust and better relationships. You are receiving this note because you are receiving care at [facility] or recently visited. It is possible you will see health information before a provider has talked with you about it. This kind of information can be easy to misunderstand. To help you fully understand what it means for your health, we urge you to discuss this note with your provider.         Emanuel Rain II, MD  11/27/23 1557

## 2023-11-27 NOTE — PLAN OF CARE
Goal Outcome Evaluation:                Outcome Evaluation: New pt from ER, pt is A&O to self, iv fluids given, family at bedside, vss, will continue to monitor rest of shift.

## 2023-11-28 PROBLEM — E83.52 HYPERCALCEMIA: Status: ACTIVE | Noted: 2023-11-28

## 2023-11-28 PROBLEM — N39.0 ACUTE UTI (URINARY TRACT INFECTION): Status: ACTIVE | Noted: 2023-11-28

## 2023-11-28 PROBLEM — N18.32 STAGE 3B CHRONIC KIDNEY DISEASE: Status: ACTIVE | Noted: 2023-11-28

## 2023-11-28 LAB
ANION GAP SERPL CALCULATED.3IONS-SCNC: 11 MMOL/L (ref 5–15)
BUN SERPL-MCNC: 65 MG/DL (ref 8–23)
BUN/CREAT SERPL: 48.5 (ref 7–25)
CA-I BLD-MCNC: 5 MG/DL (ref 4.6–5.4)
CA-I SERPL ISE-MCNC: 1.25 MMOL/L (ref 1.15–1.35)
CALCIUM SPEC-SCNC: 9.2 MG/DL (ref 8.2–9.6)
CHLORIDE SERPL-SCNC: 108 MMOL/L (ref 98–107)
CO2 SERPL-SCNC: 26 MMOL/L (ref 22–29)
CREAT SERPL-MCNC: 1.34 MG/DL (ref 0.57–1)
EGFRCR SERPLBLD CKD-EPI 2021: 37.7 ML/MIN/1.73
GLUCOSE SERPL-MCNC: 122 MG/DL (ref 65–99)
POTASSIUM SERPL-SCNC: 3.5 MMOL/L (ref 3.5–5.2)
QT INTERVAL: 488 MS
QTC INTERVAL: 527 MS
SODIUM SERPL-SCNC: 145 MMOL/L (ref 136–145)

## 2023-11-28 PROCEDURE — G0378 HOSPITAL OBSERVATION PER HR: HCPCS

## 2023-11-28 PROCEDURE — 25010000002 CEFTRIAXONE PER 250 MG: Performed by: STUDENT IN AN ORGANIZED HEALTH CARE EDUCATION/TRAINING PROGRAM

## 2023-11-28 PROCEDURE — 97162 PT EVAL MOD COMPLEX 30 MIN: CPT

## 2023-11-28 PROCEDURE — 97166 OT EVAL MOD COMPLEX 45 MIN: CPT

## 2023-11-28 PROCEDURE — 97530 THERAPEUTIC ACTIVITIES: CPT

## 2023-11-28 PROCEDURE — 97535 SELF CARE MNGMENT TRAINING: CPT

## 2023-11-28 PROCEDURE — 25810000003 SODIUM CHLORIDE 0.9 % SOLUTION: Performed by: STUDENT IN AN ORGANIZED HEALTH CARE EDUCATION/TRAINING PROGRAM

## 2023-11-28 PROCEDURE — 80048 BASIC METABOLIC PNL TOTAL CA: CPT | Performed by: STUDENT IN AN ORGANIZED HEALTH CARE EDUCATION/TRAINING PROGRAM

## 2023-11-28 PROCEDURE — 82330 ASSAY OF CALCIUM: CPT | Performed by: STUDENT IN AN ORGANIZED HEALTH CARE EDUCATION/TRAINING PROGRAM

## 2023-11-28 RX ORDER — ACETAMINOPHEN 325 MG/1
650 TABLET ORAL EVERY 6 HOURS PRN
Status: DISCONTINUED | OUTPATIENT
Start: 2023-11-28 | End: 2023-12-02 | Stop reason: HOSPADM

## 2023-11-28 RX ADMIN — CARVEDILOL 3.12 MG: 3.12 TABLET, FILM COATED ORAL at 08:16

## 2023-11-28 RX ADMIN — DOCUSATE SODIUM 50MG AND SENNOSIDES 8.6MG 2 TABLET: 8.6; 5 TABLET, FILM COATED ORAL at 08:16

## 2023-11-28 RX ADMIN — ALLOPURINOL 100 MG: 100 TABLET ORAL at 08:16

## 2023-11-28 RX ADMIN — SODIUM CHLORIDE 100 ML/HR: 9 INJECTION, SOLUTION INTRAVENOUS at 04:40

## 2023-11-28 RX ADMIN — SODIUM CHLORIDE 100 ML/HR: 9 INJECTION, SOLUTION INTRAVENOUS at 14:23

## 2023-11-28 RX ADMIN — CARVEDILOL 3.12 MG: 3.12 TABLET, FILM COATED ORAL at 20:29

## 2023-11-28 RX ADMIN — Medication 10 ML: at 20:29

## 2023-11-28 RX ADMIN — CEFTRIAXONE SODIUM 1000 MG: 1 INJECTION, POWDER, FOR SOLUTION INTRAMUSCULAR; INTRAVENOUS at 18:35

## 2023-11-28 RX ADMIN — Medication 10 ML: at 08:16

## 2023-11-28 NOTE — CASE MANAGEMENT/SOCIAL WORK
Discharge Planning Assessment  [facility]     Patient Name: Radha Luke  MRN: [id number]  Today's Date: 11/28/2023    Admit Date: 11/27/2023    Plan: Return back to [facility]   Discharge Needs Assessment       Row Name 11/28/23 1644       Living Environment    People in Home facility resident    Current Living Arrangements extended care facility    Potentially Unsafe Housing Conditions none    Primary Care Provided by spouse/significant other;other (see comments)    Provides Primary Care For no one, unable/limited ability to care for self    Family Caregiver if Needed spouse;child(mare), adult    Quality of Family Relationships helpful;involved;supportive    Able to Return to Prior Arrangements yes       Resource/Environmental Concerns    Resource/Environmental Concerns none    Transportation Concerns none       Transition Planning    Patient/Family Anticipates Transition to home with help/services;inpatient rehabilitation facility    Patient/Family Anticipated Services at Transition other (see comments)    Transportation Anticipated family or friend will provide;health plan transportation       Discharge Needs Assessment    Readmission Within the Last 30 Days no previous admission in last 30 days    Equipment Currently Used at Home walker, rolling    Concerns to be Addressed discharge planning;adjustment to diagnosis/illness;decision-making    Anticipated Changes Related to Illness inability to care for self    Equipment Needed After Discharge other (see comments)    Discharge Facility/Level of Care Needs home with home health;nursing facility, skilled    Current Discharge Risk chronically ill;cognitively impaired                   Discharge Plan       Row Name 11/28/23 3242       Plan    Plan Return back to [facility]    Roadmap to Recovery Yes    Patient/Family in Agreement with Plan yes    Provided Post Acute Provider List? Yes    Post Acute Provider List Nursing Home;Home  Health    Provided Post Acute Provider Quality & Resource List? Refused    Delivered To Support Person    Method of Delivery In person    Plan Comments Pt sleeping. [facility] spoke with pts spouse Yves and son Alfonzo [phone] at bedside, introduced self and explained CCP role. Verified facesheet and confirmed pt resides at [facility]. The pt uses their PCP and pharmacy is [facility]. Living will on file, has spouse and Alfonzo listed as HCS. Prior to admission pt was IADL with rolling walker. Pt uses no other assistive devices. They don't believe pt has gone to SNF in the past. CCP discussed dc planning and they are hoping pt can return back to [facility] with onsite therapy. Alfonzo is aware they also have SNF onsite. CCP contact provided. Spoke with [facility], pt is from  and she will follow. Packet in ccp office. Tari GONZALEZ/SUMIT                  Continued Care and Services - Admitted Since 11/27/2023       Destination       Service Provider Request Status Selected Services Address Phone Fax Patient Preferred    [facility] [place] Pending - Request Sent N/A [address] , [facility] [address] [phone] --                  Expected Discharge Date and Time       Expected Discharge Date Expected Discharge Time    Nov 29, 2023            Demographic Summary       Row Name 11/28/23 1641       General Information    Admission Type observation    Referral Source admission list    Reason for Consult discharge planning    Preferred Language English       Contact Information    Permission Granted to Share Info With facility ;family/designee                   Functional Status    No documentation.                  Psychosocial    No documentation.                  Abuse/Neglect    No documentation.                  Legal    No documentation.                  Substance Abuse    No documentation.                  Patient Forms    No  documentation.                     Dilcia Monzon RN

## 2023-11-28 NOTE — PLAN OF CARE
Goal Outcome Evaluation:  Plan of Care Reviewed With: patient, spouse           Outcome Evaluation: Pt is a 89 yo female admitted with lethargy and decreased PO intake. Workup for UTI, WILFRIDO. Pt with hx of dementia, htn, chf. She recently transitioned to ECF at [facility] [place], her  reports she typically is ambulatory with a walker. Today, pt very lethargic. Pt soiled in urine at arrival. She required min A for rolling R and L, max A for supine<>sit. She completed sit<>stand with min/mod A x2 and was able to take a few lateral steps with min/mod A x2 and HHA. She appears to be functioning below her baseline level and may benefit from skilled PT services to address mobility deficits including balance, strength, and endurance. Rec SNF at d/c.      Anticipated Discharge Disposition (PT): skilled nursing facility

## 2023-11-28 NOTE — PROGRESS NOTES
Name: Radha DELATORRE Luke ADMIT: 2023   : 3/14/1933  PCP: Blanco Chandra MD    MRN: [id number] LOS: 0 days   AGE/SEX: 89 y.o. female  ROOM: [facility]     Subjective   Subjective   Still very somnolent. Was able to eat a small amount of ice cream earlier and drink some water. No V/D. No F/C.        Objective   Objective   Vital Signs  Temp:  [97.5 °F (36.4 °C)-97.9 °F (36.6 °C)] 97.9 °F (36.6 °C)  Heart Rate:  [67-79] 79  Resp:  [16-18] 18  BP: (133-169)/(32-63) 151/63  SpO2:  [99 %-100 %] 100 %  on   ;   Device (Oxygen Therapy): room air  Body mass index is 20.24 kg/m².  Physical Exam  Vitals and nursing note reviewed. Exam conducted with a chaperone present ( and Son).   Constitutional:       General: She is not in acute distress.     Appearance: She is ill-appearing. She is not toxic-appearing or diaphoretic.      Comments: Somnolent   HENT:      Head: Normocephalic.      Mouth/Throat:      Mouth: Mucous membranes are moist.   Eyes:      General:         Right eye: No discharge.         Left eye: No discharge.   Cardiovascular:      Rate and Rhythm: Normal rate and regular rhythm.      Pulses: Normal pulses.   Pulmonary:      Effort: Pulmonary effort is normal. No respiratory distress.      Breath sounds: Normal breath sounds. No wheezing or rales.   Abdominal:      General: Bowel sounds are normal. There is no distension.      Palpations: Abdomen is soft.   Musculoskeletal:         General: No swelling.      Cervical back: Neck supple.   Skin:     General: Skin is warm and dry.      Capillary Refill: Capillary refill takes less than 2 seconds.      Coloration: Skin is pale. Skin is not jaundiced.   Neurological:      Comments: Sleeping    Psychiatric:      Comments: Unable to assess       Results Review     I reviewed the patient's new clinical results.  Results from last 7 days   Lab Units 23  1353   WBC 10*3/mm3 14.57*   HEMOGLOBIN g/dL 16.4*   PLATELETS 10*3/mm3 186     Results from last 7  "days   Lab Units 11/28/23  0334 11/27/23  1353   SODIUM mmol/L 145 139   POTASSIUM mmol/L 3.5 4.5   CHLORIDE mmol/L 108* 95*   CO2 mmol/L 26.0 29.0   BUN mg/dL 65* 86*   CREATININE mg/dL 1.34* 1.82*   GLUCOSE mg/dL 122* 153*   EGFR mL/min/1.73 37.7* 26.1*       Results from last 7 days   Lab Units 11/28/23  0334 11/27/23  1353   CALCIUM mg/dL 9.2 11.5*     Results from last 7 days   Lab Units 11/27/23 2008   LACTATE mmol/L 1.5     No results found for: \"HGBA1C\", \"POCGLU\"    XR Chest 1 View    Result Date: 11/27/2023  No focal pulmonary consolidation or mass identified. Follow-up/further evaluation can be obtained as clinical indications persist.  This report was finalized on 11/27/2023 1:45 PM by Dr. Nicho Singh M.D on Workstation: [email or web address]       I have personally reviewed all medications:  Scheduled Medications  allopurinol, 100 mg, Oral, Daily  carvedilol, 3.125 mg, Oral, BID  cefTRIAXone, 1,000 mg, Intravenous, Q24H  mirtazapine, 15 mg, Oral, Nightly  senna-docusate sodium, 2 tablet, Oral, BID  sodium chloride, 10 mL, Intravenous, Q12H    Infusions  sodium chloride, 100 mL/hr, Last Rate: 100 mL/hr (11/28/23 1424)    Diet  Diet: Regular/House Diet; Texture: Regular Texture (IDDSI 7); Fluid Consistency: Thin (IDDSI 0)    I have personally reviewed:  [x]  Laboratory   [x]  Microbiology   [x]  Radiology   [x]  EKG/Telemetry  []  Cardiology/Vascular   []  Pathology    [x]  Records       Assessment/Plan     Active Hospital Problems    Diagnosis  POA    **Acute UTI (urinary tract infection) [N39.0]  Yes    Stage 3b chronic kidney disease [N18.32]  Yes    Hypercalcemia [E83.52]  Yes    Aortic stenosis, moderate [I35.0]  Yes    Chronic combined systolic and diastolic heart failure [I50.42]  Yes    Left bundle branch block [I44.7]  Yes    Impaired glucose tolerance [R73.02]  Yes    History of left breast cancer [Z85.3]  Not Applicable    Primary hypertension [I10]  Yes    Gastroesophageal reflux disease with " esophagitis [K21.00]  Yes      Resolved Hospital Problems   No resolved problems to display.       89yo woman with HTN, CAD, left breast CA, GERD, chronic combined CHF, CKD3b, aortic stenosis, and impaired glucose tolerance, who presented to ER from assisted living facility with poor po intake, weight loss, leukocytosis, higher than usual Cr. She was diagnosed with UTI and dehydration.    UTI  Continue 3d of Rocephin  Urine culture growing GNR  Blood cultures NGTD    Lethargy  FTT  Just started Remeron yesterday at facility  Given her current somnolence I don't think this is appropriate for now--also don't expect this would help much with appetite, I suspect that her acute illness has much more to do with her decreased PO intake  Hopefully with treatment of her UTI her LOC and appetite will improve    WILFRIDO/CKD3b  Cr at baseline after IVFs overnight  Reduce rate given her h/o CHF, but continue until po intake improves    Hypercalcemia  Resolved with IVFs    HTN  BPs acceptable  Continue Coreg    Impaired glucose tolerance  Sugars acceptable  Monitor as po intake increases    Chronic combined CHF  Dehydrated on admission  Holding Lasix for now  Caution with IVFs    Breast CA  Arimidex on hold for now    SCDs for DVT prophylaxis.  Limited code (no CPR, no intubation).  Discussed with patient, nursing staff, CCP, and care team on multidisciplinary rounds. D/w  and Son at bedside.  Anticipate discharge to SNU facility, timing yet to be determined.      Michael Lou MD  [facility]  11/28/23  15:28 EST

## 2023-11-28 NOTE — PLAN OF CARE
Goal Outcome Evaluation:  Plan of Care Reviewed With: patient, spouse           Outcome Evaluation: Patient is a 89 year old female admitted with lethargy and decreased PO intake. Workup for UTI, WILFRIDO. Patient with history of dementia, HTN, CHF. She recently transitioned to ECF at [facility] at [place], her  who also lives at [facility] reports she typically is ambulatory with a walker. Today, patient very lethargic and reluctant to participate in OT/PT eval. Patient soiled in urine at arrival. She required min A for rolling R and L, max A with supine <> sitting. Patient completed sit <> stand with min/mod A x2 and was able to take a few lateral streps with min/mod A x2 and HHA with verbal cues for body mechanics and weight shifting. She appears to be functioning below her baseline level and may benefit from skilled OT services to address deficits impacting her ability to complete self care including balance, strength, and endurance. Recommend SNF at [place].      Anticipated Discharge Disposition (OT): skilled nursing facility

## 2023-11-28 NOTE — THERAPY EVALUATION
Patient Name: Radha Luke  : 3/14/1933    MRN: [id number]                              Today's Date: 2023       Admit Date: 2023    Visit Dx:     ICD-10-CM ICD-9-CM   1. Hypercalcemia  E83.52 275.42   2. Volume depletion  E86.9 276.50   3. 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    Aortic stenosis     Arthritis     Breast cancer     Locally recurrent left breast    Cataract ?    CKD (chronic kidney disease), stage III     Colon polyp ?    Coronary artery disease 2018    Cough     Diverticulosis 2012    Dizziness     Drug therapy     Edema     GERD (gastroesophageal reflux disease)     History of breast cancer     LEFT    Hyperlipidemia     Hypertension     Hypokalemia     LBBB (left bundle branch block)     Mild tricuspid regurgitation     with a right ventricular systolic pressure of 62    Moderate mitral regurgitation     Pleural effusion     Pneumonia 2018     Past  Surgical History:   Procedure Laterality Date    APPENDECTOMY  1943    BREAST BIOPSY      BREAST LUMPECTOMY Left 1995    COLONOSCOPY N/A 5/12/2016    Procedure: COLONOSCOPY;  Surgeon: Israel Vance MD;  Location: [facility] ENDOSCOPY;  Service:     HYSTERECTOMY  1964    MASTECTOMY Left 2011    THORACENTESIS Bilateral       General Information       Row Name 11/28/23 1501          OT Time and Intention    Document Type evaluation  -     Mode of Treatment co-treatment;physical therapy;occupational therapy;other (see comments)  -       Row Name 11/28/23 1501          General Information    Patient Profile Reviewed yes  -     Existing Precautions/Restrictions fall  -       Row Name 11/28/23 1501          Living Environment    People in Home facility resident  -       Row Name 11/28/23 1501          Home Main Entrance    Number of Stairs, Main Entrance none  -     Stair Railings, Main Entrance none  -       Row Name 11/28/23 1501          Stairs Within Home, Primary    Number of Stairs, Within Home, Primary none  -       Row Name 11/28/23 1501          Cognition    Orientation Status (Cognition) oriented to;person  -       Row Name 11/28/23 1501          Safety Issues, Functional Mobility    Safety Issues Affecting Function (Mobility) insight into deficits/self-awareness;awareness of need for assistance  -     Impairments Affecting Function (Mobility) balance;cognition;endurance/activity tolerance;strength  -               User Key  (r) = Recorded By, (t) = Taken By, (c) = Cosigned By      Initials Name Provider Type     Marry Rouse OT Occupational Therapist                     Mobility/ADL's       Row Name 11/28/23 1502          Bed Mobility    Bed Mobility supine-sit;sit-supine;scooting/bridging;rolling left;rolling right  -     Rolling Left [place] (Bed Mobility) minimum assist (75% patient effort);verbal cues  -     Rolling Right [place] (Bed Mobility) minimum assist (75%  patient effort);verbal cues  -     Scooting/Bridging [place] (Bed Mobility) maximum assist (25% patient effort);2 person assist  -     Supine-Sit [place] (Bed Mobility) verbal cues;maximum assist (25% patient effort);1 person assist  -     Sit-Supine [place] (Bed Mobility) moderate assist (50% patient effort);maximum assist (25% patient effort);1 person assist  -     Assistive Device (Bed Mobility) head of bed elevated;bed rails  -[facility] Name 11/28/23 1502          Sit-Stand Transfer    Sit-Stand [place] (Transfers) minimum assist (75% patient effort);moderate assist (50% patient effort);2 person assist;verbal cues  -LH       Row Name 11/28/23 1502          Functional Mobility    Patient was able to Ambulate no, other medical factors prevent ambulation  -     Reason Patient was unable to Ambulate Excessive Weakness  -LH       Row Name 11/28/23 1502          Activities of Daily Living    BADL Assessment/Intervention toileting  -LH       Row Name 11/28/23 1502          Toileting Assessment/Training    [place] Level (Toileting) toileting skills;adjust/manage clothing;perform perineal hygiene;nonverbal cues (demo/gesture);verbal cues;maximum assist (25% patient effort)  -     Position (Toileting) supine;unsupported sitting  -               User Key  (r) = Recorded By, (t) = Taken By, (c) = Cosigned By      Initials Name Provider Type     Marry Rouse OT Occupational Therapist                   Obj/Interventions       Row Name 11/28/23 1504          Sensory Assessment (Somatosensory)    Sensory Assessment (Somatosensory) sensation intact  -LH       Row Name 11/28/23 1504          Vision Assessment/Intervention    Visual Impairment/Limitations WFL  [facility] Name 11/28/23 1504          Range of Motion Comprehensive    General Range of Motion bilateral upper extremity ROM WFL  -LH       Row Name 11/28/23 1504          Strength Comprehensive (MMT)    General Manual Muscle  Testing (MMT) Assessment upper extremity strength deficits identified  -     Comment, General Manual Muscle Testing (MMT) Assessment BUE 3/5  -[facility] Name 11/28/23 1504          Motor Skills    Motor Skills functional endurance  -     Functional Endurance poor  -[facility] Name 11/28/23 1504          Balance    Balance Assessment sitting static balance;sitting dynamic balance;sit to stand dynamic balance  -     Static Sitting Balance standby assist;verbal cues  -     Dynamic Sitting Balance contact guard;verbal cues;non-verbal cues (demo/gesture)  -     Position, Sitting Balance unsupported;sitting edge of bed  -     Sit to Stand Dynamic Balance minimal assist;2-person assist  -     Static Standing Balance minimal assist;2-person assist;verbal cues  -     Dynamic Standing Balance minimal assist;moderate assist;2-person assist  -     Position/Device Used, Standing Balance unsupported  -     Balance Interventions sitting;standing;occupation based/functional task  -               User Key  (r) = Recorded By, (t) = Taken By, (c) = Cosigned By      Initials Name Provider Type     Marry Rouse OT Occupational Therapist                   Goals/Plan       Row Name 11/28/23 1518          Bed Mobility Goal 1 (OT)    Activity/Assistive Device (Bed Mobility Goal 1, OT) bed mobility activities, all  -     [place] Level/Cues Needed (Bed Mobility Goal 1, OT) modified independence  [facility]     Time Frame (Bed Mobility Goal 1, OT) short term goal (STG);2 weeks  -[facility] Name 11/28/23 1511          Transfer Goal 1 (OT)    Activity/Assistive Device (Transfer Goal 1, OT) transfers, all  -     [place] Level/Cues Needed (Transfer Goal 1, OT) modified independence  -     Time Frame (Transfer Goal 1, OT) short term goal (STG);2 weeks  -     Progress/Outcome (Transfer Goal 1, OT) new goal  -[facility] Name 11/28/23 1512          Dressing Goal 1 (OT)    Activity/Device (Dressing Goal 1,  OT) dressing skills, all;upper body dressing;lower body dressing  -     [place]/Cues Needed (Dressing Goal 1, OT) modified independence  [facility]     Time Frame (Dressing Goal 1, OT) short term goal (STG);2 weeks  -     Progress/Outcome (Dressing Goal 1, OT) new [facility]  -[facility] Name 11/28/23 1518          Toileting Goal 1 (OT)    Activity/Device (Toileting Goal 1, OT) toileting skills, all  -     [place] Level/Cues Needed (Toileting Goal 1, OT) modified independence  [facility]     Time Frame (Toileting Goal 1, OT) short term goal (STG);2 weeks  -     Progress/Outcome (Toileting Goal 1, OT) new [facility]  -       Row Name 11/28/23 1518          Grooming Goal 1 (OT)    Activity/Device (Grooming Goal 1, OT) grooming skills, all  -     [place] (Grooming Goal 1, OT) modified independence  [facility]     Time Frame (Grooming Goal 1, OT) short term goal (STG);2 weeks  -     Progress/Outcome (Grooming Goal 1, OT) new [facility]  -[facility] Name 11/28/23 1518          Therapy Assessment/Plan (OT)    Planned Therapy Interventions (OT) activity tolerance training;strengthening exercise;transfer/mobility retraining;patient/caregiver education/training;BADL retraining;occupation/activity based interventions;functional balance retraining  -               User Key  (r) = Recorded By, (t) = Taken By, (c) = Cosigned By      Initials Name Provider Type     Marry Rouse, OT Occupational Therapist                   Clinical Impression       Row Name 11/28/23 1508          Pain Assessment    Pretreatment Pain Rating 0/10 - no pain  -     Posttreatment Pain Rating 0/10 - no pain  -       Row Name 11/28/23 1508          Plan of Care Review    Plan of Care Reviewed With patient;spouse  -     Outcome Evaluation Patient is a 89 year old female admitted with lethargy and decreased PO intake. Workup for UTI, WILFRIDO. Patient with history of dementia, HTN, CHF. She recently transitioned to ECF at [facility], her   who also lives at [facility] reports she typically is ambulatory with a walker. Today, patient very lethargic and reluctant to participate in OT/PT eval. Patient soiled in urine at arrival. She required min A for rolling R and L, max A with supine <> sitting. Patient completed sit <> stand with min/mod A x2 and was able to take a few lateral streps with min/mod A x2 and HHA with verbal cues for body mechanics and weight shifting. She appears to be functioning below her baseline level and may benefit from skilled OT services to address deficits impacting her ability to complete self care including balance, strength, and endurance. Recommend SNF at [place].  -       Row Name 11/28/23 1508          Therapy Assessment/Plan (OT)    Rehab Potential (OT) good, to achieve stated therapy goals  -     Criteria for Skilled Therapeutic Interventions Met (OT) yes;meets criteria;skilled treatment is necessary  -     Therapy Frequency (OT) 3 times/wk  -       Row Name 11/28/23 1508          Therapy Plan Review/Discharge Plan (OT)    Anticipated Discharge Disposition (OT) skilled nursing facility  -       Row Name 11/28/23 1508          Positioning and Restraints    Pre-Treatment Position in bed  -     Post Treatment Position bed  -     In Bed notified nsg;call light within reach;encouraged to call for assist;exit alarm on;with family/caregiver  -               User Key  (r) = Recorded By, (t) = Taken By, (c) = Cosigned By      Initials Name Provider Type     Marry Rouse, OT Occupational Therapist                   Outcome Measures       Row Name 11/28/23 9709          How much help from another is currently needed...    Putting on and taking off regular lower body clothing? 2  -LH     Bathing (including washing, rinsing, and drying) 2  -LH     Toileting (which includes using toilet bed pan or urinal) 2  -LH     Putting on and taking off regular upper body clothing 2  -LH     Taking care of personal grooming  (such as brushing teeth) 2  -     Eating meals 3  -     AM-PAC 6 Clicks Score (OT) 13  -       Row Name 11/28/23 0957          How much help from another person do you currently need...    Turning from your back to your side while in flat bed without using bedrails? 3  -CW     Moving from lying on back to sitting on the side of a flat bed without bedrails? 2  -CW     Moving to and from a bed to a chair (including a wheelchair)? 2  -CW     Standing up from a chair using your arms (e.g., wheelchair, bedside chair)? 2  -CW     Climbing 3-5 steps with a railing? 1  -CW     To walk in hospital room? 2  -     AM-PAC 6 Clicks Score (PT) 12  -     Highest Level of Mobility Goal 4 --> Transfer to chair/commode  -       Row Name 11/28/23 1519 11/28/23 0957       Functional Assessment    Outcome Measure Options AM-PAC 6 Clicks Daily Activity (OT)  - AM-PAC 6 Clicks Basic Mobility (PT)  -              User Key  (r) = Recorded By, (t) = Taken By, (c) = Cosigned By      Initials Name Provider Type    Celia Bradley, PT Physical Therapist     Marry Rouse, OT Occupational Therapist                    Occupational Therapy Education       Title: PT OT SLP Therapies (In Progress)       Topic: Occupational Therapy (Done)       Point: ADL training (Done)       Description:   Instruct learner(s) on proper safety adaptation and remediation techniques during self care or transfers.   Instruct in proper use of assistive devices.                  Learning Progress Summary             Patient Acceptance, E,TB, VU by  at 11/28/2023 1519    Comment: Instructed in role of OT, discharge planning, importance of activity to reduce deconditioning, caregiver education                         Point: Home exercise program (Done)       Description:   Instruct learner(s) on appropriate technique for monitoring, assisting and/or progressing therapeutic exercises/activities.                  Learning Progress Summary              Patient Acceptance, E,TB, VU by  at 11/28/2023 1519    Comment: Instructed in role of OT, discharge planning, importance of activity to reduce deconditioning, caregiver education                         Point: Precautions (Done)       Description:   Instruct learner(s) on prescribed precautions during self-care and functional transfers.                  Learning Progress Summary             Patient Acceptance, E,TB, VU by  at 11/28/2023 1519    Comment: Instructed in role of OT, discharge planning, importance of activity to reduce deconditioning, caregiver education                         Point: Body mechanics (Done)       Description:   Instruct learner(s) on proper positioning and spine alignment during self-care, functional mobility activities and/or exercises.                  Learning Progress Summary             Patient Acceptance, E,TB, VU by  at 11/28/2023 1519    Comment: Instructed in role of OT, discharge planning, importance of activity to reduce deconditioning, caregiver education                                         User Key       Initials Effective Dates Name Provider Type Discipline     08/31/23 -  Marry Rouse, OT Occupational Therapist OT                  OT Recommendation and Plan  Planned Therapy Interventions (OT): activity tolerance training, strengthening exercise, transfer/mobility retraining, patient/caregiver education/training, BADL retraining, occupation/activity based interventions, functional balance retraining  Therapy Frequency (OT): 3 times/wk  Plan of Care Review  Plan of Care Reviewed With: patient, spouse  Outcome Evaluation: Patient is a 89 year old female admitted with lethargy and decreased PO intake. Workup for UTI, WILFRIDO. Patient with history of dementia, HTN, CHF. She recently transitioned to ECF at [facility] [place], her  who also lives at [facility] reports she typically is ambulatory with a walker. Today, patient very lethargic and reluctant to  participate in OT/PT eval. Patient soiled in urine at arrival. She required min A for rolling R and L, max A with supine <> sitting. Patient completed sit <> stand with min/mod A x2 and was able to take a few lateral streps with min/mod A x2 and HHA with verbal cues for body mechanics and weight shifting. She appears to be functioning below her baseline level and may benefit from skilled OT services to address deficits impacting her ability to complete self care including balance, strength, and endurance. Recommend SNF at [place].     Time Calculation:   Evaluation Complexity (OT)  Review Occupational Profile/Medical/Therapy History Complexity: expanded/moderate complexity  Assessment, Occupational Performance/Identification of Deficit Complexity: 3-5 performance deficits  Clinical Decision Making Complexity (OT): detailed assessment/moderate complexity  Overall Complexity of Evaluation (OT): moderate complexity     Time Calculation- OT       Row Name 11/28/23 1520             Time Calculation- OT    OT Start Time 0834  -      OT Stop Time 0851  -      OT Time Calculation (min) 17 min  -      Total Timed Code Minutes- OT 9 minute(s)  -      OT Non-Billable Time (min) 8 min  -      OT Received On 11/28/23  -      OT - Next Appointment 11/29/23  -      OT Goal Re-Cert Due Date 12/12/23  -         Timed Charges    93840 - OT Self Care/Mgmt Minutes 9  -         Untimed Charges    OT Eval/Re-eval Minutes 8  -LH         Total Minutes    Timed Charges Total Minutes 9  -LH      Untimed Charges Total Minutes 8  -       Total Minutes 17  -LH                User Key  (r) = Recorded By, (t) = Taken By, (c) = Cosigned By      Initials Name Provider Type     Marry Rouse OT Occupational Therapist                  Therapy Charges for Today       Code Description Service Date Service Provider Modifiers Qty    [id number]  OT SELF CARE/MGMT/TRAIN EA 15 MIN 11/28/2023 Marry Rouse OT GO 1    [id number]  OT  EVAL MOD COMPLEXITY 3 11/28/2023 Marry Rouse, OT GO 1                 Marry Rouse, MOLLY  11/28/2023

## 2023-11-28 NOTE — PLAN OF CARE
Goal Outcome Evaluation:  Plan of Care Reviewed With: patient, spouse, son        Progress: no change     IV antibiotic for urinary tract infection. Patient has been resting majority of the shift, but arouses easily and follows commands. Alert to self with history of confusion. Poor oral intake. IV fluids infusing. Patients family updated at bedside.

## 2023-11-28 NOTE — THERAPY EVALUATION
Patient Name: Radha Luke  : 3/14/1933    MRN: [id number]                              Today's Date: 2023       Admit Date: 2023    Visit Dx:     ICD-10-CM ICD-9-CM   1. Hypercalcemia  E83.52 275.42   2. Volume depletion  E86.9 276.50   3. 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    Aortic stenosis     Arthritis     Breast cancer     Locally recurrent left breast    Cataract ?    CKD (chronic kidney disease), stage III     Colon polyp ?    Coronary artery disease 2018    Cough     Diverticulosis 2012    Dizziness     Drug therapy     Edema     GERD (gastroesophageal reflux disease)     History of breast cancer     LEFT    Hyperlipidemia     Hypertension     Hypokalemia     LBBB (left bundle branch block)     Mild tricuspid regurgitation     with a right ventricular systolic pressure of 62    Moderate mitral regurgitation     Pleural effusion     Pneumonia 2018     Past  Surgical History:   Procedure Laterality Date    APPENDECTOMY  1943    BREAST BIOPSY      BREAST LUMPECTOMY Left 1995    COLONOSCOPY N/A 5/12/2016    Procedure: COLONOSCOPY;  Surgeon: Israel Vance MD;  Location: [facility] ENDOSCOPY;  Service:     HYSTERECTOMY  1964    MASTECTOMY Left 2011    THORACENTESIS Bilateral       General Information       Row Name 11/28/23 0949          Physical Therapy Time and Intention    Document Type evaluation  -CW     Mode of Treatment co-treatment;physical therapy;occupational therapy;other (see comments)  -CW       Row Name 11/28/23 0949          General Information    Patient Profile Reviewed yes  -CW     Prior Level of Function min assist:;independent:;transfer;all household mobility  from facility but amb with walker per spouse  -CW     Existing Precautions/Restrictions fall  -CW     Barriers to Rehab previous functional deficit;cognitive status  -CW       Row Name 11/28/23 0949          Living Environment    People in Home facility resident  -CW       Row Name 11/28/23 0949          Home Main Entrance    Number of Stairs, Main Entrance none  -CW       Row Name 11/28/23 0949          Stairs Within Home, Primary    Number of Stairs, Within Home, Primary none  -CW       Row Name 11/28/23 0949          Cognition    Orientation Status (Cognition) oriented to;person  -CW       Row Name 11/28/23 0949          Safety Issues, Functional Mobility    Safety Issues Affecting Function (Mobility) insight into deficits/self-awareness;awareness of need for assistance  -CW     Impairments Affecting Function (Mobility) balance;cognition;endurance/activity tolerance;strength  -CW     Comment, Safety Issues/Impairments (Mobility) Co treatment medically appropriate and necessary due to patient acuity level, activity tolerance and safety of patient and staff. Evaluation established to achieve all goals in POC.  -CW               User Key  (r) = Recorded By, (t) = Taken By, (c) = Cosigned By       Initials Name Provider Type    CW Celia Humphries PT Physical Therapist                   Mobility       Row Name 11/28/23 0943          Bed Mobility    Bed Mobility supine-sit;sit-supine;scooting/bridging;rolling left;rolling right  -CW     Rolling Left [place] (Bed Mobility) minimum assist (75% patient effort);verbal cues  -CW     Rolling Right [place] (Bed Mobility) minimum assist (75% patient effort);verbal cues  -CW     Scooting/Bridging [place] (Bed Mobility) maximum assist (25% patient effort);2 person assist  -CW     Supine-Sit [place] (Bed Mobility) verbal cues;maximum assist (25% patient effort);1 person assist  -CW     Sit-Supine [place] (Bed Mobility) moderate assist (50% patient effort);maximum assist (25% patient effort);1 person assist  -CW     Assistive Device (Bed Mobility) head of bed elevated;bed rails  -CW       Row Name 11/28/23 0984          Sit-Stand Transfer    Sit-Stand [place] (Transfers) minimum assist (75% patient effort);moderate assist (50% patient effort);2 person assist;verbal cues  -CW     Comment, (Sit-Stand Transfer) HHAx2  -CW       Row Name 11/28/23 0922          Gait/Stairs (Locomotion)    [place] Level (Gait) moderate assist (50% patient effort);verbal cues;minimum assist (75% patient effort);2 person assist  -CW     Assistive Device (Gait) other (see comments)  HHA x2  -CW     Distance in Feet (Gait) 4-5 small, shuffled lateral steps to HOB  -CW     Deviations/Abnormal Patterns (Gait) [place] decreased;festinating/shuffling;gait speed decreased;stride length decreased  -CW     Bilateral Gait Deviations forward flexed posture  -CW               User Key  (r) = Recorded By, (t) = Taken By, (c) = Cosigned By      Initials Name Provider Type    Celia Bradley PT Physical Therapist                   Obj/Interventions       Row Name 11/28/23 8636          Range of Motion Comprehensive    General Range of Motion bilateral lower extremity  ROM WFL  -CW       Row Name 11/28/23 0951          Strength Comprehensive (MMT)    Comment, General Manual Muscle Testing (MMT) Assessment Generalized weakness present BLE  -CW       Row Name 11/28/23 0951          Balance    Balance Assessment sitting static balance;sitting dynamic balance;standing static balance;standing dynamic balance  -CW     Static Sitting Balance standby assist  -CW     Dynamic Sitting Balance contact guard  -CW     Static Standing Balance minimal assist;2-person assist  -CW     Dynamic Standing Balance minimal assist;moderate assist;2-person assist  -CW     Comment, Balance HHA  -CW               User Key  (r) = Recorded By, (t) = Taken By, (c) = Cosigned By      Initials Name Provider Type    CW Celia Humphries, PT Physical Therapist                   Goals/Plan       Row Name 11/28/23 0957          Bed Mobility Goal 1 (PT)    Activity/Assistive Device (Bed Mobility Goal 1, PT) bed mobility activities, all  -CW     [place] Level/Cues Needed (Bed Mobility Goal 1, PT) standby assist  -CW     Time Frame (Bed Mobility Goal 1, PT) 2 weeks  -CW       Row Name 11/28/23 0957          Transfer Goal 1 (PT)    Activity/Assistive Device (Transfer Goal 1, PT) sit-to-stand/stand-to-sit;bed-to-chair/chair-to-bed  -CW     [place] Level/Cues Needed (Transfer Goal 1, PT) contact guard required  -CW     Time Frame (Transfer Goal 1, PT) 2 weeks  -CW       Row Name 11/28/23 0957          Gait Training Goal 1 (PT)    Activity/Assistive Device (Gait Training Goal 1, PT) gait (walking locomotion);walker, rolling  -CW     [place] Level (Gait Training Goal 1, PT) contact guard required  -CW     Distance (Gait Training Goal 1, PT) 80'  -CW     Time Frame (Gait Training Goal 1, PT) 2 weeks  -CW       Row Name 11/28/23 0957          Therapy Assessment/Plan (PT)    Planned Therapy Interventions (PT) balance training;bed mobility training;gait training;ROM (range of motion);strengthening;patient/family  education;transfer training  -CW               User Key  (r) = Recorded By, (t) = Taken By, (c) = Cosigned By      Initials Name Provider Type    CW Celia Humphries, PT Physical Therapist                   Clinical Impression       Row Name 11/28/23 0952          Pain    Pretreatment Pain Rating 0/10 - no pain  -CW     Posttreatment Pain Rating 0/10 - no pain  -CW     Pre/Posttreatment Pain Comment only complains of being cold and sleepy  -CW       Row Name 11/28/23 0952          Plan of Care Review    Plan of Care Reviewed With patient;spouse  -CW     Outcome Evaluation Pt is a 89 yo female admitted with lethargy and decreased PO intake. Workup for UTI, WILFRIDO. Pt with hx of dementia, htn, chf. She recently transitioned to ECF at [facility] [place], her  reports she typically is ambulatory with a walker. Today, pt very lethargic. Pt soiled in urine at arrival. She required min A for rolling R and L, max A for supine<>sit. She completed sit<>stand with min/mod A x2 and was able to take a few lateral steps with min/mod A x2 and HHA. She appears to be functioning below her baseline level and may benefit from skilled PT services to address mobility deficits including balance, strength, and endurance. Rec SNF at d/c.  -CW       Row Name 11/28/23 0952          Therapy Assessment/Plan (PT)    Rehab Potential (PT) fair, will monitor progress closely  -CW     Criteria for Skilled Interventions Met (PT) yes  -CW     Therapy Frequency (PT) 5 times/wk  -CW       Row Name 11/28/23 0952          Vital Signs    O2 Delivery Pre Treatment room air  -CW       Row Name 11/28/23 0952          Positioning and Restraints    Pre-Treatment Position in bed  -CW     Post Treatment Position bed  -CW     In Bed notified nsg;call light within reach;encouraged to call for assist;side lying right;exit alarm on;with family/caregiver  -CW               User Key  (r) = Recorded By, (t) = Taken By, (c) = Cosigned By      Initials Name  Provider Type    CW Celia Humphries PT Physical Therapist                   Outcome Measures       Row Name 11/28/23 0957          How much help from another person do you currently need...    Turning from your back to your side while in flat bed without using bedrails? 3  -CW     Moving from lying on back to sitting on the side of a flat bed without bedrails? 2  -CW     Moving to and from a bed to a chair (including a wheelchair)? 2  -CW     Standing up from a chair using your arms (e.g., wheelchair, bedside chair)? 2  -CW     Climbing 3-5 steps with a railing? 1  -CW     To walk in hospital room? 2  -CW     AM-PAC 6 Clicks Score (PT) 12  -CW     Highest Level of Mobility Goal 4 --> Transfer to chair/commode  -CW       Row Name 11/28/23 0957          Functional Assessment    Outcome Measure Options AM-PAC 6 Clicks Basic Mobility (PT)  -CW               User Key  (r) = Recorded By, (t) = Taken By, (c) = Cosigned By      Initials Name Provider Type    CW Celia Humphries PT Physical Therapist                                 Physical Therapy Education       Title: PT OT SLP Therapies (In Progress)       Topic: Physical Therapy (In Progress)       Point: Mobility training (Done)       Learning Progress Summary             Patient Acceptance, E, VU,NR by  at 11/28/2023 0958                         Point: Home exercise program (Not Started)       Learner Progress:  Not documented in this visit.              Point: Body mechanics (Not Started)       Learner Progress:  Not documented in this visit.              Point: Precautions (Not Started)       Learner Progress:  Not documented in this visit.                              User Key       Initials Effective Dates Name Provider Type Discipline     12/13/22 -  Celia Humphries PT Physical Therapist PT                  PT Recommendation and Plan  Planned Therapy Interventions (PT): balance training, bed mobility training, gait training, ROM (range of motion),  strengthening, patient/family education, transfer training  Plan of Care Reviewed With: patient, spouse  Outcome Evaluation: Pt is a 89 yo female admitted with lethargy and decreased PO intake. Workup for UTI, WILFRIDO. Pt with hx of dementia, htn, chf. She recently transitioned to ECF at [facility] [place], her  reports she typically is ambulatory with a walker. Today, pt very lethargic. Pt soiled in urine at arrival. She required min A for rolling R and L, max A for supine<>sit. She completed sit<>stand with min/mod A x2 and was able to take a few lateral steps with min/mod A x2 and HHA. She appears to be functioning below her baseline level and may benefit from skilled PT services to address mobility deficits including balance, strength, and endurance. Rec SNF at d/c.     Time Calculation:         PT Charges       Row Name 11/28/23 0948             Time Calculation    Start Time 0834  -CW      Stop Time 0850  -CW      Time Calculation (min) 16 min  -CW      PT Received On 11/28/23  -CW      PT - Next Appointment 11/29/23  -CW      PT Goal Re-Cert Due Date 12/12/23  -CW         Time Calculation- PT    Total Timed Code Minutes- PT 8 minute(s)  -CW         Timed Charges    90876 - PT Therapeutic Activity Minutes 8  -CW         Total Minutes    Timed Charges Total Minutes 8  -CW       Total Minutes 8  -CW                User Key  (r) = Recorded By, (t) = Taken By, (c) = Cosigned By      Initials Name Provider Type    CW Celia Humphries, PT Physical Therapist                  Therapy Charges for Today       Code Description Service Date Service Provider Modifiers Qty    16795860196 HC PT EVAL MOD COMPLEXITY 3 11/28/2023 Celia Humphries, PT GP 1    [id number]  PT THERAPEUTIC ACT EA 15 MIN 11/28/2023 Celia Humphries, PT GP 1            PT G-Codes  Outcome Measure Options: AM-PAC 6 Clicks Basic Mobility (PT)  AM-PAC 6 Clicks Score (PT): 12  PT Discharge Summary  Anticipated Discharge Disposition (PT):  skilled nursing facility    Celia Humphries, PT  11/28/2023

## 2023-11-28 NOTE — CONSULTS
Nutrition Services    Patient Name:  Radha DELATORRE Luke  YOB: 1933  MRN: [id number]  Admit Date:  11/27/2023  Assessment Date:  11/28/23    Summary: Nutrition Consult per RN admission screen  This is a 89 yo female admitted for failure to thrive, WILFRIDO, UTI, hypercalcemia. Family member reports poor po intake and 10lb wt loss x 1 month.  IVF's at 100ml/hr  Labs: glu 122, BUN 65, cr 1.34    Patient meets ASPEN/AND criteria for nutrition diagnosis of severe malnutrition of acute illness based on: poor po intake and wt loss.    Plan/Recommendations  Good po intake to be encouraged with meals  Will add supplements BID, prefers chocolate  Will continue to monitor po/labs/wt    Will continue to follow clinical course and monitor nutritional needs.       CLINICAL NUTRITION ASSESSMENT      Reason for Assessment MST score 2+, Nurse Admission Screen     Diagnosis/Problem   failure to thrive, WILFRIDO, UTI, hypercalcemia   Medical/Surgical History Past Medical History:   Diagnosis Date    Acute systolic congestive heart failure     Allergic 1970?    Aortic stenosis     Arthritis     Breast cancer     Locally recurrent left breast    Cataract 2005?    CKD (chronic kidney disease), stage III     Colon polyp 1997?    Coronary artery disease 2018    Cough     Diverticulosis 2012    Dizziness     Drug therapy     Edema     GERD (gastroesophageal reflux disease)     History of breast cancer     LEFT    Hyperlipidemia     Hypertension     Hypokalemia     LBBB (left bundle branch block)     Mild tricuspid regurgitation     with a right ventricular systolic pressure of 62    Moderate mitral regurgitation     Pleural effusion     Pneumonia 2018       Past Surgical History:   Procedure Laterality Date    APPENDECTOMY  1943    BREAST BIOPSY      BREAST LUMPECTOMY Left 1995    COLONOSCOPY N/A 5/12/2016    Procedure: COLONOSCOPY;  Surgeon: Israel Vance MD;  Location: [facility];  Service:     HYSTERECTOMY  1964    MASTECTOMY  "Left 2011    THORACENTESIS Bilateral         Anthropometrics        Current Height  Current Weight  BMI kg/m2 Height: 152.4 cm (60\")  Weight: 47 kg (103 lb 9.9 oz) (11/27/23 1715)  Body mass index is 20.24 kg/m².   Adjusted BMI (if applicable)    BMI Category Normal/Healthy (18.4 - 24.9)   Ideal Body Weight (IBW) 100lb   Usual Body Weight (UBW) 115-120    Weight Trend Loss   Weight History Wt Readings from Last 30 Encounters:   11/27/23 1715 47 kg (103 lb 9.9 oz)   11/27/23 1624 43.1 kg (95 lb)   10/20/23 0954 48.5 kg (107 lb)   05/11/23 0913 51.7 kg (114 lb)   03/16/23 0938 51.7 kg (114 lb)   11/08/22 0859 52.2 kg (115 lb)   10/14/22 1427 53 kg (116 lb 12.8 oz)   04/28/22 1030 54.3 kg (119 lb 9.6 oz)   02/16/22 0959 55.3 kg (122 lb)   09/22/21 1358 56.2 kg (124 lb)   03/12/21 1300 57.4 kg (126 lb 9.6 oz)   03/09/21 1041 56.2 kg (124 lb)   02/10/21 1004 56.4 kg (124 lb 6.4 oz)   09/04/20 1316 56.9 kg (125 lb 6.4 oz)   02/06/20 1447 57.2 kg (126 lb)   01/17/20 1243 56.7 kg (125 lb)   08/06/19 1526 55.7 kg (122 lb 12.8 oz)   06/19/19 0948 54 kg (119 lb)   05/31/19 1338 55.1 kg (121 lb 6.4 oz)   03/26/19 1358 55.3 kg (122 lb)   12/03/18 1436 55.3 kg (122 lb)   11/28/18 1351 56.2 kg (123 lb 12.8 oz)   10/24/18 1436 55.7 kg (122 lb 12.8 oz)   06/11/18 1406 56 kg (123 lb 7.2 oz)   05/30/18 1309 53.5 kg (118 lb)   05/30/18 1348 55.4 kg (122 lb 3.2 oz)   04/09/18 1501 55.7 kg (122 lb 12.8 oz)   02/28/18 1104 56.3 kg (124 lb 3.2 oz)   02/06/18 1508 58.5 kg (129 lb)   02/02/18 0600 57.1 kg (125 lb 12.8 oz)   02/01/18 0522 59 kg (130 lb)   01/31/18 0514 59 kg (130 lb)   01/30/18 1511 60.8 kg (134 lb)   01/30/18 1310 62.1 kg (137 lb)      --  Labs       Pertinent Labs    Results from last 7 days   Lab Units 11/28/23  0334 11/27/23  1353   SODIUM mmol/L 145 139   POTASSIUM mmol/L 3.5 4.5   CHLORIDE mmol/L 108* 95*   CO2 mmol/L 26.0 29.0   BUN mg/dL 65* 86*   CREATININE mg/dL 1.34* 1.82*   CALCIUM mg/dL 9.2 11.5*   GLUCOSE mg/dL " 122* 153*     Results from last 7 days   Lab Units 11/27/23  1353   HEMOGLOBIN g/dL 16.4*   HEMATOCRIT % 47.0*   WBC 10*3/mm3 14.57*     Results from last 7 days   Lab Units 11/27/23  1353   PLATELETS 10*3/mm3 186     SARS-CoV-2, JANESSA   Date Value Ref Range Status   10/14/2022 Detected (A) Not Detected Final     Comment:     Patients who have a positive COVID-19 test result may now have  treatment options. Treatment options are available for patients  with mild to moderate symptoms and for hospitalized patients.  Visit our website at [email or web address] for  resources and information.  This nucleic acid amplification test was developed and its performance  characteristics determined by [facility]. Nucleic acid  amplification tests include RT-PCR and TMA. This test has not been  FDA cleared or approved. This test has been authorized by FDA under  an Emergency Use Authorization (EUA). This test is only authorized  for the duration of time the declaration that circumstances exist  justifying the authorization of the emergency use of in vitro  diagnostic tests for detection of SARS-CoV-2 virus and/or diagnosis  of COVID-19 infection under section 564(b)(1) of the Act, 21 U.S.C.  360bbb-3(b) (1), unless the authorization is terminated or revoked  sooner.  When diagnostic testing is negative, the possibility of a false  negative result should be considered in the context of a patient's  recent exposures and the presence of clinical signs and symptoms  consistent with COVID-19. An individual without symptoms of COVID-19  and who is not shedding SARS-CoV-2 virus would expect to have a  negative (not detected) result in this assay.       Lab Results   Component Value Date    HGBA1C 6.20 (H) 05/09/2023          Medications           Scheduled Medications allopurinol, 100 mg, Oral, Daily  carvedilol, 3.125 mg, Oral, BID  cefTRIAXone, 1,000 mg, Intravenous, Q24H  mirtazapine, 15 mg, Oral,  Nightly  senna-docusate sodium, 2 tablet, Oral, BID  sodium chloride, 10 mL, Intravenous, Q12H       Infusions sodium chloride, 100 mL/hr, Last Rate: 100 mL/hr (11/28/23 0815)       PRN Medications   senna-docusate sodium **AND** polyethylene glycol **AND** bisacodyl **AND** bisacodyl    [COMPLETED] Insert Peripheral IV **AND** sodium chloride    sodium chloride    sodium chloride     Physical Findings          General Findings confused, disoriented   Oral/Mouth Cavity WDL   Edema  no edema   Gastrointestinal last bowel movement: 11/26   Skin  bruising   Tubes/Drains/Lines none   NFPE See Malnutrition Severity Assessment, Date Completed: 11/28   --  Malnutrition Severity Assessment      Patient meets criteria for : Severe Malnutrition  Malnutrition Type (last 8 hours)       Malnutrition Severity Assessment       Row Name 11/28/23 0821       Malnutrition Severity Assessment    Malnutrition Type Acute Disease or Injury - Related Malnutrition      Row Name 11/28/23 0821       Insufficient Energy Intake     Insufficient Energy Intake Findings Severe    Insufficient Energy Intake  < or equal to 50% of est. energy requirement for > or equal to 5d)      Row Name 11/28/23 0821       Unintentional Weight Loss     Unintentional Weight Loss Findings Severe    Unintentional Weight Loss  Weight loss greater than 5% in one month      Row Name 11/28/23 0821       Muscle Loss    Temple Region Moderate - slight depression    Clavicle Bone Region Moderate - some protrusion in females, visible in males    Patellar Region Moderate - patella more prominent, less muscle definition around patella      Row Name 11/28/23 0821       Fat Loss    Orbital Region  Moderate -  somewhat hollowness, slightly dark circles      Row Name 11/28/23 0821       Declining Functional Status    Declining Functional Status Findings Measurably Reduced      Row Name 11/28/23 0821       Criteria Met (Must meet criteria for severity in at least 2 of these  categories: M Wasting, Fat Loss, Fluid, Secondary Signs, Wt. Status, Intake)    Patient meets criteria for  Severe Malnutrition                       Estimated/Assessed Needs        Current Weight  Weight: 47 kg (103 lb 9.9 oz) (11/27/23 1715)       Energy Requirements    Weight for Calculation 47 kg   Method for Estimation  30 kcal/kg   EST Needs (kcal/day) 1410       Protein Requirements    Weight for Calculation 47 kg   EST Protein Needs (g/kg) 1.0 gm/kg   EST Daily Needs (g/day) 47       Fluid Requirements     Method for Estimation 1 mL/kcal    EST Needs (mL/day)      Current Nutrition Orders & Evaluation of Intake       Oral Nutrition     Food Allergies NKFA   Current PO Diet Diet: Regular/House Diet; Texture: Regular Texture (IDDSI 7); Fluid Consistency: Thin (IDDSI 0)   Supplement n/a   PO Evaluation     % PO Intake 0%    Factors Affecting Intake: altered mental status, decreased appetite, weakness   --  PES STATEMENT / NUTRITION DIAGNOSIS      Nutrition Dx Problem  Problem: Malnutrition (severe) and Inadequate Oral Intake  Etiology: Medical Diagnosis - failure to thrive, WILFRIDO, UTI and Factors Affecting Nutrition - decrease appetite    Signs/Symptoms: PO intake, NFPE Results, Unintended Weight Change, and Report/Observation     NUTRITION INTERVENTION / PLAN OF CARE      Intervention Goal(s) Reduce/improve symptoms, Disease management/therapy, Increase intake, Maintain weight, and PO intake goal %: 75+         RD Intervention/Action Interview for preferences, Encourage intake, Continue to monitor, Care plan reviewed, and Recommend/order: ONS   --      Prescription/Orders:       PO Diet       Supplements Ensure compact-chocolate      Enteral Nutrition       Parenteral Nutrition    New Prescription Ordered? Yes   --      Monitor/Evaluation Per protocol, PO intake, Pertinent labs, Weight, Skin status   Discharge Plan/Needs Pending clinical course   --    RD to follow per protocol.      Electronically signed  by:  Ivette Bourne RD  11/28/23 08:24 EST

## 2023-11-29 PROBLEM — E86.9 VOLUME DEPLETION: Status: ACTIVE | Noted: 2023-11-29

## 2023-11-29 PROBLEM — F03.90 DEMENTIA WITHOUT BEHAVIORAL DISTURBANCE: Status: ACTIVE | Noted: 2023-11-29

## 2023-11-29 LAB
ALBUMIN SERPL-MCNC: 3.1 G/DL (ref 3.5–5.2)
ALBUMIN/GLOB SERPL: 1.6 G/DL
ALP SERPL-CCNC: 47 U/L (ref 39–117)
ALT SERPL W P-5'-P-CCNC: 9 U/L (ref 1–33)
ANION GAP SERPL CALCULATED.3IONS-SCNC: 11 MMOL/L (ref 5–15)
AST SERPL-CCNC: 18 U/L (ref 1–32)
BACTERIA SPEC AEROBE CULT: ABNORMAL
BILIRUB SERPL-MCNC: 0.4 MG/DL (ref 0–1.2)
BUN SERPL-MCNC: 41 MG/DL (ref 8–23)
BUN/CREAT SERPL: 41.4 (ref 7–25)
CALCIUM SPEC-SCNC: 8.2 MG/DL (ref 8.2–9.6)
CHLORIDE SERPL-SCNC: 113 MMOL/L (ref 98–107)
CO2 SERPL-SCNC: 23 MMOL/L (ref 22–29)
CREAT SERPL-MCNC: 0.99 MG/DL (ref 0.57–1)
DEPRECATED RDW RBC AUTO: 41.3 FL (ref 37–54)
EGFRCR SERPLBLD CKD-EPI 2021: 54.3 ML/MIN/1.73
ERYTHROCYTE [DISTWIDTH] IN BLOOD BY AUTOMATED COUNT: 12 % (ref 12.3–15.4)
GLOBULIN UR ELPH-MCNC: 2 GM/DL
GLUCOSE SERPL-MCNC: 105 MG/DL (ref 65–99)
HCT VFR BLD AUTO: 36.3 % (ref 34–46.6)
HGB BLD-MCNC: 11.8 G/DL (ref 12–15.9)
MAGNESIUM SERPL-MCNC: 2.1 MG/DL (ref 1.6–2.4)
MCH RBC QN AUTO: 31 PG (ref 26.6–33)
MCHC RBC AUTO-ENTMCNC: 32.5 G/DL (ref 31.5–35.7)
MCV RBC AUTO: 95.3 FL (ref 79–97)
PLATELET # BLD AUTO: 121 10*3/MM3 (ref 140–450)
PMV BLD AUTO: 11 FL (ref 6–12)
POTASSIUM SERPL-SCNC: 3.3 MMOL/L (ref 3.5–5.2)
PROT SERPL-MCNC: 5.1 G/DL (ref 6–8.5)
RBC # BLD AUTO: 3.81 10*6/MM3 (ref 3.77–5.28)
SODIUM SERPL-SCNC: 147 MMOL/L (ref 136–145)
TSH SERPL DL<=0.05 MIU/L-ACNC: 1.18 UIU/ML (ref 0.27–4.2)
WBC NRBC COR # BLD AUTO: 9.53 10*3/MM3 (ref 3.4–10.8)

## 2023-11-29 PROCEDURE — 25810000003 SODIUM CHLORIDE 0.9 % SOLUTION: Performed by: HOSPITALIST

## 2023-11-29 PROCEDURE — 97530 THERAPEUTIC ACTIVITIES: CPT

## 2023-11-29 PROCEDURE — 80053 COMPREHEN METABOLIC PANEL: CPT | Performed by: HOSPITALIST

## 2023-11-29 PROCEDURE — 25010000002 CEFTRIAXONE PER 250 MG: Performed by: STUDENT IN AN ORGANIZED HEALTH CARE EDUCATION/TRAINING PROGRAM

## 2023-11-29 PROCEDURE — 85027 COMPLETE CBC AUTOMATED: CPT | Performed by: HOSPITALIST

## 2023-11-29 PROCEDURE — 84443 ASSAY THYROID STIM HORMONE: CPT | Performed by: HOSPITALIST

## 2023-11-29 PROCEDURE — 83735 ASSAY OF MAGNESIUM: CPT | Performed by: HOSPITALIST

## 2023-11-29 RX ORDER — FUROSEMIDE 20 MG/1
20 TABLET ORAL DAILY
Status: DISCONTINUED | OUTPATIENT
Start: 2023-11-30 | End: 2023-12-02 | Stop reason: HOSPADM

## 2023-11-29 RX ORDER — POTASSIUM CHLORIDE 750 MG/1
20 TABLET, FILM COATED, EXTENDED RELEASE ORAL DAILY
Status: DISCONTINUED | OUTPATIENT
Start: 2023-11-30 | End: 2023-12-02 | Stop reason: HOSPADM

## 2023-11-29 RX ORDER — POTASSIUM CHLORIDE 750 MG/1
40 TABLET, FILM COATED, EXTENDED RELEASE ORAL EVERY 4 HOURS
Status: COMPLETED | OUTPATIENT
Start: 2023-11-29 | End: 2023-11-29

## 2023-11-29 RX ADMIN — ALLOPURINOL 100 MG: 100 TABLET ORAL at 08:24

## 2023-11-29 RX ADMIN — POTASSIUM CHLORIDE 40 MEQ: 750 TABLET, EXTENDED RELEASE ORAL at 16:32

## 2023-11-29 RX ADMIN — Medication 10 ML: at 08:24

## 2023-11-29 RX ADMIN — CARVEDILOL 3.12 MG: 3.12 TABLET, FILM COATED ORAL at 21:03

## 2023-11-29 RX ADMIN — CEFTRIAXONE SODIUM 1000 MG: 1 INJECTION, POWDER, FOR SOLUTION INTRAMUSCULAR; INTRAVENOUS at 21:04

## 2023-11-29 RX ADMIN — ACETAMINOPHEN 650 MG: 325 TABLET, FILM COATED ORAL at 16:32

## 2023-11-29 RX ADMIN — SODIUM CHLORIDE 50 ML/HR: 9 INJECTION, SOLUTION INTRAVENOUS at 08:24

## 2023-11-29 RX ADMIN — POTASSIUM CHLORIDE 40 MEQ: 750 TABLET, EXTENDED RELEASE ORAL at 11:51

## 2023-11-29 RX ADMIN — CARVEDILOL 3.12 MG: 3.12 TABLET, FILM COATED ORAL at 08:24

## 2023-11-29 NOTE — THERAPY TREATMENT NOTE
Patient Name: Radha Luke  : 3/14/1933    MRN: [id number]                              Today's Date: 2023       Admit Date: 2023    Visit Dx:     ICD-10-CM ICD-9-CM   1. Hypercalcemia  E83.52 275.42   2. Volume depletion  E86.9 276.50   3. WILFRIDO (acute kidney injury)  N17.9 584.9     Patient Active Problem List   Diagnosis    Disorder of aorta    Diverticulosis of intestine    Gastroesophageal reflux disease with esophagitis    Mixed hyperlipidemia    Primary hypertension    Age-related osteoporosis without current pathological fracture    Health care maintenance    History of left breast cancer    Malignant neoplasm of overlapping sites of left female breast    Pain of right sacroiliac joint    Impaired glucose tolerance    Left bundle branch block    Congestive heart failure due to valvular disease    Memory loss    Gout    Foot pain    Vertigo    Chronic combined systolic and diastolic heart failure    Nonrheumatic mitral valve regurgitation    Nonrheumatic aortic valve insufficiency    Aortic stenosis, moderate    TSH elevation    Breast cancer    Short-term memory loss    Confusion    Decreased appetite    Stage 3b chronic kidney disease    Acute UTI (urinary tract infection)    Hypercalcemia    Volume depletion    Dementia without behavioral disturbance     Past Medical History:   Diagnosis Date    Acute systolic congestive heart failure     Allergic ?    Aortic stenosis     Arthritis     Breast cancer     Locally recurrent left breast    Cataract ?    CKD (chronic kidney disease), stage III     Colon polyp ?    Coronary artery disease 2018    Cough     Diverticulosis 2012    Dizziness     Drug therapy     Edema     GERD (gastroesophageal reflux disease)     History of breast cancer     LEFT    Hyperlipidemia     Hypertension     Hypokalemia     LBBB (left bundle branch block)     Mild tricuspid regurgitation     with a right ventricular systolic pressure of 62    Moderate mitral  regurgitation     Pleural effusion     Pneumonia 2018     Past Surgical History:   Procedure Laterality Date    APPENDECTOMY  1943    BREAST BIOPSY      BREAST LUMPECTOMY Left 1995    COLONOSCOPY N/A 5/12/2016    Procedure: COLONOSCOPY;  Surgeon: Israel Vance MD;  Location: [facility] ENDOSCOPY;  Service:     HYSTERECTOMY  1964    MASTECTOMY Left 2011    THORACENTESIS Bilateral       General Information       Row Name 11/29/23 1532          Physical Therapy Time and Intention    Document Type therapy note (daily note)  -CW     Mode of Treatment physical therapy  -CW       Row Name 11/29/23 1532          General Information    Patient Profile Reviewed yes  -CW     Existing Precautions/Restrictions fall  -CW       Row Name 11/29/23 1532          Cognition    Orientation Status (Cognition) oriented to;person;other (see comments)  very sleepy, cues to stay awake  -CW       Row Name 11/29/23 1532          Safety Issues, Functional Mobility    Impairments Affecting Function (Mobility) balance;cognition;endurance/activity tolerance;strength  -CW               User Key  (r) = Recorded By, (t) = Taken By, (c) = Cosigned By      Initials Name Provider Type    CW Celia Humphries, PT Physical Therapist                   Mobility       Row Name 11/29/23 1532          Bed Mobility    Bed Mobility supine-sit;sit-supine  -CW     Supine-Sit [place] (Bed Mobility) moderate assist (50% patient effort);verbal cues  -CW     Sit-Supine [place] (Bed Mobility) moderate assist (50% patient effort);verbal cues  -CW     Assistive Device (Bed Mobility) head of bed elevated;bed rails  -CW       Row Name 11/29/23 1532          Sit-Stand Transfer    Sit-Stand [place] (Transfers) minimum assist (75% patient effort);2 person assist;verbal cues  -CW     Assistive Device (Sit-Stand Transfers) walker, front-wheeled  -       Row Name 11/29/23 1532          Gait/Stairs (Locomotion)    [place] Level (Gait) minimum assist (75%  patient effort);1 person assist;verbal cues  -CW     Assistive Device (Gait) walker, front-wheeled  -CW     Distance in Feet (Gait) 15'  -CW     Deviations/Abnormal Patterns (Gait) lennox decreased;festinating/shuffling;gait speed decreased;stride length decreased  -CW     Bilateral Gait Deviations forward flexed posture  -CW     Comment, (Gait/Stairs) Small, shuffled steps. Assist with maneuvering walker, strong posterior lean initially in standing  -CW               User Key  (r) = Recorded By, (t) = Taken By, (c) = Cosigned By      Initials Name Provider Type    Celia Bradley PT Physical Therapist                   Obj/Interventions       Row Name 11/29/23 1533          Balance    Balance Assessment standing static balance;standing dynamic balance  -CW     Static Standing Balance minimal assist  -CW     Dynamic Standing Balance minimal assist  -CW     Position/Device Used, Standing Balance supported;walker, front-wheeled  -CW     Comment, Balance posterior lean initially with static standing  -CW               User Key  (r) = Recorded By, (t) = Taken By, (c) = Cosigned By      Initials Name Provider Type    Celia Bradley PT Physical Therapist                   Goals/Plan    No documentation.                  Clinical Impression       Row Name 11/29/23 1534          Pain    Pretreatment Pain Rating 0/10 - no pain  -CW     Posttreatment Pain Rating 0/10 - no pain  -CW       Row Name 11/29/23 1532          Plan of Care Review    Plan of Care Reviewed With patient  -CW     Outcome Evaluation Pt participated with PT this PM. Pt remains very lethargic, requires sternal rub to awake and multiple cues throughout session to keep her eyes open. Cotreat performed to maximize participation due to current medical status. She did have improved activity tolerance today. She stood and ambulated 15' within the room. Extra time required for all tasks. Rec DC to SNF.  -CW       Row Name 11/29/23 1538          Vital  Signs    O2 Delivery Pre Treatment room air  -CW       Row Name 11/29/23 1534          Positioning and Restraints    Pre-Treatment Position in bed  -CW     Post Treatment Position bed  -CW     In Bed notified nsg;call light within reach;encouraged to call for assist;exit alarm on;with family/caregiver;presleywangelina  -               User Key  (r) = Recorded By, (t) = Taken By, (c) = Cosigned By      Initials Name Provider Type    Celia Bradley, RICARDO Physical Therapist                   Outcome Measures       Row Name 11/29/23 1535 11/29/23 1408       How much help from another person do you currently need...    Turning from your back to your side while in flat bed without using bedrails? 3  -CW 3  -SM    Moving from lying on back to sitting on the side of a flat bed without bedrails? 3  -CW 2  -SM    Moving to and from a bed to a chair (including a wheelchair)? 3  -CW 2  -SM    Standing up from a chair using your arms (e.g., wheelchair, bedside chair)? 2  -CW 2  -SM    Climbing 3-5 steps with a railing? 1  -CW 2  -SM    To walk in hospital room? 3  -CW 2  -SM    AM-PAC 6 Clicks Score (PT) 15  -CW 13  -SM    Highest Level of Mobility Goal 4 --> Transfer to chair/commode  -CW 4 --> Transfer to chair/commode  -SM      Row Name 11/29/23 1535          Functional Assessment    Outcome Measure Options AM-PAC 6 Clicks Basic Mobility (PT)  -CW               User Key  (r) = Recorded By, (t) = Taken By, (c) = Cosigned By      Initials Name Provider Type    Jessica Carmichael, RN Registered Nurse    Celia Bradley, RICARDO Physical Therapist                                 Physical Therapy Education       Title: PT OT SLP Therapies (Done)       Topic: Physical Therapy (Done)       Point: Mobility training (Done)       Learning Progress Summary             Patient Acceptance, E, VU,NR by LIZZIE at 11/29/2023 1535    Acceptance, E,TB,D, VU,DU,NR by  at 11/29/2023 0313    Acceptance, E, VU,NR by LIZZIE at 11/28/2023 0935                          Point: Home exercise program (Done)       Learning Progress Summary             Patient Acceptance, E,TB,D, VU,DU,NR by  at 11/29/2023 0313                         Point: Body mechanics (Done)       Learning Progress Summary             Patient Acceptance, E,TB,D, VU,DU,NR by  at 11/29/2023 0313                         Point: Precautions (Done)       Learning Progress Summary             Patient Acceptance, E,TB,D, VU,DU,NR by  at 11/29/2023 0313                                         User Key       Initials Effective Dates Name Provider Type Discipline     06/16/21 -  Suzanna Rodriguez, RN Registered Nurse Nurse     12/13/22 -  Celia Humphries PT Physical Therapist PT                  PT Recommendation and Plan  Planned Therapy Interventions (PT): balance training, bed mobility training, gait training, ROM (range of motion), strengthening, patient/family education, transfer training  Plan of Care Reviewed With: patient  Outcome Evaluation: Pt participated with PT this PM. Pt remains very lethargic, requires sternal rub to awake and multiple cues throughout session to keep her eyes open. Cotreat performed to maximize participation due to current medical status. She did have improved activity tolerance today. She stood and ambulated 15' within the room. Extra time required for all tasks. Rec DC to SNF.     Time Calculation:         PT Charges       Row Name 11/29/23 1531             Time Calculation    Start Time 1425  -CW      Stop Time 1441  -CW      Time Calculation (min) 16 min  -CW      PT Received On 11/29/23  -CW      PT - Next Appointment 11/30/23  -                User Key  (r) = Recorded By, (t) = Taken By, (c) = Cosigned By      Initials Name Provider Type     Celia Humphries PT Physical Therapist                  Therapy Charges for Today       Code Description Service Date Service Provider Modifiers Qty    19490548827 HC PT EVAL MOD COMPLEXITY 3 11/28/2023 Kristel  Celia, PT GP 1    52462682797 HC PT THERAPEUTIC ACT EA 15 MIN 11/28/2023 Celia Humphries, PT GP 1    79859601077 HC PT THERAPEUTIC ACT EA 15 MIN 11/29/2023 Celia Humphries, PT GP 1            PT G-Codes  Outcome Measure Options: AM-PAC 6 Clicks Basic Mobility (PT)  AM-PAC 6 Clicks Score (PT): 15  AM-PAC 6 Clicks Score (OT): 13  PT Discharge Summary  Anticipated Discharge Disposition (PT): skilled nursing facility    Celia Humphries, PT  11/29/2023

## 2023-11-29 NOTE — THERAPY TREATMENT NOTE
Patient Name: Radha Luke  : 3/14/1933    MRN: [id number]                              Today's Date: 2023       Admit Date: 2023    Visit Dx:     ICD-10-CM ICD-9-CM   1. Hypercalcemia  E83.52 275.42   2. Volume depletion  E86.9 276.50   3. WILFRIDO (acute kidney injury)  N17.9 584.9     Patient Active Problem List   Diagnosis    Disorder of aorta    Diverticulosis of intestine    Gastroesophageal reflux disease with esophagitis    Mixed hyperlipidemia    Primary hypertension    Age-related osteoporosis without current pathological fracture    Health care maintenance    History of left breast cancer    Malignant neoplasm of overlapping sites of left female breast    Pain of right sacroiliac joint    Impaired glucose tolerance    Left bundle branch block    Congestive heart failure due to valvular disease    Memory loss    Gout    Foot pain    Vertigo    Chronic combined systolic and diastolic heart failure    Nonrheumatic mitral valve regurgitation    Nonrheumatic aortic valve insufficiency    Aortic stenosis, moderate    TSH elevation    Breast cancer    Short-term memory loss    Confusion    Decreased appetite    Stage 3b chronic kidney disease    Acute UTI (urinary tract infection)    Hypercalcemia    Volume depletion    Dementia without behavioral disturbance     Past Medical History:   Diagnosis Date    Acute systolic congestive heart failure     Allergic ?    Aortic stenosis     Arthritis     Breast cancer     Locally recurrent left breast    Cataract ?    CKD (chronic kidney disease), stage III     Colon polyp ?    Coronary artery disease 2018    Cough     Diverticulosis 2012    Dizziness     Drug therapy     Edema     GERD (gastroesophageal reflux disease)     History of breast cancer     LEFT    Hyperlipidemia     Hypertension     Hypokalemia     LBBB (left bundle branch block)     Mild tricuspid regurgitation     with a right ventricular systolic pressure of 62    Moderate mitral  regurgitation     Pleural effusion     Pneumonia 2018     Past Surgical History:   Procedure Laterality Date    APPENDECTOMY  1943    BREAST BIOPSY      BREAST LUMPECTOMY Left 1995    COLONOSCOPY N/A 5/12/2016    Procedure: COLONOSCOPY;  Surgeon: Israel Vance MD;  Location: [facility] ENDOSCOPY;  Service:     HYSTERECTOMY  1964    MASTECTOMY Left 2011    THORACENTESIS Bilateral       General Information       Row Name 11/29/23 1606          OT Time and Intention    Document Type therapy note (daily note)  -     Mode of Treatment individual therapy;physical therapy;occupational therapy  -       Row Name 11/29/23 1606          General Information    Patient Profile Reviewed yes  -     Barriers to Rehab previous functional deficit;cognitive status  -       Row Name 11/29/23 1606          Living Environment    People in Home facility resident  -       Row Name 11/29/23 1606          Cognition    Orientation Status (Cognition) oriented to;person  -       Row Name 11/29/23 1606          Safety Issues, Functional Mobility    Safety Issues Affecting Function (Mobility) ability to follow commands;insight into deficits/self-awareness;safety precaution awareness;safety precautions follow-through/compliance  -     Impairments Affecting Function (Mobility) balance;cognition;endurance/activity tolerance;strength  -     Cognitive Impairments, Mobility Safety/Performance insight into deficits/self-awareness;problem-solving/reasoning;attention  -     Comment, Safety Issues/Impairments (Mobility) PT/OT cotreatment medically appropriate and necessary due to patient acuity level, to maximize therapeutic benefit due to impaired act tolerance, and for safety of patient and staff. Treatment focused on progression of care and goals established in POC.  -               User Key  (r) = Recorded By, (t) = Taken By, (c) = Cosigned By      Initials Name Provider Type     Marry Rouse, OT Occupational Therapist                      Mobility/ADL's       [facility] Name 11/29/23 1607          Bed Mobility    Bed Mobility supine-sit;sit-supine  -     Rolling Left [place] (Bed Mobility) verbal cues;moderate assist (50% patient effort)  -     Rolling Right [place] (Bed Mobility) verbal cues;moderate assist (50% patient effort)  -     Supine-Sit [place] (Bed Mobility) moderate assist (50% patient effort);verbal cues  -     Sit-Supine [place] (Bed Mobility) moderate assist (50% patient effort);verbal cues  -     Assistive Device (Bed Mobility) head of bed elevated;bed rails  -[facility] Name 11/29/23 1607          Sit-Stand Transfer    Sit-Stand [place] (Transfers) minimum assist (75% patient effort);2 person assist;verbal cues  -     Assistive Device (Sit-Stand Transfers) walker, front-wheeled  [facility]               User Key  (r) = Recorded By, (t) = Taken By, (c) = Cosigned By      Initials Name Provider Type     Marry Rouse OT Occupational Therapist                   Obj/Interventions       [facility] Name 11/29/23 1607          Sensory Assessment (Somatosensory)    Sensory Assessment (Somatosensory) sensation intact  -[facility] Name 11/29/23 1607          Vision Assessment/Intervention    Visual Impairment/Limitations WFL  -     Vision Assessment Comment requires cues to open eyes today  -[facility] Name 11/29/23 1607          Balance    Balance Assessment sitting static balance;sitting dynamic balance;sit to stand dynamic balance;standing static balance;standing dynamic balance  -     Static Sitting Balance standby assist;verbal cues  -     Dynamic Sitting Balance standby assist;verbal cues  -     Position, Sitting Balance unsupported;sitting edge of bed  -     Sit to Stand Dynamic Balance minimal assist;2-person assist  -     Static Standing Balance minimal assist  -     Dynamic Standing Balance minimal assist  -     Position/Device Used, Standing Balance supported;walker, front-wheeled  -      Balance Interventions sitting;standing;occupation based/functional task  -               User Key  (r) = Recorded By, (t) = Taken By, (c) = Cosigned By      Initials Name Provider Type     Marry Rouse OT Occupational Therapist                   Goals/Plan    No documentation.                  Clinical Impression       Row Name 11/29/23 1608          Pain Assessment    Pretreatment Pain Rating 0/10 - no pain  -     Posttreatment Pain Rating 0/10 - no pain  -       Row Name 11/29/23 1608          Plan of Care Review    Plan of Care Reviewed With patient  -     Progress improving  -     Outcome Evaluation Patient seen for OT treatment this afternoon, and remains lethargic, agitated throughout visit. Patient requires max encouragement to wake up and sit EOB, but once upright much more compliant with therapeutic intervention. Patient performed sit to stand transfer with min Ax2, able to perform functional ambulation with rolling walker with CGA/min A to correct LOB. Patient able to return to supine from sitting position with min A. Patient will continue to benefit from skilled OT to address remaining functional deficits.  -       Row Name 11/29/23 1600          Therapy Assessment/Plan (OT)    Rehab Potential (OT) good, to achieve stated therapy goals  -     Criteria for Skilled Therapeutic Interventions Met (OT) yes;meets criteria;skilled treatment is necessary  -     Therapy Frequency (OT) 3 times/wk  -       Row Name 11/29/23 1608          Therapy Plan Review/Discharge Plan (OT)    Anticipated Discharge Disposition (OT) skilled nursing facility  -       Row Name 11/29/23 1604          Positioning and Restraints    Pre-Treatment Position in bed  -     Post Treatment Position bed  -     In Bed encouraged to call for assist;fowlers;exit alarm on;with family/caregiver  -               User Key  (r) = Recorded By, (t) = Taken By, (c) = Cosigned By      Initials Name Provider Type      Marry Rouse, MOLLY Occupational Therapist                   Outcome Measures       Row Name 11/29/23 1610          How much help from another is currently needed...    Putting on and taking off regular lower body clothing? 2  -LH     Bathing (including washing, rinsing, and drying) 2  -LH     Toileting (which includes using toilet bed pan or urinal) 2  -LH     Putting on and taking off regular upper body clothing 2  -LH     Taking care of personal grooming (such as brushing teeth) 2  -LH     Eating meals 3  -LH     AM-PAC 6 Clicks Score (OT) 13  -LH       Row Name 11/29/23 1535 11/29/23 1408       How much help from another person do you currently need...    Turning from your back to your side while in flat bed without using bedrails? 3  -CW 3  -SM    Moving from lying on back to sitting on the side of a flat bed without bedrails? 3  -CW 2  -SM    Moving to and from a bed to a chair (including a wheelchair)? 3  -CW 2  -SM    Standing up from a chair using your arms (e.g., wheelchair, bedside chair)? 2  -CW 2  -SM    Climbing 3-5 steps with a railing? 1  -CW 2  -SM    To walk in hospital room? 3  -CW 2  -SM    AM-PAC 6 Clicks Score (PT) 15  -CW 13  -SM    Highest Level of Mobility Goal 4 --> Transfer to chair/commode  -CW 4 --> Transfer to chair/commode  -SM      Row Name 11/29/23 1610 11/29/23 1535       Functional Assessment    Outcome Measure Options AM-PAC 6 Clicks Daily Activity (OT);Modified [place]  - AM-PAC 6 Clicks Basic Mobility (PT)  -CW              User Key  (r) = Recorded By, (t) = Taken By, (c) = Cosigned By      Initials Name Provider Type    Jessica Carmichael, RN Registered Nurse    Celia Bradley, PT Physical Therapist     Marry Rouse, MOLLY Occupational Therapist                    Occupational Therapy Education       Title: PT OT SLP Therapies (Done)       Topic: Occupational Therapy (Done)       Point: ADL training (Done)       Description:   Instruct learner(s) on proper safety  adaptation and remediation techniques during self care or transfers.   Instruct in proper use of assistive devices.                  Learning Progress Summary             Patient Acceptance, E,TB,D, VU,DU,NR by  at 11/29/2023 0313    Acceptance, E,TB, VU by  at 11/28/2023 1519    Comment: Instructed in role of OT, discharge planning, importance of activity to reduce deconditioning, caregiver education                         Point: Home exercise program (Done)       Description:   Instruct learner(s) on appropriate technique for monitoring, assisting and/or progressing therapeutic exercises/activities.                  Learning Progress Summary             Patient Acceptance, E,TB,D, VU,DU,NR by  at 11/29/2023 0313    Acceptance, E,TB, VU by  at 11/28/2023 1519    Comment: Instructed in role of OT, discharge planning, importance of activity to reduce deconditioning, caregiver education                         Point: Precautions (Done)       Description:   Instruct learner(s) on prescribed precautions during self-care and functional transfers.                  Learning Progress Summary             Patient Acceptance, E,TB,D, VU,DU,NR by  at 11/29/2023 0313    Acceptance, E,TB, VU by  at 11/28/2023 1519    Comment: Instructed in role of OT, discharge planning, importance of activity to reduce deconditioning, caregiver education                         Point: Body mechanics (Done)       Description:   Instruct learner(s) on proper positioning and spine alignment during self-care, functional mobility activities and/or exercises.                  Learning Progress Summary             Patient Acceptance, E,TB,D, VU,DU,NR by  at 11/29/2023 0313    Acceptance, E,TB, VU by  at 11/28/2023 1519    Comment: Instructed in role of OT, discharge planning, importance of activity to reduce deconditioning, caregiver education                                         User Key       Initials Effective Dates Name Provider  Type Discipline     06/16/21 -  Suzanna Rodriguez, RN Registered Nurse Nurse     08/31/23 -  Marry Rouse OT Occupational Therapist OT                  OT Recommendation and Plan  Planned Therapy Interventions (OT): activity tolerance training, strengthening exercise, transfer/mobility retraining, patient/caregiver education/training, BADL retraining, occupation/activity based interventions, functional balance retraining  Therapy Frequency (OT): 3 times/wk  Plan of Care Review  Plan of Care Reviewed With: patient  Progress: improving  Outcome Evaluation: Patient seen for OT treatment this afternoon, and remains lethargic, agitated throughout visit. Patient requires max encouragement to wake up and sit EOB, but once upright much more compliant with therapeutic intervention. Patient performed sit to stand transfer with min Ax2, able to perform functional ambulation with rolling walker with CGA/min A to correct LOB. Patient able to return to supine from sitting position with min A. Patient will continue to benefit from skilled OT to address remaining functional deficits.     Time Calculation:   Evaluation Complexity (OT)  Review Occupational Profile/Medical/Therapy History Complexity: expanded/moderate complexity  Assessment, Occupational Performance/Identification of Deficit Complexity: 3-5 performance deficits  Clinical Decision Making Complexity (OT): detailed assessment/moderate complexity  Overall Complexity of Evaluation (OT): moderate complexity     Time Calculation- OT       Row Name 11/29/23 1610             Time Calculation- OT    OT Start Time 1425  -      OT Stop Time 1441  -      OT Time Calculation (min) 16 min  -      Total Timed Code Minutes- OT 16 minute(s)  -      OT Received On 11/29/23  -      OT - Next Appointment 11/30/23  -         Timed Charges    17487 - OT Therapeutic Activity Minutes 16  -         Total Minutes    Timed Charges Total Minutes 16  -       Total Minutes 16  -LH                 User Key  (r) = Recorded By, (t) = Taken By, (c) = Cosigned By      Initials Name Provider Type     Marry Rouse, OT Occupational Therapist                  Therapy Charges for Today       Code Description Service Date Service Provider Modifiers Qty    05916178055  OT SELF CARE/MGMT/TRAIN EA 15 MIN 11/28/2023 Marry Rouse OT GO 1    [id number]  OT EVAL MOD COMPLEXITY 3 11/28/2023 Marry Rouse OT GO 1    [id number]  OT THERAPEUTIC ACT EA 15 MIN 11/29/2023 Marry Rouse OT GO 1                 Marry Rouse OT  11/29/2023

## 2023-11-29 NOTE — PLAN OF CARE
Goal Outcome Evaluation:  Plan of Care Reviewed With: patient        Progress: no change  Outcome Evaluation: PT  A&O to slef, IV fluids and antibiotics continued, sat up to SOB with PT, tolerating regular diet, no c/o or s/o pain or nausea, will need SNF st D/C, family at bedside, will CTM

## 2023-11-29 NOTE — PROGRESS NOTES
Name: Radha DELATORRE Luke ADMIT: 2023   : 3/14/1933  PCP: Blanco Chandra MD    MRN: [id number] LOS: 0 days   AGE/SEX: 89 y.o. female  ROOM: [facility]     Subjective   Subjective   Still pretty somnolent. Able to eat small amounts today but appetite still poor. Drinking better. No V/D. No F/C.        Objective   Objective   Vital Signs  Temp:  [98.2 °F (36.8 °C)-98.6 °F (37 °C)] 98.4 °F (36.9 °C)  Heart Rate:  [62-80] 62  Resp:  [18] 18  BP: (113-148)/(49-94) 133/59  SpO2:  [98 %-100 %] 98 %  on   ;   Device (Oxygen Therapy): room air  Body mass index is 20.24 kg/m².  Physical Exam  Vitals and nursing note reviewed. Exam conducted with a chaperone present (Dtr in law).   Constitutional:       General: She is not in acute distress.     Appearance: She is ill-appearing. She is not toxic-appearing or diaphoretic.      Comments: Somnolent   HENT:      Head: Normocephalic.      Mouth/Throat:      Mouth: Mucous membranes are moist.   Eyes:      General:         Right eye: No discharge.         Left eye: No discharge.      Extraocular Movements: Extraocular movements intact.      Conjunctiva/sclera: Conjunctivae normal.   Cardiovascular:      Rate and Rhythm: Normal rate and regular rhythm.      Pulses: Normal pulses.   Pulmonary:      Effort: Pulmonary effort is normal. No respiratory distress.      Breath sounds: Normal breath sounds. No wheezing or rales.   Abdominal:      General: Bowel sounds are normal. There is no distension.      Palpations: Abdomen is soft.      Tenderness: There is no abdominal tenderness.   Musculoskeletal:         General: No swelling.      Cervical back: Neck supple.   Skin:     General: Skin is warm and dry.      Capillary Refill: Capillary refill takes less than 2 seconds.      Coloration: Skin is pale. Skin is not jaundiced.   Neurological:      Cranial Nerves: No cranial nerve deficit.      Coordination: Coordination normal.      Comments: Oriented to person   Psychiatric:       "Comments: Pleasant       Results Review     I reviewed the patient's new clinical results.  Results from last 7 days   Lab Units 11/29/23  0445 11/27/23  1353   WBC 10*3/mm3 9.53 14.57*   HEMOGLOBIN g/dL 11.8* 16.4*   PLATELETS 10*3/mm3 121* 186     Results from last 7 days   Lab Units 11/29/23  0445 11/28/23  0334 11/27/23  1353   SODIUM mmol/L 147* 145 139   POTASSIUM mmol/L 3.3* 3.5 4.5   CHLORIDE mmol/L 113* 108* 95*   CO2 mmol/L 23.0 26.0 29.0   BUN mg/dL 41* 65* 86*   CREATININE mg/dL 0.99 1.34* 1.82*   GLUCOSE mg/dL 105* 122* 153*   EGFR mL/min/1.73 54.3* 37.7* 26.1*     Results from last 7 days   Lab Units 11/29/23  0445   ALBUMIN g/dL 3.1*   BILIRUBIN mg/dL 0.4   ALK PHOS U/L 47   AST (SGOT) U/L 18   ALT (SGPT) U/L 9     Results from last 7 days   Lab Units 11/29/23  0445 11/28/23  0334 11/27/23  1353   CALCIUM mg/dL 8.2 9.2 11.5*   ALBUMIN g/dL 3.1*  --   --    MAGNESIUM mg/dL 2.1  --   --      Results from last 7 days   Lab Units 11/27/23  2008   LACTATE mmol/L 1.5     No results found for: \"HGBA1C\", \"POCGLU\"    XR Chest 1 View    Result Date: 11/27/2023  No focal pulmonary consolidation or mass identified. Follow-up/further evaluation can be obtained as clinical indications persist.  This report was finalized on 11/27/2023 1:45 PM by Dr. Nicho Singh M.D on Workstation: [id number]       I have personally reviewed all medications:  Scheduled Medications  allopurinol, 100 mg, Oral, Daily  carvedilol, 3.125 mg, Oral, BID  cefTRIAXone, 1,000 mg, Intravenous, Q24H  potassium chloride, 40 mEq, Oral, Q4H  sodium chloride, 10 mL, Intravenous, Q12H    Infusions     Diet  Diet: Regular/House Diet; Texture: Regular Texture (IDDSI 7); Fluid Consistency: Thin (IDDSI 0)    I have personally reviewed:  [x]  Laboratory   [x]  Microbiology   []  Radiology   [x]  EKG/Telemetry  []  Cardiology/Vascular   []  Pathology    []  Records       Assessment/Plan     Active Hospital Problems    Diagnosis  POA    **Acute UTI " (urinary tract infection) [N39.0]  Yes    Volume depletion [E86.9]  Yes    Dementia without behavioral disturbance [F03.90]  Yes    Stage 3b chronic kidney disease [N18.32]  Yes    Hypercalcemia [E83.52]  Yes    Aortic stenosis, moderate [I35.0]  Yes    Chronic combined systolic and diastolic heart failure [I50.42]  Yes    Left bundle branch block [I44.7]  Yes    Impaired glucose tolerance [R73.02]  Yes    History of left breast cancer [Z85.3]  Not Applicable    Primary hypertension [I10]  Yes    Gastroesophageal reflux disease with esophagitis [K21.00]  Yes      Resolved Hospital Problems   No resolved problems to display.       89yo woman with HTN, CAD, left breast CA, GERD, chronic combined CHF, CKD3b, aortic stenosis, and impaired glucose tolerance, who presented to ER from assisted living facility with poor po intake, weight loss, leukocytosis, higher than usual Cr. She was diagnosed with UTI and dehydration.    UTI  Continue 3d of Rocephin for pan-sensitive E.coli  Afebrile, WBC normalized  Blood cultures NGTD    Lethargy  FTT  Just started Remeron at facility on day of admission  Given somnolence on presentation I don't think this is appropriate for now--also don't expect this would actually help much with appetite, I suspect that her acute illness has much more to do with her decreased PO intake PTA  With treatment of her UTI her LOC and appetite have improved now    Hypernatremia  Stopped IVFs  Encourage PO fluids  Monitor    Hypokalemia  Replace today with protocol  Mg++ level fine  Restart her daily KCl tomorrow as we restart some Lasix    WILFRIDO/CKD3b  Cr at baseline or better after IVFs overnight  Stop IVFs today and monitor Cr daily    Hypercalcemia  Resolved with IVFs    HTN  BPs acceptable  Continue Coreg    Impaired glucose tolerance  Sugars acceptable  Monitor as po intake increases    Chronic combined CHF  Dehydrated on admission  Holding Lasix for now  Stop IVFs  Can restart Lasix in AM at  attenuated dose given her poor po intake    Breast CA  Arimidex on hold for now  Can restart at OR    SCDs for DVT prophylaxis.  Limited code (no CPR, no intubation).  Discussed with patient, nursing staff, CCP, and care team on multidisciplinary rounds. D/w dtr-in-law at bedside.  Anticipate discharge to SNU facility, timing yet to be determined.      Mihcael Lou MD  [facility]  11/29/23  13:13 EST

## 2023-11-29 NOTE — PLAN OF CARE
Goal Outcome Evaluation:  Plan of Care Reviewed With: patient        Progress: improving  Outcome Evaluation: Patient seen for OT treatment this afternoon, and remains lethargic, agitated throughout visit. Patient requires max encouragement to wake up and sit EOB, but once upright much more compliant with therapeutic intervention. Patient performed sit to stand transfer with min Ax2, able to perform functional ambulation with rolling walker with CGA/min A to correct LOB. Patient able to return to supine from sitting position with min A. Patient will continue to benefit from skilled OT to address remaining functional deficits.      Anticipated Discharge Disposition (OT): skilled nursing facility

## 2023-11-29 NOTE — PLAN OF CARE
Goal Outcome Evaluation:  Plan of Care Reviewed With: patient           Outcome Evaluation: Pt participated with PT this PM. Pt remains very lethargic, requires sternal rub to awake and multiple cues throughout session to keep her eyes open. [place] performed to maximize participation due to current medical status. She did have improved activity tolerance today. She stood and ambulated 15' within the room. Extra time required for all tasks. Rec DC to SNF.      Anticipated Discharge Disposition (PT): skilled nursing facility

## 2023-11-29 NOTE — PLAN OF CARE
Goal Outcome Evaluation:  Plan of Care Reviewed With: patient, spouse, son        Progress: improving     Potassium replaced per order. IV fluids stopped. IV antibiotics for UTI. Worked with therapy. Needs lots of encouragement for oral intake, which is poor.

## 2023-11-30 LAB
ALBUMIN SERPL-MCNC: 2.9 G/DL (ref 3.5–5.2)
ALBUMIN/GLOB SERPL: 1.3 G/DL
ALP SERPL-CCNC: 46 U/L (ref 39–117)
ALT SERPL W P-5'-P-CCNC: 11 U/L (ref 1–33)
ANION GAP SERPL CALCULATED.3IONS-SCNC: 8.2 MMOL/L (ref 5–15)
AST SERPL-CCNC: 21 U/L (ref 1–32)
BILIRUB SERPL-MCNC: 0.3 MG/DL (ref 0–1.2)
BUN SERPL-MCNC: 27 MG/DL (ref 8–23)
BUN/CREAT SERPL: 29.3 (ref 7–25)
CALCIUM SPEC-SCNC: 8.2 MG/DL (ref 8.2–9.6)
CHLORIDE SERPL-SCNC: 120 MMOL/L (ref 98–107)
CO2 SERPL-SCNC: 19.8 MMOL/L (ref 22–29)
CREAT SERPL-MCNC: 0.92 MG/DL (ref 0.57–1)
DEPRECATED RDW RBC AUTO: 41.5 FL (ref 37–54)
EGFRCR SERPLBLD CKD-EPI 2021: 59.3 ML/MIN/1.73
ERYTHROCYTE [DISTWIDTH] IN BLOOD BY AUTOMATED COUNT: 12.1 % (ref 12.3–15.4)
GLOBULIN UR ELPH-MCNC: 2.2 GM/DL
GLUCOSE SERPL-MCNC: 121 MG/DL (ref 65–99)
HCT VFR BLD AUTO: 33.4 % (ref 34–46.6)
HGB BLD-MCNC: 11.6 G/DL (ref 12–15.9)
MAGNESIUM SERPL-MCNC: 2.2 MG/DL (ref 1.6–2.4)
MCH RBC QN AUTO: 32.4 PG (ref 26.6–33)
MCHC RBC AUTO-ENTMCNC: 34.7 G/DL (ref 31.5–35.7)
MCV RBC AUTO: 93.3 FL (ref 79–97)
PLATELET # BLD AUTO: 114 10*3/MM3 (ref 140–450)
PMV BLD AUTO: 10.7 FL (ref 6–12)
POTASSIUM SERPL-SCNC: 4.7 MMOL/L (ref 3.5–5.2)
PROT SERPL-MCNC: 5.1 G/DL (ref 6–8.5)
RBC # BLD AUTO: 3.58 10*6/MM3 (ref 3.77–5.28)
SODIUM SERPL-SCNC: 148 MMOL/L (ref 136–145)
WBC NRBC COR # BLD AUTO: 8.11 10*3/MM3 (ref 3.4–10.8)

## 2023-11-30 PROCEDURE — 85027 COMPLETE CBC AUTOMATED: CPT | Performed by: HOSPITALIST

## 2023-11-30 PROCEDURE — 0 DEXTROSE 5 % SOLUTION: Performed by: HOSPITALIST

## 2023-11-30 PROCEDURE — 83735 ASSAY OF MAGNESIUM: CPT | Performed by: HOSPITALIST

## 2023-11-30 PROCEDURE — 80053 COMPREHEN METABOLIC PANEL: CPT | Performed by: HOSPITALIST

## 2023-11-30 PROCEDURE — 97116 GAIT TRAINING THERAPY: CPT

## 2023-11-30 PROCEDURE — 97530 THERAPEUTIC ACTIVITIES: CPT

## 2023-11-30 RX ORDER — DEXTROSE MONOHYDRATE 50 MG/ML
50 INJECTION, SOLUTION INTRAVENOUS CONTINUOUS
Status: ACTIVE | OUTPATIENT
Start: 2023-11-30 | End: 2023-11-30

## 2023-11-30 RX ADMIN — POTASSIUM CHLORIDE 20 MEQ: 750 TABLET, EXTENDED RELEASE ORAL at 09:33

## 2023-11-30 RX ADMIN — CARVEDILOL 3.12 MG: 3.12 TABLET, FILM COATED ORAL at 21:40

## 2023-11-30 RX ADMIN — ALLOPURINOL 100 MG: 100 TABLET ORAL at 09:33

## 2023-11-30 RX ADMIN — FUROSEMIDE 20 MG: 20 TABLET ORAL at 09:33

## 2023-11-30 RX ADMIN — Medication 10 ML: at 09:34

## 2023-11-30 RX ADMIN — CARVEDILOL 3.12 MG: 3.12 TABLET, FILM COATED ORAL at 09:34

## 2023-11-30 RX ADMIN — DEXTROSE MONOHYDRATE 50 ML/HR: 50 INJECTION, SOLUTION INTRAVENOUS at 12:56

## 2023-11-30 NOTE — THERAPY TREATMENT NOTE
Patient Name: Radha Luke  : 3/14/1933    MRN: [id number]                              Today's Date: 2023       Admit Date: 2023    Visit Dx:     ICD-10-CM ICD-9-CM   1. Hypercalcemia  E83.52 275.42   2. Volume depletion  E86.9 276.50   3. WILFRIOD (acute kidney injury)  N17.9 584.9     Patient Active Problem List   Diagnosis    Disorder of aorta    Diverticulosis of intestine    Gastroesophageal reflux disease with esophagitis    Mixed hyperlipidemia    Primary hypertension    Age-related osteoporosis without current pathological fracture    Health care maintenance    History of left breast cancer    Malignant neoplasm of overlapping sites of left female breast    Pain of right sacroiliac joint    Impaired glucose tolerance    Left bundle branch block    Congestive heart failure due to valvular disease    Memory loss    Gout    Foot pain    Vertigo    Chronic combined systolic and diastolic heart failure    Nonrheumatic mitral valve regurgitation    Nonrheumatic aortic valve insufficiency    Aortic stenosis, moderate    TSH elevation    Breast cancer    Short-term memory loss    Confusion    Decreased appetite    Stage 3b chronic kidney disease    Acute UTI (urinary tract infection)    Hypercalcemia    Volume depletion    Dementia without behavioral disturbance     Past Medical History:   Diagnosis Date    Acute systolic congestive heart failure     Allergic ?    Aortic stenosis     Arthritis     Breast cancer     Locally recurrent left breast    Cataract ?    CKD (chronic kidney disease), stage III     Colon polyp ?    Coronary artery disease 2018    Cough     Diverticulosis 2012    Dizziness     Drug therapy     Edema     GERD (gastroesophageal reflux disease)     History of breast cancer     LEFT    Hyperlipidemia     Hypertension     Hypokalemia     LBBB (left bundle branch block)     Mild tricuspid regurgitation     with a right ventricular systolic pressure of 62    Moderate mitral  regurgitation     Pleural effusion     Pneumonia 2018     Past Surgical History:   Procedure Laterality Date    APPENDECTOMY  1943    BREAST BIOPSY      BREAST LUMPECTOMY Left 1995    COLONOSCOPY N/A 5/12/2016    Procedure: COLONOSCOPY;  Surgeon: Israel Vance MD;  Location: [facility];  Service:     HYSTERECTOMY  1964    MASTECTOMY Left 2011    THORACENTESIS Bilateral       General Information       Row Name 11/30/23 1033          Physical Therapy Time and Intention    Document Type therapy note (daily note)  -     Mode of Treatment physical therapy  -       Row Name 11/30/23 1033          General Information    Existing Precautions/Restrictions fall  -       Row Name 11/30/23 1033          Cognition    Orientation Status (Cognition) oriented to;person;place  -               User Key  (r) = Recorded By, (t) = Taken By, (c) = Cosigned By      Initials Name Provider Type     Jessica Pfeiffer PTA Physical Therapist Assistant                   Mobility       Row Name 11/30/23 1034          Bed Mobility    Bed Mobility supine-sit;sit-supine  -     Supine-Sit [place] (Bed Mobility) minimum assist (75% patient effort);moderate assist (50% patient effort)  -     Sit-Supine [place] (Bed Mobility) moderate assist (50% patient effort)  -     Assistive Device (Bed Mobility) bed rails;head of bed elevated  -       Row Name 11/30/23 1034          Sit-Stand Transfer    Sit-Stand [place] (Transfers) minimum assist (75% patient effort)  -     Assistive Device (Sit-Stand Transfers) walker, front-wheeled  -       Row Name 11/30/23 1034          Gait/Stairs (Locomotion)    [place] Level (Gait) contact guard;minimum assist (75% patient effort)  -     Assistive Device (Gait) walker, front-wheeled  -     Distance in Feet (Gait) 10  -     Deviations/Abnormal Patterns (Gait) lennox decreased;festinating/shuffling;stride length decreased  -     Bilateral Gait Deviations forward  flexed posture  -     Comment, (Gait/Stairs) limited d/t fatigue  -SM               User Key  (r) = Recorded By, (t) = Taken By, (c) = Cosigned By      Initials Name Provider Type    Jessica Osorio PTA Physical Therapist Assistant                   Obj/Interventions       Row Name 11/30/23 1037          Motor Skills    Therapeutic Exercise --  seated AP, LAQ x10 reps  -SM               User Key  (r) = Recorded By, (t) = Taken By, (c) = Cosigned By      Initials Name Provider Type    Jessica Osorio PTA Physical Therapist Assistant                   Goals/Plan    No documentation.                  Clinical Impression       Row Name 11/30/23 1037          Pain    Pretreatment Pain Rating 0/10 - no pain  -SM     Posttreatment Pain Rating 0/10 - no pain  -SM       Row Name 11/30/23 1037          Positioning and Restraints    Pre-Treatment Position in bed  -SM     Post Treatment Position bed  -SM     In Bed side lying left;call light within reach;encouraged to call for assist;exit alarm on  -SM               User Key  (r) = Recorded By, (t) = Taken By, (c) = Cosigned By      Initials Name Provider Type    Jessica Osorio PTA Physical Therapist Assistant                   Outcome Measures       Row Name 11/30/23 1037 11/30/23 0751       How much help from another person do you currently need...    Turning from your back to your side while in flat bed without using bedrails? 2  -SM 3  -ZH    Moving from lying on back to sitting on the side of a flat bed without bedrails? 2  -SM 3  -ZH    Moving to and from a bed to a chair (including a wheelchair)? 3  -SM 3  -ZH    Standing up from a chair using your arms (e.g., wheelchair, bedside chair)? 3  -SM 2  -ZH    Climbing 3-5 steps with a railing? 1  -SM 1  -ZH    To walk in hospital room? 3  -SM 2  -ZH    AM-PAC 6 Clicks Score (PT) 14  -SM 14  -ZH    Highest Level of Mobility Goal 4 --> Transfer to chair/commode  - 4 --> Transfer to chair/commode   -              User Key  (r) = Recorded By, (t) = Taken By, (c) = Cosigned By      Initials Name Provider Type     Jessica Pfeiffer PTA Physical Therapist Assistant    Honorio Perera, RN Registered Nurse                                 Physical Therapy Education       Title: PT OT SLP Therapies (In Progress)       Topic: Physical Therapy (In Progress)       Point: Mobility training (In Progress)       Learning Progress Summary             Patient Acceptance, E,D, NR by  at 11/30/2023 1038    Acceptance, E, VU,NR by  at 11/29/2023 1535    Acceptance, E,TB,D, VU,DU,NR by  at 11/29/2023 0313    Acceptance, E, VU,NR by  at 11/28/2023 0958                         Point: Home exercise program (In Progress)       Learning Progress Summary             Patient Acceptance, E,D, NR by  at 11/30/2023 1038    Acceptance, E,TB,D, VU,DU,NR by  at 11/29/2023 0313                         Point: Body mechanics (In Progress)       Learning Progress Summary             Patient Acceptance, E,D, NR by  at 11/30/2023 1038    Acceptance, E,TB,D, VU,DU,NR by  at 11/29/2023 0313                         Point: Precautions (In Progress)       Learning Progress Summary             Patient Acceptance, E,D, NR by  at 11/30/2023 1038    Acceptance, E,TB,D, VU,DU,NR by  at 11/29/2023 0313                                         User Key       Initials Effective Dates Name Provider Type [facility] 06/16/21 -  Suzanna Rodriguez, CARLOS Registered Nurse Nurse     03/07/18 -  Jessica Pfeiffer PTA Physical Therapist Assistant PT     12/13/22 -  Celia Humphries PT Physical Therapist PT                  PT Recommendation and Plan     Plan of Care Reviewed With: patient  Progress: improving  Outcome Evaluation: Pt tolerated treatment fair this date. Required mod A for bed mobility, then min A to stand. Pt ambulated 10ft w/ Rw and min A, limited d/t fatigue. Encouraged pt to get up to chair later w/ nsg.     Time  Calculation:         PT Charges       Row Name 11/30/23 1203             Time Calculation    Start Time 0931  -      Stop Time 0950  -      Time Calculation (min) 19 min  -      PT Received On 11/30/23  -      PT - Next Appointment 12/01/23  -                User Key  (r) = Recorded By, (t) = Taken By, (c) = Cosigned By      Initials Name Provider Type     Jessica Pfeiffer PTA Physical Therapist Assistant                  Therapy Charges for Today       Code Description Service Date Service Provider Modifiers Qty    [id number] HC GAIT TRAINING EA 15 MIN 11/30/2023 Jessica Pfeiffer PTA GP 1            PT G-Codes  Outcome Measure Options: AM-PAC 6 Clicks Daily Activity (OT), Modified [place]  AM-PAC 6 Clicks Score (PT): 14  AM-PAC 6 Clicks Score (OT): 13  PT Discharge Summary  Anticipated Discharge Disposition (PT): skilled nursing facility    Jessica Pfeiffer PTA  11/30/2023

## 2023-11-30 NOTE — PLAN OF CARE
Problem: Adult Inpatient Plan of Care  Goal: Plan of Care Review  Outcome: Ongoing, Progressing  Goal: Patient-Specific Goal (Individualized)  Outcome: Ongoing, Progressing  Goal: Absence of Hospital-Acquired Illness or Injury  Outcome: Ongoing, Progressing  Intervention: Identify and Manage Fall Risk  Recent Flowsheet Documentation  Taken 11/30/2023 1600 by Honorio Malone RN  Safety Promotion/Fall Prevention:   activity supervised   clutter free environment maintained   fall prevention program maintained   lighting adjusted   nonskid shoes/slippers when out of bed   room organization consistent   safety round/check completed  Taken 11/30/2023 1400 by Honorio Malone RN  Safety Promotion/Fall Prevention:   activity supervised   clutter free environment maintained   fall prevention program maintained   lighting adjusted   nonskid shoes/slippers when out of bed   room organization consistent   safety round/check completed  Taken 11/30/2023 1200 by Honorio Malone RN  Safety Promotion/Fall Prevention:   activity supervised   clutter free environment maintained   fall prevention program maintained   lighting adjusted   nonskid shoes/slippers when out of bed   room organization consistent   safety round/check completed  Taken 11/30/2023 1000 by Honorio Malone RN  Safety Promotion/Fall Prevention:   activity supervised   clutter free environment maintained   fall prevention program maintained   lighting adjusted   nonskid shoes/slippers when out of bed   room organization consistent   safety round/check completed  Taken 11/30/2023 0800 by Honorio Malone RN  Safety Promotion/Fall Prevention:   activity supervised   clutter free environment maintained   fall prevention program maintained   lighting adjusted   nonskid shoes/slippers when out of bed   room organization consistent   safety round/check completed  Intervention: Prevent Infection  Recent Flowsheet Documentation  Taken 11/30/2023 1600 by Honorio Malone  RN  Infection Prevention:   single patient room provided   rest/sleep promoted   hand hygiene promoted  Taken 11/30/2023 1400 by Honorio Malone RN  Infection Prevention:   hand hygiene promoted   rest/sleep promoted   single patient room provided  Taken 11/30/2023 1200 by Honorio Malone RN  Infection Prevention:   rest/sleep promoted   hand hygiene promoted   single patient room provided  Taken 11/30/2023 1000 by Honorio Malone RN  Infection Prevention:   single patient room provided   rest/sleep promoted   hand hygiene promoted  Taken 11/30/2023 0800 by Honorio Malone RN  Infection Prevention:   rest/sleep promoted   hand hygiene promoted   single patient room provided  Goal: Optimal Comfort and Wellbeing  Outcome: Ongoing, Progressing  Intervention: Provide Person-Centered Care  Recent Flowsheet Documentation  Taken 11/30/2023 1537 by Honorio Malone RN  Trust Relationship/Rapport:   care explained   thoughts/feelings acknowledged   reassurance provided   questions encouraged   questions answered   empathic listening provided  Taken 11/30/2023 0751 by Honorio Malone RN  Trust Relationship/Rapport:   care explained   thoughts/feelings acknowledged   reassurance provided   questions encouraged   questions answered   empathic listening provided  Goal: Readiness for Transition of Care  Outcome: Ongoing, Progressing     Problem: Fall Injury Risk  Goal: Absence of Fall and Fall-Related Injury  Outcome: Ongoing, Progressing  Intervention: Identify and Manage Contributors  Recent Flowsheet Documentation  Taken 11/30/2023 1600 by Honorio Malone RN  Medication Review/Management: medications reviewed  Taken 11/30/2023 1400 by Honorio Malone RN  Medication Review/Management: medications reviewed  Taken 11/30/2023 1200 by Honorio Malone RN  Medication Review/Management: medications reviewed  Taken 11/30/2023 1000 by Honorio Malone RN  Medication Review/Management: medications reviewed  Taken 11/30/2023 0800 by Honorio Malone  RN  Medication Review/Management: medications reviewed  Intervention: Promote Injury-Free Environment  Recent Flowsheet Documentation  Taken 11/30/2023 1600 by Honorio Malone RN  Safety Promotion/Fall Prevention:   activity supervised   clutter free environment maintained   fall prevention program maintained   lighting adjusted   nonskid shoes/slippers when out of bed   room organization consistent   safety round/check completed  Taken 11/30/2023 1400 by Honorio Malone RN  Safety Promotion/Fall Prevention:   activity supervised   clutter free environment maintained   fall prevention program maintained   lighting adjusted   nonskid shoes/slippers when out of bed   room organization consistent   safety round/check completed  Taken 11/30/2023 1200 by Honorio Malone RN  Safety Promotion/Fall Prevention:   activity supervised   clutter free environment maintained   fall prevention program maintained   lighting adjusted   nonskid shoes/slippers when out of bed   room organization consistent   safety round/check completed  Taken 11/30/2023 1000 by Honorio Malone RN  Safety Promotion/Fall Prevention:   activity supervised   clutter free environment maintained   fall prevention program maintained   lighting adjusted   nonskid shoes/slippers when out of bed   room organization consistent   safety round/check completed  Taken 11/30/2023 0800 by Honorio Malone RN  Safety Promotion/Fall Prevention:   activity supervised   clutter free environment maintained   fall prevention program maintained   lighting adjusted   nonskid shoes/slippers when out of bed   room organization consistent   safety round/check completed     Problem: Skin Injury Risk Increased  Goal: Skin Health and Integrity  Outcome: Ongoing, Progressing     Problem: Adjustment to Illness (Heart Failure)  Goal: Optimal Coping  Outcome: Ongoing, Progressing  Intervention: Support Psychosocial Response  Recent Flowsheet Documentation  Taken 11/30/2023 0751 by Faisal  CARLOS Olmedo  Supportive Measures: active listening utilized     Problem: Cardiac Output Decreased (Heart Failure)  Goal: Optimal Cardiac Output  Outcome: Ongoing, Progressing  Intervention: Optimize Cardiac Output  Recent Flowsheet Documentation  Taken 11/30/2023 0751 by Honorio Malone RN  Environmental Support: calm environment promoted     Problem: Dysrhythmia (Heart Failure)  Goal: Stable Heart Rate and Rhythm  Outcome: Ongoing, Progressing     Problem: Fluid Imbalance (Heart Failure)  Goal: Fluid Balance  Outcome: Ongoing, Progressing     Problem: Functional Ability Impaired (Heart Failure)  Goal: Optimal Functional Ability  Outcome: Ongoing, Progressing     Problem: Oral Intake Inadequate (Heart Failure)  Goal: Optimal Nutrition Intake  Outcome: Ongoing, Progressing     Problem: Respiratory Compromise (Heart Failure)  Goal: Effective Oxygenation and Ventilation  Outcome: Ongoing, Progressing  Intervention: Promote Airway Secretion Clearance  Recent Flowsheet Documentation  Taken 11/30/2023 1537 by Honorio Malone RN  Cough And Deep Breathing: done independently per patient  Taken 11/30/2023 0751 by Honorio Malone RN  Cough And Deep Breathing: done independently per patient     Problem: Sleep Disordered Breathing (Heart Failure)  Goal: Effective Breathing Pattern During Sleep  Outcome: Ongoing, Progressing   Goal Outcome Evaluation:      Patient in room with family at bedside. Patient not motivated to get out of bed or take part in care. Patient will participate with a lot of prompting. Poor appetite. D5 started at 50 ml/hr. Receives ensures on tray. Call light in reach.

## 2023-11-30 NOTE — THERAPY TREATMENT NOTE
Patient Name: Radha Luke  : 3/14/1933    MRN: [id number]                              Today's Date: 2023       Admit Date: 2023    Visit Dx:     ICD-10-CM ICD-9-CM   1. Hypercalcemia  E83.52 275.42   2. Volume depletion  E86.9 276.50   3. WILFRIDO (acute kidney injury)  N17.9 584.9     Patient Active Problem List   Diagnosis    Disorder of aorta    Diverticulosis of intestine    Gastroesophageal reflux disease with esophagitis    Mixed hyperlipidemia    Primary hypertension    Age-related osteoporosis without current pathological fracture    Health care maintenance    History of left breast cancer    Malignant neoplasm of overlapping sites of left female breast    Pain of right sacroiliac joint    Impaired glucose tolerance    Left bundle branch block    Congestive heart failure due to valvular disease    Memory loss    Gout    Foot pain    Vertigo    Chronic combined systolic and diastolic heart failure    Nonrheumatic mitral valve regurgitation    Nonrheumatic aortic valve insufficiency    Aortic stenosis, moderate    TSH elevation    Breast cancer    Short-term memory loss    Confusion    Decreased appetite    Stage 3b chronic kidney disease    Acute UTI (urinary tract infection)    Hypercalcemia    Volume depletion    Dementia without behavioral disturbance     Past Medical History:   Diagnosis Date    Acute systolic congestive heart failure     Allergic ?    Aortic stenosis     Arthritis     Breast cancer     Locally recurrent left breast    Cataract ?    CKD (chronic kidney disease), stage III     Colon polyp ?    Coronary artery disease 2018    Cough     Diverticulosis 2012    Dizziness     Drug therapy     Edema     GERD (gastroesophageal reflux disease)     History of breast cancer     LEFT    Hyperlipidemia     Hypertension     Hypokalemia     LBBB (left bundle branch block)     Mild tricuspid regurgitation     with a right ventricular systolic pressure of 62    Moderate mitral  regurgitation     Pleural effusion     Pneumonia 2018     Past Surgical History:   Procedure Laterality Date    APPENDECTOMY  1943    BREAST BIOPSY      BREAST LUMPECTOMY Left 1995    COLONOSCOPY N/A 5/12/2016    Procedure: COLONOSCOPY;  Surgeon: Israel Vance MD;  Location: [facility] ENDOSCOPY;  Service:     HYSTERECTOMY  1964    MASTECTOMY Left 2011    THORACENTESIS Bilateral       General Information       Row Name 11/30/23 1524          OT Time and Intention    Document Type therapy note (daily note)  -CE     Mode of Treatment occupational therapy  -CE       Row Name 11/30/23 1524          General Information    Patient Profile Reviewed yes  -CE     Existing Precautions/Restrictions fall  -CE       Row Name 11/30/23 1524          Cognition    Orientation Status (Cognition) oriented to;person;place  -CE       Row Name 11/30/23 1524          Safety Issues, Functional Mobility    Impairments Affecting Function (Mobility) balance;cognition;endurance/activity tolerance;strength  -CE     Cognitive Impairments, Mobility Safety/Performance attention;insight into deficits/self-awareness;judgment;problem-solving/reasoning;safety precaution awareness;safety precaution follow-through  -CE               User Key  (r) = Recorded By, (t) = Taken By, (c) = Cosigned By      Initials Name Provider Type    CE Мария Cherry, MOLLY Occupational Therapist                     Mobility/ADL's       Row Name 11/30/23 1524          Bed Mobility    Bed Mobility supine-sit  -CE     Supine-Sit [place] (Bed Mobility) minimum assist (75% patient effort);1 person assist;verbal cues  -CE       Row Name 11/30/23 1524          Transfers    Transfers sit-stand transfer;bed-chair transfer  -CE       Row Name 11/30/23 1524          Bed-Chair Transfer    Bed-Chair [place] (Transfers) 1 person assist;minimum assist (75% patient effort);verbal cues  -CE     Assistive Device (Bed-Chair Transfers) walker, front-wheeled  -CE       Row Name  11/30/23 1524          Sit-Stand Transfer    Sit-Stand [place] (Transfers) minimum assist (75% patient effort)  -CE     Assistive Device (Sit-Stand Transfers) walker, front-wheeled  -CE       Row Name 11/30/23 1524          Functional Mobility    Functional Mobility- Comment pt declining further functional mobility in room and required max cues/encouragement to remain in chair  -CE       Row Name 11/30/23 1524          Activities of Daily Living    BADL Assessment/Intervention grooming  -CE       Row Name 11/30/23 1524          Grooming Assessment/Training    [place] Level (Grooming) hair care, combing/brushing;set up;standby assist  -CE     Position (Grooming) supported sitting  -CE               User Key  (r) = Recorded By, (t) = Taken By, (c) = Cosigned By      Initials Name Provider Type    Мария Hatch OT Occupational Therapist                   Obj/Interventions       Row Name 11/30/23 1525          Shoulder (Therapeutic Exercise)    Shoulder (Therapeutic Exercise) AROM (active range of motion)  -CE     Shoulder AROM (Therapeutic Exercise) bilateral;flexion;extension;3 sets;10 repetitions  mod cues for technique  -CE       Row Name 11/30/23 1525          Motor Skills    Therapeutic Exercise shoulder  -CE       Row Name 11/30/23 1525          Balance    Dynamic Standing Balance 1-person assist;minimal assist  -CE               User Key  (r) = Recorded By, (t) = Taken By, (c) = Cosigned By      Initials Name Provider Type    Мария Hatch OT Occupational Therapist                   Goals/Plan    No documentation.                  Clinical Impression       Row Name 11/30/23 1526          Pain Assessment    Pretreatment Pain Rating 0/10 - no pain  -CE     Posttreatment Pain Rating 0/10 - no pain  -CE       Row Name 11/30/23 1526          Plan of Care Review    Plan of Care Reviewed With patient  -CE     Outcome Evaluation Pt seen for OT tx focusing on functional endurance and  "strengthening in prep for ADLs. Pt requiring mod encouragment to stay OOBTC at end of session (family present and supportive). Pt with poor functional endurance and standing tolerance. Able to stand ~2 min on best attempt to complete set of orthostatics (see flowsheets for details recorded by RN). Pt with no c/o dizziness during sup>sit>stand but only feeling \"tired.\" Will con't to follow for stated goals and recommend d/c to SNF.  -CE       Row Name 11/30/23 1526          Therapy Plan Review/Discharge Plan (OT)    Anticipated Discharge Disposition (OT) skilled nursing facility  -CE       Row Name 11/30/23 1526          Vital Signs    O2 Delivery Pre Treatment room air  -CE     O2 Delivery Intra Treatment room air  -CE     O2 Delivery Post Treatment room air  -CE     Pre Patient Position Supine  -CE     Intra Patient Position Standing  -CE     Post Patient Position Sitting  -CE       Row Name 11/30/23 1526          Positioning and Restraints    Pre-Treatment Position in bed  -CE     Post Treatment Position chair  -CE     In Chair notified nsg;reclined;call light within reach;encouraged to call for assist;exit alarm on;with family/caregiver  -CE               User Key  (r) = Recorded By, (t) = Taken By, (c) = Cosigned By      Initials Name Provider Type    CE Мария Cherry, OT Occupational Therapist                   Outcome Measures       Row Name 11/30/23 1528          How much help from another is currently needed...    Putting on and taking off regular lower body clothing? 2  -CE     Bathing (including washing, rinsing, and drying) 2  -CE     Toileting (which includes using toilet bed pan or urinal) 2  -CE     Putting on and taking off regular upper body clothing 2  -CE     Taking care of personal grooming (such as brushing teeth) 2  -CE     Eating meals 3  -CE     AM-PAC 6 Clicks Score (OT) 13  -CE       Row Name 11/30/23 1037 11/30/23 0921       How much help from another person do you currently need...    " Turning from your back to your side while in flat bed without using bedrails? 2  -SM 3  -ZH    Moving from lying on back to sitting on the side of a flat bed without bedrails? 2  -SM 3  -ZH    Moving to and from a bed to a chair (including a wheelchair)? 3  -SM 3  -ZH    Standing up from a chair using your arms (e.g., wheelchair, bedside chair)? 3  -SM 2  -ZH    Climbing 3-5 steps with a railing? 1  -SM 1  -ZH    To walk in hospital room? 3  -SM 2  -ZH    AM-PAC 6 Clicks Score (PT) 14  -SM 14  -ZH    Highest Level of Mobility Goal 4 --> Transfer to chair/commode  -SM 4 --> Transfer to chair/commode  -ZH      Row Name 11/30/23 1528          Functional Assessment    Outcome Measure Options AM-PAC 6 Clicks Daily Activity (OT)  -CE               User Key  (r) = Recorded By, (t) = Taken By, (c) = Cosigned By      Initials Name Provider Type    Jessica Osorio, PTA Physical Therapist Assistant    Мария Hatch, OT Occupational Therapist    Honorio Perera, RN Registered Nurse                    Occupational Therapy Education       Title: PT OT SLP Therapies (In Progress)       Topic: Occupational Therapy (Done)       Point: ADL training (Done)       Description:   Instruct learner(s) on proper safety adaptation and remediation techniques during self care or transfers.   Instruct in proper use of assistive devices.                  Learning Progress Summary             Patient Acceptance, E,TB,D, OSMANY,MAXIM,NR by  at 11/29/2023 0313    Acceptance, E,TB, VU by  at 11/28/2023 1519    Comment: Instructed in role of OT, discharge planning, importance of activity to reduce deconditioning, caregiver education                         Point: Home exercise program (Done)       Description:   Instruct learner(s) on appropriate technique for monitoring, assisting and/or progressing therapeutic exercises/activities.                  Learning Progress Summary             Patient Acceptance, E,TB,D, OSMANY,MAXIM,NR by  at  "11/29/2023 0313    Acceptance, E,TB, VU by  at 11/28/2023 1519    Comment: Instructed in role of OT, discharge planning, importance of activity to reduce deconditioning, caregiver education                         Point: Precautions (Done)       Description:   Instruct learner(s) on prescribed precautions during self-care and functional transfers.                  Learning Progress Summary             Patient Acceptance, E,TB,D, VU,DU,NR by  at 11/29/2023 0313    Acceptance, E,TB, VU by  at 11/28/2023 1519    Comment: Instructed in role of OT, discharge planning, importance of activity to reduce deconditioning, caregiver education                         Point: Body mechanics (Done)       Description:   Instruct learner(s) on proper positioning and spine alignment during self-care, functional mobility activities and/or exercises.                  Learning Progress Summary             Patient Acceptance, E,TB,D, VU,DU,NR by  at 11/29/2023 0313    Acceptance, E,TB, VU by  at 11/28/2023 1519    Comment: Instructed in role of OT, discharge planning, importance of activity to reduce deconditioning, caregiver education                                         User Key       Initials Effective Dates Name Provider Type Discipline     06/16/21 -  Suzanna Rodriguez, RN Registered Nurse Nurse     08/31/23 -  Marry Rouse OT Occupational Therapist OT                  OT Recommendation and Plan     Plan of Care Review  Plan of Care Reviewed With: patient  Outcome Evaluation: Pt seen for OT tx focusing on functional endurance and strengthening in prep for ADLs. Pt requiring mod encouragment to stay OOBTC at end of session (family present and supportive). Pt with poor functional endurance and standing tolerance. Able to stand ~2 min on best attempt to complete set of orthostatics (see flowsheets for details recorded by RN). Pt with no c/o dizziness during sup>sit>stand but only feeling \"tired.\" Will con't to follow for " stated goals and recommend d/c to SNF.     Time Calculation:         Time Calculation- OT       Row Name 11/30/23 1528             Time Calculation- OT    OT Start Time 1400  -CE      OT Stop Time 1428  -CE      OT Time Calculation (min) 28 min  -CE      Total Timed Code Minutes- OT 28 minute(s)  -CE      OT Received On 11/30/23  -CE         Timed Charges    65261 - OT Therapeutic Activity Minutes 28  -CE         Total Minutes    Timed Charges Total Minutes 28  -CE       Total Minutes 28  -CE                User Key  (r) = Recorded By, (t) = Taken By, (c) = Cosigned By      Initials Name Provider Type    CE Мария Cherry OT Occupational Therapist                  Therapy Charges for Today       Code Description Service Date Service Provider Modifiers Qty    03281736605 HC OT THERAPEUTIC ACT EA 15 MIN 11/30/2023 Мария Cherry OT GO 2                 Мария Cherry OT  11/30/2023

## 2023-11-30 NOTE — PROGRESS NOTES
Name: Radha DELATORRE Luke ADMIT: 2023   : 3/14/1933  PCP: Blanco Chandra MD    MRN: [id number] LOS: 1 days   AGE/SEX: 89 y.o. female  ROOM: [facility]     Subjective   Subjective   More awake and alert today--more interactive. Able to eat small amounts today but appetite still poor. Drinking better. No V/D. No F/C.        Objective   Objective   Vital Signs  Temp:  [97.9 °F (36.6 °C)-98.4 °F (36.9 °C)] 98.1 °F (36.7 °C)  Heart Rate:  [60-69] 69  Resp:  [18] 18  BP: (122-152)/(44-69) 122/69  SpO2:  [98 %-100 %] 99 %  on   ;   Device (Oxygen Therapy): room air  Body mass index is 20.24 kg/m².  Physical Exam  Vitals and nursing note reviewed.   Constitutional:       General: She is not in acute distress.     Appearance: She is ill-appearing. She is not toxic-appearing or diaphoretic.   HENT:      Head: Normocephalic.      Mouth/Throat:      Mouth: Mucous membranes are moist.   Eyes:      General:         Right eye: No discharge.         Left eye: No discharge.      Extraocular Movements: Extraocular movements intact.      Conjunctiva/sclera: Conjunctivae normal.   Cardiovascular:      Rate and Rhythm: Normal rate and regular rhythm.      Pulses: Normal pulses.   Pulmonary:      Effort: Pulmonary effort is normal. No respiratory distress.      Breath sounds: Normal breath sounds. No wheezing or rales.   Abdominal:      General: Bowel sounds are normal. There is no distension.      Palpations: Abdomen is soft.      Tenderness: There is no abdominal tenderness.   Musculoskeletal:         General: No swelling.      Cervical back: Neck supple.   Skin:     General: Skin is warm and dry.      Capillary Refill: Capillary refill takes less than 2 seconds.      Coloration: Skin is pale. Skin is not jaundiced.   Neurological:      Mental Status: She is alert.      Cranial Nerves: No cranial nerve deficit.      Coordination: Coordination normal.      Comments: Oriented to person   Psychiatric:      Comments: Pleasant and  "interactive today, calm       Results Review     I reviewed the patient's new clinical results.  Results from last 7 days   Lab Units 11/30/23  0516 11/29/23 0445 11/27/23  1353   WBC 10*3/mm3 8.11 9.53 14.57*   HEMOGLOBIN g/dL 11.6* 11.8* 16.4*   PLATELETS 10*3/mm3 114* 121* 186     Results from last 7 days   Lab Units 11/30/23 0516 11/29/23 0445 11/28/23 0334 11/27/23  1353   SODIUM mmol/L 148* 147* 145 139   POTASSIUM mmol/L 4.7 3.3* 3.5 4.5   CHLORIDE mmol/L 120* 113* 108* 95*   CO2 mmol/L 19.8* 23.0 26.0 29.0   BUN mg/dL 27* 41* 65* 86*   CREATININE mg/dL 0.92 0.99 1.34* 1.82*   GLUCOSE mg/dL 121* 105* 122* 153*   EGFR mL/min/1.73 59.3* 54.3* 37.7* 26.1*     Results from last 7 days   Lab Units 11/30/23  0516 11/29/23 0445   ALBUMIN g/dL 2.9* 3.1*   BILIRUBIN mg/dL 0.3 0.4   ALK PHOS U/L 46 47   AST (SGOT) U/L 21 18   ALT (SGPT) U/L 11 9     Results from last 7 days   Lab Units 11/30/23 0516 11/29/23 0445 11/28/23 0334 11/27/23  1353   CALCIUM mg/dL 8.2 8.2 9.2 11.5*   ALBUMIN g/dL 2.9* 3.1*  --   --    MAGNESIUM mg/dL 2.2 2.1  --   --      Results from last 7 days   Lab Units 11/27/23 2008   LACTATE mmol/L 1.5     No results found for: \"HGBA1C\", \"POCGLU\"    No radiology results for the last day    I have personally reviewed all medications:  Scheduled Medications  allopurinol, 100 mg, Oral, Daily  carvedilol, 3.125 mg, Oral, BID  furosemide, 20 mg, Oral, Daily  potassium chloride, 20 mEq, Oral, Daily  sodium chloride, 10 mL, Intravenous, Q12H    Infusions  dextrose, 50 mL/hr, Last Rate: 50 mL/hr (11/30/23 1256)      Diet  Diet: Regular/House Diet; Texture: Regular Texture (IDDSI 7); Fluid Consistency: Thin (IDDSI 0)    I have personally reviewed:  [x]  Laboratory   [x]  Microbiology   []  Radiology   [x]  EKG/Telemetry  []  Cardiology/Vascular   []  Pathology    []  Records       Assessment/Plan     Active Hospital Problems    Diagnosis  POA    **Acute UTI (urinary tract infection) [N39.0]  Yes    " Volume depletion [E86.9]  Yes    Dementia without behavioral disturbance [F03.90]  Yes    Stage 3b chronic kidney disease [N18.32]  Yes    Hypercalcemia [E83.52]  Yes    Aortic stenosis, moderate [I35.0]  Yes    Chronic combined systolic and diastolic heart failure [I50.42]  Yes    Left bundle branch block [I44.7]  Yes    Impaired glucose tolerance [R73.02]  Yes    History of left breast cancer [Z85.3]  Not Applicable    Primary hypertension [I10]  Yes    Gastroesophageal reflux disease with esophagitis [K21.00]  Yes      Resolved Hospital Problems   No resolved problems to display.       89yo woman with HTN, CAD, left breast CA, GERD, chronic combined CHF, CKD3b, aortic stenosis, and impaired glucose tolerance, who presented to ER from assisted living facility with poor po intake, weight loss, leukocytosis, higher than usual Cr. She was diagnosed with UTI and dehydration.    UTI  Has completed 3d of Rocephin for pan-sensitive E.coli  Afebrile, WBC normalized  Blood cultures NGTD    Lethargy  FTT  Just started Remeron at facility on day of admission  Given somnolence on presentation I don't think this is appropriate for now--also don't expect this would actually help much with appetite, I suspect that her acute illness has much more to do with her decreased PO intake PTA  With treatment of her UTI her LOC and appetite have improved somewhat    Hypernatremia  Stopped IVFs  Encouraged PO fluids  Na+ still high so have ordered D5W cautiously   Monitor    Hypokalemia  Replaced with protocol  Mg++ level fine  Restarted her daily KCl today    WILFRIDO/CKD3b  Cr at baseline or better after IVFs  Continue to monitor Cr daily    Hypercalcemia  Resolved with IVFs    HTN  BPs acceptable  Continue Coreg    Impaired glucose tolerance  Sugars acceptable  Monitor as po intake increases    Chronic combined CHF  Dehydrated on admission  Held Lasix initially  Restarted Lasix this AM at attenuated dose given her poor po intake    Breast  CA  Arimidex on hold for now  Can restart at dc    SCDs for DVT prophylaxis.  Limited code (no CPR, no intubation).  Discussed with patient, nursing staff, CCP, and care team on multidisciplinary rounds. No family present.  Anticipate discharge to SNU facility, timing yet to be determined.      Michael Lou MD  [facility]  11/30/23  13:20 EST

## 2023-11-30 NOTE — CASE MANAGEMENT/SOCIAL WORK
Continued Stay Note  [facility]     Patient Name: Radha Luke  MRN: [id number]  Today's Date: 11/30/2023    Admit Date: 11/27/2023    Plan: [facility] SNF   Discharge Plan       Row Name 11/30/23 1434       Plan    Plan [facility]    Patient/Family in Agreement with Plan yes    Plan Comments CCP spoke with Henna/Dtr in law at bedside, introduced self and explained CCP role. Discussed dc planning, PC vs SNF at [facility]. CCP explained bed available at [facility] and they will hold bed available for pt. CCP explained possible dc Saturday. Henna states she and pts son Alfonzo discussed pts needs and recommend SNF also. CCP explained would notify [facility] and will continue to follow along for medical readiness. Notified [facility]. Packet in ccp office. Tari GONZALEZ/CCP                   Discharge Codes    No documentation.                 Expected Discharge Date and Time       Expected Discharge Date Expected Discharge Time    Dec 1, 2023               Dilcia Monzon, RN

## 2023-11-30 NOTE — PLAN OF CARE
"Goal Outcome Evaluation:  Plan of Care Reviewed With: patient           Outcome Evaluation: Pt seen for OT tx focusing on functional endurance and strengthening in prep for ADLs. Pt requiring mod encouragment to stay OOBTC at end of session (family present and supportive). Pt with poor functional endurance and standing tolerance. Able to stand ~2 min on best attempt to complete set of orthostatics (see flowsheets for details recorded by RN). Pt with no c/o dizziness during sup>sit>stand but only feeling \"tired.\" Will con't to follow for stated goals and recommend d/c to SNF.      Anticipated Discharge Disposition (OT): skilled nursing facility  "

## 2023-11-30 NOTE — PLAN OF CARE
Goal Outcome Evaluation:  Plan of Care Reviewed With: patient        Progress: improving  Outcome Evaluation: Pt tolerated treatment fair this date. Required mod A for bed mobility, then min A to stand. Pt ambulated 10ft w/ Rw and min A, limited d/t fatigue. Encouraged pt to get up to chair later w/ nsg.      Anticipated Discharge Disposition (PT): skilled nursing facility

## 2023-12-01 PROBLEM — E43 SEVERE MALNUTRITION: Status: ACTIVE | Noted: 2023-12-01

## 2023-12-01 LAB
ALBUMIN SERPL-MCNC: 2.7 G/DL (ref 3.5–5.2)
ALBUMIN/GLOB SERPL: 1.2 G/DL
ALP SERPL-CCNC: 48 U/L (ref 39–117)
ALT SERPL W P-5'-P-CCNC: 9 U/L (ref 1–33)
ANION GAP SERPL CALCULATED.3IONS-SCNC: 7.6 MMOL/L (ref 5–15)
AST SERPL-CCNC: 23 U/L (ref 1–32)
BILIRUB SERPL-MCNC: 0.5 MG/DL (ref 0–1.2)
BUN SERPL-MCNC: 18 MG/DL (ref 8–23)
BUN/CREAT SERPL: 22.8 (ref 7–25)
CALCIUM SPEC-SCNC: 7.6 MG/DL (ref 8.2–9.6)
CHLORIDE SERPL-SCNC: 111 MMOL/L (ref 98–107)
CO2 SERPL-SCNC: 19.4 MMOL/L (ref 22–29)
CREAT SERPL-MCNC: 0.79 MG/DL (ref 0.57–1)
DEPRECATED RDW RBC AUTO: 42.3 FL (ref 37–54)
EGFRCR SERPLBLD CKD-EPI 2021: 71.2 ML/MIN/1.73
ERYTHROCYTE [DISTWIDTH] IN BLOOD BY AUTOMATED COUNT: 12.3 % (ref 12.3–15.4)
GLOBULIN UR ELPH-MCNC: 2.3 GM/DL
GLUCOSE SERPL-MCNC: 128 MG/DL (ref 65–99)
HCT VFR BLD AUTO: 32.3 % (ref 34–46.6)
HGB BLD-MCNC: 11.2 G/DL (ref 12–15.9)
MAGNESIUM SERPL-MCNC: 2.1 MG/DL (ref 1.6–2.4)
MCH RBC QN AUTO: 32.7 PG (ref 26.6–33)
MCHC RBC AUTO-ENTMCNC: 34.7 G/DL (ref 31.5–35.7)
MCV RBC AUTO: 94.4 FL (ref 79–97)
PLATELET # BLD AUTO: 101 10*3/MM3 (ref 140–450)
PMV BLD AUTO: 10.8 FL (ref 6–12)
POTASSIUM SERPL-SCNC: 4.2 MMOL/L (ref 3.5–5.2)
PROT SERPL-MCNC: 5 G/DL (ref 6–8.5)
RBC # BLD AUTO: 3.42 10*6/MM3 (ref 3.77–5.28)
SODIUM SERPL-SCNC: 138 MMOL/L (ref 136–145)
WBC NRBC COR # BLD AUTO: 7.54 10*3/MM3 (ref 3.4–10.8)

## 2023-12-01 PROCEDURE — 85027 COMPLETE CBC AUTOMATED: CPT | Performed by: HOSPITALIST

## 2023-12-01 PROCEDURE — 83735 ASSAY OF MAGNESIUM: CPT | Performed by: HOSPITALIST

## 2023-12-01 PROCEDURE — 97530 THERAPEUTIC ACTIVITIES: CPT

## 2023-12-01 PROCEDURE — 80053 COMPREHEN METABOLIC PANEL: CPT | Performed by: HOSPITALIST

## 2023-12-01 PROCEDURE — 97535 SELF CARE MNGMENT TRAINING: CPT

## 2023-12-01 RX ORDER — MIRTAZAPINE 15 MG/1
7.5 TABLET, FILM COATED ORAL NIGHTLY
Status: DISCONTINUED | OUTPATIENT
Start: 2023-12-01 | End: 2023-12-02 | Stop reason: HOSPADM

## 2023-12-01 RX ADMIN — FUROSEMIDE 20 MG: 20 TABLET ORAL at 08:31

## 2023-12-01 RX ADMIN — MIRTAZAPINE 7.5 MG: 15 TABLET, FILM COATED ORAL at 20:09

## 2023-12-01 RX ADMIN — ALLOPURINOL 100 MG: 100 TABLET ORAL at 08:31

## 2023-12-01 RX ADMIN — Medication 10 ML: at 08:36

## 2023-12-01 RX ADMIN — CARVEDILOL 3.12 MG: 3.12 TABLET, FILM COATED ORAL at 08:31

## 2023-12-01 RX ADMIN — CARVEDILOL 3.12 MG: 3.12 TABLET, FILM COATED ORAL at 20:09

## 2023-12-01 RX ADMIN — POTASSIUM CHLORIDE 20 MEQ: 750 TABLET, EXTENDED RELEASE ORAL at 08:31

## 2023-12-01 NOTE — PLAN OF CARE
Goal Outcome Evaluation:  Plan of Care Reviewed With: patient        Progress: improving  Outcome Evaluation: Patient is more alert today, awake upon arrival to room. Patient is willing to get up to chair, and motivated to participate in therapy in order to return to facilty with . Patient performed bed mobility with min A, grooming/hygiene with set up at EOB, mod A with toileting at EOB. Patient performed bed to chair transfer with CGA, min A to come to stand. Patient will continue to benefit from skilled OT to address remaining functional deficits, recommend SNF at acute [place].      Anticipated Discharge Disposition (OT): skilled nursing facility

## 2023-12-01 NOTE — PLAN OF CARE
Problem: Adult Inpatient Plan of Care  Goal: Plan of Care Review  Outcome: Ongoing, Progressing  Goal: Patient-Specific Goal (Individualized)  Outcome: Ongoing, Progressing  Goal: Absence of Hospital-Acquired Illness or Injury  Outcome: Ongoing, Progressing  Intervention: Identify and Manage Fall Risk  Recent Flowsheet Documentation  Taken 12/1/2023 1400 by Honorio Malone RN  Safety Promotion/Fall Prevention:   activity supervised   clutter free environment maintained   fall prevention program maintained   lighting adjusted   nonskid shoes/slippers when out of bed   room organization consistent   safety round/check completed  Taken 12/1/2023 1200 by Honorio Malone RN  Safety Promotion/Fall Prevention:   activity supervised   clutter free environment maintained   fall prevention program maintained   lighting adjusted   nonskid shoes/slippers when out of bed   room organization consistent   safety round/check completed  Taken 12/1/2023 1000 by Honorio Malone RN  Safety Promotion/Fall Prevention:   activity supervised   clutter free environment maintained   fall prevention program maintained   lighting adjusted   nonskid shoes/slippers when out of bed   room organization consistent   safety round/check completed  Taken 12/1/2023 0800 by Honorio Malone RN  Safety Promotion/Fall Prevention:   activity supervised   clutter free environment maintained   fall prevention program maintained   lighting adjusted   nonskid shoes/slippers when out of bed   room organization consistent   safety round/check completed  Intervention: Prevent and Manage VTE (Venous Thromboembolism) Risk  Recent Flowsheet Documentation  Taken 12/1/2023 0800 by Honorio Malone RN  Range of Motion: active ROM (range of motion) encouraged  Intervention: Prevent Infection  Recent Flowsheet Documentation  Taken 12/1/2023 1400 by Honorio Malone RN  Infection Prevention:   rest/sleep promoted   single patient room provided   hand hygiene promoted  Taken  12/1/2023 1200 by Honorio Malone RN  Infection Prevention:   hand hygiene promoted   rest/sleep promoted   single patient room provided  Taken 12/1/2023 1000 by Honorio Malone RN  Infection Prevention:   rest/sleep promoted   single patient room provided   hand hygiene promoted  Taken 12/1/2023 0800 by Honorio Malone RN  Infection Prevention:   hand hygiene promoted   rest/sleep promoted   single patient room provided  Goal: Optimal Comfort and Wellbeing  Outcome: Ongoing, Progressing  Intervention: Provide Person-Centered Care  Recent Flowsheet Documentation  Taken 12/1/2023 0800 by Honorio Malone RN  Trust Relationship/Rapport:   care explained   thoughts/feelings acknowledged   reassurance provided   questions encouraged   questions answered   empathic listening provided  Goal: Readiness for Transition of Care  Outcome: Ongoing, Progressing     Problem: Fall Injury Risk  Goal: Absence of Fall and Fall-Related Injury  Outcome: Ongoing, Progressing  Intervention: Identify and Manage Contributors  Recent Flowsheet Documentation  Taken 12/1/2023 1400 by Honorio Malone RN  Medication Review/Management: medications reviewed  Taken 12/1/2023 1200 by Honorio Malone RN  Medication Review/Management: medications reviewed  Taken 12/1/2023 1000 by Honorio Malone RN  Medication Review/Management: medications reviewed  Taken 12/1/2023 0800 by Honorio Malone RN  Medication Review/Management: medications reviewed  Intervention: Promote Injury-Free Environment  Recent Flowsheet Documentation  Taken 12/1/2023 1400 by Honorio Malone RN  Safety Promotion/Fall Prevention:   activity supervised   clutter free environment maintained   fall prevention program maintained   lighting adjusted   nonskid shoes/slippers when out of bed   room organization consistent   safety round/check completed  Taken 12/1/2023 1200 by Honorio Malone RN  Safety Promotion/Fall Prevention:   activity supervised   clutter free environment maintained   fall  prevention program maintained   lighting adjusted   nonskid shoes/slippers when out of bed   room organization consistent   safety round/check completed  Taken 12/1/2023 1000 by Honorio Malone RN  Safety Promotion/Fall Prevention:   activity supervised   clutter free environment maintained   fall prevention program maintained   lighting adjusted   nonskid shoes/slippers when out of bed   room organization consistent   safety round/check completed  Taken 12/1/2023 0800 by Honorio Malone RN  Safety Promotion/Fall Prevention:   activity supervised   clutter free environment maintained   fall prevention program maintained   lighting adjusted   nonskid shoes/slippers when out of bed   room organization consistent   safety round/check completed     Problem: Skin Injury Risk Increased  Goal: Skin Health and Integrity  Outcome: Ongoing, Progressing     Problem: Adjustment to Illness (Heart Failure)  Goal: Optimal Coping  Outcome: Ongoing, Progressing     Problem: Cardiac Output Decreased (Heart Failure)  Goal: Optimal Cardiac Output  Outcome: Ongoing, Progressing     Problem: Dysrhythmia (Heart Failure)  Goal: Stable Heart Rate and Rhythm  Outcome: Ongoing, Progressing     Problem: Fluid Imbalance (Heart Failure)  Goal: Fluid Balance  Outcome: Ongoing, Progressing     Problem: Functional Ability Impaired (Heart Failure)  Goal: Optimal Functional Ability  Outcome: Ongoing, Progressing     Problem: Oral Intake Inadequate (Heart Failure)  Goal: Optimal Nutrition Intake  Outcome: Ongoing, Progressing     Problem: Respiratory Compromise (Heart Failure)  Goal: Effective Oxygenation and Ventilation  Outcome: Ongoing, Progressing  Intervention: Promote Airway Secretion Clearance  Recent Flowsheet Documentation  Taken 12/1/2023 0800 by Honorio Malone RN  Cough And Deep Breathing: done with encouragement     Problem: Sleep Disordered Breathing (Heart Failure)  Goal: Effective Breathing Pattern During Sleep  Outcome: Ongoing,  Progressing   Goal Outcome Evaluation:      Patient is in bed with eyes closed. Family at bedside. Patient continues to have decreased appetite. Drinks ensures but hardly touches food. Denies all pain at this time. Call light in reach.

## 2023-12-01 NOTE — PROGRESS NOTES
Name: Radha DELATORRE Luke ADMIT: 2023   : 3/14/1933  PCP: Blanco Chandra MD    MRN: [id number] LOS: 2 days   AGE/SEX: 89 y.o. female  ROOM: [facility]     Subjective   Subjective   She is sleeping but easily awakened. Doesn't want to talk but is able to--speech clear. Able to eat small amounts today but appetite still poor. Drinking better. No V/D. No F/C. Family reports she was awake all night.       Objective   Objective   Vital Signs  Temp:  [99 °F (37.2 °C)-99.4 °F (37.4 °C)] 99 °F (37.2 °C)  Heart Rate:  [62-68] 62  Resp:  [17-18] 18  BP: (112-137)/(34-61) 128/55  SpO2:  [97 %-100 %] 97 %  on   ;   Device (Oxygen Therapy): room air  Body mass index is 21.05 kg/m².  Physical Exam  Vitals and nursing note reviewed.   Constitutional:       General: She is not in acute distress.     Appearance: She is ill-appearing. She is not toxic-appearing or diaphoretic.   HENT:      Head: Normocephalic.      Mouth/Throat:      Mouth: Mucous membranes are moist.   Eyes:      General:         Right eye: No discharge.         Left eye: No discharge.      Extraocular Movements: Extraocular movements intact.      Conjunctiva/sclera: Conjunctivae normal.   Cardiovascular:      Rate and Rhythm: Normal rate and regular rhythm.      Pulses: Normal pulses.   Pulmonary:      Effort: Pulmonary effort is normal. No respiratory distress.      Breath sounds: Normal breath sounds. No wheezing or rales.   Abdominal:      General: Bowel sounds are normal. There is no distension.      Palpations: Abdomen is soft.      Tenderness: There is no abdominal tenderness.   Musculoskeletal:         General: No swelling.      Cervical back: Neck supple.   Skin:     General: Skin is warm and dry.      Capillary Refill: Capillary refill takes less than 2 seconds.      Coloration: Skin is pale. Skin is not jaundiced.   Neurological:      Mental Status: She is alert.      Cranial Nerves: No cranial nerve deficit.      Coordination: Coordination normal.  "     Comments: Oriented to person   Psychiatric:      Comments: Interactive today, calm but irritable       Results Review     I reviewed the patient's new clinical results.  Results from last 7 days   Lab Units 12/01/23 0437 11/30/23 0516 11/29/23 0445 11/27/23  1353   WBC 10*3/mm3 7.54 8.11 9.53 14.57*   HEMOGLOBIN g/dL 11.2* 11.6* 11.8* 16.4*   PLATELETS 10*3/mm3 101* 114* 121* 186     Results from last 7 days   Lab Units 12/01/23 0437 11/30/23 0516 11/29/23 0445 11/28/23  0334   SODIUM mmol/L 138 148* 147* 145   POTASSIUM mmol/L 4.2 4.7 3.3* 3.5   CHLORIDE mmol/L 111* 120* 113* 108*   CO2 mmol/L 19.4* 19.8* 23.0 26.0   BUN mg/dL 18 27* 41* 65*   CREATININE mg/dL 0.79 0.92 0.99 1.34*   GLUCOSE mg/dL 128* 121* 105* 122*   EGFR mL/min/1.73 71.2 59.3* 54.3* 37.7*     Results from last 7 days   Lab Units 12/01/23 0437 11/30/23 0516 11/29/23 0445   ALBUMIN g/dL 2.7* 2.9* 3.1*   BILIRUBIN mg/dL 0.5 0.3 0.4   ALK PHOS U/L 48 46 47   AST (SGOT) U/L 23 21 18   ALT (SGPT) U/L 9 11 9     Results from last 7 days   Lab Units 12/01/23 0437 11/30/23 0516 11/29/23 0445 11/28/23  0334   CALCIUM mg/dL 7.6* 8.2 8.2 9.2   ALBUMIN g/dL 2.7* 2.9* 3.1*  --    MAGNESIUM mg/dL 2.1 2.2 2.1  --      Results from last 7 days   Lab Units 11/27/23 2008   LACTATE mmol/L 1.5     No results found for: \"HGBA1C\", \"POCGLU\"    No radiology results for the last day    I have personally reviewed all medications:  Scheduled Medications  allopurinol, 100 mg, Oral, Daily  carvedilol, 3.125 mg, Oral, BID  furosemide, 20 mg, Oral, Daily  potassium chloride, 20 mEq, Oral, Daily  sodium chloride, 10 mL, Intravenous, Q12H    Infusions       Diet  Diet: Regular/House Diet; Texture: Regular Texture (IDDSI 7); Fluid Consistency: Thin (IDDSI 0)    I have personally reviewed:  [x]  Laboratory   [x]  Microbiology   []  Radiology   [x]  EKG/Telemetry  []  Cardiology/Vascular   []  Pathology    []  Records       Assessment/Plan     Active Hospital " Problems    Diagnosis  POA    **Acute UTI (urinary tract infection) [N39.0]  Yes    Volume depletion [E86.9]  Yes    Dementia without behavioral disturbance [F03.90]  Yes    Stage 3b chronic kidney disease [N18.32]  Yes    Hypercalcemia [E83.52]  Yes    Aortic stenosis, moderate [I35.0]  Yes    Chronic combined systolic and diastolic heart failure [I50.42]  Yes    Left bundle branch block [I44.7]  Yes    Impaired glucose tolerance [R73.02]  Yes    History of left breast cancer [Z85.3]  Not Applicable    Primary hypertension [I10]  Yes    Gastroesophageal reflux disease with esophagitis [K21.00]  Yes      Resolved Hospital Problems   No resolved problems to display.       89yo woman with HTN, CAD, left breast CA, GERD, chronic combined CHF, CKD3b, aortic stenosis, and impaired glucose tolerance, who presented to ER from assisted living facility with poor po intake, weight loss, leukocytosis, higher than usual Cr. She was diagnosed with UTI and dehydration.    UTI  Has completed 3d of Rocephin for pan-sensitive E.coli  Afebrile, WBC normalized  Blood cultures NGTD    Lethargy  FTT  Just started Remeron at facility on day of admission  Given her insomnia here I think it reasonable to start low-dose here at HS, not sure it will actually help with appetite--I suspect that her acute illness has much more to do with her decreased PO intake prior to admission  With treatment of her UTI her LOC and appetite have improved somewhat    Hypernatremia  Encouraging PO fluids  Na+ normalized after small amount of D5W  Monitor    Hypokalemia  Replaced with protocol  Mg++ level fine  Restarted her daily KCl yesterday    WILFRIDO/CKD3b  Cr at baseline or better after IVFs  Continue to monitor Cr daily    Hypercalcemia  Resolved with IVFs    HTN  BPs acceptable  Continue Coreg    Impaired glucose tolerance  Sugars acceptable  Monitor as po intake increases    Chronic combined CHF  Dehydrated on admission  Held Lasix initially  Have  restarted Lasix now at attenuated dose given her poor po intake    Breast CA  Arimidex on hold for now  Can restart at dc    SCDs for DVT prophylaxis.  Limited code (no CPR, no intubation).  Discussed with patient, nursing staff, CCP, and care team on multidisciplinary rounds. D/w  and son at bedside.  Anticipate discharge to the [facility] tomorrow.      Michael Lou MD  [facility]  12/01/23  14:23 EST

## 2023-12-01 NOTE — PLAN OF CARE
Goal Outcome Evaluation:  Plan of Care Reviewed With: patient        Progress: no change     Family at bedside throughout this shift. PO fluids encouraged.

## 2023-12-01 NOTE — CASE MANAGEMENT/SOCIAL WORK
Continued Stay Note  [facility]     Patient Name: Radha Luke  MRN: [id number]  Today's Date: 12/1/2023    Admit Date: 11/27/2023    Plan: [facility] via family   Discharge Plan       Row Name 12/01/23 1406       Plan    Plan [facility] via family    Patient/Family in Agreement with Plan yes    Plan Comments Spoke with Alma/Luis at [facility] bed available tomorrow, explained MD anticipates dc tomorrow. Partial Packet in ccp office. Tari GONZALEZ/CCP                   Discharge Codes    No documentation.                 Expected Discharge Date and Time       Expected Discharge Date Expected Discharge Time    Dec 2, 2023               Dilcia Monzon, RN

## 2023-12-01 NOTE — THERAPY TREATMENT NOTE
Patient Name: Radha Luke  : 3/14/1933    MRN: [id number]                              Today's Date: 2023       Admit Date: 2023    Visit Dx:     ICD-10-CM ICD-9-CM   1. Hypercalcemia  E83.52 275.42   2. Volume depletion  E86.9 276.50   3. WILFRIDO (acute kidney injury)  N17.9 584.9     Patient Active Problem List   Diagnosis    Disorder of aorta    Diverticulosis of intestine    Gastroesophageal reflux disease with esophagitis    Mixed hyperlipidemia    Primary hypertension    Age-related osteoporosis without current pathological fracture    Health care maintenance    History of left breast cancer    Malignant neoplasm of overlapping sites of left female breast    Pain of right sacroiliac joint    Impaired glucose tolerance    Left bundle branch block    Congestive heart failure due to valvular disease    Memory loss    Gout    Foot pain    Vertigo    Chronic combined systolic and diastolic heart failure    Nonrheumatic mitral valve regurgitation    Nonrheumatic aortic valve insufficiency    Aortic stenosis, moderate    TSH elevation    Breast cancer    Short-term memory loss    Confusion    Decreased appetite    Stage 3b chronic kidney disease    Acute UTI (urinary tract infection)    Hypercalcemia    Volume depletion    Dementia without behavioral disturbance     Past Medical History:   Diagnosis Date    Acute systolic congestive heart failure     Allergic ?    Aortic stenosis     Arthritis     Breast cancer     Locally recurrent left breast    Cataract ?    CKD (chronic kidney disease), stage III     Colon polyp ?    Coronary artery disease 2018    Cough     Diverticulosis 2012    Dizziness     Drug therapy     Edema     GERD (gastroesophageal reflux disease)     History of breast cancer     LEFT    Hyperlipidemia     Hypertension     Hypokalemia     LBBB (left bundle branch block)     Mild tricuspid regurgitation     with a right ventricular systolic pressure of 62    Moderate mitral  regurgitation     Pleural effusion     Pneumonia 2018     Past Surgical History:   Procedure Laterality Date    APPENDECTOMY  1943    BREAST BIOPSY      BREAST LUMPECTOMY Left 1995    COLONOSCOPY N/A 5/12/2016    Procedure: COLONOSCOPY;  Surgeon: Israel Vance MD;  Location: [facility] ENDOSCOPY;  Service:     HYSTERECTOMY  1964    MASTECTOMY Left 2011    THORACENTESIS Bilateral       General Information       Row Name 12/01/23 0941          OT Time and Intention    Document Type therapy note (daily note)  -     Mode of Treatment individual therapy;occupational therapy  -       Row Name 12/01/23 0941          General Information    Patient Profile Reviewed yes  -     Existing Precautions/Restrictions fall  -     Barriers to Rehab medically complex;cognitive status  -       Row Name 12/01/23 0941          Living Environment    People in Home facility resident  -       Row Name 12/01/23 0941          Home Main Entrance    Number of Stairs, Main Entrance none  -     Stair Railings, Main Entrance none  -       Row Name 12/01/23 0941          Cognition    Orientation Status (Cognition) oriented to;person;place  -       Row Name 12/01/23 0941          Safety Issues, Functional Mobility    Safety Issues Affecting Function (Mobility) ability to follow commands;insight into deficits/self-awareness;safety precautions follow-through/compliance  -     Impairments Affecting Function (Mobility) balance;cognition;endurance/activity tolerance;strength  -     Cognitive Impairments, Mobility Safety/Performance awareness, need for assistance;insight into deficits/self-awareness  -               User Key  (r) = Recorded By, (t) = Taken By, (c) = Cosigned By      Initials Name Provider Type     Marry Rouse OT Occupational Therapist                     Mobility/ADL's       Row Name 12/01/23 0942          Bed Mobility    Bed Mobility supine-sit  -     Rolling Left [place] (Bed Mobility) verbal  cues;nonverbal cues (demo/gesture);minimum assist (75% patient effort)  -     Supine-Sit [place] (Bed Mobility) minimum assist (75% patient effort);verbal cues  -     Bed Mobility, Safety Issues cognitive deficits limit understanding;impaired trunk control for bed mobility  -     Assistive Device (Bed Mobility) bed rails;head of bed elevated  -       Row Name 12/01/23 0942          Transfers    Transfers sit-stand transfer;bed-chair transfer  -       Row Name 12/01/23 0942          Bed-Chair Transfer    Bed-Chair [place] (Transfers) 1 person assist;minimum assist (75% patient effort);verbal cues  -     Comment, (Bed-Chair Transfer) A  -       Row Name 12/01/23 0942          Sit-Stand Transfer    Sit-Stand [place] (Transfers) minimum assist (75% patient effort);verbal cues  -     Comment, (Sit-Stand Transfer) [facility] x1  -       Row Name 12/01/23 0942          Activities of Daily Living    BADL Assessment/Intervention grooming;toileting  -[facility] Name 12/01/23 0942          Toileting Assessment/Training    [place] Level (Toileting) toileting skills;adjust/manage clothing;perform perineal hygiene;nonverbal cues (demo/gesture);verbal cues;moderate assist (50% patient effort)  -     Position (Toileting) edge of bed sitting  -       Row Name 12/01/23 0942          Grooming Assessment/Training    [place] Level (Grooming) hair care, combing/brushing;set up  -     Position (Grooming) edge of bed sitting  -               User Key  (r) = Recorded By, (t) = Taken By, (c) = Cosigned By      Initials Name Provider Type     Marry Rouse OT Occupational Therapist                   Obj/Interventions       Row Name 12/01/23 0943          Range of Motion Comprehensive    General Range of Motion bilateral upper extremity ROM WFL  -       Row Name 12/01/23 0943          Strength Comprehensive (MMT)    General Manual Muscle Testing (MMT) Assessment upper extremity strength  deficits identified  -     Comment, General Manual Muscle Testing (MMT) Assessment 3+/5 BUE MMT  -       Row Name 12/01/23 0943          Motor Skills    Motor Skills functional endurance  -     Functional Endurance poor+  -       Row Name 12/01/23 0943          Balance    Balance Assessment sitting static balance;sitting dynamic balance;sit to stand dynamic balance;standing static balance  -     Static Sitting Balance standby assist  -     Dynamic Sitting Balance standby assist  -     Position, Sitting Balance unsupported;sitting edge of bed  -     Sit to Stand Dynamic Balance minimal assist;1-person assist  -     Static Standing Balance contact guard;1-person assist  -     Dynamic Standing Balance contact guard;1-person assist;verbal cues  -     Position/Device Used, Standing Balance supported;other (see comments)  HHA x1  -     Balance Interventions sitting;standing;occupation based/functional task  -               User Key  (r) = Recorded By, (t) = Taken By, (c) = Cosigned By      Initials Name Provider Type     Marry Rouse, MOLLY Occupational Therapist                   Goals/Plan    No documentation.                  Clinical Impression       Row Name 12/01/23 0944          Pain Assessment    Pretreatment Pain Rating 0/10 - no pain  -     Posttreatment Pain Rating 0/10 - no pain  -       Row Name 12/01/23 0944          Plan of Care Review    Plan of Care Reviewed With patient  -     Progress improving  -     Outcome Evaluation Patient is more alert today, awake upon arrival to room. Patient is willing to get up to chair, and motivated to participate in therapy in order to return to facilty with . Patient performed bed mobility with min A, grooming/hygiene with set up at EOB, mod A with toileting at EOB. Patient performed bed to chair transfer with CGA, min A to come to stand. Patient will continue to benefit from skilled OT to address remaining functional deficits,  recommend SNF at acute dc.  -       Row Name 12/01/23 0944          Therapy Assessment/Plan (OT)    Rehab Potential (OT) good, to achieve stated therapy goals  -     Criteria for Skilled Therapeutic Interventions Met (OT) yes;meets criteria;skilled treatment is necessary  -     Therapy Frequency (OT) 5 times/wk  -       Row Name 12/01/23 0944          Therapy Plan Review/Discharge Plan (OT)    Anticipated Discharge Disposition (OT) skilled nursing facility  -       Row Name 12/01/23 0944          Positioning and Restraints    In Chair reclined;call light within reach;encouraged to call for assist;exit alarm on  -               User Key  (r) = Recorded By, (t) = Taken By, (c) = Cosigned By      Initials Name Provider Type    Marry Patel OT Occupational Therapist                   Outcome Measures       Row Name 12/01/23 0946          How much help from another is currently needed...    Putting on and taking off regular lower body clothing? 2  -LH     Bathing (including washing, rinsing, and drying) 2  -LH     Toileting (which includes using toilet bed pan or urinal) 2  -LH     Putting on and taking off regular upper body clothing 3  -LH     Taking care of personal grooming (such as brushing teeth) 4  -LH     Eating meals 4  -     AM-PAC 6 Clicks Score (OT) 17  -       Row Name 12/01/23 0946          Functional Assessment    Outcome Measure Options AM-PAC 6 Clicks Daily Activity (OT)  -               User Key  (r) = Recorded By, (t) = Taken By, (c) = Cosigned By      Initials Name Provider Type     Marry Rouse OT Occupational Therapist                    Occupational Therapy Education       Title: PT OT SLP Therapies (In Progress)       Topic: Occupational Therapy (Done)       Point: ADL training (Done)       Description:   Instruct learner(s) on proper safety adaptation and remediation techniques during self care or transfers.   Instruct in proper use of assistive devices.                   Learning Progress Summary             Patient Acceptance, E,TB,D, VU,DU,NR by  at 11/29/2023 0313    Acceptance, E,TB, VU by  at 11/28/2023 1519    Comment: Instructed in role of OT, discharge planning, importance of activity to reduce deconditioning, caregiver education                         Point: Home exercise program (Done)       Description:   Instruct learner(s) on appropriate technique for monitoring, assisting and/or progressing therapeutic exercises/activities.                  Learning Progress Summary             Patient Acceptance, E,TB,D, VU,DU,NR by  at 11/29/2023 0313    Acceptance, E,TB, VU by  at 11/28/2023 1519    Comment: Instructed in role of OT, discharge planning, importance of activity to reduce deconditioning, caregiver education                         Point: Precautions (Done)       Description:   Instruct learner(s) on prescribed precautions during self-care and functional transfers.                  Learning Progress Summary             Patient Acceptance, E,TB,D, VU,DU,NR by  at 11/29/2023 0313    Acceptance, E,TB, VU by  at 11/28/2023 1519    Comment: Instructed in role of OT, discharge planning, importance of activity to reduce deconditioning, caregiver education                         Point: Body mechanics (Done)       Description:   Instruct learner(s) on proper positioning and spine alignment during self-care, functional mobility activities and/or exercises.                  Learning Progress Summary             Patient Acceptance, E,TB,D, VU,DU,NR by  at 11/29/2023 0313    Acceptance, E,TB, VU by  at 11/28/2023 1519    Comment: Instructed in role of OT, discharge planning, importance of activity to reduce deconditioning, caregiver education                                         User Key       Initials Effective Dates Name Provider Type Discipline     06/16/21 -  Suzanna Rodriguez, CARLOS Registered Nurse Nurse     08/31/23 -  Marry Rouse OT Occupational Therapist  OT                  OT Recommendation and Plan  Planned Therapy Interventions (OT): activity tolerance training, strengthening exercise, transfer/mobility retraining, patient/caregiver education/training, BADL retraining, occupation/activity based interventions, functional balance retraining  Therapy Frequency (OT): 5 times/wk  Plan of Care Review  Plan of Care Reviewed With: patient  Progress: improving  Outcome Evaluation: Patient is more alert today, awake upon arrival to room. Patient is willing to get up to chair, and motivated to participate in therapy in order to return to facilty with . Patient performed bed mobility with min A, grooming/hygiene with set up at EOB, mod A with toileting at EOB. Patient performed bed to chair transfer with CGA, min A to come to stand. Patient will continue to benefit from skilled OT to address remaining functional deficits, recommend SNF at acute [place].     Time Calculation:   Evaluation Complexity (OT)  Review Occupational Profile/Medical/Therapy History Complexity: expanded/moderate complexity  Assessment, Occupational Performance/Identification of Deficit Complexity: 3-5 performance deficits  Clinical Decision Making Complexity (OT): detailed assessment/moderate complexity  Overall Complexity of Evaluation (OT): moderate complexity     Time Calculation- OT       Row Name 12/01/23 0947             Time Calculation- OT    OT Start Time 0913  -      OT Stop Time 0941  -      OT Time Calculation (min) 28 min  -      Total Timed Code Minutes- OT 28 minute(s)  -      OT Received On 12/01/23  -      OT - Next Appointment 12/04/23  -         Timed Charges    21428 - OT Therapeutic Activity Minutes 13  -      94197 - OT Self Care/Mgmt Minutes 15  -         Total Minutes    Timed Charges Total Minutes 28  -       Total Minutes 28  -                User Key  (r) = Recorded By, (t) = Taken By, (c) = Cosigned By      Initials Name Provider Type     Padma  Marry, OT Occupational Therapist                  Therapy Charges for Today       Code Description Service Date Service Provider Modifiers Qty    [id number]  OT THERAPEUTIC ACT EA 15 MIN 12/1/2023 Marry Rouse OT GO 1    [id number]  OT SELF CARE/MGMT/TRAIN EA 15 MIN 12/1/2023 Marry Rouse OT GO 1                 Marry Rouse OT  12/1/2023

## 2023-12-02 VITALS
BODY MASS INDEX: 20.73 KG/M2 | HEIGHT: 60 IN | WEIGHT: 105.6 LBS | DIASTOLIC BLOOD PRESSURE: 55 MMHG | SYSTOLIC BLOOD PRESSURE: 130 MMHG | TEMPERATURE: 98 F | OXYGEN SATURATION: 97 % | RESPIRATION RATE: 18 BRPM | HEART RATE: 74 BPM

## 2023-12-02 LAB
ALBUMIN SERPL-MCNC: 2.8 G/DL (ref 3.5–5.2)
ALBUMIN/GLOB SERPL: 1.1 G/DL
ALP SERPL-CCNC: 51 U/L (ref 39–117)
ALT SERPL W P-5'-P-CCNC: 12 U/L (ref 1–33)
ANION GAP SERPL CALCULATED.3IONS-SCNC: 6.5 MMOL/L (ref 5–15)
AST SERPL-CCNC: 27 U/L (ref 1–32)
BACTERIA SPEC AEROBE CULT: NORMAL
BACTERIA SPEC AEROBE CULT: NORMAL
BILIRUB SERPL-MCNC: 0.4 MG/DL (ref 0–1.2)
BUN SERPL-MCNC: 16 MG/DL (ref 8–23)
BUN/CREAT SERPL: 20.5 (ref 7–25)
CALCIUM SPEC-SCNC: 7.7 MG/DL (ref 8.2–9.6)
CHLORIDE SERPL-SCNC: 112 MMOL/L (ref 98–107)
CO2 SERPL-SCNC: 20.5 MMOL/L (ref 22–29)
CREAT SERPL-MCNC: 0.78 MG/DL (ref 0.57–1)
DEPRECATED RDW RBC AUTO: 42.1 FL (ref 37–54)
EGFRCR SERPLBLD CKD-EPI 2021: 72.3 ML/MIN/1.73
ERYTHROCYTE [DISTWIDTH] IN BLOOD BY AUTOMATED COUNT: 12.3 % (ref 12.3–15.4)
GLOBULIN UR ELPH-MCNC: 2.5 GM/DL
GLUCOSE SERPL-MCNC: 105 MG/DL (ref 65–99)
HCT VFR BLD AUTO: 35.4 % (ref 34–46.6)
HGB BLD-MCNC: 12.1 G/DL (ref 12–15.9)
MAGNESIUM SERPL-MCNC: 2.1 MG/DL (ref 1.6–2.4)
MCH RBC QN AUTO: 32.3 PG (ref 26.6–33)
MCHC RBC AUTO-ENTMCNC: 34.2 G/DL (ref 31.5–35.7)
MCV RBC AUTO: 94.4 FL (ref 79–97)
PLATELET # BLD AUTO: 119 10*3/MM3 (ref 140–450)
PMV BLD AUTO: 11 FL (ref 6–12)
POTASSIUM SERPL-SCNC: 4.1 MMOL/L (ref 3.5–5.2)
PROT SERPL-MCNC: 5.3 G/DL (ref 6–8.5)
RBC # BLD AUTO: 3.75 10*6/MM3 (ref 3.77–5.28)
SODIUM SERPL-SCNC: 139 MMOL/L (ref 136–145)
WBC NRBC COR # BLD AUTO: 7.16 10*3/MM3 (ref 3.4–10.8)

## 2023-12-02 PROCEDURE — 83735 ASSAY OF MAGNESIUM: CPT | Performed by: HOSPITALIST

## 2023-12-02 PROCEDURE — 85027 COMPLETE CBC AUTOMATED: CPT | Performed by: HOSPITALIST

## 2023-12-02 PROCEDURE — 80053 COMPREHEN METABOLIC PANEL: CPT | Performed by: HOSPITALIST

## 2023-12-02 RX ORDER — FUROSEMIDE 20 MG/1
20 TABLET ORAL DAILY
Start: 2023-12-03

## 2023-12-02 RX ORDER — MIRTAZAPINE 7.5 MG/1
7.5 TABLET, FILM COATED ORAL NIGHTLY
Start: 2023-12-02

## 2023-12-02 RX ORDER — ACETAMINOPHEN 325 MG/1
650 TABLET ORAL EVERY 6 HOURS PRN
Start: 2023-12-02

## 2023-12-02 RX ADMIN — ALLOPURINOL 100 MG: 100 TABLET ORAL at 08:30

## 2023-12-02 RX ADMIN — Medication 10 ML: at 08:30

## 2023-12-02 RX ADMIN — CARVEDILOL 3.12 MG: 3.12 TABLET, FILM COATED ORAL at 08:30

## 2023-12-02 RX ADMIN — FUROSEMIDE 20 MG: 20 TABLET ORAL at 08:30

## 2023-12-02 RX ADMIN — POTASSIUM CHLORIDE 20 MEQ: 750 TABLET, EXTENDED RELEASE ORAL at 08:30

## 2023-12-02 NOTE — DISCHARGE SUMMARY
Patient Name: Radha Luke  : 3/14/1933  MRN: [id number]    Date of Admission: 2023  Date of Discharge:  2023  Primary Care Physician: Blanco Chandra MD      Chief Complaint:   Abnormal Lab      Discharge Diagnoses     Active Hospital Problems    Diagnosis  POA    **Acute UTI (urinary tract infection) [N39.0]  Yes    Severe malnutrition [E43]  Yes    Volume depletion [E86.9]  Yes    Dementia without behavioral disturbance [F03.90]  Yes    Stage 3b chronic kidney disease [N18.32]  Yes    Hypercalcemia [E83.52]  Yes    Aortic stenosis, moderate [I35.0]  Yes    Chronic combined systolic and diastolic heart failure [I50.42]  Yes    Left bundle branch block [I44.7]  Yes    Impaired glucose tolerance [R73.02]  Yes    History of left breast cancer [Z85.3]  Not Applicable    Primary hypertension [I10]  Yes    Gastroesophageal reflux disease with esophagitis [K21.00]  Yes      Resolved Hospital Problems   No resolved problems to display.        Hospital Course     89yo woman with HTN, CAD, left breast CA, GERD, chronic combined CHF, CKD3b, aortic stenosis, and impaired glucose tolerance, who presented to ER from assisted living facility with poor po intake, weight loss, leukocytosis, higher than usual Cr. She was diagnosed with UTI and dehydration. Please see below for details of admission:     UTI  Has completed 3d of Rocephin for pan-sensitive E.coli  Afebrile, WBC normalized  Blood cultures NGTD     Lethargy  FTT  Just started Remeron at facility on day of admission  Given her insomnia here I thought it reasonable to start low-dose here at HS, not sure it will actually help with appetite--I suspect that her acute illness has much more to do with her decreased PO intake prior to admission  With treatment of her UTI her LOC and appetite have improved somewhat     Hypernatremia  Encouraging PO fluids  Na+ normalized after small amount of D5W  Monitor at facility     Hypokalemia  Replaced with  protocol  Mg++ level fine  Restarted her daily KCl      WILFRIDO/CKD3b  Cr at baseline or better after IVFs  Continue to monitor Cr at facility     Hypercalcemia  Resolved with IVFs     HTN  BPs acceptable  Continued Coreg     Impaired glucose tolerance  Sugars acceptable     Chronic combined CHF  Dehydrated on admission so held Lasix initially  Have restarted Lasix now at attenuated dose given her poor po intake     Breast CA  Arimidex on hold for now  Can restart at [facility] for DVT prophylaxis while here.  Limited code (no CPR, no intubation) confirmed.  Discussed with patient and RN.  Discharge to the [facility] [place] this afternoon.    Day of Discharge     Subjective:  She is sleeping but easily awakens. Much more awake and alert and interactive today. Able to eat small amounts again today but appetite still poor. Drinking better. No V/D. No F/C. Slept better last night.    Physical Exam:  Temp:  [98 °F (36.7 °C)-99 °F (37.2 °C)] 98 °F (36.7 °C)  Heart Rate:  [67-76] 74  Resp:  [18] 18  BP: (128-150)/(55-74) 130/55  Body mass index is 20.62 kg/m².  Physical Exam  Vitals and nursing note reviewed.   Constitutional:       General: She is not in acute distress.     Appearance: She is ill-appearing. She is not toxic-appearing or diaphoretic.   Cardiovascular:      Rate and Rhythm: Normal rate and regular rhythm.      Pulses: Normal pulses.   Pulmonary:      Effort: Pulmonary effort is normal. No respiratory distress.      Breath sounds: Normal breath sounds. No wheezing or rales.   Abdominal:      General: Bowel sounds are normal. There is no distension.      Palpations: Abdomen is soft.      Tenderness: There is no abdominal tenderness.   Musculoskeletal:         General: No swelling.      Cervical back: Neck supple.   Skin:     General: Skin is warm and dry.      Capillary Refill: Capillary refill takes less than 2 seconds.      Coloration: Skin is pale. Skin is not jaundiced.   Neurological:      Mental  Status: She is alert.      Cranial Nerves: No cranial nerve deficit.      Coordination: Coordination normal.      Comments: Oriented to person   Psychiatric:      Comments: Interactive today, calm and much less irritable      Consultants     Consult Orders (all) (From admission, onward)       Start     Ordered    11/27/23 1625  Inpatient Nutrition Consult  Once        Provider:  (Not yet assigned)    11/27/23 1624    11/27/23 1624  Inpatient Case Management  Consult  Once        Provider:  (Not yet assigned)    11/27/23 1624    11/27/23 1503  Inpatient Palliative Care Team Consult  Once,   Status:  Canceled        Provider:  (Not yet assigned)    11/27/23 1502    11/27/23 1439  [facility] (on-call MD unless specified) Details  Once        Specialty:  Hospitalist  Provider:  (Not yet assigned)    11/27/23 1439                  Procedures     * Surgery not found *    Imaging Results (All)       Procedure Component Value Units Date/Time    XR Chest 1 View [[id number]] Collected: 11/27/23 1344     Updated: 11/27/23 1348    Narrative:      XR CHEST 1 VW-     HISTORY: Female who is 89 years-old, weight loss     TECHNIQUE: Frontal view of the chest     COMPARISON: 1/30/2018     FINDINGS: The heart size is normal. Aorta is calcified. Pulmonary  vasculature is unremarkable. No focal pulmonary consolidation, pleural  effusion, or pneumothorax. Skinfolds are apparent. No acute osseous  process.       Impression:      No focal pulmonary consolidation or mass identified.  Follow-up/further evaluation can be obtained as clinical indications  persist.     This report was finalized on 11/27/2023 1:45 PM by Dr. Nicho Singh M.D on Workstation: [id number]             Results for orders placed during the hospital encounter of 03/09/21    Duplex Carotid Ultrasound CAR    Interpretation Summary  · Proximal right internal carotid artery plaque without significant stenosis.  · Proximal left internal carotid artery plaque  without significant stenosis.    Results for orders placed during the hospital encounter of 03/09/21    Adult Transthoracic Echo Complete w/ Color, Spectral and Contrast if Necessary Per Protocol    Interpretation Summary  · Left ventricular wall thickness is consistent with mild to moderate concentric hypertrophy.  · Estimated left ventricular EF = 45% Left ventricular systolic function is low normal.  · Left ventricular diastolic function is consistent with (grade Ia w/high LAP) impaired relaxation.  · There is moderate calcification of the aortic valve. Mild to moderate aortic valve regurgitation is present. Moderate aortic valve stenosis is present. Peak velocity of the flow distal to the aortic valve is 366.5 cm/s. Aortic valve maximum pressure gradient is 53.7 mmHg. Aortic valve mean pressure gradient is 26.8 mmHg. Aortic valve area is 0.63 cm2. Aortic valve dimensionless index is 0.20 .  · Moderate mitral annular calcification is present. Mild mitral valve regurgitation is present    Pertinent Labs     Results from last 7 days   Lab Units 12/02/23 0546 12/01/23 0437 11/30/23 0516 11/29/23  0445   WBC 10*3/mm3 7.16 7.54 8.11 9.53   HEMOGLOBIN g/dL 12.1 11.2* 11.6* 11.8*   PLATELETS 10*3/mm3 119* 101* 114* 121*     Results from last 7 days   Lab Units 12/02/23 0546 12/01/23 0437 11/30/23  0516 11/29/23  0445   SODIUM mmol/L 139 138 148* 147*   POTASSIUM mmol/L 4.1 4.2 4.7 3.3*   CHLORIDE mmol/L 112* 111* 120* 113*   CO2 mmol/L 20.5* 19.4* 19.8* 23.0   BUN mg/dL 16 18 27* 41*   CREATININE mg/dL 0.78 0.79 0.92 0.99   GLUCOSE mg/dL 105* 128* 121* 105*   EGFR mL/min/1.73 72.3 71.2 59.3* 54.3*     Results from last 7 days   Lab Units 12/02/23 0546 12/01/23 0437 11/30/23 0516 11/29/23  0445   ALBUMIN g/dL 2.8* 2.7* 2.9* 3.1*   BILIRUBIN mg/dL 0.4 0.5 0.3 0.4   ALK PHOS U/L 51 48 46 47   AST (SGOT) U/L 27 23 21 18   ALT (SGPT) U/L 12 9 11 9     Results from last 7 days   Lab Units 12/02/23  0546 12/01/23  0437  "11/30/23  0516 11/29/23  0445   CALCIUM mg/dL 7.7* 7.6* 8.2 8.2   ALBUMIN g/dL 2.8* 2.7* 2.9* 3.1*   MAGNESIUM mg/dL 2.1 2.1 2.2 2.1               Invalid input(s): \"LDLCALC\"  Results from last 7 days   Lab Units 11/27/23 2008 11/27/23  1559   BLOODCX  No growth at 4 days  No growth at 4 days  --    URINECX   --  50,000 CFU/mL Escherichia coli*         Test Results Pending at Discharge     Pending Labs       Order Current Status    Blood Culture - Blood, Arm, Left Preliminary result    Blood Culture - Blood, Arm, Right Preliminary result            Discharge Details        Discharge Medications        New Medications        Instructions Start Date   acetaminophen 325 MG tablet  Commonly known as: TYLENOL   650 mg, Oral, Every 6 Hours PRN      mirtazapine 7.5 MG tablet  Commonly known as: REMERON  Replaces: mirtazapine 15 MG disintegrating tablet   7.5 mg, Oral, Nightly             Changes to Medications        Instructions Start Date   furosemide 20 MG tablet  Commonly known as: LASIX  What changed:   medication strength  how much to take  how to take this  when to take this  additional instructions   20 mg, Oral, Daily   Start Date: December 3, 2023            Continue These Medications        Instructions Start Date   allopurinol 100 MG tablet  Commonly known as: ZYLOPRIM   100 mg, Oral, Daily      anastrozole 1 MG tablet  Commonly known as: ARIMIDEX   No dose, route, or frequency recorded.      CALCIUM 1000 + D PO   1 tablet, Oral, Daily      carvedilol 3.125 MG tablet  Commonly known as: COREG   3.125 mg, Oral, 2 Times Daily      magnesium gluconate 500 MG tablet  Commonly known as: MAGONATE   500 mg, Oral, Daily      multivitamin with minerals tablet tablet   1 tablet, Oral, Daily      potassium chloride 10 MEQ CR capsule  Commonly known as: MICRO-K   20 mEq, Oral, Daily      simvastatin 20 MG tablet  Commonly known as: ZOCOR   20 mg, Oral, Daily             Stop These Medications      mirtazapine 15 MG " disintegrating tablet  Commonly known as: REMERON SOL-TAB  Replaced by: mirtazapine 7.5 MG tablet              Allergies   Allergen Reactions    Levofloxacin Swelling     Swelling in legs and ankle and tongue    Penicillins Hives       Discharge Disposition:  Skilled Nursing Facility (DC - External)      Discharge Diet:  Diet Order   Procedures    Diet: Regular/House Diet; Texture: Regular Texture (IDDSI 7); Fluid Consistency: Thin (IDDSI 0)       Discharge Activity:   As tolerated    CODE STATUS:    Code Status and Medical Interventions:   Ordered at: 11/27/23 1550     Code Status (Patient has no pulse and is not breathing):    No CPR (Do Not Attempt to Resuscitate)     Medical Interventions (Patient has pulse or is breathing):    Full Support       Future Appointments   Date Time Provider Department Center   1/24/2024  2:00 PM Matthieu Fleming MD [facility]   3/28/2024 10:20 AM Irene Earl MD [facility]     Additional Instructions for the Follow-ups that You Need to Schedule       Discharge Follow-up with PCP   As directed       Currently Documented PCP:    Blanco Chandra MD    PCP Phone Number:    [phone]     Follow Up Details: Dr. Chandra (PCP) in 2 weeks               Contact information for follow-up providers       Blanco Chandra MD .    Specialty: Internal Medicine  Why: Dr. Chandra (PCP) in 2 weeks  Contact information:  [address] [facility] [id number] [phone]                       Contact information for after-discharge care       Destination       [facility] .    Service: Skilled Nursing  Contact information:  [address] [facility] [address]  [phone]                                   Additional Instructions for the Follow-ups that You Need to Schedule       Discharge Follow-up with PCP   As directed       Currently Documented PCP:    Blanco Chandra MD    PCP Phone Number:    [phone]     Follow Up Details: Dr. Chandra (PCP) in 2  weeks            Time Spent on Discharge:  Greater than 30 minutes      Michael Lou MD  [facility]  12/02/23  11:21 EST

## 2023-12-02 NOTE — PLAN OF CARE
Problem: Adult Inpatient Plan of Care  Goal: Plan of Care Review  Outcome: Adequate for Care Transition  Goal: Patient-Specific Goal (Individualized)  Outcome: Adequate for Care Transition  Goal: Absence of Hospital-Acquired Illness or Injury  Outcome: Adequate for Care Transition  Intervention: Identify and Manage Fall Risk  Recent Flowsheet Documentation  Taken 12/2/2023 1200 by Honorio Malone RN  Safety Promotion/Fall Prevention:   activity supervised   clutter free environment maintained   fall prevention program maintained   lighting adjusted   nonskid shoes/slippers when out of bed   safety round/check completed   room organization consistent  Taken 12/2/2023 1000 by Honorio Malone RN  Safety Promotion/Fall Prevention:   activity supervised   clutter free environment maintained   fall prevention program maintained   lighting adjusted   nonskid shoes/slippers when out of bed   safety round/check completed   room organization consistent  Taken 12/2/2023 0800 by Honorio Malone RN  Safety Promotion/Fall Prevention:   activity supervised   clutter free environment maintained   fall prevention program maintained   lighting adjusted   nonskid shoes/slippers when out of bed   room organization consistent   safety round/check completed  Intervention: Prevent Infection  Recent Flowsheet Documentation  Taken 12/2/2023 1200 by Honorio Malone RN  Infection Prevention:   rest/sleep promoted   single patient room provided   hand hygiene promoted  Taken 12/2/2023 1000 by Honorio Malone RN  Infection Prevention:   single patient room provided   rest/sleep promoted   hand hygiene promoted  Taken 12/2/2023 0800 by Honorio Malone RN  Infection Prevention:   single patient room provided   rest/sleep promoted   hand hygiene promoted  Goal: Optimal Comfort and Wellbeing  Outcome: Adequate for Care Transition  Intervention: Provide Person-Centered Care  Recent Flowsheet Documentation  Taken 12/2/2023 0800 by Honorio Malone  RN  Trust Relationship/Rapport:   care explained   thoughts/feelings acknowledged   reassurance provided   questions answered   empathic listening provided   questions encouraged  Goal: Readiness for Transition of Care  Outcome: Adequate for Care Transition     Problem: Fall Injury Risk  Goal: Absence of Fall and Fall-Related Injury  Outcome: Adequate for Care Transition  Intervention: Identify and Manage Contributors  Recent Flowsheet Documentation  Taken 12/2/2023 1200 by Honorio Malone RN  Medication Review/Management: medications reviewed  Taken 12/2/2023 1000 by Honorio Malone RN  Medication Review/Management: medications reviewed  Taken 12/2/2023 0800 by Honorio Malone RN  Medication Review/Management: medications reviewed  Intervention: Promote Injury-Free Environment  Recent Flowsheet Documentation  Taken 12/2/2023 1200 by Honorio Malone RN  Safety Promotion/Fall Prevention:   activity supervised   clutter free environment maintained   fall prevention program maintained   lighting adjusted   nonskid shoes/slippers when out of bed   safety round/check completed   room organization consistent  Taken 12/2/2023 1000 by Honorio Malone RN  Safety Promotion/Fall Prevention:   activity supervised   clutter free environment maintained   fall prevention program maintained   lighting adjusted   nonskid shoes/slippers when out of bed   safety round/check completed   room organization consistent  Taken 12/2/2023 0800 by Honorio Malone RN  Safety Promotion/Fall Prevention:   activity supervised   clutter free environment maintained   fall prevention program maintained   lighting adjusted   nonskid shoes/slippers when out of bed   room organization consistent   safety round/check completed     Problem: Skin Injury Risk Increased  Goal: Skin Health and Integrity  Outcome: Adequate for Care Transition     Problem: Adjustment to Illness (Heart Failure)  Goal: Optimal Coping  Outcome: Adequate for Care Transition     Problem:  Cardiac Output Decreased (Heart Failure)  Goal: Optimal Cardiac Output  Outcome: Adequate for Care Transition     Problem: Dysrhythmia (Heart Failure)  Goal: Stable Heart Rate and Rhythm  Outcome: Adequate for Care Transition     Problem: Fluid Imbalance (Heart Failure)  Goal: Fluid Balance  Outcome: Adequate for Care Transition     Problem: Functional Ability Impaired (Heart Failure)  Goal: Optimal Functional Ability  Outcome: Adequate for Care Transition     Problem: Oral Intake Inadequate (Heart Failure)  Goal: Optimal Nutrition Intake  Outcome: Adequate for Care Transition     Problem: Respiratory Compromise (Heart Failure)  Goal: Effective Oxygenation and Ventilation  Outcome: Adequate for Care Transition  Intervention: Promote Airway Secretion Clearance  Recent Flowsheet Documentation  Taken 12/2/2023 0800 by Honorio Malone RN  Cough And Deep Breathing: done independently per patient     Problem: Sleep Disordered Breathing (Heart Failure)  Goal: Effective Breathing Pattern During Sleep  Outcome: Adequate for Care Transition   Goal Outcome Evaluation:

## 2023-12-02 NOTE — NURSING NOTE
Patient report called to emelyn paz. Nurse denies having any further questions. IV discontinued. Waiting for transport

## 2023-12-04 NOTE — CASE MANAGEMENT/SOCIAL WORK
Case Management Discharge Note      Final Note: [facility] via family. robles rn/ccp    Provided Post Acute Provider List?: Yes  Post Acute Provider List: Nursing Home, Home Health  Provided Post Acute Provider Quality & Resource List?: Refused  Delivered To: Support Person  Method of Delivery: In person    Selected Continued Care - Discharged on 12/2/2023 Admission date: 11/27/2023 - Discharge disposition: Skilled Nursing Facility (DC - External)      Destination Coordination complete.      Service Provider Selected Services Address Phone Fax Patient Preferred    [facility] [address] [facility] [address] [phone] --              Durable Medical Equipment    No services have been selected for the patient.                Dialysis/Infusion    No services have been selected for the patient.                Home Medical Care    No services have been selected for the patient.                Therapy    No services have been selected for the patient.                Community Resources    No services have been selected for the patient.                Community & DME    No services have been selected for the patient.                    Transportation Services  Private: Car    Final Discharge Disposition Code: 03 - skilled nursing facility (SNF)

## 2023-12-14 ENCOUNTER — READMISSION MANAGEMENT (OUTPATIENT)
Dept: CALL CENTER | Facility: HOSPITAL | Age: 88
End: 2023-12-14
Payer: MEDICARE

## 2023-12-14 NOTE — OUTREACH NOTE
Prep Survey      Flowsheet Row Responses   [facility] facility patient discharged from? Non-BH   Is LACE score < 7 ? Non-BH Discharge   Eligibility [facility]   Date of Discharge 12/14/23   Discharge diagnosis Urinary tract infection   Does the patient have one of the following disease processes/diagnoses(primary or secondary)? Other   Does the patient have Home health ordered? No   Is there a DME ordered? No   Prep survey completed? Yes            JESSICA CONNOR - Registered Nurse

## 2023-12-15 ENCOUNTER — TRANSITIONAL CARE MANAGEMENT TELEPHONE ENCOUNTER (OUTPATIENT)
Dept: CALL CENTER | Facility: HOSPITAL | Age: 88
End: 2023-12-15
Payer: MEDICARE

## 2023-12-15 NOTE — OUTREACH NOTE
Call Center TCM Note      Flowsheet Row Responses   [facility] patient discharged from? Non-   Does the patient have one of the following disease processes/diagnoses(primary or secondary)? Other   TCM attempt successful? Yes   Call start time 1616   Call end time 1619   Discharge diagnosis Urinary tract infection   Person spoke with today (if not patient) and relationship son, Alfonzo Luke   Meds reviewed with patient/caregiver? Yes   Is the patient having any side effects they believe may be caused by any medication additions or changes? No   Does the patient have all medications ordered at discharge? Yes   Is the patient taking all medications as directed (includes completed medication regime)? Yes   Does the patient have an appointment with their PCP within 7-14 days of discharge? No appointments available   Nursing Interventions PCP office requested to make appointment - message sent   Has home health visited the patient within 72 hours of discharge? N/A   Psychosocial issues? No   Comments son states his mother is back at [facility] with his father. She is doing very well. She has had PT and OT. She is up and walking with her walker. Pt is eating better also.   Did the patient receive a copy of their discharge instructions? Yes   Nursing interventions Reviewed instructions with patient   What is the patient's perception of their health status since discharge? Improving   Is the patient/caregiver able to teach back the hierarchy of who to call/visit for symptoms/problems? PCP, Specialist, Home health nurse, Urgent Care, ED, 911 Yes   If the patient is a current smoker, are they able to teach back resources for cessation? Not a smoker   TCM call completed? Yes   Wrap up additional comments son denies any questions, needs or concerns at this time.   Call end time 1619   Would this patient benefit from a Referral to Amb Social Work? No   Is the patient interested in additional calls from an ambulatory ?  No            Cheri Avila RN    12/15/2023, 16:19 EST

## 2023-12-15 NOTE — OUTREACH NOTE
Call Center TCM Note      Flowsheet Row Responses   [facility] patient discharged from? Non-   Does the patient have one of the following disease processes/diagnoses(primary or secondary)? Other   TCM attempt successful? No   Unsuccessful attempts Attempt 1  [attempted patient and spouse numbers.  verbal release for Yves.]   Call Status Left message            Cheri Avila RN    12/15/2023, 13:53 EST

## 2024-01-11 ENCOUNTER — APPOINTMENT (OUTPATIENT)
Dept: GENERAL RADIOLOGY | Facility: HOSPITAL | Age: 89
End: 2024-01-11
Payer: MEDICARE

## 2024-01-11 ENCOUNTER — HOSPITAL ENCOUNTER (EMERGENCY)
Facility: HOSPITAL | Age: 89
Discharge: HOME | End: 2024-01-11
Attending: EMERGENCY MEDICINE
Payer: MEDICARE

## 2024-01-11 VITALS
DIASTOLIC BLOOD PRESSURE: 51 MMHG | OXYGEN SATURATION: 100 % | BODY MASS INDEX: 20.69 KG/M2 | SYSTOLIC BLOOD PRESSURE: 131 MMHG | RESPIRATION RATE: 18 BRPM | HEIGHT: 60 IN | HEART RATE: 70 BPM | TEMPERATURE: 98 F | WEIGHT: 105.4 LBS

## 2024-01-11 DIAGNOSIS — R53.1 GENERALIZED WEAKNESS: Primary | ICD-10-CM

## 2024-01-11 DIAGNOSIS — E86.0 MILD DEHYDRATION: ICD-10-CM

## 2024-01-11 DIAGNOSIS — Z86.59 HISTORY OF DEMENTIA: ICD-10-CM

## 2024-01-11 LAB
ALBUMIN SERPL-MCNC: 4 G/DL (ref 3.5–5.2)
ALBUMIN/GLOB SERPL: 1.7 G/DL
ALP SERPL-CCNC: 66 U/L (ref 39–117)
ALT SERPL W P-5'-P-CCNC: 11 U/L (ref 1–33)
ANION GAP SERPL CALCULATED.3IONS-SCNC: 9 MMOL/L (ref 5–15)
AST SERPL-CCNC: 19 U/L (ref 1–32)
BASOPHILS # BLD AUTO: 0.01 10*3/MM3 (ref 0–0.2)
BASOPHILS NFR BLD AUTO: 0.2 % (ref 0–1.5)
BILIRUB SERPL-MCNC: 0.5 MG/DL (ref 0–1.2)
BILIRUB UR QL STRIP: NEGATIVE
BUN SERPL-MCNC: 30 MG/DL (ref 8–23)
BUN/CREAT SERPL: 22.9 (ref 7–25)
CALCIUM SPEC-SCNC: 8.7 MG/DL (ref 8.2–9.6)
CHLORIDE SERPL-SCNC: 106 MMOL/L (ref 98–107)
CLARITY UR: CLEAR
CO2 SERPL-SCNC: 27 MMOL/L (ref 22–29)
COLOR UR: YELLOW
CREAT SERPL-MCNC: 1.31 MG/DL (ref 0.57–1)
DEPRECATED RDW RBC AUTO: 43.6 FL (ref 37–54)
EGFRCR SERPLBLD CKD-EPI 2021: 38.8 ML/MIN/1.73
EOSINOPHIL # BLD AUTO: 0.34 10*3/MM3 (ref 0–0.4)
EOSINOPHIL NFR BLD AUTO: 5.8 % (ref 0.3–6.2)
ERYTHROCYTE [DISTWIDTH] IN BLOOD BY AUTOMATED COUNT: 12.6 % (ref 12.3–15.4)
FLUAV SUBTYP SPEC NAA+PROBE: NOT DETECTED
FLUBV RNA ISLT QL NAA+PROBE: NOT DETECTED
GLOBULIN UR ELPH-MCNC: 2.3 GM/DL
GLUCOSE SERPL-MCNC: 119 MG/DL (ref 65–99)
GLUCOSE UR STRIP-MCNC: NEGATIVE MG/DL
HCT VFR BLD AUTO: 33.9 % (ref 34–46.6)
HGB BLD-MCNC: 11.2 G/DL (ref 12–15.9)
HGB UR QL STRIP.AUTO: NEGATIVE
IMM GRANULOCYTES # BLD AUTO: 0.01 10*3/MM3 (ref 0–0.05)
IMM GRANULOCYTES NFR BLD AUTO: 0.2 % (ref 0–0.5)
KETONES UR QL STRIP: NEGATIVE
LEUKOCYTE ESTERASE UR QL STRIP.AUTO: NEGATIVE
LYMPHOCYTES # BLD AUTO: 2.12 10*3/MM3 (ref 0.7–3.1)
LYMPHOCYTES NFR BLD AUTO: 36.2 % (ref 19.6–45.3)
MAGNESIUM SERPL-MCNC: 2.4 MG/DL (ref 1.6–2.4)
MCH RBC QN AUTO: 31.3 PG (ref 26.6–33)
MCHC RBC AUTO-ENTMCNC: 33 G/DL (ref 31.5–35.7)
MCV RBC AUTO: 94.7 FL (ref 79–97)
MONOCYTES # BLD AUTO: 0.58 10*3/MM3 (ref 0.1–0.9)
MONOCYTES NFR BLD AUTO: 9.9 % (ref 5–12)
NEUTROPHILS NFR BLD AUTO: 2.8 10*3/MM3 (ref 1.7–7)
NEUTROPHILS NFR BLD AUTO: 47.7 % (ref 42.7–76)
NITRITE UR QL STRIP: NEGATIVE
NRBC BLD AUTO-RTO: 0 /100 WBC (ref 0–0.2)
PH UR STRIP.AUTO: 7.5 [PH] (ref 5–8)
PLATELET # BLD AUTO: 164 10*3/MM3 (ref 140–450)
PMV BLD AUTO: 9.5 FL (ref 6–12)
POTASSIUM SERPL-SCNC: 4.3 MMOL/L (ref 3.5–5.2)
PROT SERPL-MCNC: 6.3 G/DL (ref 6–8.5)
PROT UR QL STRIP: NEGATIVE
RBC # BLD AUTO: 3.58 10*6/MM3 (ref 3.77–5.28)
SARS-COV-2 RNA RESP QL NAA+PROBE: NOT DETECTED
SODIUM SERPL-SCNC: 142 MMOL/L (ref 136–145)
SP GR UR STRIP: 1.01 (ref 1–1.03)
UROBILINOGEN UR QL STRIP: NORMAL
WBC NRBC COR # BLD AUTO: 5.86 10*3/MM3 (ref 3.4–10.8)

## 2024-01-11 PROCEDURE — 80053 COMPREHEN METABOLIC PANEL: CPT | Performed by: EMERGENCY MEDICINE

## 2024-01-11 PROCEDURE — 87636 SARSCOV2 & INF A&B AMP PRB: CPT | Performed by: EMERGENCY MEDICINE

## 2024-01-11 PROCEDURE — 25810000003 SODIUM CHLORIDE 0.9 % SOLUTION: Performed by: EMERGENCY MEDICINE

## 2024-01-11 PROCEDURE — 83735 ASSAY OF MAGNESIUM: CPT | Performed by: EMERGENCY MEDICINE

## 2024-01-11 PROCEDURE — 71045 X-RAY EXAM CHEST 1 VIEW: CPT

## 2024-01-11 PROCEDURE — 81003 URINALYSIS AUTO W/O SCOPE: CPT | Performed by: EMERGENCY MEDICINE

## 2024-01-11 PROCEDURE — 85025 COMPLETE CBC W/AUTO DIFF WBC: CPT | Performed by: EMERGENCY MEDICINE

## 2024-01-11 PROCEDURE — 99283 EMERGENCY DEPT VISIT LOW MDM: CPT

## 2024-01-11 RX ADMIN — SODIUM CHLORIDE 500 ML: 9 INJECTION, SOLUTION INTRAVENOUS at 12:40

## 2024-01-11 NOTE — ED NOTES
Pt to er via ems from the [facility] with c/o lethargy and weakness. Hx of chronic UTIs. Pt a&ox1 at baseline.

## 2024-01-11 NOTE — ED PROVIDER NOTES
EMERGENCY DEPARTMENT ENCOUNTER    Room Number:  33/33  PCP: Blanco Chandra MD (Inactive)  Historian: Patient      HPI:  Chief Complaint: Weakness  A complete HPI/ROS/PMH/PSH/SH/FH are unobtainable due to: None    Context: Radha Luke is a 89 y.o. female who presents to the ED via [facility] from [facility] c/o acute weakness and decreased intake over the last 1 to 2 days.  Lives with her .  Son at bedside states that she was recently hospitalized for dehydration and a UTI.  No vomiting or diarrhea, patient denies any pain.  She reports feeling well and wants to leave.      MEDICAL RECORD REVIEW    External (non-ED) record review: Discharge summary reviewed from December 2, 2023 after being admitted November 27, 2023 with a UTI, malnutrition, dementia.              PAST MEDICAL HISTORY  Active Ambulatory Problems     Diagnosis Date Noted    Disorder of aorta 05/15/2016    Diverticulosis of intestine 05/15/2016    Gastroesophageal reflux disease with esophagitis 05/15/2016    Mixed hyperlipidemia 05/15/2016    Primary hypertension 05/15/2016    Age-related osteoporosis without current pathological fracture 05/15/2016    Health care maintenance 05/15/2016    History of left breast cancer 05/18/2016    Malignant neoplasm of overlapping sites of left female breast 08/25/2016    Pain of right sacroiliac joint 11/16/2016    Impaired glucose tolerance 12/16/2016    Left bundle branch block 01/08/2018    Congestive heart failure due to valvular disease 01/24/2018    Memory loss 04/09/2018    Gout 12/03/2018    Foot pain 03/26/2019    Vertigo 03/26/2019    Chronic combined systolic and diastolic heart failure 05/31/2019    Nonrheumatic mitral valve regurgitation 05/31/2019    Nonrheumatic aortic valve insufficiency 01/17/2020    Aortic stenosis, moderate 02/10/2021    TSH elevation 09/22/2021    Breast cancer 10/20/2023    Short-term memory loss 02/05/2018    Confusion 10/20/2023    Decreased  appetite 10/20/2023    Stage 3b chronic kidney disease 11/28/2023    Acute UTI (urinary tract infection) 11/28/2023    Hypercalcemia 11/28/2023    Volume depletion 11/29/2023    Dementia without behavioral disturbance 11/29/2023    Severe malnutrition 12/01/2023     Resolved Ambulatory Problems     Diagnosis Date Noted    Cough 12/20/2017    CHF exacerbation 01/30/2018    Acute congestive heart failure 01/30/2018    Nonrheumatic aortic valve stenosis 05/31/2019     Past Medical History:   Diagnosis Date    Acute systolic congestive heart failure     Allergic 1970?    Aortic stenosis     Arthritis     Cataract 2005?    CKD (chronic kidney disease), stage III     Colon polyp 1997?    Coronary artery disease 2018    Diverticulosis 2012    Dizziness     Drug therapy     Edema     GERD (gastroesophageal reflux disease)     History of breast cancer     Hyperlipidemia     Hypertension     Hypokalemia     LBBB (left bundle branch block)     Mild tricuspid regurgitation     Moderate mitral regurgitation     Pleural effusion     Pneumonia 2018         PAST SURGICAL HISTORY  Past Surgical History:   Procedure Laterality Date    APPENDECTOMY  1943    BREAST BIOPSY      BREAST LUMPECTOMY Left 1995    COLONOSCOPY N/A 5/12/2016    Procedure: COLONOSCOPY;  Surgeon: Israel Vance MD;  Location: [facility] ENDOSCOPY;  Service:     HYSTERECTOMY  1964    MASTECTOMY Left 2011    THORACENTESIS Bilateral          FAMILY HISTORY  Family History   Problem Relation Age of Onset    Hypertension Mother     Cancer Neg Hx          SOCIAL HISTORY  Social History     Socioeconomic History    Marital status:      Spouse name: Yves    Years of education: High School   Tobacco Use    Smoking status: Never    Smokeless tobacco: Never    Tobacco comments:     no caffeine   Vaping Use    Vaping Use: Never used   Substance and Sexual Activity    Alcohol use: Not Currently     Comment: occassional    Drug use: No    Sexual activity: Not Currently      Partners: Male         ALLERGIES  Levofloxacin and Penicillins        REVIEW OF SYSTEMS  Review of Systems     All systems reviewed and negative except for those discussed in HPI.       PHYSICAL EXAM    I have reviewed the triage vital signs and nursing notes.    ED Triage Vitals [01/11/24 1201]   Temp Heart Rate Resp BP SpO2   98 °F (36.7 °C) 63 18 149/56 98 %      Temp src Heart Rate Source Patient Position BP Location FiO2 (%)   -- -- -- -- --       Physical Exam  General: No acute distress, nontoxic  HEENT: Mucous membranes tacky, atraumatic, EOMI  Neck: Full ROM  Pulm: Symmetric chest rise, nonlabored, lungs CTAB  Cardiovascular: Regular rate and rhythm, intact distal pulses  GI: Soft, nontender, nondistended, no rebound, no guarding, bowel sounds present  MSK: Full ROM, no deformity  Skin: Warm, dry  Neuro: Awake, alert, GCS 15, moving all extremities, no focal deficits  Psych: Calm, cooperative        LAB RESULTS  Recent Results (from the past 24 hour(s))   COVID-19 and FLU A/B PCR, 1 HR TAT - Swab, Nasopharynx    Collection Time: 01/11/24 12:41 PM    Specimen: Nasopharynx; Swab   Result Value Ref Range    COVID19 Not Detected Not Detected - Ref. Range    Influenza A PCR Not Detected Not Detected    Influenza B PCR Not Detected Not Detected   Comprehensive Metabolic Panel    Collection Time: 01/11/24 12:42 PM    Specimen: Blood   Result Value Ref Range    Glucose 119 (H) 65 - 99 mg/dL    BUN 30 (H) 8 - 23 mg/dL    Creatinine 1.31 (H) 0.57 - 1.00 mg/dL    Sodium 142 136 - 145 mmol/L    Potassium 4.3 3.5 - 5.2 mmol/L    Chloride 106 98 - 107 mmol/L    CO2 27.0 22.0 - 29.0 mmol/L    Calcium 8.7 8.2 - 9.6 mg/dL    Total Protein 6.3 6.0 - 8.5 g/dL    Albumin 4.0 3.5 - 5.2 g/dL    ALT (SGPT) 11 1 - 33 U/L    AST (SGOT) 19 1 - 32 U/L    Alkaline Phosphatase 66 39 - 117 U/L    Total Bilirubin 0.5 0.0 - 1.2 mg/dL    Globulin 2.3 gm/dL    A/G Ratio 1.7 g/dL    BUN/Creatinine Ratio 22.9 7.0 - 25.0    Anion Gap 9.0  5.0 - 15.0 mmol/L    eGFR 38.8 (L) >60.0 mL/min/1.73   Urinalysis With Microscopic If Indicated (No Culture) - Urine, Catheter    Collection Time: 01/11/24 12:42 PM    Specimen: Urine, Catheter   Result Value Ref Range    Color, UA Yellow Yellow, Straw    Appearance, UA Clear Clear    pH, UA 7.5 5.0 - 8.0    Specific Gravity, UA 1.009 1.005 - 1.030    Glucose, UA Negative Negative    Ketones, UA Negative Negative    Bilirubin, UA Negative Negative    Blood, UA Negative Negative    Protein, UA Negative Negative    Leuk Esterase, UA Negative Negative    Nitrite, UA Negative Negative    Urobilinogen, UA 0.2 E.U./dL 0.2 - 1.0 E.U./dL   Magnesium    Collection Time: 01/11/24 12:42 PM    Specimen: Blood   Result Value Ref Range    Magnesium 2.4 1.6 - 2.4 mg/dL   CBC Auto Differential    Collection Time: 01/11/24 12:42 PM    Specimen: Blood   Result Value Ref Range    WBC 5.86 3.40 - 10.80 10*3/mm3    RBC 3.58 (L) 3.77 - 5.28 10*6/mm3    Hemoglobin 11.2 (L) 12.0 - 15.9 g/dL    Hematocrit 33.9 (L) 34.0 - 46.6 %    MCV 94.7 79.0 - 97.0 fL    MCH 31.3 26.6 - 33.0 pg    MCHC 33.0 31.5 - 35.7 g/dL    RDW 12.6 12.3 - 15.4 %    RDW-SD 43.6 37.0 - 54.0 fl    MPV 9.5 6.0 - 12.0 fL    Platelets 164 140 - 450 10*3/mm3    Neutrophil % 47.7 42.7 - 76.0 %    Lymphocyte % 36.2 19.6 - 45.3 %    Monocyte % 9.9 5.0 - 12.0 %    Eosinophil % 5.8 0.3 - 6.2 %    Basophil % 0.2 0.0 - 1.5 %    Immature Grans % 0.2 0.0 - 0.5 %    Neutrophils, Absolute 2.80 1.70 - 7.00 10*3/mm3    Lymphocytes, Absolute 2.12 0.70 - 3.10 10*3/mm3    Monocytes, Absolute 0.58 0.10 - 0.90 10*3/mm3    Eosinophils, Absolute 0.34 0.00 - 0.40 10*3/mm3    Basophils, Absolute 0.01 0.00 - 0.20 10*3/mm3    Immature Grans, Absolute 0.01 0.00 - 0.05 10*3/mm3    nRBC 0.0 0.0 - 0.2 /100 WBC       Ordered the above labs and independently interpreted results. My findings will be discussed in the medical decision making section below        RADIOLOGY  XR Chest 1 View    Result Date:  1/11/2024  XR CHEST 1 VW-  HISTORY: Female who is 89 years-old, altered mental status  TECHNIQUE: Frontal view of the chest  COMPARISON: 11/27/2023  FINDINGS: The heart size is borderline. Pulmonary vasculature is unremarkable. Aorta is calcified. No focal pulmonary consolidation, pleural effusion, or pneumothorax. Old granulomatous disease is present. No acute osseous process.      No focal pulmonary consolidation. Borderline heart size. Follow-up as clinical indications persist.  This report was finalized on 1/11/2024 12:42 PM by Dr. Nicho Singh M.D on Workstation: [employer]       Ordered the above noted radiological studies.  Independently interpreted by me and my independent review of findings can be found in the ED Course.  See dictation for official radiology interpretation.      PROCEDURES    Procedures        MEDICATIONS GIVEN IN ER    Medications   sodium chloride 0.9 % bolus 500 mL (500 mL Intravenous New Bag 1/11/24 1240)         PROGRESS, DATA ANALYSIS, CONSULTS, AND MEDICAL DECISION MAKING    Please note that this section constitutes my independent interpretation of clinical data including lab results, radiology, EKG's.  This constitutes my independent professional opinion regarding differential diagnosis and management of this patient.  It may include any factors such as history from outside sources, review of external records, social determinants of health, management of medications, response to those treatments, and discussions with other providers.    Differential Diagnosis and Plan: Initial concern for UTI, dehydration, renal failure, electrolyte abnormalities, anemia, viral process, dementia exacerbation, among others.  Plan for labs, IV fluids, urinalysis, chest x-ray, and reevaluation with results.    Additional sources:  - Discussed/ obtained information from independent historians: Son and  provide additional history that states that the patient has had some diminished p.o. intake  over the last 1 to 2 days with increasing weakness     - (Social Determinants of Health): None     - Shared decision making:  Patient and  and son at bedside fully updated on and in agreement with the course and plan moving forward    ED Course as of 01/11/24 1352   Thu Jan 11, 2024   1234 XR Chest 1 View  Per my independent interpretation of the chest x-ray, no evidence of pneumothorax or obvious pneumonia [DC]   1301 WBC: 5.86 [DC]   1301 Hemoglobin(!): 11.2 [DC]   1301 Nitrite, UA: Negative [DC]   1301 Leukocytes, UA: Negative [DC]   1301 Ketones, UA: Negative [DC]   1322 Influenza B PCR: Not Detected [DC]   1322 Influenza A PCR: Not Detected [DC]   1322 COVID19: Not Detected [DC]   1322 Glucose(!): 119 [DC]   1322 BUN(!): 30 [DC]   1322 Creatinine(!): 1.31  0.78 one month ago [DC]   1323 Sodium: 142 [DC]   1323 Potassium: 4.3 [DC]   1323 ALT (SGPT): 11 [DC]   1323 AST (SGOT): 19 [DC]   1323 Alkaline Phosphatase: 66 [DC]   1323 Total Bilirubin: 0.5 [DC]   1323 Magnesium: 2.4 [DC]      ED Course User Index  [DC] Matt Tobias MD       Hospitalization Considered?:     Orders Placed During This Visit:  Orders Placed This Encounter   Procedures    COVID-19 and FLU A/B PCR, 1 HR TAT - Swab, Nasopharynx    XR Chest 1 View    Comprehensive Metabolic Panel    Urinalysis With Microscopic If Indicated (No Culture) - Urine, Catheter    Magnesium    CBC Auto Differential    CBC & Differential       Additional orders considered but not placed:      Independent interpretation of labs, radiology studies, and discussions with consultants: See ED Course        AS OF 13:52 EST VITALS:    BP - 149/56  HR - 63  TEMP - 98 °F (36.7 °C)  02 SATS - 98%          DIAGNOSIS  Final diagnoses:   Generalized weakness   Mild dehydration   History of dementia         DISPOSITION  ED Disposition       ED Disposition   Discharge    Condition   Stable    Comment   --                Please note that portions of this document were completed  with a voice recognition program.    Note Disclaimer: At [facility], we believe that sharing information builds trust and better relationships. You are receiving this note because you recently visited [facility]. It is possible you will see health information before a provider has talked with you about it. This kind of information can be easy to misunderstand. To help you fully understand what it means for your health, we urge you to discuss this note with your provider.                       Matt Tobias MD  01/11/24 1523

## 2024-01-11 NOTE — DISCHARGE INSTRUCTIONS
Increased hydration, irregular meals, continue care medications, PCP follow-up within 1 week for recheck, ED return for worsening symptoms as needed.

## 2024-03-18 ENCOUNTER — OFFICE VISIT (OUTPATIENT)
Dept: NEUROLOGY | Facility: CLINIC | Age: 89
End: 2024-03-18
Payer: MEDICARE

## 2024-03-18 VITALS
HEIGHT: 60 IN | OXYGEN SATURATION: 99 % | BODY MASS INDEX: 20.22 KG/M2 | WEIGHT: 103 LBS | HEART RATE: 69 BPM | SYSTOLIC BLOOD PRESSURE: 128 MMHG | DIASTOLIC BLOOD PRESSURE: 66 MMHG

## 2024-03-18 DIAGNOSIS — F03.A0 MILD DEMENTIA, UNSPECIFIED DEMENTIA TYPE, UNSPECIFIED WHETHER BEHAVIORAL, PSYCHOTIC, OR MOOD DISTURBANCE OR ANXIETY: Primary | ICD-10-CM

## 2024-03-18 RX ORDER — POLYETHYLENE GLYCOL 3350 17 G/17G
17 POWDER, FOR SOLUTION ORAL DAILY
COMMUNITY

## 2024-03-18 NOTE — PROGRESS NOTES
"No chief complaint on file.      Patient ID: Radha Luke is a 89 y.o. female.    HPI:    The patient is a 89-year-old female who presents to neurology clinic as a referral from STAN Fox, for memory issues. Referral was placed in 10/2023. She has not seen a neurologist in the EMR at least recently. I reviewed the report from the visit. The patient was with her son and her family medicine practitioner, and her son stated that her mother has been getting more confused as of recent and that she lives at [facility]. Her  had a stroke and was not living with her at that time and also some concern about depression. I am not a psychiatrist, however. Subsequent ER note said that she had moved into [facility] for her care, and she has had poor oral intake back in 11/2023. She did have a UTI and dehydration at that time. She was also seen in the emergency room with decreased oral intake and increased weakness. She did not have a UTI at that time, but they thought she could have been dehydrated. She is accompanied by her son, Rafael, and daughter-in-law, Paulina.    Today, she says she has noticed her memory troubles for approximately 1 year, but she notes that \"it is not bad\". She states that she \"remembers things, but she does not remember things\" on certain days. She can remember things from her past and old stories fairly well, her long-term memory is in good shape; however, she does have some trouble with her short-term memory. She confirms that she lives at [facility] assisted living with her . They have been  for many years. Her son and daughter-in-law have noticed memory difficulties from time to time. Her son thinks she does really good with a lot of things, but she does not commit short-term memories to her memory. Her son has noticed this for approximately 5 years, and it has been gradually getting worse over that time. She can still dress " "herself in the morning, take care of her basic hygiene, and she remembers to eat meals when she is hungry. She confirms that [facility] does a wonderful job fixing meals for her to eat daily. Her son states that he reminds her daily to drink water and she reports that she tries to drink as much as she can. She has lost weight over the last year. She weighs 103 pounds today when she previously weighed 108 pounds. She reports that her  takes care of her finances and has during their marriage. She states [facility] assisted living takes care of her medications. She does not have to worry about housework or laundry as that is done for her at [facility]. She does not currently drive and the last time she drove was approximately 5 years ago. She confirms that she is retired as she was a  at [employer]. She denies drinking alcohol.    One of the reasons she is where she is now is when her  was in rehab and moved out of their apartment into the rehab area of [facility]. When she is alone by herself, her son states that she becomes a little bit more anxious and occasionally wants to \"hit the halls and look for her \". She was living in an apartment by herself and did well there. They had a lot of visitors and \"helping hands\" when certain things needed to be done or for just visitors to stop by. She does not want to take any medication for memory loss or have an MRI scan done at this time. She states that she is \"happy the way she is.\" Her son states that she is very social and active at [facility] living [facility].     Her son notes that they have been trying to get an updated B12 level drawn for her as her last one performed was in 2018 that showed it was 777 pg/mL. Her son states that he and his other brother are the power of  for her.  Her daughter-in-law states that she previously had breast cancer and that she had a CT scan performed approximately 6 years ago. Her " previous A1c levels have been in the prediabetic range but she is not currently on any medication for it. She currently is taking a cholesterol medication to help stabilize her cholesterol levels.     The following portions of the patient's history were reviewed and updated as appropriate: allergies, current medications, past family history, past medical history, past social history, past surgical history and problem list.    Review of Systems   Musculoskeletal:  Positive for back pain (right hip pain) and gait problem. Negative for neck pain and neck stiffness.   Neurological:  Positive for dizziness (sometimes), weakness and light-headedness (sometimes). Negative for tremors, seizures, syncope, facial asymmetry, speech difficulty, numbness and headaches.   Hematological:  Negative for adenopathy. Does not bruise/bleed easily.   Psychiatric/Behavioral:  Positive for confusion and decreased concentration. Negative for sleep disturbance. The patient is nervous/anxious.         There were no vitals filed for this visit.    Neurologic Exam     Mental Status   Level of consciousness: alert  MMSE 21/30 - oriented to season, state, county, city, \[facility] [place] [facility]\""j, 0/3 delayed recall, abnormal vigure copy       Physical Exam    Procedures    Assessment/Plan:         Diagnoses and all orders for this visit:    1. Mild dementia, unspecified dementia type, unspecified whether behavioral, psychotic, or mood disturbance or anxiety (Primary)  -     Vitamin B12; Future  -     Folate; Future  -     Vitamin B1, Whole Blood    There are some signs of some vascular changes that could be due to blood pressure, blood sugar, and cholesterol. She is already in the memory care unit. We discussed options of controlling blood pressure, blood sugar, and blood cholesterol that can prevent things from further decline. We discussed the option of medication to help with symptoms of memory loss, occasionally improving quality of life, or maintaining  independence. We discussed the side effects, risks, and benefits of the medication and she prefers to hold off on medication and MRI of the brain currently. I will check her B12, folate, and B1 level. If it is low, then she could treat her low vitamin levels and potentially her memory could even get a little better. She has issues with orientation and delayed recall.    Follow-up  The patient will follow up in 6 months to reassess her memory.         I spent 60 minutes caring for this patient on this date of service. This time includes time spent by me in the following activities as necessary: preparing for the visit, reviewing tests, medical records and previous visits, obtaining and/or reviewing a separately obtained history, performing a medically appropriate exam and/or evaluation, counseling and educating the patient, and/or communicating with other healthcare professionals, documenting information in the medical record, independently interpreting results and communicating that information with the patient, and developing a medically appropriate treatment plan with consideration of other conditions, medications, and treatments.      Transcribed from ambient dictation for Matthieu Fleming MD by Elizabeth Bowen.  03/18/24   18:08 EDT    Patient or patient representative verbalized consent to the visit recording.  I have personally performed the services described in this document as transcribed by the above individual, and it is both accurate and complete.  Matthieu Fleming MD  4/1/2024  02:31 EDT

## 2024-03-18 NOTE — LETTER
March 18, 2024       No Recipients    Patient: Radha Luke   YOB: 1933   Date of Visit: 3/18/2024     Dear STAN Peguero:       Thank you for referring [facility] to me for evaluation. Below are the relevant portions of my assessment and plan of care.    If you have questions, please do not hesitate to call me. I look forward to following Radha along with you.         Sincerely,        Matthieu Fleming MD        CC:   No Recipients

## 2024-03-19 ENCOUNTER — LAB (OUTPATIENT)
Dept: LAB | Facility: HOSPITAL | Age: 89
End: 2024-03-19
Payer: MEDICARE

## 2024-03-19 DIAGNOSIS — F03.A0 MILD DEMENTIA, UNSPECIFIED DEMENTIA TYPE, UNSPECIFIED WHETHER BEHAVIORAL, PSYCHOTIC, OR MOOD DISTURBANCE OR ANXIETY: ICD-10-CM

## 2024-03-19 LAB
FOLATE SERPL-MCNC: >20 NG/ML (ref 4.78–24.2)
VIT B12 BLD-MCNC: 1067 PG/ML (ref 211–946)

## 2024-03-19 PROCEDURE — 82746 ASSAY OF FOLIC ACID SERUM: CPT

## 2024-03-19 PROCEDURE — 36415 COLL VENOUS BLD VENIPUNCTURE: CPT

## 2024-03-19 PROCEDURE — 84425 ASSAY OF VITAMIN B-1: CPT | Performed by: STUDENT IN AN ORGANIZED HEALTH CARE EDUCATION/TRAINING PROGRAM

## 2024-03-19 PROCEDURE — 82607 VITAMIN B-12: CPT

## 2024-03-22 LAB — VIT B1 BLD-SCNC: 174.3 NMOL/L (ref 66.5–200)

## 2024-03-28 ENCOUNTER — OFFICE VISIT (OUTPATIENT)
Dept: CARDIOLOGY | Facility: CLINIC | Age: 89
End: 2024-03-28
Payer: MEDICARE

## 2024-03-28 VITALS
DIASTOLIC BLOOD PRESSURE: 60 MMHG | SYSTOLIC BLOOD PRESSURE: 110 MMHG | OXYGEN SATURATION: 99 % | BODY MASS INDEX: 19.83 KG/M2 | HEIGHT: 60 IN | WEIGHT: 101 LBS

## 2024-03-28 DIAGNOSIS — I44.7 LEFT BUNDLE BRANCH BLOCK: ICD-10-CM

## 2024-03-28 DIAGNOSIS — E78.2 MIXED HYPERLIPIDEMIA: ICD-10-CM

## 2024-03-28 DIAGNOSIS — I34.0 NONRHEUMATIC MITRAL VALVE REGURGITATION: ICD-10-CM

## 2024-03-28 DIAGNOSIS — I50.42 CHRONIC COMBINED SYSTOLIC AND DIASTOLIC HEART FAILURE: Primary | ICD-10-CM

## 2024-03-28 DIAGNOSIS — I35.1 NONRHEUMATIC AORTIC VALVE INSUFFICIENCY: ICD-10-CM

## 2024-03-28 PROCEDURE — 93000 ELECTROCARDIOGRAM COMPLETE: CPT | Performed by: INTERNAL MEDICINE

## 2024-03-28 PROCEDURE — 99214 OFFICE O/P EST MOD 30 MIN: CPT | Performed by: INTERNAL MEDICINE

## 2024-03-28 RX ORDER — CARVEDILOL 3.12 MG/1
3.12 TABLET ORAL 2 TIMES DAILY
Qty: 180 TABLET | Refills: 3 | Status: SHIPPED | OUTPATIENT
Start: 2024-03-28

## 2024-03-28 NOTE — PROGRESS NOTES
CARDIOLOGY    Irene Earl MD    ENCOUNTER DATE:  03/28/2024    Radha Luke / 89 y.o. / female        CHIEF COMPLAINT / REASON FOR OFFICE VISIT     Follow-up      HISTORY OF PRESENT ILLNESS       HPI    Radha Luke is a 89 y.o. female     This is a nice woman who was admitted in 01/2018 with new onset of acute congestive heart failure.  Her ejection fraction was 28% by echocardiogram on 01/30/2018.  She was started on a low dose of carvedilol.  She was not started on Entresto, ACE inhibitor or angiotensin receptor blocker because of kidney disease.  She had a right-sided thoracentesis with 1200 mL of fluid removed and a left-sided thoracentesis with 750 mL of fluid removed.   she had a PET stress test in February 2018 which showed no perfusion defects with an ejection fraction on 24%.  Repeat echocardiogram was performed in May 2018 and showed her ejection fraction had improved to 45 to 50%.  She had grade 1A diastolic dysfunction, mild aortic regurgitation, moderate aortic stenosis, moderate mitral regurgitation, mild tricuspid regurgitation with a normal right ventricular systolic pressure.  Echocardiogram in March 2021 showed her ejection fraction to be 45% with grade 1A diastolic dysfunction.  There was mild to moderate aortic regurgitation and moderate aortic stenosis noted.  Carotid Doppler and March 2021 showed plaque without stenosis bilaterally.     She is living in a memory care unit.  She had a urinary tract infection and dehydration in the hospital for a bit.  She is not having any acute symptoms right now.  No edema.  They were wondering about maybe cutting back on her Lasix I told her that we could try to do it every other day but they are little reluctant right now but if she has more issues with dehydration or UTI we can try to cut back on the Lasix.    REVIEW OF SYSTEMS     Review of Systems   Constitutional: Negative for chills, fever, weight gain and weight loss.   Cardiovascular:   "Negative for leg swelling.   Respiratory:  Negative for cough, snoring and wheezing.    Hematologic/Lymphatic: Negative for bleeding problem. Does not bruise/bleed easily.   Skin:  Negative for color change.   Musculoskeletal:  Negative for falls, joint pain and myalgias.   Gastrointestinal:  Negative for melena.   Genitourinary:  Negative for hematuria.   Neurological:  Negative for excessive daytime sleepiness.   Psychiatric/Behavioral:  Negative for depression. The patient is not nervous/anxious.          VITAL SIGNS     Visit Vitals  /60   Ht 152.4 cm (60\")   Wt 45.8 kg (101 lb)   SpO2 99%   BMI 19.73 kg/m²         Wt Readings from Last 3 Encounters:   03/28/24 45.8 kg (101 lb)   03/18/24 46.7 kg (103 lb)   01/11/24 47.8 kg (105 lb 6.4 oz)     Body mass index is 19.73 kg/m².      PHYSICAL EXAMINATION     Constitutional:       General: Not in acute distress.  Neck:      Vascular: No carotid bruit or JVD.   Pulmonary:      Effort: Pulmonary effort is normal.      Breath sounds: Normal breath sounds.   Cardiovascular:      Normal rate. Regular rhythm.      Murmurs: There is no murmur.   Psychiatric:         Mood and Affect: Mood and affect normal.           REVIEWED DATA       ECG 12 Lead    Date/Time: 3/28/2024 11:51 AM  Performed by: Irene Earl MD    Authorized by: Irene Earl MD  Comparison: compared with previous ECG from 11/27/2023  Similar to previous ECG  Rhythm: sinus rhythm  BPM: 65  Conduction: left bundle branch block    Clinical impression: abnormal EKG            Lipid Panel          5/9/2023    08:58   Lipid Panel   Total Cholesterol 164    Triglycerides 125    HDL Cholesterol 51    VLDL Cholesterol 22    LDL Cholesterol  91    LDL/HDL Ratio 1.73        Lab Results   Component Value Date    GLUCOSE 119 (H) 01/11/2024    BUN 30 (H) 01/11/2024    CREATININE 1.31 (H) 01/11/2024    EGFRRESULT 33.0 (L) 05/09/2023    EGFR 38.8 (L) 01/11/2024    BCR 22.9 01/11/2024    K 4.3 01/11/2024    " CO2 27.0 01/11/2024    CALCIUM 8.7 01/11/2024    PROTENTOTREF 7.0 05/09/2023    ALBUMIN 4.0 01/11/2024    BILITOT 0.5 01/11/2024    AST 19 01/11/2024    ALT 11 01/11/2024       ASSESSMENT & PLAN      Diagnosis Plan   1. Chronic combined systolic and diastolic heart failure        2. Left bundle branch block        3. Mixed hyperlipidemia        4. Nonrheumatic aortic valve insufficiency        5. Nonrheumatic mitral valve regurgitation            1. Chronic systolic congestive heart failure. She is on a low dose of carvedilol.  Her ejection fraction was 28% echo on January 30, 2018. On January 31, 2018 she had a thoracentesis on the right side with 1200 mL of fluid removed.  On February 1 she had a thoracentesis on the left side with 750 mL removed.    Repeat echo in May 2018 showed her ejection fraction had improved to 45 to 50%.  And in March 2020 when her ejection fraction was stable at 45%. She had a negative PET stress test.  Continue current medications.  She is euvolemic on exam.  2.  Pleural effusion. Status post bilateral thoracentesis  3.  Hypertension.  Controlled  4.  Hyperlipidemia.  I reviewed the lipid panel as above.  Continue current medications.  5.  Chronic kidney disease stage III.    6.  History of breast cancer in 2011  7.  Left bundle branch block.  Given that she has remained out of the hospital and euvolemic, she does not qualify for biventricular pacemaker at this time.  8.  Aortic stenosis.  Moderate.  With mild to moderate aortic regurgitation.  Her murmur is consistent with what was seen on her echo last year.  I am not going to order any new test this year.  9.  Mild mitral regurgitation.  10.  Mild tricuspid regurgitation.  Initially she had pulmonary hypertension but after diuresis/bilateral thoracentesis, right ventricular systolic pressure was normal.     Plan is to follow-up in 1 year unless her symptoms change in the meantime.      Orders Placed This Encounter   Procedures    ECG  12 Lead     This order was created via procedure documentation     Order Specific Question:   Release to patient     Answer:   Routine Release [[id number]]           MEDICATIONS         Discharge Medications            Accurate as of March 28, 2024 11:52 AM. If you have any questions, ask your nurse or doctor.                Continue These Medications        Instructions Start Date   acetaminophen 325 MG tablet  Commonly known as: TYLENOL   650 mg, Oral, Every 6 Hours PRN      allopurinol 100 MG tablet  Commonly known as: ZYLOPRIM   100 mg, Oral, Daily      anastrozole 1 MG tablet  Commonly known as: ARIMIDEX   No dose, route, or frequency recorded.      carvedilol 3.125 MG tablet  Commonly known as: COREG   3.125 mg, Oral, 2 Times Daily      furosemide 20 MG tablet  Commonly known as: LASIX   20 mg, Oral, Daily      magnesium gluconate 500 MG tablet  Commonly known as: MAGONATE   500 mg, Oral, Daily      MiraLax 17 g packet  Generic drug: polyethylene glycol   17 g, Oral, Daily      mirtazapine 7.5 MG tablet  Commonly known as: REMERON   7.5 mg, Oral, Nightly      multivitamin with minerals tablet tablet   1 tablet, Oral, Daily      potassium chloride 10 MEQ CR capsule  Commonly known as: MICRO-K   20 mEq, Oral, Daily      simvastatin 20 MG tablet  Commonly known as: ZOCOR   20 mg, Oral, Daily                 Irene Earl MD  03/28/24  11:52 EDT    Part of this note may be an electronic transcription/translation of spoken language to printed text using the Dragon dictation system.

## 2024-04-06 ENCOUNTER — APPOINTMENT (OUTPATIENT)
Dept: GENERAL RADIOLOGY | Facility: HOSPITAL | Age: 89
DRG: 085 | End: 2024-04-06
Payer: MEDICARE

## 2024-04-06 ENCOUNTER — APPOINTMENT (OUTPATIENT)
Dept: CT IMAGING | Facility: HOSPITAL | Age: 89
DRG: 085 | End: 2024-04-06
Payer: MEDICARE

## 2024-04-06 ENCOUNTER — HOSPITAL ENCOUNTER (INPATIENT)
Facility: HOSPITAL | Age: 89
LOS: 2 days | Discharge: NURSING, REHAB OR LONG-TERM CARE | DRG: 085 | End: 2024-04-08
Attending: EMERGENCY MEDICINE | Admitting: INTERNAL MEDICINE
Payer: MEDICARE

## 2024-04-06 DIAGNOSIS — S06.320A: ICD-10-CM

## 2024-04-06 DIAGNOSIS — S01.01XA LACERATION OF SCALP, INITIAL ENCOUNTER: ICD-10-CM

## 2024-04-06 DIAGNOSIS — I60.9 SUBARACHNOID HEMORRHAGE: Primary | ICD-10-CM
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
## 2024-04-06 PROBLEM — S06.6XAA SUBARACHNOID HEMATOMA: Status: ACTIVE | Noted: 2024-04-06

## 2024-04-06 LAB
ALBUMIN SERPL-MCNC: 3.6 G/DL (ref 3.5–5.2)
ALBUMIN/GLOB SERPL: 1.4 G/DL
ALP SERPL-CCNC: 51 U/L (ref 39–117)
ALT SERPL W P-5'-P-CCNC: 13 U/L (ref 1–33)
ANION GAP SERPL CALCULATED.3IONS-SCNC: 10 MMOL/L (ref 5–15)
APTT PPP: 25 SECONDS (ref 22.7–35.4)
AST SERPL-CCNC: 23 U/L (ref 1–32)
BASOPHILS # BLD AUTO: 0.01 10*3/MM3 (ref 0–0.2)
BASOPHILS NFR BLD AUTO: 0.2 % (ref 0–1.5)
BILIRUB SERPL-MCNC: 0.4 MG/DL (ref 0–1.2)
BUN SERPL-MCNC: 30 MG/DL (ref 8–23)
BUN/CREAT SERPL: 23.8 (ref 7–25)
CALCIUM SPEC-SCNC: 8.2 MG/DL (ref 8.2–9.6)
CHLORIDE SERPL-SCNC: 110 MMOL/L (ref 98–107)
CO2 SERPL-SCNC: 19 MMOL/L (ref 22–29)
CREAT SERPL-MCNC: 1.26 MG/DL (ref 0.57–1)
DEPRECATED RDW RBC AUTO: 48.1 FL (ref 37–54)
EGFRCR SERPLBLD CKD-EPI 2021: 40.4 ML/MIN/1.73
EOSINOPHIL # BLD AUTO: 0.12 10*3/MM3 (ref 0–0.4)
EOSINOPHIL NFR BLD AUTO: 2.1 % (ref 0.3–6.2)
ERYTHROCYTE [DISTWIDTH] IN BLOOD BY AUTOMATED COUNT: 13.4 % (ref 12.3–15.4)
GLOBULIN UR ELPH-MCNC: 2.5 GM/DL
GLUCOSE BLDC GLUCOMTR-MCNC: 108 MG/DL (ref 70–130)
GLUCOSE SERPL-MCNC: 114 MG/DL (ref 65–99)
HCT VFR BLD AUTO: 33 % (ref 34–46.6)
HGB BLD-MCNC: 10.8 G/DL (ref 12–15.9)
IMM GRANULOCYTES # BLD AUTO: 0.01 10*3/MM3 (ref 0–0.05)
IMM GRANULOCYTES NFR BLD AUTO: 0.2 % (ref 0–0.5)
INR PPP: 1.25 (ref 0.9–1.1)
LYMPHOCYTES # BLD AUTO: 2.04 10*3/MM3 (ref 0.7–3.1)
LYMPHOCYTES NFR BLD AUTO: 35.2 % (ref 19.6–45.3)
MCH RBC QN AUTO: 31.5 PG (ref 26.6–33)
MCHC RBC AUTO-ENTMCNC: 32.7 G/DL (ref 31.5–35.7)
MCV RBC AUTO: 96.2 FL (ref 79–97)
MONOCYTES # BLD AUTO: 0.49 10*3/MM3 (ref 0.1–0.9)
MONOCYTES NFR BLD AUTO: 8.4 % (ref 5–12)
NEUTROPHILS NFR BLD AUTO: 3.13 10*3/MM3 (ref 1.7–7)
NEUTROPHILS NFR BLD AUTO: 53.9 % (ref 42.7–76)
NRBC BLD AUTO-RTO: 0 /100 WBC (ref 0–0.2)
PLATELET # BLD AUTO: 124 10*3/MM3 (ref 140–450)
PMV BLD AUTO: 10.4 FL (ref 6–12)
POTASSIUM SERPL-SCNC: 4.4 MMOL/L (ref 3.5–5.2)
PROT SERPL-MCNC: 6.1 G/DL (ref 6–8.5)
PROTHROMBIN TIME: 16 SECONDS (ref 11.7–14.2)
RBC # BLD AUTO: 3.43 10*6/MM3 (ref 3.77–5.28)
SODIUM SERPL-SCNC: 139 MMOL/L (ref 136–145)
WBC NRBC COR # BLD AUTO: 5.8 10*3/MM3 (ref 3.4–10.8)

## 2024-04-06 PROCEDURE — 71045 X-RAY EXAM CHEST 1 VIEW: CPT

## 2024-04-06 PROCEDURE — 85025 COMPLETE CBC W/AUTO DIFF WBC: CPT | Performed by: PHYSICIAN ASSISTANT

## 2024-04-06 PROCEDURE — 25010000002 TETANUS-DIPHTH-ACELL PERTUSSIS 5-2.5-18.5 LF-MCG/0.5 SUSPENSION PREFILLED SYRINGE: Performed by: PHYSICIAN ASSISTANT

## 2024-04-06 PROCEDURE — 36415 COLL VENOUS BLD VENIPUNCTURE: CPT

## 2024-04-06 PROCEDURE — 82948 REAGENT STRIP/BLOOD GLUCOSE: CPT

## 2024-04-06 PROCEDURE — 25010000002 NICARDIPINE 2.5 MG/ML SOLUTION: Performed by: INTERNAL MEDICINE

## 2024-04-06 PROCEDURE — 85610 PROTHROMBIN TIME: CPT | Performed by: PHYSICIAN ASSISTANT

## 2024-04-06 PROCEDURE — 25810000003 SODIUM CHLORIDE 0.9 % SOLUTION: Performed by: INTERNAL MEDICINE

## 2024-04-06 PROCEDURE — 72125 CT NECK SPINE W/O DYE: CPT

## 2024-04-06 PROCEDURE — 70450 CT HEAD/BRAIN W/O DYE: CPT

## 2024-04-06 PROCEDURE — 99291 CRITICAL CARE FIRST HOUR: CPT

## 2024-04-06 PROCEDURE — 80053 COMPREHEN METABOLIC PANEL: CPT | Performed by: PHYSICIAN ASSISTANT

## 2024-04-06 PROCEDURE — 90715 TDAP VACCINE 7 YRS/> IM: CPT | Performed by: PHYSICIAN ASSISTANT

## 2024-04-06 PROCEDURE — 99221 1ST HOSP IP/OBS SF/LOW 40: CPT | Performed by: NEUROLOGICAL SURGERY

## 2024-04-06 PROCEDURE — 90471 IMMUNIZATION ADMIN: CPT | Performed by: PHYSICIAN ASSISTANT

## 2024-04-06 PROCEDURE — 92610 EVALUATE SWALLOWING FUNCTION: CPT

## 2024-04-06 PROCEDURE — 85730 THROMBOPLASTIN TIME PARTIAL: CPT | Performed by: PHYSICIAN ASSISTANT

## 2024-04-06 RX ORDER — ALLOPURINOL 100 MG/1
100 TABLET ORAL DAILY
Status: DISCONTINUED | OUTPATIENT
Start: 2024-04-06 | End: 2024-04-08 | Stop reason: HOSPADM

## 2024-04-06 RX ORDER — SODIUM CHLORIDE 0.9 % (FLUSH) 0.9 %
10 SYRINGE (ML) INJECTION EVERY 12 HOURS SCHEDULED
Status: DISCONTINUED | OUTPATIENT
Start: 2024-04-06 | End: 2024-04-08 | Stop reason: HOSPADM

## 2024-04-06 RX ORDER — MIRTAZAPINE 15 MG/1
7.5 TABLET, FILM COATED ORAL NIGHTLY
Status: DISCONTINUED | OUTPATIENT
Start: 2024-04-06 | End: 2024-04-08 | Stop reason: HOSPADM

## 2024-04-06 RX ORDER — SODIUM CHLORIDE 0.9 % (FLUSH) 0.9 %
10 SYRINGE (ML) INJECTION AS NEEDED
Status: DISCONTINUED | OUTPATIENT
Start: 2024-04-06 | End: 2024-04-08 | Stop reason: HOSPADM

## 2024-04-06 RX ORDER — SODIUM CHLORIDE 9 MG/ML
40 INJECTION, SOLUTION INTRAVENOUS AS NEEDED
Status: DISCONTINUED | OUTPATIENT
Start: 2024-04-06 | End: 2024-04-08 | Stop reason: HOSPADM

## 2024-04-06 RX ORDER — ATORVASTATIN CALCIUM 20 MG/1
10 TABLET, FILM COATED ORAL DAILY
Status: DISCONTINUED | OUTPATIENT
Start: 2024-04-06 | End: 2024-04-08 | Stop reason: HOSPADM

## 2024-04-06 RX ORDER — POTASSIUM CHLORIDE 750 MG/1
20 TABLET, FILM COATED, EXTENDED RELEASE ORAL DAILY
Status: DISCONTINUED | OUTPATIENT
Start: 2024-04-06 | End: 2024-04-08 | Stop reason: HOSPADM

## 2024-04-06 RX ORDER — CARVEDILOL 3.12 MG/1
3.12 TABLET ORAL 2 TIMES DAILY
Status: DISCONTINUED | OUTPATIENT
Start: 2024-04-06 | End: 2024-04-08 | Stop reason: HOSPADM

## 2024-04-06 RX ORDER — UREA 10 %
27 LOTION (ML) TOPICAL DAILY
Status: DISCONTINUED | OUTPATIENT
Start: 2024-04-06 | End: 2024-04-08 | Stop reason: HOSPADM

## 2024-04-06 RX ADMIN — MIRTAZAPINE 7.5 MG: 15 TABLET, FILM COATED ORAL at 20:33

## 2024-04-06 RX ADMIN — ATORVASTATIN CALCIUM 10 MG: 20 TABLET, FILM COATED ORAL at 11:19

## 2024-04-06 RX ADMIN — CARVEDILOL 3.12 MG: 3.12 TABLET, FILM COATED ORAL at 20:33

## 2024-04-06 RX ADMIN — TETANUS TOXOID, REDUCED DIPHTHERIA TOXOID AND ACELLULAR PERTUSSIS VACCINE, ADSORBED 0.5 ML: 5; 2.5; 8; 8; 2.5 SUSPENSION INTRAMUSCULAR at 04:30

## 2024-04-06 RX ADMIN — Medication 27 MG: at 11:20

## 2024-04-06 RX ADMIN — POTASSIUM CHLORIDE 20 MEQ: 750 TABLET, EXTENDED RELEASE ORAL at 11:20

## 2024-04-06 RX ADMIN — Medication 10 ML: at 20:34

## 2024-04-06 RX ADMIN — SODIUM CHLORIDE 5 MG/HR: 9 INJECTION, SOLUTION INTRAVENOUS at 05:43

## 2024-04-06 RX ADMIN — ALLOPURINOL 100 MG: 100 TABLET ORAL at 11:23

## 2024-04-06 RX ADMIN — Medication 10 ML: at 11:23

## 2024-04-06 NOTE — PROGRESS NOTES
[facility]    Acute rehab screening referral received via stroke order set. Please note that the acute rehab admission RNs will not be actively evaluating this patient. If it is felt that the patient is or will be acute rehab appropriate, please call the admissions office at [phone] and a full evaluation will be initiated. Thank you.     Char Gomez RN  Rehab Admissions Coordinator  [phone]

## 2024-04-06 NOTE — ED PROVIDER NOTES
MD ATTESTATION NOTE    The LANNY and I have discussed this patient's history, physical exam, and treatment plan.  I have reviewed the documentation and personally had a face to face interaction with the patient. I affirm the documentation and agree with the treatment and plan.  The attached note describes my personal findings.      I provided a substantive portion of the care of the patient.  I personally performed the physical exam in its entirety, and below are my findings.        Brief HPI: This patient is a 89-year-old female with a history of dementia presenting to the emergency room today after sustaining a fall where she struck the back of her head on the ground.  There is unknown loss of consciousness but they do deny that the patient is on any anticoagulation medication.  Any further HPI is unobtainable given her mental status.      PHYSICAL EXAM  ED Triage Vitals   Temp Heart Rate Resp BP SpO2   04/06/24 0221 04/06/24 0221 04/06/24 0221 04/06/24 0221 04/06/24 0221   97.9 °F (36.6 °C) 58 16 145/54 99 %      Temp src Heart Rate Source Patient Position BP Location FiO2 (%)   04/06/24 0522 04/06/24 0522 04/06/24 0522 -- --   Oral Monitor Lying           GENERAL: Resting comfortably and in no acute distress, nontoxic in appearance  HENT: nares patent  EYES: no scleral icterus  CV: regular rhythm, normal rate, no M/R/G  RESPIRATORY: normal effort, lungs clear bilaterally  ABDOMEN: soft, nontender, no rebound or guarding  MUSCULOSKELETAL: no deformity, no edema  NEURO: alert, moves all extremities, follows commands  PSYCH:  calm, cooperative  SKIN: warm, dry, occipital scalp laceration    Vital signs and nursing notes reviewed.      Differential diagnosis includes but is not limited to scalp laceration, closed head injury, skull fracture, intracranial hemorrhage, or intracranial mass effect.      Plan: We will obtain CT scans of the patient's head and cervical spine.  Following this, we will clean and repair her  laceration accordingly.      Head CT was independently interpreted by myself with my interpretation showing multiple areas of acute intraparenchymal hemorrhage.  No area of acute ischemia/infarct or mass effect.       Yves Pfeiffer MD  04/06/24 0540

## 2024-04-06 NOTE — SIGNIFICANT NOTE
04/06/24 1126   OTHER   Discipline physical therapist   Rehab Time/Intention   Session Not Performed patient unavailable for evaluation;other (see comments)  (Pt on way to CT scan this AM per RN. PT will f/u as time allows.)   Recommendation   PT - Next Appointment 04/07/24

## 2024-04-06 NOTE — THERAPY EVALUATION
Acute Care - Speech Language Pathology   Swallow Initial Evaluation [facility]     Patient Name: Radha Luke  : 3/14/1933  MRN: [id number]  Today's Date: 2024               Admit Date: 2024    Visit Dx:     ICD-10-CM ICD-9-CM   1. Subarachnoid hemorrhage  I60.9 430   2. Fall, initial encounter  W19.XXXA E888.9   3. Intraparenchymal hematoma of left side of brain due to trauma, without loss of consciousness, initial encounter  S06.320A 853.01   4. Laceration of scalp, initial encounter  S01.01XA 873.0     Patient Active Problem List   Diagnosis    Disorder of aorta    Diverticulosis of intestine    Gastroesophageal reflux disease with esophagitis    Mixed hyperlipidemia    Primary hypertension    Age-related osteoporosis without current pathological fracture    Health care maintenance    History of left breast cancer    Malignant neoplasm of overlapping sites of left female breast    Pain of right sacroiliac joint    Impaired glucose tolerance    Left bundle branch block    Congestive heart failure due to valvular disease    Memory loss    Gout    Foot pain    Vertigo    Chronic combined systolic and diastolic heart failure    Nonrheumatic mitral valve regurgitation    Nonrheumatic aortic valve insufficiency    Aortic stenosis, moderate    TSH elevation    Breast cancer    Short-term memory loss    Confusion    Decreased appetite    Stage 3b chronic kidney disease    Acute UTI (urinary tract infection)    Hypercalcemia    Volume depletion    Dementia without behavioral disturbance    Severe malnutrition    Subarachnoid hematoma     Past Medical History:   Diagnosis Date    Acute systolic congestive heart failure     Allergic ?    Aortic stenosis     Arthritis     Breast cancer     Locally recurrent left breast    Cataract ?    CKD (chronic kidney disease), stage III     Colon polyp ?    Coronary artery disease 2018    Cough     Diverticulosis 2012    Dizziness     Drug therapy     Edema      GERD (gastroesophageal reflux disease)     History of breast cancer     LEFT    Hyperlipidemia     Hypertension     Hypokalemia     LBBB (left bundle branch block)     Mild tricuspid regurgitation     with a right ventricular systolic pressure of 62    Moderate mitral regurgitation     Pleural effusion     Pneumonia 2018     Past Surgical History:   Procedure Laterality Date    APPENDECTOMY  1943    BREAST BIOPSY      BREAST LUMPECTOMY Left 1995    COLONOSCOPY N/A 5/12/2016    Procedure: COLONOSCOPY;  Surgeon: Israel Vance MD;  Location: [facility] ENDOSCOPY;  Service:     HYSTERECTOMY  1964    MASTECTOMY Left 2011    THORACENTESIS Bilateral        SLP Recommendation and Plan  SLP Swallowing Diagnosis: mild, oral dysphagia, functional pharyngeal phase (04/06/24 0940)  SLP Diet Recommendation: soft to chew textures, chopped, ground, thin liquids (04/06/24 0940)  Recommended Precautions and Strategies: upright posture during/after eating, small bites of food and sips of liquid, 1:1 supervision (04/06/24 0940)  SLP Rec. for Method of Medication Administration: meds whole, meds crushed, with thin liquids, with puree, as tolerated (04/06/24 0940)     Monitor for Signs of Aspiration: yes, notify SLP if any concerns (04/06/24 0940)     Swallow Criteria for Skilled Therapeutic Interventions Met: demonstrates skilled criteria (04/06/24 0940)  Anticipated Discharge Disposition (SLP): unknown (04/06/24 0940)  Rehab Potential/Prognosis, Swallowing: good, to achieve stated therapy goals (04/06/24 0940)  Therapy Frequency (Swallow): PRN (04/06/24 0940)  Predicted Duration Therapy Intervention (Days): until discharge (04/06/24 0940)  Oral Care Recommendations: Oral Care BID/PRN (04/06/24 0940)                                        Plan of Care Reviewed With: patient, family  Progress: improving  Outcome Evaluation: Pt seen for clinical swallow eval with generalized oral motor weakness and mild oral dysphagia impacting  mastication. Pt tolerated thin, puree and mixed consistencies with no overt s/s aspiration. Recommend: Mechanical soft diet chopped w/ground meat, thin liquids, meds whole/crushed with thin or puree as tolerated, Frequent oral care, supervision with PO intake. Speech to follow for diet tolerance.      SWALLOW EVALUATION (Last 72 Hours)       SLP Adult Swallow Evaluation       Row Name 04/06/24 0940                   Rehab Evaluation    Document Type evaluation  -JT        Subjective Information no complaints  -JT        Patient Observations alert;cooperative;agree to therapy  -JT        Patient/Family/Caregiver Comments/Observations pt upright in bed; family at bedside supportive  -JT        Patient Effort adequate  -JT        Comment mildly pleasantly confused, poor apetite  -JT        Symptoms Noted During/After Treatment none  -JT        Oral Care swabbed with antiseptic solution  -JT           General Information    Patient Profile Reviewed yes  -JT        Pertinent History Of Current Problem Pt w/baseline dementia adm after fall w/resulting SAH. PMH includes GERD, HTN, Breast CA, CKD, Malnutrition.  -JT        Current Method of Nutrition NPO  -JT        Precautions/Limitations, Hearing WFL;for purposes of eval  -JT        Prior Level of Function-Communication cognitive-linguistic impairment  -JT        Prior Level of Function-Swallowing no diet consistency restrictions  -JT        Plans/Goals Discussed with patient and family;agreed upon  -JT        Barriers to Rehab cognitive status  -JT        Patient's Goals for Discharge patient did not state  -JT        Family Goals for Discharge patient able to return to PO diet  -JT           Pain    Additional Documentation Pain Scale: Numbers Pre/Post-Treatment (Group)  -JT           Pain Scale: Numbers Pre/Post-Treatment    Pretreatment Pain Rating 0/10 - no pain  -JT        Posttreatment Pain Rating 0/10 - no pain  -JT           Oral Motor Structure and Function     Dentition Assessment natural, present and adequate  -JT        Secretion Management WNL/WFL  -JT        Mucosal Quality moist, healthy  -JT        Gag Response WFL  -JT        Volitional Swallow WFL  -JT        Volitional Cough weak  -JT           Oral Musculature and Cranial Nerve Assessment    Oral Motor General Assessment generalized oral motor weakness;lingual impairment  -JT        Lingual Impairment, Detail. Cranial Nerves IX, XII (Glossopharyngeal and Hypoglossal) reduced lingual ROM;reduced strength;bilaterally  -JT           General Eating/Swallowing Observations    Respiratory Support Currently in Use room air  -JT        Eating/Swallowing Skills fed by SLP  -JT        Positioning During Eating upright 90 degree;upright in bed  -JT        Utensils Used cup;spoon;straw  -JT        Consistencies Trialed regular textures;soft to chew textures;chopped;mixed consistency;pureed;ice chips;thin liquids  -JT           Clinical Swallow Eval    Oral Prep Phase impaired  -JT        Oral Transit WFL  -JT        Oral Residue impaired  -JT        Pharyngeal Phase no overt signs/symptoms of pharyngeal impairment  -JT        Esophageal Phase unremarkable  -JT        Clinical Swallow Evaluation Summary Pt presents with mild oral dysphgia and suspected functional pharyngeal swallow. Pt w/generalized oral motor weakness with reduced lingual strength/ROM. Pt tolerated thin via cup/straw, puree, and mixed consistency with no overt signs/symptoms of aspiration. Prolonged mastication with moderate oral residue noted with mech soft chopped and regular solids. Liquid was was effective to clear oral residue. Upon palpation, laryngeal elevation was appropriate and timely. Feel pt safe for soft foods and thin liquids.  -JT           Oral Prep Concerns    Oral Prep Concerns prolonged mastication  -JT        Prolonged Mastication mechanical soft;regular consistencies  -JT           Oral Residue Concerns    Oral Residue Concerns diffuse  residue throughout oral cavity  -JT        Diffuse Residue Throughout Oral Cavity mechanical soft;regular consistencies  -JT           SLP Evaluation Clinical Impression    SLP Swallowing Diagnosis mild;oral dysphagia;functional pharyngeal phase  -JT        Functional Impact risk of malnutrition  -JT        Rehab Potential/Prognosis, Swallowing good, to achieve stated therapy goals  -JT        Swallow Criteria for Skilled Therapeutic Interventions Met demonstrates skilled criteria  -JT           Recommendations    Therapy Frequency (Swallow) PRN  -JT        Predicted Duration Therapy Intervention (Days) until discharge  -JT        SLP Diet Recommendation soft to chew textures;chopped;ground;thin liquids  -JT        Recommended Precautions and Strategies upright posture during/after eating;small bites of food and sips of liquid;1:1 supervision  -JT        Oral Care Recommendations Oral Care BID/PRN  -JT        SLP Rec. for Method of Medication Administration meds whole;meds crushed;with thin liquids;with puree;as tolerated  -JT        Monitor for Signs of Aspiration yes;notify SLP if any concerns  -JT        Anticipated Discharge Disposition (SLP) unknown  -JT           Swallow Goals (SLP)    Swallow LTGs Patient will demonstrate progress toward functional swallow for  -JT        Swallow STGs diet tolerance goal selection (SLP)  -JT        Diet Tolerance Goal Selection (SLP) Swallow Short Term Goal 1  -JT           (LTG) Patient will demonstrate progress toward functional swallow for    Diet Texture (Demonstrate progress toward functional swallow) soft to chew (chopped) textures  -JT        Liquid viscosity (Demonstrate progress toward functional swallow) thin liquids  -JT        Ellis (Demonstrate progress towards functional swallow) with minimal cues (75-90% accuracy)  -JT        Time Frame (Demonstrate progress toward functional swallow) by discharge  -JT           (STG) Swallow 1    (STG) Swallow 1 Pt will  tolerate least restrictive diet with no overt signs/symptoms of aspiration.  -JT        Hope (Swallow Short Term Goal 1) independently (over 90% accuracy)  -JT        Time Frame (Swallow Short Term Goal 1) by discharge  -JT                  User Key  (r) = Recorded By, (t) = Taken By, (c) = Cosigned By      Initials Name Effective Dates    Vicenta Sheppard SLP 01/05/24 -                     EDUCATION  The patient has been educated in the following areas:   Cognitive Impairment Dysphagia (Swallowing Impairment).        SLP GOALS       Row Name 04/06/24 0940             (LTG) Patient will demonstrate progress toward functional swallow for    Diet Texture (Demonstrate progress toward functional swallow) soft to chew (chopped) textures  -JT      Liquid viscosity (Demonstrate progress toward functional swallow) thin liquids  -JT      Hope (Demonstrate progress towards functional swallow) with minimal cues (75-90% accuracy)  -JT      Time Frame (Demonstrate progress toward functional swallow) by discharge  -JT         (STG) Swallow 1    (STG) Swallow 1 Pt will tolerate least restrictive diet with no overt signs/symptoms of aspiration.  -JT      Hope (Swallow Short Term Goal 1) independently (over 90% accuracy)  -JT      Time Frame (Swallow Short Term Goal 1) by discharge  -JT                User Key  (r) = Recorded By, (t) = Taken By, (c) = Cosigned By      Initials Name Provider Type    Vicenta Sheppard, SLP Speech and Language Pathologist                       Time Calculation:    Time Calculation- SLP       Row Name 04/06/24 0955             Time Calculation- SLP    SLP Start Time 0830  -JT      SLP Stop Time 0930  -JT      SLP Time Calculation (min) 60 min  -JT      SLP Received On 04/06/24  -JT         Untimed Charges    65290-QJ Eval Oral Pharyng Swallow Minutes 60  -JT         Total Minutes    Untimed Charges Total Minutes 60  -JT       Total Minutes 60  -JT                 User Key  (r) = Recorded By, (t) = Taken By, (c) = Cosigned By      Initials Name Provider Type    Vicenta Sheppard, SLP Speech and Language Pathologist                    Therapy Charges for Today       Code Description Service Date Service Provider Modifiers Qty    [id number]  ST EVAL ORAL PHARYNG SWALLOW 4 4/6/2024 Vicenta Alamo, SLP GN 1                 SYDNEY Castro  4/6/2024

## 2024-04-06 NOTE — SIGNIFICANT NOTE
04/06/24 1113   OTHER   Discipline occupational therapist   Rehab Time/Intention   Session Not Performed patient unavailable for evaluation;other (see comments)  (Pt on way to CT scan this AM per RN. OT will f/u as time allows or next service date.)   Recommendation   OT - Next Appointment 04/07/24

## 2024-04-06 NOTE — H&P
[facility]  [phone]  Dr. Mehran Jean Baptiste      Subjective   LOS: 0 days     89-year-old female I was asked to admit from the ED.  Patient is a resident of nursing facility.  She had a fall without LOC causing a laceration on the head.  She came into the ED and had the stitches.  CT head showed small subarachnoid hemorrhage.  She is being admitted to the ICU for monitoring.  She is not on any anticoagulation or antiplatelet agents.  Medical history includes chronic HFpEF, hypertension, CKD 3b, left bundle branch block and dementia without behavioral disturbance.  Patient is mildly confused.  She basically endorses a fall that occurred last night.  She denies any headaches or focal weakness.    Radha Luke  reports that she does not currently use alcohol.,  reports that she has never smoked. She has never used smokeless tobacco.     Past Hx:  has a past medical history of Acute systolic congestive heart failure, Allergic (1970?), Aortic stenosis, Arthritis, Breast cancer, Cataract (2005?), CKD (chronic kidney disease), stage III, Colon polyp (1997?), Coronary artery disease (2018), Cough, Diverticulosis (2012), Dizziness, Drug therapy, Edema, GERD (gastroesophageal reflux disease), History of breast cancer, Hyperlipidemia, Hypertension, Hypokalemia, LBBB (left bundle branch block), Mild tricuspid regurgitation, Moderate mitral regurgitation, Pleural effusion, and Pneumonia (2018).  Surg Hx:  has a past surgical history that includes Breast lumpectomy (Left, 1995); Mastectomy (Left, 2011); Appendectomy (1943); Hysterectomy (1964); Colonoscopy (N/A, 5/12/2016); Breast biopsy; and Thoracentesis (Bilateral).  FH: family history includes Hypertension in her mother.  SH:  reports that she has never smoked. She has never used smokeless tobacco. She reports that she does not currently use alcohol. She reports that she does not use drugs.    Medications Prior to Admission   Medication Sig Dispense Refill  Last Dose    acetaminophen (TYLENOL) 325 MG tablet Take 2 tablets by mouth Every 6 (Six) Hours As Needed for Mild Pain, Moderate Pain or Headache.   Past Month    allopurinol (ZYLOPRIM) 100 MG tablet Take 1 tablet by mouth Daily. 90 tablet 3 2024    anastrozole (ARIMIDEX) 1 MG tablet    2024    carvedilol (COREG) 3.125 MG tablet Take 1 tablet by mouth 2 (Two) Times a Day. 180 tablet 3 2024    furosemide (LASIX) 20 MG tablet Take 1 tablet by mouth Daily.   2024    magnesium gluconate (MAGONATE) 500 MG tablet Take 500 mg by mouth Daily.   2024    mirtazapine (REMERON) 7.5 MG tablet Take 1 tablet by mouth Every Night.   2024    polyethylene glycol (MiraLax) 17 g packet Take 17 g by mouth Daily.   Past Week    potassium chloride (MICRO-K) 10 MEQ CR capsule Take 2 capsules by mouth Daily. 180 capsule 3 2024    simvastatin (ZOCOR) 20 MG tablet Take 1 tablet by mouth Daily. 90 tablet 3 2024    Multiple Vitamins-Minerals (CENTRUM SILVER 50+WOMEN PO) Take 1 tablet by mouth Daily.        Allergies   Allergen Reactions    Levofloxacin Swelling     Swelling in legs and ankle and tongue    Penicillins Hives       Review of Systems   Unable to perform ROS: Dementia       Vital Signs past 24hrs  BP range: BP: (103-160)/(45-72) 122/56  Pulse range: Heart Rate:  [55-80] 80  Resp rate range: Resp:  [16-17] 17  Temp range: Temp (24hrs), Av °F (36.7 °C), Min:97.9 °F (36.6 °C), Max:98.1 °F (36.7 °C)    Oxygen range: SpO2:  [97 %-100 %] 99 %;  ;   Device (Oxygen Therapy): room air  45.8 kg (100 lb 15.5 oz); Body mass index is 19.72 kg/m².  Net IO Since Admission: No IO data has been entered for this period [24 0614]      Mechanical Ventilator:     Adult female who is sitting up in bed.  No distress or discomfort.  Laceration noted on right parietal region with surgical sutures in place.  Pupils equal and react light.  Oropharynx moist class II Mallampati airway.  JVP not elevated trachea midline  thyroid not enlarged.  Lungs reveal bilateral air entry clear to auscultation no rales rhonchi wheeze.  Percussion note resonant chest expansion equal no chest wall deformity or tenderness.  Heart examination S1-S2 present rhythm regular no murmurs.  No edema lower extremities.  Abdomen is soft nontender bowel sounds present no liver spleen enlargement.  No peripheral cyanosis clubbing.  No obvious lymphadenopathy in cervical axillary or inguinal areas.  Patient appears to move all 4 extremities normally with power intact.  No sensory loss deficit.  Cranial nerves appear grossly intact.    Results Review:    I have reviewed the laboratory and imaging data from current admission. My annotations are as noted in assessment and plan.  Result Review:  I have personally reviewed the results from the time of this admission to 4/6/2024 06:14 EDT and agree with these findings:  [x]  Laboratory list / accordion  [x]  Microbiology  [x]  Radiology  []  EKG/Telemetry   []  Cardiology/Vascular   []  Pathology  [x]  Old records  []  Other:      Medication Review:  I have reviewed the current MAR. My annotations are as noted in assessment and plan.    niCARdipine, 5-15 mg/hr, Last Rate: 5 mg/hr (04/06/24 0557)      Lines, Drains & Airways       Active LDAs       Name Placement date Placement time Site Days    Peripheral IV 04/06/24 0457 Anterior;Right Hand 04/06/24  0457  Hand  less than 1    Peripheral IV 04/06/24 0611 Anterior;Left Forearm 04/06/24  0611  Forearm  less than 1    External Urinary Catheter 04/06/24  0613  --  less than 1                  Isolation status: No active isolations        Isolation:  No active isolations    PCCM Problems  Iyer grade 3 traumatic subarachnoid hemorrhage  Left temporal intraparenchymal hematoma with vasogenic edema  Dementia  Chronic combined HFrEF and HFpEF  Valvular heart disease  CKD 3B  Hypertension  History of breast cancer  Anemia  EMS DNR      Plan of Treatment    Neurosurgery was  contacted by ER staff.  They have asked for ICU monitoring and follow-up CT head.  Will keep systolic blood pressure less than 140.  Resume home medications and wean off Cardene.  The areas of scattered bleeding in the head appear to be quite small.  Patient has no apparent focal deficit at this time.    Resume home medications.    Patient has history of heart failure.  Will check chest x-ray to ensure no evidence of fluid overload.  None seen on clinical exam.    Renal function appears to be stable.  Resume home Lasix and potassium.  Monitor electrolytes.    Patient is on Arimidex at home.  Will resume on discharge.    Anemia appears to be chronic and without acute blood loss.  Monitor hemoglobin.    Clarify DNR status with family and adjust CODE STATUS.    Permit diet if patient passes dysphagia screen.    Electronically signed by Mehran Jean Baptiste MD, 04/06/24, 6:14 AM EDT.      Part of this note may be an electronic transcription/translation of spoken language to printed text using the Dragon Dictation System.

## 2024-04-06 NOTE — ED PROVIDER NOTES
EMERGENCY DEPARTMENT ENCOUNTER  Room Number:  04/04  PCP: Paxton Nuno MD  Independent Historians: Patient and EMS      HPI:  Chief Complaint: had concerns including Fall and Head Injury.     A complete HPI/ROS/PMH/PSH/SH/FH are unobtainable due to: Dementia    Chronic or social conditions impacting patient care (Social Determinants of Health): None      Context: Radha Luke is a 89 y.o. female with a medical history of dementia, heart failure, CKD, and history of breast cancer presents emergency department with a closed head injury after mechanical fall at the nursing facility [facility].  This fall was witnessed by staff and says she got up to go up to the bathroom and fell backwards striking her head on the floor.  There is no reported LOC.  She is not anticoagulated.  She denies any injuries associated with the fall.  Patient just keeps complaining that she is cold.  She has dementia and nearly the entirety of the history was given by EMS.      Review of prior external notes (non-ED) -and- Review of prior external test results outside of this encounter:   Most recent tetanus vaccine in 2015    Patient follows with neurology and last saw them on 3/18/2024 for mild dementia.  They reported that she had some vascular changes but may be related to blood pressure, blood sugar, and cholesterol.  They recommended tight control on all of these risk factors to better control her progression of memory loss.    B12 and folic acid levels were normal.    PAST MEDICAL HISTORY  Active Ambulatory Problems     Diagnosis Date Noted    Disorder of aorta 05/15/2016    Diverticulosis of intestine 05/15/2016    Gastroesophageal reflux disease with esophagitis 05/15/2016    Mixed hyperlipidemia 05/15/2016    Primary hypertension 05/15/2016    Age-related osteoporosis without current pathological fracture 05/15/2016    Health care maintenance 05/15/2016    History of left breast cancer 05/18/2016    Malignant neoplasm of overlapping  sites of left female breast 08/25/2016    Pain of right sacroiliac joint 11/16/2016    Impaired glucose tolerance 12/16/2016    Left bundle branch block 01/08/2018    Congestive heart failure due to valvular disease 01/24/2018    Memory loss 04/09/2018    Gout 12/03/2018    Foot pain 03/26/2019    Vertigo 03/26/2019    Chronic combined systolic and diastolic heart failure 05/31/2019    Nonrheumatic mitral valve regurgitation 05/31/2019    Nonrheumatic aortic valve insufficiency 01/17/2020    Aortic stenosis, moderate 02/10/2021    TSH elevation 09/22/2021    Breast cancer 10/20/2023    Short-term memory loss 02/05/2018    Confusion 10/20/2023    Decreased appetite 10/20/2023    Stage 3b chronic kidney disease 11/28/2023    Acute UTI (urinary tract infection) 11/28/2023    Hypercalcemia 11/28/2023    Volume depletion 11/29/2023    Dementia without behavioral disturbance 11/29/2023    Severe malnutrition 12/01/2023     Resolved Ambulatory Problems     Diagnosis Date Noted    Cough 12/20/2017    CHF exacerbation 01/30/2018    Acute congestive heart failure 01/30/2018    Nonrheumatic aortic valve stenosis 05/31/2019     Past Medical History:   Diagnosis Date    Acute systolic congestive heart failure     Allergic 1970?    Aortic stenosis     Arthritis     Cataract 2005?    CKD (chronic kidney disease), stage III     Colon polyp 1997?    Coronary artery disease 2018    Diverticulosis 2012    Dizziness     Drug therapy     Edema     GERD (gastroesophageal reflux disease)     History of breast cancer     Hyperlipidemia     Hypertension     Hypokalemia     LBBB (left bundle branch block)     Mild tricuspid regurgitation     Moderate mitral regurgitation     Pleural effusion     Pneumonia 2018         PAST SURGICAL HISTORY  Past Surgical History:   Procedure Laterality Date    APPENDECTOMY  1943    BREAST BIOPSY      BREAST LUMPECTOMY Left 1995    COLONOSCOPY N/A 5/12/2016    Procedure: COLONOSCOPY;  Surgeon: Israel VÁSQUEZ  MD Rajiv;  Location: [facility] ENDOSCOPY;  Service:     HYSTERECTOMY  1964    MASTECTOMY Left 2011    THORACENTESIS Bilateral          FAMILY HISTORY  Family History   Problem Relation Age of Onset    Hypertension Mother     Cancer Neg Hx          SOCIAL HISTORY  Social History     Socioeconomic History    Marital status:      Spouse name: Yves    Years of education: High School   Tobacco Use    Smoking status: Never    Smokeless tobacco: Never    Tobacco comments:     no caffeine   Vaping Use    Vaping status: Never Used   Substance and Sexual Activity    Alcohol use: Not Currently     Comment: occassional    Drug use: No    Sexual activity: Not Currently     Partners: Male         ALLERGIES  Levofloxacin and Penicillins      REVIEW OF SYSTEMS  Included in HPI  All systems reviewed and negative except for those discussed in HPI.      PHYSICAL EXAM    I have reviewed the triage vital signs and nursing notes.    ED Triage Vitals [04/06/24 0221]   Temp Heart Rate Resp BP SpO2   97.9 °F (36.6 °C) 58 16 145/54 99 %      Temp src Heart Rate Source Patient Position BP Location FiO2 (%)   -- -- -- -- --       Physical Exam  Constitutional:       General: She is not in acute distress.     Appearance: Normal appearance. She is obese.   HENT:      Head: Normocephalic.      Comments: 4 cm laceration to the right parietal scalp.  No active bleeding.     Nose: Nose normal.      Mouth/Throat:      Mouth: Mucous membranes are moist.   Eyes:      Extraocular Movements: Extraocular movements intact.      Pupils: Pupils are equal, round, and reactive to light.   Neck:      Comments: No midline C, T, or L-spine tenderness.  Spontaneously moving all extremities.  Sensorimotor grossly intact.  Cardiovascular:      Rate and Rhythm: Normal rate and regular rhythm.      Pulses: Normal pulses.      Heart sounds: Normal heart sounds.   Pulmonary:      Effort: Pulmonary effort is normal. No respiratory distress.      Breath sounds:  Normal breath sounds.   Abdominal:      General: Abdomen is flat. There is no distension.      Palpations: Abdomen is soft.      Tenderness: There is no abdominal tenderness.   Musculoskeletal:         General: Normal range of motion.      Cervical back: Normal range of motion and neck supple.   Skin:     General: Skin is warm and dry.      Capillary Refill: Capillary refill takes less than 2 seconds.   Neurological:      General: No focal deficit present.      Mental Status: She is alert and oriented to person, place, and time.   Psychiatric:         Mood and Affect: Mood normal.         Behavior: Behavior normal.         RADIOLOGY  CT Cervical Spine Without Contrast    Result Date: 4/6/2024  Patient: SHARPPERRYTY  Time Out: 04:22 Exam(s): CT C SPINE EXAM:   CT Cervical Spine Without Intravenous Contrast CLINICAL HISTORY:    Reason for exam: head injury. TECHNIQUE:   Axial computed tomography images of the cervical spine without intravenous contrast.  CTDI is 14.15 mGy and DLP is 262.5 mGy-cm.  This CT exam was performed according to the principle of ALARA (As Low As Reasonably Achievable) by using one or more of the following dose reduction techniques: automated exposure control, adjustment of the mA and or kV according to patient size, and or use of iterative reconstruction technique. COMPARISON:   No relevant prior studies available. FINDINGS:   Vertebrae:  No evidence of acutely displaced fracture or dislocation within the cervical spine.  Consider MRI if there is further concern.  Loss of cervical lordosis which can be seen with patient positioning or muscle spasm.  Degenerative changes in the cervical spine noted including loss of disc space height, osteophyte formation, uncovertebral hypertrophy, and facet arthropathy.   Soft tissues:  Unremarkable.   Lung apices:  Biapical scarring. IMPRESSION:     1.  No evidence of acutely displaced fracture or dislocation within the cervical spine.  Consider MRI if there  is further concern. 2.  Loss of cervical lordosis which can be seen with patient positioning or muscle spasm. 3.  Degenerative changes.     Electronically signed by Jorge L Garland MD on 04-06-24 at 0422    CT Head Without Contrast    Addendum Date: 4/6/2024    ADDENDUM: 04 06 24 04:30 Call Doctor Regarding Above results, called  Dr. Pfeiffer on 04 06 04:30 (-04:00)     Result Date: 4/6/2024  CR Patient: SHARP, ROSHNI  Time Out: 04:20 Exam(s): CT HEAD Without Contrast EXAM:   CT Head Without Intravenous Contrast CLINICAL HISTORY:    Reason for exam: head injury. TECHNIQUE:   Axial computed tomography images of the head brain without intravenous contrast.  CTDI is 55.7 mGy and DLP is 938.1 mGy-cm.  This CT exam was performed according to the principle of ALARA (As Low As Reasonably Achievable) by using one or more of the following dose reduction techniques: automated exposure control, adjustment of the mA and or kV according to patient size, and or use of iterative reconstruction technique. COMPARISON:   No relevant prior studies available. FINDINGS:   Brain:  Scattered Iyer grade 3 subarachnoid hemorrhage noted overlying the parietal lobes and frontal lobes. Findings are favored to be related to traumatic subarachnoid hemorrhage.  Left temporal intraparenchymal hematoma measuring up to 5 mm with adjacent vasogenic edema.  Areas of decreased attenuation in the deep cerebral white matter are consistent with small vessel ischemic degenerative changes.  The cerebral and cerebellar sulci are prominent consistent with brain atrophy.   Ventricles:  Unremarkable.  No ventriculomegaly.   Bones joints:  Unremarkable.  No acute fracture.   Soft tissues:  Scalp hematoma laceration changes noted overlying the right parietal bone.   Vasculature:  Atherosclerotic disease.   Sinuses:  Unremarkable as visualized.   Mastoid air cells:  Unremarkable as visualized.  No mastoid effusion.   Orbits:  Right lens replacement. IMPRESSION:      1.  Scattered Iyer grade 3 subarachnoid hemorrhage noted overlying the parietal lobes and frontal lobes. Findings are favored to be related to traumatic subarachnoid hemorrhage. 2.  Left temporal intraparenchymal hematoma measuring up to 5 mm with adjacent vasogenic edema. 3.  Recommend continued attention on follow-up imaging. 4.  Small vessel ischemic degenerative changes. 5.  Cerebral and cerebellar atrophy.     Communications:  Call Doctor Above results Electronically signed by Jorge L Garland MD on 04-06-24 at 0420       MEDICATIONS GIVEN IN ER  Medications   Tetanus-Diphth-Acell Pertussis (BOOSTRIX) injection 0.5 mL (0.5 mL Intramuscular Given 4/6/24 0430)           OUTPATIENT MEDICATION MANAGEMENT:  No current Epic-ordered facility-administered medications on file.     Current Outpatient Medications Ordered in Epic   Medication Sig Dispense Refill    acetaminophen (TYLENOL) 325 MG tablet Take 2 tablets by mouth Every 6 (Six) Hours As Needed for Mild Pain, Moderate Pain or Headache.      allopurinol (ZYLOPRIM) 100 MG tablet Take 1 tablet by mouth Daily. 90 tablet 3    anastrozole (ARIMIDEX) 1 MG tablet       carvedilol (COREG) 3.125 MG tablet Take 1 tablet by mouth 2 (Two) Times a Day. 180 tablet 3    furosemide (LASIX) 20 MG tablet Take 1 tablet by mouth Daily.      magnesium gluconate (MAGONATE) 500 MG tablet Take 500 mg by mouth Daily.      mirtazapine (REMERON) 7.5 MG tablet Take 1 tablet by mouth Every Night.      Multiple Vitamins-Minerals (CENTRUM SILVER 50+WOMEN PO) Take 1 tablet by mouth Daily.      polyethylene glycol (MiraLax) 17 g packet Take 17 g by mouth Daily.      potassium chloride (MICRO-K) 10 MEQ CR capsule Take 2 capsules by mouth Daily. 180 capsule 3    simvastatin (ZOCOR) 20 MG tablet Take 1 tablet by mouth Daily. 90 tablet 3       Laceration Repair    Date/Time: 4/6/2024 4:27 AM    Performed by: aRe Sierra PA-C  Authorized by: Yves Pfeiffer MD    Consent:     Consent  obtained:  Verbal    Consent given by:  Patient    Risks, benefits, and alternatives were discussed: yes      Risks discussed:  Infection, poor wound healing, poor cosmetic result, pain, retained foreign body, tendon damage, vascular damage, need for additional repair and nerve damage    Alternatives discussed:  No treatment  Anesthesia:     Anesthesia method:  None  Laceration details:     Location:  Scalp    Scalp location:  R parietal    Length (cm):  4  Pre-procedure details:     Preparation:  Patient was prepped and draped in usual sterile fashion and imaging obtained to evaluate for foreign bodies  Exploration:     Hemostasis achieved with:  Direct pressure    Wound extent: no areolar tissue violation noted, no fascia violation noted, no foreign bodies/material noted, no muscle damage noted, no nerve damage noted, no tendon damage noted, no underlying fracture noted and no vascular damage noted      Contaminated: no    Treatment:     Area cleansed with:  Saline    Amount of cleaning:  Standard    Irrigation solution:  Sterile saline    Irrigation method:  Syringe  Skin repair:     Repair method:  Staples    Number of staples:  4  Approximation:     Approximation:  Close  Repair type:     Repair type:  Simple  Post-procedure details:     Dressing:  Open (no dressing)    Procedure completion:  Tolerated        PROGRESS, DATA ANALYSIS, CONSULTS, AND MEDICAL DECISION MAKING  ORDERS PLACED DURING THIS VISIT:  Orders Placed This Encounter   Procedures    Laceration Repair    CT Head Without Contrast    CT Cervical Spine Without Contrast    Comprehensive Metabolic Panel    Protime-INR    aPTT    CBC Auto Differential    Inpatient Neurosurgery Consult    Inpatient Pulmonology Consult    Inpatient Admission    CBC & Differential       All labs have been independently interpreted by me.  All radiology studies have been reviewed by me. All EKG's have been independently viewed and interpreted by me.  Discussion below  represents my analysis of pertinent findings related to patient's condition, differential diagnosis, treatment plan and final disposition.    Differential diagnosis includes but is not limited to:   My differential diagnosis includes but is not limited to cerebral contusion, cervical strain, concussion with LOC, concussion without LOC, contusion, fracture of the skull, orbits or mandible, hematoma, intracranial hemorrhage including subdural, epidural, subarachnoid and intracerebral, laceration and postconcussion syndrome       ED Course:  ED Course as of 04/06/24 0501   Sat Apr 06, 2024   0229 I discussed the case with Dr. Pfeiffer and they agree to evaluate the patient at the bedside.    [CC]   0428 Patient has multiple areas of ICH.  Neurosurgery and ICU paged.  Patient is awake, and alert.  She is answering questions appropriately.  She has baseline dementia and is not oriented at baseline.  She has an otherwise nonfocal neurologic exam. [CC]   0429 I spoke with Dr. Lima with neurosurgery who agrees with plan for admission to the ICU and they will consult in the morning. [CC]   0430 I rechecked the patient.  I discussed the patient's labs, radiology findings (including all incidental findings), diagnosis, and plan for admission. The patient understands and agrees with the plan.   [CC]   0438 Spoke with Dr. Jean Baptiste with ICU.  Reviewed history, exam, results, treatments.  He agrees admit the patient.      [CC]      ED Course User Index  [CC] Rae Sierra PA-C           AS OF 05:01 EDT VITALS:    BP - 117/45  HR - 78  TEMP - 97.9 °F (36.6 °C)  O2 SATS - 99%      MDM:  Patient is a 89-year-old female presents emergency department today from a nursing facility after a witnessed fall.  On arrival here in the emergency department patient is awake and alert but is not oriented.  This is her neurologic baseline according to the facility.  Patient has a nonfocal neurologic exam.  Patient has a laceration to the right  parietal scalp on my exam.  She was evaluated with a CT of the head which revealed multiple subarachnoid hemorrhages and an intraparenchymal hemorrhage.  Patient is not anticoagulated.  I spoke with neurosurgery who recommends admission to the ICU for repeat CT in the morning.  Laceration was repaired by primary intention.  Tetanus was updated.  Labs were obtained prior to admission.  Patient is stable at the time of admission.      COMPLEXITY OF CARE  The patient requires admission.    Critical care provider statement:    Critical care time (minutes): 31.   Critical care time was exclusive of:  Separately billable procedures and treating other patients   Critical care was necessary to treat or prevent imminent or life-threatening deterioration of the following conditions:  CNS Failure   Critical care was time spent personally by me on the following activities:  Development of treatment plan with patient or surrogate, discussions with consultants, evaluation of patient's response to treatment, examination of patient, obtaining history from patient or surrogate, ordering and performing treatments and interventions, ordering and review of laboratory studies, ordering and review of radiographic studies, pulse oximetry, re-evaluation of patient's condition and review of old charts. Critical Care indicators: Head Injury, severe, unresponsive     DIAGNOSIS  Final diagnoses:   Fall, initial encounter   Subarachnoid hemorrhage   Intraparenchymal hematoma of left side of brain due to trauma, without loss of consciousness, initial encounter   Laceration of scalp, initial encounter         DISPOSITION  ED Disposition       ED Disposition   Decision to Admit    Condition   --    Comment   Level of Care: Critical Care [6]   Diagnosis: Subarachnoid hematoma [884385]   Admitting Physician: BETH HARRIS [[id number]]   Attending Physician: BETH HARRIS [[id number]]   Certification: I Certify That Inpatient Hospital Services Are Medically  Necessary For Greater Than 2 Midnights                      Please note that portions of this document were completed with a voice recognition program.    Note Disclaimer: At [facility], we believe that sharing information builds trust and better relationships. You are receiving this note because you recently visited [facility]. It is possible you will see health information before a provider has talked with you about it. This kind of information can be easy to misunderstand. To help you fully understand what it means for your health, we urge you to discuss this note with your provider.     Rae Sierra PAJohnsonC  04/06/24 0500

## 2024-04-06 NOTE — PLAN OF CARE
Goal Outcome Evaluation:  Plan of Care Reviewed With: patient, son        Progress: no change     Pt arrived to ICU from the ED, after falling at nursing home and developing subarachnoid hematoma. Pt was alert and oriented to self, but disoriented to time, place, and situation, reorientation provided. NIH was 1 due to baseline dementia. A little hypertensive started Cardene drip to maintain SBP below 140. Son updated over the phone.

## 2024-04-06 NOTE — ED TRIAGE NOTES
To ER via EMS from [facility] [place].  Pt was walking when she fell backwards striking head on floor.  Lac to back of head.  No LOC.  No blood thinners.

## 2024-04-06 NOTE — PLAN OF CARE
Problem: Adult Inpatient Plan of Care  Goal: Plan of Care Review  Outcome: Ongoing, Not Progressing  Flowsheets (Taken 4/6/2024 1151)  Progress: no change  Plan of Care Reviewed With: other (see comments)  Outcome Evaluation: Per RN, pt intubated/not appropriate for speech eval at this time. Please reconsult speech when appropriate.   Goal Outcome Evaluation:  Plan of Care Reviewed With: other (see comments)        Progress: no change  Outcome Evaluation: Per RN, pt intubated/not appropriate for speech eval at this time. Please reconsult speech when appropriate.

## 2024-04-06 NOTE — PLAN OF CARE
Problem: Adult Inpatient Plan of Care  Goal: Plan of Care Review  4/6/2024 0941 by Vicenta Alamo, SLP  Outcome: Ongoing, Progressing  Flowsheets (Taken 4/6/2024 0941)  Progress: improving  Plan of Care Reviewed With:   patient   family  Outcome Evaluation: Pt seen for clinical swallow eval with generalized oral motor weakness and mild oral dysphagia impacting mastication. Pt tolerated thin, puree and mixed consistencies with no overt s/s aspiration. Recommend: Mechanical soft diet chopped w/ground meat, thin liquids, meds whole/crushed with thin or puree as tolerated, Upright 30 minutes after PO intake. Frequent oral care, supervision with PO intake. Speech to follow for diet tolerance.  4/6/2024 0849 by Vicenta Alamo, SLP  Outcome: Ongoing, Not Progressing  Flowsheets (Taken 4/6/2024 0849)  Progress: no change  Plan of Care Reviewed With: other (see comments)  Outcome Evaluation: Per RN, pt intubated/not appropriate for speech eval at this time. Please reconsult speech when appropriate.   Goal Outcome Evaluation:  Plan of Care Reviewed With: patient, family        Progress: improving  Outcome Evaluation: Pt seen for clinical swallow eval with generalized oral motor weakness and mild oral dysphagia impacting mastication. Pt tolerated thin, puree and mixed consistencies with no overt s/s aspiration. Recommend: Mechanical soft diet chopped w/ground meat, thin liquids, meds whole/crushed with thin or puree as tolerated, Frequent oral care, supervision with PO intake. Speech to follow for diet tolerance.

## 2024-04-06 NOTE — CONSULTS
NEUROSURGERY CONSULT      [facility]  3/14/1933  [id number]    Referring Provider: Yves Pfeiffer MD  [address] [place] [address]  Reason for Consultation/Chief Complaint: Head trauma    Patient Care Team:  Paxton Nuno MD as PCP - General (Family Medicine)  Code, Yves REY II, MD as Consulting Physician (Hematology and Oncology)    Subjective .     History of Present Illness:    Duration: 1 day  Severity: Moderate to severe  Timing: Acute  Associated Symptoms: None  Narrative: 89-year-old female who lives at a facility has baseline dementia.  She is mildly confused but follows commands briskly and is awake and alert.  She is able to answer 2 out of 3 orientation questions.  She is not on any blood thinners.    While in the room and during my examination of the patient I wore a mask and eye protection.  I washed my hands before and after this patient encounter.  The patient was also wearing a mask.    Review of Systems: All 14 systems are reviewed and are negative except for what is documented above in the HPI  Review of Systems    History: I have reviewed and agree with the following documented PMH, PSH, FH and there no additions or changes.  Past Medical History:   Diagnosis Date    Acute systolic congestive heart failure     Allergic 1970?    Aortic stenosis     Arthritis     Breast cancer     Locally recurrent left breast    Cataract 2005?    CKD (chronic kidney disease), stage III     Colon polyp 1997?    Coronary artery disease 2018    Cough     Diverticulosis 2012    Dizziness     Drug therapy     Edema     GERD (gastroesophageal reflux disease)     History of breast cancer     LEFT    Hyperlipidemia     Hypertension     Hypokalemia     LBBB (left bundle branch block)     Mild tricuspid regurgitation     with a right ventricular systolic pressure of 62    Moderate mitral regurgitation     Pleural effusion     Pneumonia 2018    and   Past Surgical History:   Procedure Laterality Date     APPENDECTOMY  1943    BREAST BIOPSY      BREAST LUMPECTOMY Left 1995    COLONOSCOPY N/A 5/12/2016    Procedure: COLONOSCOPY;  Surgeon: Israel Vance MD;  Location: [facility];  Service:     HYSTERECTOMY  1964    MASTECTOMY Left 2011    THORACENTESIS Bilateral     and The patient has a family history of    Objective     Physical Exam:  CON:  Appears stated age. No acute distress.  HEENT: Scalp laceration  PULM:  Breathing nonlabored on room air  CARDIO:  RRR, Pulses 2+  MSK:  Limbs intact. Musculoskeletal pain is absent  PSYCH:  Appears within normal limits.  SKIN:  No noticeable skin lesions or abrasions  VITALS:  Temp:  [97.9 °F (36.6 °C)-98.1 °F (36.7 °C)] 97.9 °F (36.6 °C)  Heart Rate:  [55-80] 70  Resp:  [16-17] 17  BP: ()/(45-72) 122/49, Body mass index is 19.72 kg/m².  NEURO: AO x 2.  Pupils equal reactive to light.  Cranial nerves intact strength and sensation grossly intact      Results Review: I reviewed the patient's new clinical results.    CT: CT of the head was reviewed and shows scattered subarachnoid hemorrhage with some calcification along the falx.  No evidence of significant mass effect or shift    I personally reviewed the images from the following radiographic studies.    Lab Results (last 24 hours)       Procedure Component Value Units Date/Time    CBC & Differential [[id number]]  (Abnormal) Collected: 04/06/24 0403    Specimen: Blood from Arm, Right Updated: 04/06/24 0414    Narrative:      The following orders were created for panel order CBC & Differential.  Procedure                               Abnormality         Status                     ---------                               -----------         ------                     CBC Auto Differential[[id number]]        Abnormal            Final result                 Please view results for these tests on the individual orders.    Comprehensive Metabolic Panel [[id number]]  (Abnormal) Collected: 04/06/24 0403    Specimen: Blood  from Arm, Right Updated: 04/06/24 0433     Glucose 114 mg/dL      BUN 30 mg/dL      Creatinine 1.26 mg/dL      Sodium 139 mmol/L      Potassium 4.4 mmol/L      Chloride 110 mmol/L      CO2 19.0 mmol/L      Calcium 8.2 mg/dL      Total Protein 6.1 g/dL      Albumin 3.6 g/dL      ALT (SGPT) 13 U/L      AST (SGOT) 23 U/L      Alkaline Phosphatase 51 U/L      Total Bilirubin 0.4 mg/dL      Globulin 2.5 gm/dL      A/G Ratio 1.4 g/dL      BUN/Creatinine Ratio 23.8     Anion Gap 10.0 mmol/L      eGFR 40.4 mL/min/1.73     Narrative:      GFR Normal >60  Chronic Kidney Disease <60  Kidney Failure <15    The GFR formula is only valid for adults with stable renal function between ages 18 and 70.    Protime-INR [[id number]]  (Abnormal) Collected: 04/06/24 0403    Specimen: Blood from Arm, Right Updated: 04/06/24 0422     Protime 16.0 Seconds      INR 1.25    aPTT [[id number]]  (Normal) Collected: 04/06/24 0403    Specimen: Blood from Arm, Right Updated: 04/06/24 0422     PTT 25.0 seconds     CBC Auto Differential [[id number]]  (Abnormal) Collected: 04/06/24 0403    Specimen: Blood from Arm, Right Updated: 04/06/24 0414     WBC 5.80 10*3/mm3      RBC 3.43 10*6/mm3      Hemoglobin 10.8 g/dL      Hematocrit 33.0 %      MCV 96.2 fL      MCH 31.5 pg      MCHC 32.7 g/dL      RDW 13.4 %      RDW-SD 48.1 fl      MPV 10.4 fL      Platelets 124 10*3/mm3      Neutrophil % 53.9 %      Lymphocyte % 35.2 %      Monocyte % 8.4 %      Eosinophil % 2.1 %      Basophil % 0.2 %      Immature Grans % 0.2 %      Neutrophils, Absolute 3.13 10*3/mm3      Lymphocytes, Absolute 2.04 10*3/mm3      Monocytes, Absolute 0.49 10*3/mm3      Eosinophils, Absolute 0.12 10*3/mm3      Basophils, Absolute 0.01 10*3/mm3      Immature Grans, Absolute 0.01 10*3/mm3      nRBC 0.0 /100 WBC     POC Glucose Once [[id number]]  (Normal) Collected: 04/06/24 0525    Specimen: Blood Updated: 04/06/24 0526     Glucose 108 mg/dL             Assessment & Plan :     Assessment:  Radha Luke is a 89 y.o. female who presents with traumatic subarachnoid hemorrhage  Additional workup: None  Surgery planned: None  Additional plan: CT scan shows stable traumatic subarachnoid hemorrhage.  She is not on blood thinners.  She is cleared to discharge from the hospital with no restrictions from neurosurgery.  Follow-up as needed      Subarachnoid hematoma      I discussed the patient's findings and my recommendations with patient and nursing staff    Ronak Lima MD  04/06/24  10:42 EDT

## 2024-04-07 LAB
ALBUMIN SERPL-MCNC: 3.2 G/DL (ref 3.5–5.2)
ANION GAP SERPL CALCULATED.3IONS-SCNC: 10 MMOL/L (ref 5–15)
BUN SERPL-MCNC: 18 MG/DL (ref 8–23)
BUN/CREAT SERPL: 16.8 (ref 7–25)
CALCIUM SPEC-SCNC: 7.7 MG/DL (ref 8.2–9.6)
CHLORIDE SERPL-SCNC: 114 MMOL/L (ref 98–107)
CHOLEST SERPL-MCNC: 84 MG/DL (ref 0–200)
CO2 SERPL-SCNC: 18 MMOL/L (ref 22–29)
CREAT SERPL-MCNC: 1.07 MG/DL (ref 0.57–1)
DEPRECATED RDW RBC AUTO: 46.2 FL (ref 37–54)
EGFRCR SERPLBLD CKD-EPI 2021: 49.1 ML/MIN/1.73
ERYTHROCYTE [DISTWIDTH] IN BLOOD BY AUTOMATED COUNT: 13 % (ref 12.3–15.4)
GLUCOSE BLDC GLUCOMTR-MCNC: 100 MG/DL (ref 70–130)
GLUCOSE BLDC GLUCOMTR-MCNC: 100 MG/DL (ref 70–130)
GLUCOSE BLDC GLUCOMTR-MCNC: 105 MG/DL (ref 70–130)
GLUCOSE BLDC GLUCOMTR-MCNC: 106 MG/DL (ref 70–130)
GLUCOSE BLDC GLUCOMTR-MCNC: 97 MG/DL (ref 70–130)
GLUCOSE SERPL-MCNC: 92 MG/DL (ref 65–99)
HBA1C MFR BLD: 5.5 % (ref 4.8–5.6)
HCT VFR BLD AUTO: 31.3 % (ref 34–46.6)
HDLC SERPL-MCNC: 38 MG/DL (ref 40–60)
HGB BLD-MCNC: 10 G/DL (ref 12–15.9)
LDLC SERPL CALC-MCNC: 28 MG/DL (ref 0–100)
LDLC/HDLC SERPL: 0.73 {RATIO}
MCH RBC QN AUTO: 31.3 PG (ref 26.6–33)
MCHC RBC AUTO-ENTMCNC: 31.9 G/DL (ref 31.5–35.7)
MCV RBC AUTO: 98.1 FL (ref 79–97)
PHOSPHATE SERPL-MCNC: 2.4 MG/DL (ref 2.5–4.5)
PLATELET # BLD AUTO: 113 10*3/MM3 (ref 140–450)
PMV BLD AUTO: 10.4 FL (ref 6–12)
POTASSIUM SERPL-SCNC: 4.3 MMOL/L (ref 3.5–5.2)
RBC # BLD AUTO: 3.19 10*6/MM3 (ref 3.77–5.28)
SODIUM SERPL-SCNC: 142 MMOL/L (ref 136–145)
TRIGL SERPL-MCNC: 91 MG/DL (ref 0–150)
VLDLC SERPL-MCNC: 18 MG/DL (ref 5–40)
WBC NRBC COR # BLD AUTO: 5.29 10*3/MM3 (ref 3.4–10.8)

## 2024-04-07 PROCEDURE — 97162 PT EVAL MOD COMPLEX 30 MIN: CPT

## 2024-04-07 PROCEDURE — 80069 RENAL FUNCTION PANEL: CPT | Performed by: INTERNAL MEDICINE

## 2024-04-07 PROCEDURE — 85027 COMPLETE CBC AUTOMATED: CPT | Performed by: INTERNAL MEDICINE

## 2024-04-07 PROCEDURE — 97110 THERAPEUTIC EXERCISES: CPT

## 2024-04-07 PROCEDURE — 97166 OT EVAL MOD COMPLEX 45 MIN: CPT

## 2024-04-07 PROCEDURE — 83036 HEMOGLOBIN GLYCOSYLATED A1C: CPT | Performed by: INTERNAL MEDICINE

## 2024-04-07 PROCEDURE — 25010000002 HALOPERIDOL LACTATE PER 5 MG: Performed by: INTERNAL MEDICINE

## 2024-04-07 PROCEDURE — 82948 REAGENT STRIP/BLOOD GLUCOSE: CPT

## 2024-04-07 PROCEDURE — 80061 LIPID PANEL: CPT | Performed by: INTERNAL MEDICINE

## 2024-04-07 RX ORDER — HALOPERIDOL 5 MG/ML
0.5 INJECTION INTRAMUSCULAR ONCE
Status: COMPLETED | OUTPATIENT
Start: 2024-04-07 | End: 2024-04-07

## 2024-04-07 RX ADMIN — POTASSIUM CHLORIDE 20 MEQ: 750 TABLET, EXTENDED RELEASE ORAL at 08:45

## 2024-04-07 RX ADMIN — Medication 10 ML: at 09:55

## 2024-04-07 RX ADMIN — ALLOPURINOL 100 MG: 100 TABLET ORAL at 09:55

## 2024-04-07 RX ADMIN — HALOPERIDOL LACTATE 0.5 MG: 5 INJECTION, SOLUTION INTRAMUSCULAR at 18:12

## 2024-04-07 RX ADMIN — CARVEDILOL 3.12 MG: 3.12 TABLET, FILM COATED ORAL at 08:45

## 2024-04-07 RX ADMIN — Medication 27 MG: at 08:45

## 2024-04-07 RX ADMIN — ATORVASTATIN CALCIUM 10 MG: 20 TABLET, FILM COATED ORAL at 08:45

## 2024-04-07 NOTE — PLAN OF CARE
Goal Outcome Evaluation:  Plan of Care Reviewed With: patient         Pt admitted after a fall and neurosurgery has seen for stable traumatic subarachnoid hemorrhage . Presents with weakness. This visit patient needed maxA with bed mobility and  modA sit<>stand and was able to take some sidesteps along the head of the bed. Dizziness limiting mobility. Anticipates d/c to skilled nursing facility.          Anticipated Discharge Disposition (PT): skilled nursing facility

## 2024-04-07 NOTE — PLAN OF CARE
Goal Outcome Evaluation:   Remain in ICU on room air patient is confused at times baseline. Wants to get up from beds multiples times reorientation provided. Patient has bruise in her right hip and staples in her head. Voids with no issue. Vitals stable will continued to monitor.

## 2024-04-07 NOTE — THERAPY EVALUATION
Patient Name: Radha Luke  : 3/14/1933    MRN: [id number]                              Today's Date: 2024       Admit Date: 2024    Visit Dx:     ICD-10-CM ICD-9-CM   1. Subarachnoid hemorrhage  I60.9 430   2. Fall, initial encounter  W19.XXXA E888.9   3. Intraparenchymal hematoma of left side of brain due to trauma, without loss of consciousness, initial encounter  S06.320A 853.01   4. Laceration of scalp, initial encounter  S01.01XA 873.0     Patient Active Problem List   Diagnosis    Disorder of aorta    Diverticulosis of intestine    Gastroesophageal reflux disease with esophagitis    Mixed hyperlipidemia    Primary hypertension    Age-related osteoporosis without current pathological fracture    Health care maintenance    History of left breast cancer    Malignant neoplasm of overlapping sites of left female breast    Pain of right sacroiliac joint    Impaired glucose tolerance    Left bundle branch block    Congestive heart failure due to valvular disease    Memory loss    Gout    Foot pain    Vertigo    Chronic combined systolic and diastolic heart failure    Nonrheumatic mitral valve regurgitation    Nonrheumatic aortic valve insufficiency    Aortic stenosis, moderate    TSH elevation    Breast cancer    Short-term memory loss    Confusion    Decreased appetite    Stage 3b chronic kidney disease    Acute UTI (urinary tract infection)    Hypercalcemia    Volume depletion    Dementia without behavioral disturbance    Severe malnutrition    Subarachnoid hematoma     Past Medical History:   Diagnosis Date    Acute systolic congestive heart failure     Allergic ?    Aortic stenosis     Arthritis     Breast cancer     Locally recurrent left breast    Cataract ?    CKD (chronic kidney disease), stage III     Colon polyp ?    Coronary artery disease 2018    Cough     Diverticulosis 2012    Dizziness     Drug therapy     Edema     GERD (gastroesophageal reflux disease)     History of breast  cancer     LEFT    Hyperlipidemia     Hypertension     Hypokalemia     LBBB (left bundle branch block)     Mild tricuspid regurgitation     with a right ventricular systolic pressure of 62    Moderate mitral regurgitation     Pleural effusion     Pneumonia 2018     Past Surgical History:   Procedure Laterality Date    APPENDECTOMY  1943    BREAST BIOPSY      BREAST LUMPECTOMY Left 1995    COLONOSCOPY N/A 5/12/2016    Procedure: COLONOSCOPY;  Surgeon: Israel Vance MD;  Location: [facility] ENDOSCOPY;  Service:     HYSTERECTOMY  1964    MASTECTOMY Left 2011    THORACENTESIS Bilateral       General Information       Row Name 04/07/24 0817          Physical Therapy Time and Intention    Document Type evaluation  -CS     Mode of Treatment physical therapy  -CS       Row Name 04/07/24 0817          General Information    Patient Profile Reviewed yes  -CS     Prior Level of Function min assist:  -CS     Existing Precautions/Restrictions fall  -CS       Row Name 04/07/24 0817          Living Environment    People in Home facility resident  -CS       Row Name 04/07/24 0817          Cognition    Orientation Status (Cognition) oriented to;person  -CS       Row Name 04/07/24 0817          Safety Issues, Functional Mobility    Impairments Affecting Function (Mobility) balance;endurance/activity tolerance;strength  -CS               User Key  (r) = Recorded By, (t) = Taken By, (c) = Cosigned By      Initials Name Provider Type    CS Pop Lundberg, PT Physical Therapist                   Mobility       Row Name 04/07/24 0817          Bed Mobility    Bed Mobility supine-sit;sit-supine  -CS     Supine-Sit [place] (Bed Mobility) maximum assist (25% patient effort);verbal cues;nonverbal cues (demo/gesture)  -CS     Sit-Supine [place] (Bed Mobility) verbal cues;nonverbal cues (demo/gesture);maximum assist (25% patient effort)  -CS     Assistive Device (Bed Mobility) bed rails;head of bed elevated  -CS       Row Name  04/07/24 0817          Sit-Stand Transfer    Sit-Stand [place] (Transfers) moderate assist (50% patient effort);verbal cues;nonverbal cues (demo/gesture)  -CS       Row Name 04/07/24 0817          Gait/Stairs (Locomotion)    [place] Level (Gait) moderate assist (50% patient effort);verbal cues;nonverbal cues (demo/gesture)  -CS     Distance in Feet (Gait) 4  sidesteps along the head of the bed  -CS     Deviations/Abnormal Patterns (Gait) festinating/shuffling  -CS               User Key  (r) = Recorded By, (t) = Taken By, (c) = Cosigned By      Initials Name Provider Type    Pop Russo, PT Physical Therapist                   Obj/Interventions       Row Name 04/07/24 0818          Range of Motion Comprehensive    General Range of Motion no range of motion deficits identified  -       Row Name 04/07/24 0818          Strength Comprehensive (MMT)    General Manual Muscle Testing (MMT) Assessment --  >3/5  -CS               User Key  (r) = Recorded By, (t) = Taken By, (c) = Cosigned By      Initials Name Provider Type    Pop Russo, PT Physical Therapist                   Goals/Plan       Row Name 04/07/24 0818          Bed Mobility Goal 1 (PT)    Activity/Assistive Device (Bed Mobility Goal 1, PT) bed mobility activities, all  -CS     [place] Level/Cues Needed (Bed Mobility Goal 1, PT) contact guard required  -CS     Time Frame (Bed Mobility Goal 1, PT) 1 week  -CS       Row Name 04/07/24 0818          Transfer Goal 1 (PT)    Activity/Assistive Device (Transfer Goal 1, PT) sit-to-stand/stand-to-sit;bed-to-chair/chair-to-bed  -CS     [place] Level/Cues Needed (Transfer Goal 1, PT) contact guard required  -CS     Time Frame (Transfer Goal 1, PT) 1 week  -CS       Row Name 04/07/24 0818          Gait Training Goal 1 (PT)    [place] Level (Gait Training Goal 1, PT) minimum assist (75% or more patient effort)  -CS     Distance (Gait Training Goal 1, PT) 50  -CS     Time Frame  (Gait Training Goal 1, PT) 1 week  -CS       Row Name 04/07/24 0818          Therapy Assessment/Plan (PT)    Planned Therapy Interventions (PT) balance training;bed mobility training;gait training;home exercise program;manual therapy techniques;strengthening;stretching;ROM (range of motion);patient/family education;neuromuscular re-education;transfer training  -CS               User Key  (r) = Recorded By, (t) = Taken By, (c) = Cosigned By      Initials Name Provider Type    Pop Russo, PT Physical Therapist                   Clinical Impression       Row Name 04/07/24 0818          Pain    Pain Location generalized  -CS     Pain Location - hip  -CS     Pain Intervention(s) Ambulation/increased activity;Repositioned  -CS     Additional Documentation Pain Scale: FACES Pre/Post-Treatment (Group)  -CS       Row Name 04/07/24 0818          Pain Scale: FACES Pre/Post-Treatment    Pain: FACES Scale, Pretreatment 6-->hurts even more  -CS     Posttreatment Pain Rating 6-->hurts even more  -CS       Row Name 04/07/24 0818          Plan of Care Review    Plan of Care Reviewed With patient  -CS       Row Name 04/07/24 0818          Therapy Assessment/Plan (PT)    Patient/Family Therapy Goals Statement (PT) home  -CS     Rehab Potential (PT) good, to achieve stated therapy goals  -CS     Criteria for Skilled Interventions Met (PT) yes  -CS     Therapy Frequency (PT) 5 times/wk  -CS       Row Name 04/07/24 0818          Vital Signs    O2 Delivery Pre Treatment room air  -CS     O2 Delivery Intra Treatment room air  -CS     O2 Delivery Post Treatment room air  -CS       Row Name 04/07/24 0818          Positioning and Restraints    Pre-Treatment Position in bed  -CS     Post Treatment Position bed  -CS     In Bed supine;call light within reach;encouraged to call for assist;exit alarm on;notified nsg  -CS               User Key  (r) = Recorded By, (t) = Taken By, (c) = Cosigned By      Initials Name Provider Type    CS  Pop Lundberg, PT Physical Therapist                   Outcome Measures       Row Name 04/07/24 0819          How much help from another person do you currently need...    Turning from your back to your side while in flat bed without using bedrails? 2  -CS     Moving from lying on back to sitting on the side of a flat bed without bedrails? 2  -CS     Moving to and from a bed to a chair (including a wheelchair)? 2  -CS     Standing up from a chair using your arms (e.g., wheelchair, bedside chair)? 2  -CS     Climbing 3-5 steps with a railing? 2  -CS     To walk in hospital room? 2  -CS     AM-PAC 6 Clicks Score (PT) 12  -CS     Highest Level of Mobility Goal 4 --> Transfer to chair/commode  -CS       Row Name 04/07/24 0819          Functional Assessment    Outcome Measure Options AM-PAC 6 Clicks Basic Mobility (PT)  -CS               User Key  (r) = Recorded By, (t) = Taken By, (c) = Cosigned By      Initials Name Provider Type     Pop Lundberg, PT Physical Therapist                                 Physical Therapy Education       Title: PT OT SLP Therapies (Done)       Topic: Physical Therapy (Done)       Point: Mobility training (Done)       Learning Progress Summary             Patient Acceptance, E,TB, VU,NR by  at 4/7/2024 0819                         Point: Home exercise program (Done)       Learning Progress Summary             Patient Acceptance, E,TB, VU,NR by  at 4/7/2024 0819                         Point: Body mechanics (Done)       Learning Progress Summary             Patient Acceptance, E,TB, VU,NR by  at 4/7/2024 0819                         Point: Precautions (Done)       Learning Progress Summary             Patient Acceptance, E,TB, VU,NR by  at 4/7/2024 0819                                         User Key       Initials Effective Dates Name Provider Type Discipline     07/11/23 -  Pop Lundberg, PT Physical Therapist PT                  PT Recommendation and Plan  Planned  Therapy Interventions (PT): balance training, bed mobility training, gait training, home exercise program, manual therapy techniques, strengthening, stretching, ROM (range of motion), patient/family education, neuromuscular re-education, transfer training  Plan of Care Reviewed With: patient     Time Calculation:         PT Charges       Row Name 04/07/24 0821             Time Calculation    Start Time 0800  -CS      Stop Time 0814  -CS      Time Calculation (min) 14 min  -CS      PT Received On 04/07/24  -      PT - Next Appointment 04/08/24  -      PT Goal Re-Cert Due Date 04/14/24  -                User Key  (r) = Recorded By, (t) = Taken By, (c) = Cosigned By      Initials Name Provider Type    CS Pop Lundberg, PT Physical Therapist                  Therapy Charges for Today       Code Description Service Date Service Provider Modifiers Qty    46027690413 HC PT EVAL MOD COMPLEXITY 2 4/7/2024 Pop Lundberg, PT GP 1    07639091316 HC PT THER PROC EA 15 MIN 4/7/2024 Pop Lundberg, PT GP 1            PT G-Codes  Outcome Measure Options: AM-PAC 6 Clicks Basic Mobility (PT)  AM-PAC 6 Clicks Score (PT): 12  PT Discharge Summary  Anticipated Discharge Disposition (PT): skilled nursing facility    Pop Lundberg PT  4/7/2024

## 2024-04-07 NOTE — PLAN OF CARE
Goal Outcome Evaluation:  Plan of Care Reviewed With: patient           Outcome Evaluation: Pt is a 89 y/o F nursing home residents admitted to [facility] s/p fall at NH without LOC and laceration on head present on arrival to ED and work up revealed subarachnoid hemorrhage. Pt seen in ICU this AM for OT evaluation. PMHx significant for chronic HFpEF, Dementia, HTN, CKD 3b, breast cancer, CAD, Diverticulosis, GERD, HLD, mild tricuspid regurgitation, mod mitral regurgitation, pleural effusion. Pt fowlers in bed on OT arrival this AM and oriented to self only at this time and is poor historian for PLOF. Pt MAX A for bed mobility to EOB and MIN A to wash face with cues for initiation and seq and OT assist with bringing cloth up to face. Pt presents to OT with R hip abrasion, pain, deficits in BUE strength and AROM, balance, cognition, and sequencing skills, and decreased endurance, act shea and safety awareness. Skilled OT services are medically indicated in order to address aforementioned skills and deficits, improve QOL, and attain PLOF and maximize safety and IND with ADLs and fxl mobility. OT recommending SNF at [place].      Anticipated Discharge Disposition (OT): skilled nursing facility

## 2024-04-07 NOTE — PLAN OF CARE
Goal Outcome Evaluation:         Patient rested in bed for most of shift. She requires frequent reorientation and reminding to stay in bed. VSS. Aox self. Bed alarm continued and frequent rounding necessary. She can be bribed to behave with sweet treats, which helps. She would greatly benefit from having a sitter until she is stable and stops removing IV, removing monitoring equipment.

## 2024-04-07 NOTE — PROGRESS NOTES
[facility] INPATIENT PROGRESS NOTE         [facility] INTENSIVE CARE    2024      PATIENT IDENTIFICATION:  Name: Radha DELATORRE Luke ADMIT: 2024   : 3/14/1933  PCP: Paxton Nuno MD    MRN: [id number] LOS: 1 days   AGE/SEX: 89 y.o. female  ROOM: [facility]                     LOS 1    Reason for visit: Subarachnoid hemorrhage      SUBJECTIVE:      Resting comfortably.  No new complaints overnight.  Denies headache, nausea or vomiting.  No shortness of breath. I am seeing the patient for the first time today.  All patient problems are new to me.      Objective   OBJECTIVE:    Vital Sign Min/Max for last 24 hours  Temp  Min: 97.7 °F (36.5 °C)  Max: 99.4 °F (37.4 °C)   BP  Min: 92/67  Max: 131/54   Pulse  Min: 57  Max: 96   Resp  Min: 15  Max: 15   SpO2  Min: 93 %  Max: 100 %   No data recorded   No data recorded    Vitals:    24 0500 24 0501 24 0600 24 0700   BP: (!) 103/39  98/41 102/41   Pulse: 66  73 57   Resp:       Temp:  98.1 °F (36.7 °C)     TempSrc:  Oral     SpO2: 100%  96% 96%   Weight:       Height:                24  0221 24  0522   Weight: 45.8 kg (101 lb) 45.8 kg (100 lb 15.5 oz)       Body mass index is 19.72 kg/m².                          Body mass index is 19.72 kg/m².    Intake/Output Summary (Last 24 hours) at 2024 0911  Last data filed at 2024 0500  Gross per 24 hour   Intake 60 ml   Output 200 ml   Net -140 ml         Exam:  GEN:  No distress, appears stated age  EYES:   PERRL, anicteric sclerae  ENT:    External ears/nose normal, OP clear  NECK:  No adenopathy, midline trachea  LUNGS: Normal chest on inspection, palpation and auscultation  CV:  Normal S1S2, without murmur  ABD:  Nontender, nondistended, no hepatosplenomegaly, +BS  EXT:  No edema.  No cyanosis or clubbing.  No mottling and normal cap refill.    Assessment     Scheduled meds:  allopurinol, 100 mg, Oral, Daily  atorvastatin, 10 mg, Oral, Daily  carvedilol, 3.125 mg, Oral,  BID  magnesium (as) gluconate, 27 mg, Oral, Daily  mirtazapine, 7.5 mg, Oral, Nightly  potassium chloride, 20 mEq, Oral, Daily  sodium chloride, 10 mL, Intravenous, Q12H      IV meds:                      niCARdipine, 5-15 mg/hr, Last Rate: Stopped (04/06/24 0832)      Data Review:  Results from last 7 days   Lab Units 04/07/24  0446 04/06/24  0403   SODIUM mmol/L 142 139   POTASSIUM mmol/L 4.3 4.4   CHLORIDE mmol/L 114* 110*   CO2 mmol/L 18.0* 19.0*   BUN mg/dL 18 30*   CREATININE mg/dL 1.07* 1.26*   GLUCOSE mg/dL 92 114*   CALCIUM mg/dL 7.7* 8.2         Estimated Creatinine Clearance: 24.8 mL/min (A) (by C-G formula based on SCr of 1.07 mg/dL (H)).  Results from last 7 days   Lab Units 04/07/24  0446 04/06/24  0403   WBC 10*3/mm3 5.29 5.80   HEMOGLOBIN g/dL 10.0* 10.8*   PLATELETS 10*3/mm3 113* 124*     Results from last 7 days   Lab Units 04/06/24  0403   INR  1.25*     Results from last 7 days   Lab Units 04/06/24  0403   ALT (SGPT) U/L 13   AST (SGOT) U/L 23                 Hemoglobin A1C   Date/Time Value Ref Range Status   04/07/2024 0446 5.50 4.80 - 5.60 % Final     Glucose   Date/Time Value Ref Range Status   04/07/2024 0721 100 70 - 130 mg/dL Final   04/07/2024 0419 105 70 - 130 mg/dL Final   04/07/2024 0034 97 70 - 130 mg/dL Final   04/06/2024 0525 108 70 - 130 mg/dL Final         Imaging reviewed  CT head 4/6 reviewed: Stable compared to prior CT showing bilateral subarachnoid hemorrhage and left temporal intraparenchymal hemorrhage.    Chest x-ray 4/6 reviewed: Borderline heart size no focal consolidation.                Active Hospital Problems    Diagnosis  POA    **Subarachnoid hematoma [S06.6XAA]  Yes      Resolved Hospital Problems   No resolved problems to display.         ASSESSMENT:  Iyer grade 3 traumatic subarachnoid hemorrhage  Left temporal intraparenchymal hematoma with vasogenic edema  Dementia  Chronic combined HFrEF and HFpEF  Valvular heart disease  CKD 3B  Hypertension  History of  breast cancer  Anemia  EMS DNR      PLAN:  Neurochecks per protocol.  Neurosurgical input appreciated.  Goal systolic less than 140.  Watch for signs of CHF and careful with IV fluids.  Chronic anemia stable.  Control glucose.  Mechanical DVT prophylaxis.    CCT: 33 min    Martin Khoury MD  Pulmonary and Critical Care Medicine  [facility]  4/7/2024    09:11 EDT

## 2024-04-07 NOTE — CONSULTS
Internal Medicine Consult  INTERNAL MEDICINE   [facility]       Patient Identification:  Name: Radha Luke  Age: 89 y.o.  Sex: female  :  3/14/1933  MRN: [id number]                   Primary Care Physician: Paxton Nuno MD                               Requesting physician: Dr. Jean Baptiste  Date of consultation: 2024    Reason for consultation: Continued care out of ICU.    History of Present Illness:   Patient is a 89-year-old female who is a resident of nursing home apparently had a fall without loss of consciousness causing her to have a laceration of the hand.  She was seen in the emergency room and had some stitches placed and was noted to have a small subarachnoid hemorrhage.  Patient was admitted to ICU for monitoring on 2024.  Patient apparently had a fall that was not witnessed at the facility.  Over the course of 24 hours in ICU patient was seen by neurosurgery service for traumatic subarachnoid hemorrhage for which no intervention was recommended.  Repeat CT scan of the head did not show any acute worsening and patient was cleared for discharge from neurosurgery service.  Patient was transferred out of ICU but she has history of dementia and the time of arrival on the floor she was extremely agitated and difficult to console.  Patient required a small dose of Haldol to calm her down and sitter was arranged.  Patient currently unable to give any history.  Upon review of records it appears that her past medical history is remarkable for baseline dementia and currently resides in the facility and has underlying history of congestive heart failure prior history of breast cancer hypertension and gastroesophageal reflux disease as well as chronic kidney disease and coronary artery disease.  Patient currently does not engage in any information exchange.      Past Medical History:  Past Medical History:   Diagnosis Date    Acute systolic congestive heart failure     Allergic ?     Aortic stenosis     Arthritis     Breast cancer     Locally recurrent left breast    Cataract 2005?    CKD (chronic kidney disease), stage III     Colon polyp 1997?    Coronary artery disease 2018    Cough     Diverticulosis 2012    Dizziness     Drug therapy     Edema     GERD (gastroesophageal reflux disease)     History of breast cancer     LEFT    Hyperlipidemia     Hypertension     Hypokalemia     LBBB (left bundle branch block)     Mild tricuspid regurgitation     with a right ventricular systolic pressure of 62    Moderate mitral regurgitation     Pleural effusion     Pneumonia 2018     Past Surgical History:  Past Surgical History:   Procedure Laterality Date    APPENDECTOMY  1943    BREAST BIOPSY      BREAST LUMPECTOMY Left 1995    COLONOSCOPY N/A 5/12/2016    Procedure: COLONOSCOPY;  Surgeon: Israel Vance MD;  Location: [facility] ENDOSCOPY;  Service:     HYSTERECTOMY  1964    MASTECTOMY Left 2011    THORACENTESIS Bilateral       Home Meds:  Medications Prior to Admission   Medication Sig Dispense Refill Last Dose    acetaminophen (TYLENOL) 325 MG tablet Take 2 tablets by mouth Every 6 (Six) Hours As Needed for Mild Pain, Moderate Pain or Headache.   Past Month    allopurinol (ZYLOPRIM) 100 MG tablet Take 1 tablet by mouth Daily. 90 tablet 3 4/5/2024    anastrozole (ARIMIDEX) 1 MG tablet    4/5/2024    carvedilol (COREG) 3.125 MG tablet Take 1 tablet by mouth 2 (Two) Times a Day. 180 tablet 3 4/5/2024    furosemide (LASIX) 20 MG tablet Take 1 tablet by mouth Daily.   4/5/2024    magnesium gluconate (MAGONATE) 500 MG tablet Take 500 mg by mouth Daily.   4/5/2024    mirtazapine (REMERON) 7.5 MG tablet Take 1 tablet by mouth Every Night.   4/5/2024    polyethylene glycol (MiraLax) 17 g packet Take 17 g by mouth Daily.   Past Week    potassium chloride (MICRO-K) 10 MEQ CR capsule Take 2 capsules by mouth Daily. 180 capsule 3 4/5/2024    simvastatin (ZOCOR) 20 MG tablet Take 1 tablet by mouth Daily. 90 tablet 3  "4/5/2024    Multiple Vitamins-Minerals (CENTRUM SILVER 50+WOMEN PO) Take 1 tablet by mouth Daily.        Current Meds:     Current Facility-Administered Medications:     allopurinol (ZYLOPRIM) tablet 100 mg, 100 mg, Oral, Daily, Martin Khoury MD, 100 mg at 04/07/24 0955    atorvastatin (LIPITOR) tablet 10 mg, 10 mg, Oral, Daily, Martin Khoury MD, 10 mg at 04/07/24 0845    carvedilol (COREG) tablet 3.125 mg, 3.125 mg, Oral, BID, Martin Khoury MD, 3.125 mg at 04/07/24 0845    magnesium (as) gluconate (MAGONATE) tablet 27 mg, 27 mg, Oral, Daily, Martin Khoury MD, 27 mg at 04/07/24 0845    mirtazapine (REMERON) tablet 7.5 mg, 7.5 mg, Oral, Nightly, Martin Khoury MD, 7.5 mg at 04/06/24 2033    potassium chloride (K-DUR,KLOR-CON) ER tablet 20 mEq, 20 mEq, Oral, Daily, Martin Khoury MD, 20 mEq at 04/07/24 0845    sodium chloride 0.9 % flush 10 mL, 10 mL, Intravenous, Q12H, Martin Khoury MD, 10 mL at 04/07/24 0955    sodium chloride 0.9 % flush 10 mL, 10 mL, Intravenous, PRN, Martin Khoury MD    sodium chloride 0.9 % infusion 40 mL, 40 mL, Intravenous, PRN, Martin Khoury MD  Allergies:  Allergies   Allergen Reactions    Levofloxacin Swelling     Swelling in legs and ankle and tongue    Penicillins Hives     Social History:   Social History     Tobacco Use    Smoking status: Never    Smokeless tobacco: Never    Tobacco comments:     no caffeine   Substance Use Topics    Alcohol use: Not Currently     Comment: occassional      Family History:  Family History   Problem Relation Age of Onset    Hypertension Mother     Cancer Neg Hx           Review of Systems  See history of present illness and past medical history.  Could not be obtained from the patient       Vitals:   /75   Pulse 73   Temp 96.7 °F (35.9 °C) (Oral)   Resp 16   Ht 152.4 cm (60\")   Wt 45.8 kg (100 lb 15.5 oz)   SpO2 100%   BMI 19.72 kg/m²   I/O:   Intake/Output Summary (Last 24 hours) " at 4/7/2024 1905  Last data filed at 4/7/2024 1748  Gross per 24 hour   Intake --   Output 475 ml   Net -475 ml     Exam:  Patient is examined using the personal protective equipment as per guidelines from infection control for this particular patient as enacted.  Hand washing was performed before and after patient interaction.  General Appearance:  Does not appear to in any acute distress and currently sedated after receiving Haldol.  Patient appears emaciated and malnourished   Head:  Repaired laceration noted.   Eyes:    PERRL, conjunctiva/corneas clear, EOM's intact, both eyes   Ears:    Normal external ear canals, both ears   Nose:   Nares normal, septum midline, mucosa normal, no drainage    or sinus tenderness   Throat: Dry oral mucosa   Neck: Supple   Back:     Symmetric, no curvature, ROM normal, no CVA tenderness   Lungs:     Clear to auscultation bilaterally, respirations unlabored   Chest Wall:    No tenderness or deformity    Heart:  S1-S2 regular   Abdomen:   Soft nontender   Extremities:   Extremities normal, atraumatic, no cyanosis or edema   Pulses:   Pulses palpable in all extremities; symmetric all extremities   Skin: Bruising and ecchymotic changes noted   Neurologic: Sedated and grossly nonfocal spontaneously moving upper and lower extremities       Data Review:      I reviewed the patient's new clinical results.  Results from last 7 days   Lab Units 04/07/24  0446 04/06/24  0403   WBC 10*3/mm3 5.29 5.80   HEMOGLOBIN g/dL 10.0* 10.8*   PLATELETS 10*3/mm3 113* 124*     Results from last 7 days   Lab Units 04/07/24  0446 04/06/24  0403   SODIUM mmol/L 142 139   POTASSIUM mmol/L 4.3 4.4   CHLORIDE mmol/L 114* 110*   CO2 mmol/L 18.0* 19.0*   BUN mg/dL 18 30*   CREATININE mg/dL 1.07* 1.26*   CALCIUM mg/dL 7.7* 8.2   GLUCOSE mg/dL 92 114*     XR Chest 1 View    Result Date: 4/6/2024   1. No focal pulmonary consolidation. Borderline heart size. Follow-up as clinical indications persist.  2. Possible  right humerus pathology, as described.  This report was finalized on 4/6/2024 1:44 PM by Dr. Nicho Singh M.D on Workstation: [id number]      CT Head Without Contrast    Result Date: 4/6/2024   No significant change.    This report was finalized on 4/6/2024 10:28 AM by Dr. Nicho Singh M.D on Workstation: [id number]      CT Cervical Spine Without Contrast    Result Date: 4/6/2024  Electronically signed by Jorge L Garland MD on 04-06-24 at 0422    CT Head Without Contrast    Addendum Date: 4/6/2024    ADDENDUM: 04 06 24 04:30 Call Doctor Regarding Above results, called  Dr. Pfeiffer on 04 06 04:30 (-04:00)     Result Date: 4/6/2024  Communications:  Call Doctor Above results Electronically signed by Jorge L Garland MD on 04-06-24 at 0420   Microbiology Results (last 10 days)       ** No results found for the last 240 hours. **            Assessment:  Active Hospital Problems    Diagnosis  POA    **Subarachnoid hematoma [S06.6XAA]  Yes    Severe malnutrition [E43]  Yes    Dementia without behavioral disturbance [F03.90]  Yes    Stage 3b chronic kidney disease [N18.32]  Yes    Breast cancer [C50.919]  Yes    Short-term memory loss [R41.3]  Yes    Malignant neoplasm of overlapping sites of left female breast [C50.812]  Yes    History of left breast cancer [Z85.3]  Not Applicable       Medical decision making/care plan: See admitting orders  Unwitnessed fall with traumatic head injury and traumatic subarachnoid hematoma-patient is cleared for discharge from neurosurgical standpoint and no intervention is planned.  Dementia with behavioral disturbances and sundowning-continue with sitter and as needed Haldol with close monitoring of her airways and provided with aspiration precautions.  Chronic kidney disease-continue with as needed hydration May require IV fluid if her mental status continues to be an issue.  Malnutrition-nutrition consult.  Care structure discussion and CODE STATUS-needs to be brought up with  the family members for long-term care plan given her progressive decline recurrent falls closed head injury and dementia and going forward need providing her with proper hydration and nutrition which could be hampered because of her dementia and noncooperation.  Palliative care consult may be indicated after discussion with family members.  [facility] will assume care on 4/8/2024.    Maria Luz Centeno MD   4/7/2024  19:05 EDT    Parts of this note may be an electronic transcription/translation of spoken language to printed text using the Dragon dictation system.

## 2024-04-07 NOTE — THERAPY EVALUATION
Patient Name: Radha Luke  : 3/14/1933    MRN: [id number]                              Today's Date: 2024       Admit Date: 2024    Visit Dx:     ICD-10-CM ICD-9-CM   1. Subarachnoid hemorrhage  I60.9 430   2. Fall, initial encounter  W19.XXXA E888.9   3. Intraparenchymal hematoma of left side of brain due to trauma, without loss of consciousness, initial encounter  S06.320A 853.01   4. Laceration of scalp, initial encounter  S01.01XA 873.0     Patient Active Problem List   Diagnosis    Disorder of aorta    Diverticulosis of intestine    Gastroesophageal reflux disease with esophagitis    Mixed hyperlipidemia    Primary hypertension    Age-related osteoporosis without current pathological fracture    Health care maintenance    History of left breast cancer    Malignant neoplasm of overlapping sites of left female breast    Pain of right sacroiliac joint    Impaired glucose tolerance    Left bundle branch block    Congestive heart failure due to valvular disease    Memory loss    Gout    Foot pain    Vertigo    Chronic combined systolic and diastolic heart failure    Nonrheumatic mitral valve regurgitation    Nonrheumatic aortic valve insufficiency    Aortic stenosis, moderate    TSH elevation    Breast cancer    Short-term memory loss    Confusion    Decreased appetite    Stage 3b chronic kidney disease    Acute UTI (urinary tract infection)    Hypercalcemia    Volume depletion    Dementia without behavioral disturbance    Severe malnutrition    Subarachnoid hematoma     Past Medical History:   Diagnosis Date    Acute systolic congestive heart failure     Allergic ?    Aortic stenosis     Arthritis     Breast cancer     Locally recurrent left breast    Cataract ?    CKD (chronic kidney disease), stage III     Colon polyp ?    Coronary artery disease 2018    Cough     Diverticulosis 2012    Dizziness     Drug therapy     Edema     GERD (gastroesophageal reflux disease)     History of breast  cancer     LEFT    Hyperlipidemia     Hypertension     Hypokalemia     LBBB (left bundle branch block)     Mild tricuspid regurgitation     with a right ventricular systolic pressure of 62    Moderate mitral regurgitation     Pleural effusion     Pneumonia 2018     Past Surgical History:   Procedure Laterality Date    APPENDECTOMY  1943    BREAST BIOPSY      BREAST LUMPECTOMY Left 1995    COLONOSCOPY N/A 5/12/2016    Procedure: COLONOSCOPY;  Surgeon: Israel Vance MD;  Location: [facility] ENDOSCOPY;  Service:     HYSTERECTOMY  1964    MASTECTOMY Left 2011    THORACENTESIS Bilateral       General Information       Row Name 04/07/24 1008          OT Time and Intention    Document Type evaluation  -PARVEEN     Mode of Treatment occupational therapy;individual therapy  -PARVEEN       Row Name 04/07/24 1008          General Information    Patient Profile Reviewed yes  -PARVEEN     Prior Level of Function min assist:  Pt is poor historian and PLOF details are unclear at this time but pt is NH resident and likely has staff assist with ADLs at baseline.  -PARVEEN     Existing Precautions/Restrictions fall  -PARVEEN     Barriers to Rehab cognitive status;medically complex  -PARVEEN       Row Name 04/07/24 1008          Living Environment    People in Home facility resident  -PARVEEN       Row Name 04/07/24 1008          Cognition    Orientation Status (Cognition) oriented to;place;verbal cues/prompts needed for orientation  -PARVEEN       Row Name 04/07/24 1008          Safety Issues, Functional Mobility    Safety Issues Affecting Function (Mobility) judgment;safety precaution awareness;safety precautions follow-through/compliance;sequencing abilities;ability to follow commands;awareness of need for assistance;insight into deficits/self-awareness  -PARVEEN     Impairments Affecting Function (Mobility) balance;endurance/activity tolerance;strength;pain;cognition;range of motion (ROM)  -PARVEEN     Cognitive Impairments, Mobility Safety/Performance attention;awareness, need  for assistance;insight into deficits/self-awareness;judgment;safety precaution awareness;safety precaution follow-through;sequencing abilities  -PARVEEN               User Key  (r) = Recorded By, (t) = Taken By, (c) = Cosigned By      Initials Name Provider Type    Sabi Yip OT Occupational Therapist                     Mobility/ADL's       Row Name 04/07/24 1011          Bed Mobility    Bed Mobility supine-sit;sit-supine  -PARVEEN     Supine-Sit [place] (Bed Mobility) maximum assist (25% patient effort);verbal cues;nonverbal cues (demo/gesture);1 person assist  -PARVEEN     Sit-Supine [place] (Bed Mobility) verbal cues;nonverbal cues (demo/gesture);maximum assist (25% patient effort);1 person assist  -PARVEEN     Assistive Device (Bed Mobility) bed rails;head of bed elevated  -PARVEEN     Comment, (Bed Mobility) Cues for sequencing and inc time to reach EOB. Cues for balance at EOB initially and then pt able to correct.  -PARVEEN       Row Name 04/07/24 1011          Transfers    Transfers other (see comments)  Deferred at this time d/t pt very lethargic this AM and req freq cues to keep eyes open  -PARVEEN       Row Name 04/07/24 1011          Sit-Stand Transfer    Sit-Stand [place] (Transfers) not tested;unable to assess  -PARVEEN       Row Name 04/07/24 1011          Functional Mobility    Functional Mobility- Ind. Level not tested  -PARVEEN     Functional Mobility- Comment Pt lethargic this AM and req freq cues to keep eyes open and unsafe to perform txfers and fxl mobility at this time.  -PARVEEN       Row Name 04/07/24 1011          Activities of Daily Living    BADL Assessment/Intervention upper body dressing;grooming;toileting  -PARVEEN       Row Name 04/07/24 1011          Upper Body Dressing Assessment/Training    [place] Level (Upper Body Dressing) upper body dressing skills;don;front opening garment;maximum assist (25% patient effort);nonverbal cues (demo/gesture);verbal cues;set up;other (see comments)  [facility] gown donned  -PARVEEN      Position (Upper Body Dressing) edge of bed sitting  -PARVEEN       Row Name 04/07/24 1011          Grooming Assessment/Training    [place] Level (Grooming) grooming skills;wash face, hands;set up;minimum assist (75% patient effort);verbal cues;nonverbal cues (demo/gesture)  -PARVEEN     Position (Grooming) edge of bed sitting  -PARVEEN     Comment, (Grooming) Cues for intiation and seq  -PARVEEN       Row Name 04/07/24 1011          Toileting Assessment/Training    [place] Level (Toileting) toileting skills;dependent (less than 25% patient effort);not tested  -PARVEEN     Comment, (Toileting) Purewick in place and brief worn at this time  -PARVEEN               User Key  (r) = Recorded By, (t) = Taken By, (c) = Cosigned By      Initials Name Provider Type    PARVEEN Sbai Murdock OT Occupational Therapist                   Obj/Interventions       Row Name 04/07/24 1017          Sensory Assessment (Somatosensory)    Sensory Assessment (Somatosensory) sensation intact  -[facility] Name 04/07/24 1017          Vision Assessment/Intervention    Visual Impairment/Limitations WFL  -PARVEEN       Row Name 04/07/24 1017          Range of Motion Comprehensive    Comment, General Range of Motion B shoulder AROM 1/2; PROM 7/8; B elbows/wrists [facility]  -PARVEEN       Row Name 04/07/24 1017          Strength Comprehensive (MMT)    Comment, General Manual Muscle Testing (MMT) Assessment Generalized weakness; B UE grossly 3-/5 to 3/5 MMT  -PARVEEN       Row Name 04/07/24 1017          Motor Skills    Motor Skills functional endurance  -PARVEEN     Functional Endurance Poor; fatigues quickly and requests to return to bed immediately after reaching EOB this AM but able to tolerate briefly with encouragement.  -PARVEEN       Row Name 04/07/24 1017          Balance    Balance Assessment sitting static balance;sitting dynamic balance  -PARVEEN     Static Sitting Balance verbal cues;non-verbal cues (demo/gesture);contact guard  -PARVEEN     Dynamic Sitting Balance contact guard;minimal  assist;verbal cues;non-verbal cues (demo/gesture)  -PARVEEN     Balance Interventions sitting;supported;static;dynamic;occupation based/functional task  -PARVEEN     Comment, Balance R lateral and posterior lean on initial sit EOB but able to correct with cues  -PARVEEN               User Key  (r) = Recorded By, (t) = Taken By, (c) = Cosigned By      Initials Name Provider Type    PARVEENSabi Bauer OT Occupational Therapist                   Goals/Plan       Row Name 04/07/24 1022          Bed Mobility Goal 1 (OT)    Activity/Assistive Device (Bed Mobility Goal 1, OT) bed mobility activities, all  -PARVEEN     [place] Level/Cues Needed (Bed Mobility Goal 1, OT) verbal cues required;nonverbal cues (demo/gesture) required;minimum assist (75% or more patient effort)  -PARVEEN     Time Frame (Bed Mobility Goal 1, OT) short term goal (STG);2 weeks  -PARVEEN     Progress/Outcomes (Bed Mobility Goal 1, OT) new goal  -PARVEEN       Row Name 04/07/24 1022          Transfer Goal 1 (OT)    Activity/Assistive Device (Transfer Goal 1, OT) transfers, all  -PARVEEN     [place] Level/Cues Needed (Transfer Goal 1, OT) verbal cues required;nonverbal cues (demo/gesture) required;set-up required;moderate assist (50-74% patient effort)  with AD  -PARVEEN     Time Frame (Transfer Goal 1, OT) short term goal (STG);2 weeks  -PARVEEN     Progress/Outcome (Transfer Goal 1, OT) new goal  -PARVEEN       Row Name 04/07/24 1022          Dressing Goal 1 (OT)    Activity/Device (Dressing Goal 1, OT) dressing skills, all;upper body dressing;lower body dressing  -PARVEEN     [place]/Cues Needed (Dressing Goal 1, OT) set-up required;verbal cues required;nonverbal cues (demo/gesture) required;minimum assist (75% or more patient effort);moderate assist (50-74% patient effort)  -PARVEEN     Time Frame (Dressing Goal 1, OT) short term goal (STG);2 weeks  -PARVEEN     Progress/Outcome (Dressing Goal 1, OT) new goal  -PARVEEN       Row Name 04/07/24 1022          Toileting Goal 1 (OT)    Activity/Device (Toileting  "Goal 1, OT) toileting skills, all;grab bar/safety frame;raised toilet seat  BSC  -PARVEEN     [place] Level/Cues Needed (Toileting Goal 1, OT) verbal cues required;nonverbal cues (demo/gesture) required;maximum assist (25-49% patient effort);set-up required  -PARVEEN     Time Frame (Toileting Goal 1, OT) short term goal (STG);2 weeks  -PARVEEN     Progress/Outcome (Toileting Goal 1, OT) new goal  -PARVEEN       Row Name 04/07/24 1022          Grooming Goal 1 (OT)    Activity/Device (Grooming Goal 1, OT) grooming skills, all  -PARVEEN     [place] (Grooming Goal 1, OT) set-up required;contact guard required;verbal cues required;nonverbal cues (demo/gesture) required  sitting EOB  -PARVEEN     Time Frame (Grooming Goal 1, OT) short term goal (STG);2 weeks  -PARVEEN     Progress/Outcome (Grooming Goal 1, OT) new goal  -PARVEEN       Row Name 04/07/24 1022          Therapy Assessment/Plan (OT)    Planned Therapy Interventions (OT) activity tolerance training;BADL retraining;functional balance retraining;occupation/activity based interventions;patient/caregiver education/training;strengthening exercise;transfer/mobility retraining;adaptive equipment training;cognitive/visual perception retraining;neuromuscular control/coordination retraining;passive ROM/stretching;ROM/therapeutic exercise  -PARVEEN               User Key  (r) = Recorded By, (t) = Taken By, (c) = Cosigned By      Initials Name Provider Type    Sabi Yip, OT Occupational Therapist                   Clinical Impression       Row Name 04/07/24 1004          Pain Assessment    Pain Location - Side/Orientation Right  -PARVEEN     Pain Location - hip  -PARVEEN     Pre/Posttreatment Pain Comment c/o pain in R hip and reports, \"I'm cold\" several times throughout session. Pt did not rate pain on numeric scale at this time.  -PARVEEN     Pain Intervention(s) Repositioned;Rest;Nursing Notified  -PARVEEN       Row Name 04/07/24 1004          Plan of Care Review    Plan of Care Reviewed With patient  -PARVEEN     Outcome " Evaluation Pt is a 89 y/o F nursing home residents admitted [facility] s/p fall at NH without LOC and laceration on head present on arrival to ED and work up revealed subarachnoid hemorrhage. Pt seen in ICU this AM for OT evaluation. PMHx significant for chronic HFpEF, Dementia, HTN, CKD 3b, breast cancer, CAD, Diverticulosis, GERD, HLD, mild tricuspid regurgitation, mod mitral regurgitation, pleural effusion. Pt fowlers in bed on OT arrival this AM and oriented to self only at this time and is poor historian for PLOF. Pt MAX A for bed mobility to EOB and MIN A to wash face with cues for initiation and seq and OT assist with bringing cloth up to face. Pt presents to OT with R hip abrasion, pain, deficits in BUE strength and AROM, balance, cognition, and sequencing skills, and decreased endurance, act shea and safety awareness. Skilled OT services are medically indicated in order to address aforementioned skills and deficits, improve QOL, and attain PLOF and maximize safety and IND with ADLs and fxl mobility. OT recommending SNF at [place].  -PARVEEN       Row Name 04/07/24 1004          Therapy Assessment/Plan (OT)    Rehab Potential (OT) fair, will monitor progress closely  -     Criteria for Skilled Therapeutic Interventions Met (OT) yes;skilled treatment is necessary  -PARVEEN     Therapy Frequency (OT) 3 times/wk  -PARVEEN       Row Name 04/07/24 1004          Therapy Plan Review/Discharge Plan (OT)    Anticipated Discharge Disposition (OT) skilled nursing facility  -PARVEEN       Row Name 04/07/24 1004          Vital Signs    O2 Delivery Pre Treatment room air  -PARVEEN     O2 Delivery Intra Treatment room air  -PARVEEN     O2 Delivery Post Treatment room air  -PARVEEN       Row Name 04/07/24 1004          Positioning and Restraints    Pre-Treatment Position in bed  -PARVEEN     Post Treatment Position bed  -PARVEEN     In Bed call light within reach;encouraged to call for assist;exit alarm on;patient within staff view;side rails up x3;side lying left;notified  nsg  -PARVEEN               User Key  (r) = Recorded By, (t) = Taken By, (c) = Cosigned By      Initials Name Provider Type    Sabi Yip OT Occupational Therapist                   Outcome Measures       Row Name 04/07/24 1025          How much help from another is currently needed...    Putting on and taking off regular lower body clothing? 1  -PARVEEN     Bathing (including washing, rinsing, and drying) 1  -PARVEEN     Toileting (which includes using toilet bed pan or urinal) 1  -PARVEEN     Putting on and taking off regular upper body clothing 2  -PARVEEN     Taking care of personal grooming (such as brushing teeth) 2  -PARVEEN     Eating meals 3  -PARVEEN     AM-PAC 6 Clicks Score (OT) 10  -PARVEEN       Row Name 04/07/24 0819          How much help from another person do you currently need...    Turning from your back to your side while in flat bed without using bedrails? 2  -CS     Moving from lying on back to sitting on the side of a flat bed without bedrails? 2  -CS     Moving to and from a bed to a chair (including a wheelchair)? 2  -CS     Standing up from a chair using your arms (e.g., wheelchair, bedside chair)? 2  -CS     Climbing 3-5 steps with a railing? 2  -CS     To walk in hospital room? 2  -CS     AM-PAC 6 Clicks Score (PT) 12  -CS     Highest Level of Mobility Goal 4 --> Transfer to chair/commode  -CS       Row Name 04/07/24 1025          Modified Delight Scale    Modified Delight Scale 5 - Severe disability.  Bedridden, incontinent, and requiring constant nursing care and attention.  -PARVEEN       Row Name 04/07/24 1025 04/07/24 0819       Functional Assessment    Outcome Measure Options AM-PAC 6 Clicks Daily Activity (OT);Modified Delight  -PARVEEN AM-PAC 6 Clicks Basic Mobility (PT)  -CS              User Key  (r) = Recorded By, (t) = Taken By, (c) = Cosigned By      Initials Name Provider Type    Pop Russo, PT Physical Therapist    Sabi Yip, MOLLY Occupational Therapist                    Occupational Therapy Education        Title: PT OT SLP Therapies (Done)       Topic: Occupational Therapy (Done)       Point: ADL training (Done)       Description:   Instruct learner(s) on proper safety adaptation and remediation techniques during self care or transfers.   Instruct in proper use of assistive devices.                  Learning Progress Summary             Patient Acceptance, E, DU by PARVEEN at 4/7/2024 1026    Comment: Role of OT and safety techniques and cues                         Point: Home exercise program (Done)       Description:   Instruct learner(s) on appropriate technique for monitoring, assisting and/or progressing therapeutic exercises/activities.                  Learning Progress Summary             Patient Acceptance, E, DU by PARVEEN at 4/7/2024 1026    Comment: Role of OT and safety techniques and cues                         Point: Precautions (Done)       Description:   Instruct learner(s) on prescribed precautions during self-care and functional transfers.                  Learning Progress Summary             Patient Acceptance, E, DU by PARVEEN at 4/7/2024 1026    Comment: Role of OT and safety techniques and cues                         Point: Body mechanics (Done)       Description:   Instruct learner(s) on proper positioning and spine alignment during self-care, functional mobility activities and/or exercises.                  Learning Progress Summary             Patient Acceptance, E, DU by PARVEEN at 4/7/2024 1026    Comment: Role of OT and safety techniques and cues                                         User Key       Initials Effective Dates Name Provider Type Discipline    PARVEEN 10/12/23 -  Sabi Murdock OT Occupational Therapist OT                  OT Recommendation and Plan  Planned Therapy Interventions (OT): activity tolerance training, BADL retraining, functional balance retraining, occupation/activity based interventions, patient/caregiver education/training, strengthening exercise, transfer/mobility retraining,  adaptive equipment training, cognitive/visual perception retraining, neuromuscular control/coordination retraining, passive ROM/stretching, ROM/therapeutic exercise  Therapy Frequency (OT): 3 times/wk  Plan of Care Review  Plan of Care Reviewed With: patient  Outcome Evaluation: Pt is a 89 y/o F nursing home residents admitted to [facility] s/p fall at NH without LOC and laceration on head present on arrival to ED and work up revealed subarachnoid hemorrhage. Pt seen in ICU this AM for OT evaluation. PMHx significant for chronic HFpEF, Dementia, HTN, CKD 3b, breast cancer, CAD, Diverticulosis, GERD, HLD, mild tricuspid regurgitation, mod mitral regurgitation, pleural effusion. Pt fowlers in bed on OT arrival this AM and oriented to self only at this time and is poor historian for PLOF. Pt MAX A for bed mobility to EOB and MIN A to wash face with cues for initiation and seq and OT assist with bringing cloth up to face. Pt presents to OT with R hip abrasion, pain, deficits in BUE strength and AROM, balance, cognition, and sequencing skills, and decreased endurance, act shea and safety awareness. Skilled OT services are medically indicated in order to address aforementioned skills and deficits, improve QOL, and attain PLOF and maximize safety and IND with ADLs and fxl mobility. OT recommending SNF at [place].     Time Calculation:   Evaluation Complexity (OT)  Review Occupational Profile/Medical/Therapy History Complexity: expanded/moderate complexity  Assessment, Occupational Performance/Identification of Deficit Complexity: 3-5 performance deficits  Clinical Decision Making Complexity (OT): detailed assessment/moderate complexity  Overall Complexity of Evaluation (OT): moderate complexity     Time Calculation- OT       Row Name 04/07/24 1027             Time Calculation- OT    OT Start Time 0911  -PARVEEN      OT Stop Time 0925  -PARVEEN      OT Time Calculation (min) 14 min  -PARVEEN      OT Received On 04/07/24  -PARVEEN      OT - Next Appointment  04/08/24  -PARVEEN      OT Goal Re-Cert Due Date 04/21/24  -PARVEEN         Untimed Charges    OT Eval/Re-eval Minutes 14  -PARVEEN         Total Minutes    Untimed Charges Total Minutes 14  -PARVEEN       Total Minutes 14  -PARVEEN                User Key  (r) = Recorded By, (t) = Taken By, (c) = Cosigned By      Initials Name Provider Type    Sabi Yip OT Occupational Therapist                  Therapy Charges for Today       Code Description Service Date Service Provider Modifiers Qty    [id number] HC OT EVAL MOD COMPLEXITY 3 4/7/2024 Sabi Murdock OT GO 1                 Sabi Murdock OT  4/7/2024

## 2024-04-08 VITALS
SYSTOLIC BLOOD PRESSURE: 146 MMHG | BODY MASS INDEX: 19.82 KG/M2 | HEART RATE: 64 BPM | WEIGHT: 100.97 LBS | RESPIRATION RATE: 16 BRPM | HEIGHT: 60 IN | TEMPERATURE: 97.8 F | OXYGEN SATURATION: 100 % | DIASTOLIC BLOOD PRESSURE: 52 MMHG

## 2024-04-08 PROBLEM — S01.01XA SCALP LACERATION: Status: ACTIVE | Noted: 2024-04-08

## 2024-04-08 LAB
ALBUMIN SERPL-MCNC: 3.3 G/DL (ref 3.5–5.2)
ANION GAP SERPL CALCULATED.3IONS-SCNC: 9.2 MMOL/L (ref 5–15)
BUN SERPL-MCNC: 17 MG/DL (ref 8–23)
BUN/CREAT SERPL: 17.2 (ref 7–25)
CALCIUM SPEC-SCNC: 7.7 MG/DL (ref 8.2–9.6)
CHLORIDE SERPL-SCNC: 116 MMOL/L (ref 98–107)
CO2 SERPL-SCNC: 19.8 MMOL/L (ref 22–29)
CREAT SERPL-MCNC: 0.99 MG/DL (ref 0.57–1)
DEPRECATED RDW RBC AUTO: 46 FL (ref 37–54)
EGFRCR SERPLBLD CKD-EPI 2021: 53.9 ML/MIN/1.73
ERYTHROCYTE [DISTWIDTH] IN BLOOD BY AUTOMATED COUNT: 13.2 % (ref 12.3–15.4)
GLUCOSE SERPL-MCNC: 93 MG/DL (ref 65–99)
HCT VFR BLD AUTO: 28.6 % (ref 34–46.6)
HGB BLD-MCNC: 9.7 G/DL (ref 12–15.9)
MCH RBC QN AUTO: 32.1 PG (ref 26.6–33)
MCHC RBC AUTO-ENTMCNC: 33.9 G/DL (ref 31.5–35.7)
MCV RBC AUTO: 94.7 FL (ref 79–97)
PHOSPHATE SERPL-MCNC: 2.4 MG/DL (ref 2.5–4.5)
PLATELET # BLD AUTO: 124 10*3/MM3 (ref 140–450)
PMV BLD AUTO: 10.4 FL (ref 6–12)
POTASSIUM SERPL-SCNC: 4.4 MMOL/L (ref 3.5–5.2)
RBC # BLD AUTO: 3.02 10*6/MM3 (ref 3.77–5.28)
SODIUM SERPL-SCNC: 145 MMOL/L (ref 136–145)
WBC NRBC COR # BLD AUTO: 4.78 10*3/MM3 (ref 3.4–10.8)

## 2024-04-08 PROCEDURE — 85027 COMPLETE CBC AUTOMATED: CPT | Performed by: INTERNAL MEDICINE

## 2024-04-08 PROCEDURE — 97530 THERAPEUTIC ACTIVITIES: CPT

## 2024-04-08 PROCEDURE — 80069 RENAL FUNCTION PANEL: CPT | Performed by: INTERNAL MEDICINE

## 2024-04-08 PROCEDURE — 97535 SELF CARE MNGMENT TRAINING: CPT

## 2024-04-08 RX ORDER — ACETAMINOPHEN 325 MG/1
650 TABLET ORAL ONCE
Status: COMPLETED | OUTPATIENT
Start: 2024-04-08 | End: 2024-04-08

## 2024-04-08 RX ADMIN — Medication 27 MG: at 08:34

## 2024-04-08 RX ADMIN — POTASSIUM CHLORIDE 20 MEQ: 750 TABLET, EXTENDED RELEASE ORAL at 08:36

## 2024-04-08 RX ADMIN — ALLOPURINOL 100 MG: 100 TABLET ORAL at 08:34

## 2024-04-08 RX ADMIN — CARVEDILOL 3.12 MG: 3.12 TABLET, FILM COATED ORAL at 08:34

## 2024-04-08 RX ADMIN — ATORVASTATIN CALCIUM 10 MG: 20 TABLET, FILM COATED ORAL at 08:36

## 2024-04-08 RX ADMIN — ACETAMINOPHEN 325MG 650 MG: 325 TABLET ORAL at 12:30

## 2024-04-08 NOTE — DISCHARGE SUMMARY
"    Patient Name: Radha Luke  : 3/14/1933  MRN: [id number]    Date of Admission: 2024  Date of Discharge:  2024  Primary Care Physician: Paxton Nuno MD      Chief Complaint:   Fall and Head Injury      Discharge Diagnoses     Active Hospital Problems    Diagnosis  POA    **Subarachnoid hematoma [S06.6XAA]  Yes    Scalp laceration [S01.01XA]  Yes    Severe malnutrition [E43]  Yes    Dementia without behavioral disturbance [F03.90]  Yes    Stage 3b chronic kidney disease [N18.32]  Yes    Breast cancer [C50.919]  Yes    Short-term memory loss [R41.3]  Yes    Malignant neoplasm of overlapping sites of left female breast [C50.812]  Yes    History of left breast cancer [Z85.3]  Not Applicable      Resolved Hospital Problems   No resolved problems to display.        Hospital Course     Ms. Luke is a 89 y.o. female with a history of breast cancer, chronic combined systolic and diastolic heart failure, LBBB, HTN, breast cancer, CKD 3A, dementia who presented to [facility] initially after suffering fall with subsequent scalp laceration  Please see the admitting history and physical for further details.  She was admitted to the hospital for further evaluation and treatment.     Traumatic subarachnoid hemorrhage  Left temporal intraparenchymal hematoma with vasogenic edema  Fall  - Imaging obtained following arrival and showed \"Scattered Iyer grade 3 subarachnoid hemorrhage noted overlying the parietal lobes and frontal lobes. Findings are favored to be related to traumatic subarachnoid hemorrhage. Left temporal intraparenchymal hematoma measuring up to 5 mm with adjacent vasogenic edema.\" Neurosurgery was consulted, ordered repeat imaging which was stable, they did not feel acute intervention was warranted and cleared her for discharge without restrictions and stated that patient could follow up as needed. She had laceration repair in ED, will need reassessment and removal of staples within " ~1 week, to be done by physician at facility, as they deem appropriate. Would recommend outpatient follow up with PCP, can consider outpatient neurosurgical evaluation, as indicated, as discretion of PCP. PT evaluated, plan for patient to return to prior living facility with SNF after discharge. Recommend close monitoring and fall precautions.    Anemia and thrombocytopenia  - Hemoglobin and platelets both low on most recent labs, however, not significantly changed and relatively stable from prior. Recommend repeat CBC with PCP within 1 week for reassessment.    Chronic kidney disease stage 3A  - On most recent labs, patient's creatinine found to be stable, improved overall when compared to admission. Recommend repeat BMP with PCP within 1 week for reassessment.    Hypertension  - Blood pressure appears stable and acceptable acutely at this time. No indications to warrant acute changes/intervention at this time. Continue current medications as prescribed. Recommend that patient have her blood pressure checked 2-3 times daily and record those values in log book and take with her to next PCP visit to allow for greater insight into treatment efficacy to guide further management decisions. Recommend heart healthy/low sodium diet.    Chronic combined systolic and diastolic heart failure  - Most recent prior 2D Echocardiogram: (03/09/2021) showing: EF 45%, low normal LV systolic function, mild to moderate concentric LVH, grade 1 diastolic dysfunction, moderate aortic stenosis. At present, patient appears stable from this perspective.  No evidence to suggest acute heart failure exacerbation. Continue medications as previously prescribed. Patient provided discharge instructions on weight monitoring and when to return to hospital. Recommend low sodium diet.     Dementia  - Appears to be near baseline per discussion with family and nursing prior to discharge. Recommend implementation of delirium/fall/aspiration precautions at  facility. Follow up with PCP.    History of breast cancer  - Resume home medication as previously prescribed.    Day of Discharge     Subjective:  Patient seen and examined this morning.  Hospital day 2.  She is awake and resting in bed, son present at bedside.  Patient doing okay today, patient and son wishing to take patient home at this time.  Cleared for discharge by neurosurgery.    Physical Exam:  Temp:  [96.6 °F (35.9 °C)-98.1 °F (36.7 °C)] 97.8 °F (36.6 °C)  Heart Rate:  [64-83] 64  Resp:  [16-22] 16  BP: (135-146)/(50-75) 146/52  Body mass index is 19.72 kg/m².  Constitutional: Elderly/frail, chronically ill-appearing  HEENT: Scalp laceration with staples intact  CV: Regular rate, regular rhythm  RESP: FIO2 21, clear to auscultation B/L, normal effort  GI: soft, nontender, nondistended  MSK: No tenderness, no edema  Skin: Warm, dry.   Neuro: Awake, alert, near baseline    Consultants     Consult Orders (all) (From admission, onward)       Start     Ordered    04/07/24 1659  Inpatient Hospitalist Consult  Once        Specialty:  Hospitalist  Provider:  Maria Luz Centeno MD    04/07/24 1659 04/06/24 0625  Notify Stroke Coordinator  Once        Provider:  (Not yet assigned)    04/06/24 0625 04/06/24 0625  Inpatient Rehab Admission Consult  Once        Provider:  (Not yet assigned)    04/06/24 0625 04/06/24 0625  Inpatient Case Management  Consult  Once        Provider:  (Not yet assigned)    04/06/24 0625 04/06/24 0625  Inpatient Diabetes Educator Consult  Once,   Status:  Canceled        Provider:  (Not yet assigned)    04/06/24 0625 04/06/24 0625  Inpatient Neurosurgery Consult  Once        Specialty:  Neurosurgery  Provider:  Ronak Lima MD    04/06/24 0625 04/06/24 0424  Inpatient Pulmonology Consult  STAT        Specialty:  Pulmonary Disease  Provider:  Mehran Jean Baptiste MD    04/06/24 0423 04/06/24 0423  Inpatient Neurosurgery Consult  STAT        Specialty:  Neurosurgery   Provider:  Ronak Lima MD    04/06/24 0422                  Procedures     * Surgery not found *    Imaging Results (All)       Procedure Component Value Units Date/Time    XR Chest 1 View [[id number]] Collected: 04/06/24 1337     Updated: 04/06/24 1347    Narrative:      XR CHEST 1 VW-     HISTORY: Female who is 89 years-old, congestive heart failure     TECHNIQUE: Frontal views of the chest     COMPARISON: 1/11/2024     FINDINGS: The patient is rotated towards the left. The heart size is  borderline. Pulmonary vasculature is unremarkable. Aorta is calcified.  No focal pulmonary consolidation, pleural effusion, or pneumothorax.  Appearance of lucency at the mid to distal humeral shaft may in part may  result of overlapping soft tissue shadows or could represent pathology  of the humerus, right humerus x-ray can be obtained.       Impression:         1. No focal pulmonary consolidation. Borderline heart size. Follow-up as  clinical indications persist.     2. Possible right humerus pathology, as described.     This report was finalized on 4/6/2024 1:44 PM by Dr. Nicho Singh M.D on Workstation: [facility]/       CT Head Without Contrast [[id number]] Collected: 04/06/24 1025     Updated: 04/06/24 1031    Narrative:      CT HEAD WO CONTRAST-     INDICATIONS: Intracranial hemorrhage, follow-up     TECHNIQUE: Radiation dose reduction techniques were utilized, including  automated exposure control and exposure modulation based on body size.  Noncontrast head CT     COMPARISON: 4/6/2024 at 0335 hours     FINDINGS:     Scattered bilateral subarachnoid hemorrhage and left temporal  intraparenchymal hemorrhage appear stable. No new areas of hemorrhage  are noted.     No midline shift or mass effect. No acute territorial infarct is  identified. Old left basal ganglia lacunar infarct or prominent  perivascular space.     Moderate periventricular hypodensities suggest chronic small vessel  ischemic change in a patient this  age.           Ventricles, cisterns, cerebral sulci are unremarkable for patient age.     The visualized paranasal sinuses, orbits, mastoid air cells are  unremarkable.                   Impression:         No significant change.           This report was finalized on 4/6/2024 10:28 AM by Dr. Nicho Singh M.D on Workstation: [id number]       CT Head Without Contrast [[id number]] Collected: 04/06/24 0421     Updated: 04/06/24 0431    Addenda:        ADDENDUM:  04 06 24 04:30 Call Doctor Regarding Above results, called  Dr. Pfeiffer on   04 06 04:30 (-04:00)      Signed: 04/06/24 0420 by Jorge L Garland MD    Narrative:      CR  Patient: SHARP, ROSHNI  Time Out: 04:20  Exam(s): CT HEAD Without Contrast     EXAM:    CT Head Without Intravenous Contrast    CLINICAL HISTORY:     Reason for exam: head injury.    TECHNIQUE:    Axial computed tomography images of the head brain without intravenous   contrast.  CTDI is 55.7 mGy and DLP is 938.1 mGy-cm.  This CT exam was   performed according to the principle of ALARA (As Low As Reasonably   Achievable) by using one or more of the following dose reduction   techniques: automated exposure control, adjustment of the mA and or kV   according to patient size, and or use of iterative reconstruction   technique.    COMPARISON:    No relevant prior studies available.    FINDINGS:    Brain:  Scattered Iyer grade 3 subarachnoid hemorrhage noted   overlying the parietal lobes and frontal lobes. Findings are favored to   be related to traumatic subarachnoid hemorrhage.  Left temporal   intraparenchymal hematoma measuring up to 5 mm with adjacent vasogenic   edema.  Areas of decreased attenuation in the deep cerebral white matter   are consistent with small vessel ischemic degenerative changes.  The   cerebral and cerebellar sulci are prominent consistent with brain atrophy.      Ventricles:  Unremarkable.  No ventriculomegaly.    Bones joints:  Unremarkable.  No acute fracture.     Soft tissues:  Scalp hematoma laceration changes noted overlying the   right parietal bone.    Vasculature:  Atherosclerotic disease.    Sinuses:  Unremarkable as visualized.    Mastoid air cells:  Unremarkable as visualized.  No mastoid effusion.    Orbits:  Right lens replacement.    IMPRESSION:       1.  Scattered Iyer grade 3 subarachnoid hemorrhage noted overlying the   parietal lobes and frontal lobes. Findings are favored to be related to   traumatic subarachnoid hemorrhage.  2.  Left temporal intraparenchymal hematoma measuring up to 5 mm with   adjacent vasogenic edema.  3.  Recommend continued attention on follow-up imaging.  4.  Small vessel ischemic degenerative changes.  5.  Cerebral and cerebellar atrophy.      Impression:            Communications:     Call Doctor Above results    Electronically signed by Jorge L Garland MD on 04-06-24 at 0420    CT Cervical Spine Without Contrast [[id number]] Collected: 04/06/24 0423     Updated: 04/06/24 0423    Narrative:        Patient: SHARP, ROSHNI  Time Out: 04:22  Exam(s): CT C SPINE     EXAM:    CT Cervical Spine Without Intravenous Contrast    CLINICAL HISTORY:     Reason for exam: head injury.    TECHNIQUE:    Axial computed tomography images of the cervical spine without   intravenous contrast.  CTDI is 14.15 mGy and DLP is 262.5 mGy-cm.  This   CT exam was performed according to the principle of ALARA (As Low As   Reasonably Achievable) by using one or more of the following dose   reduction techniques: automated exposure control, adjustment of the mA   and or kV according to patient size, and or use of iterative   reconstruction technique.    COMPARISON:    No relevant prior studies available.    FINDINGS:    Vertebrae:  No evidence of acutely displaced fracture or dislocation   within the cervical spine.  Consider MRI if there is further concern.    Loss of cervical lordosis which can be seen with patient positioning or   muscle spasm.  Degenerative  changes in the cervical spine noted including   loss of disc space height, osteophyte formation, uncovertebral   hypertrophy, and facet arthropathy.    Soft tissues:  Unremarkable.    Lung apices:  Biapical scarring.    IMPRESSION:       1.  No evidence of acutely displaced fracture or dislocation within the   cervical spine.  Consider MRI if there is further concern.  2.  Loss of cervical lordosis which can be seen with patient positioning   or muscle spasm.  3.  Degenerative changes.      Impression:          Electronically signed by Jorge L Garland MD on 04-06-24 at 0422          Results for orders placed during the hospital encounter of 03/09/21    Duplex Carotid Ultrasound CAR    Interpretation Summary  · Proximal right internal carotid artery plaque without significant stenosis.  · Proximal left internal carotid artery plaque without significant stenosis.    Results for orders placed during the hospital encounter of 03/09/21    Adult Transthoracic Echo Complete w/ Color, Spectral and Contrast if Necessary Per Protocol    Interpretation Summary  · Left ventricular wall thickness is consistent with mild to moderate concentric hypertrophy.  · Estimated left ventricular EF = 45% Left ventricular systolic function is low normal.  · Left ventricular diastolic function is consistent with (grade Ia w/high LAP) impaired relaxation.  · There is moderate calcification of the aortic valve. Mild to moderate aortic valve regurgitation is present. Moderate aortic valve stenosis is present. Peak velocity of the flow distal to the aortic valve is 366.5 cm/s. Aortic valve maximum pressure gradient is 53.7 mmHg. Aortic valve mean pressure gradient is 26.8 mmHg. Aortic valve area is 0.63 cm2. Aortic valve dimensionless index is 0.20 .  · Moderate mitral annular calcification is present. Mild mitral valve regurgitation is present    Pertinent Labs     Results from last 7 days   Lab Units 04/08/24  0550 04/07/24  5176  04/06/24  0403   WBC 10*3/mm3 4.78 5.29 5.80   HEMOGLOBIN g/dL 9.7* 10.0* 10.8*   PLATELETS 10*3/mm3 124* 113* 124*     Results from last 7 days   Lab Units 04/08/24  0550 04/07/24  0446 04/06/24  0403   SODIUM mmol/L 145 142 139   POTASSIUM mmol/L 4.4 4.3 4.4   CHLORIDE mmol/L 116* 114* 110*   CO2 mmol/L 19.8* 18.0* 19.0*   BUN mg/dL 17 18 30*   CREATININE mg/dL 0.99 1.07* 1.26*   GLUCOSE mg/dL 93 92 114*   EGFR mL/min/1.73 53.9* 49.1* 40.4*     Results from last 7 days   Lab Units 04/08/24  0550 04/07/24 0446 04/06/24  0403   ALBUMIN g/dL 3.3* 3.2* 3.6   BILIRUBIN mg/dL  --   --  0.4   ALK PHOS U/L  --   --  51   AST (SGOT) U/L  --   --  23   ALT (SGPT) U/L  --   --  13     Results from last 7 days   Lab Units 04/08/24  0550 04/07/24 0446 04/06/24  0403   CALCIUM mg/dL 7.7* 7.7* 8.2   ALBUMIN g/dL 3.3* 3.2* 3.6   PHOSPHORUS mg/dL 2.4* 2.4*  --            Results from last 7 days   Lab Units 04/07/24 0446   CHOLESTEROL mg/dL 84   TRIGLYCERIDES mg/dL 91   HDL CHOL mg/dL 38*   LDL CHOL mg/dL 28             Test Results Pending at Discharge       Discharge Details        Discharge Medications        Continue These Medications        Instructions Start Date   acetaminophen 325 MG tablet  Commonly known as: TYLENOL   650 mg, Oral, Every 6 Hours PRN      allopurinol 100 MG tablet  Commonly known as: ZYLOPRIM   100 mg, Oral, Daily      anastrozole 1 MG tablet  Commonly known as: ARIMIDEX   No dose, route, or frequency recorded.      carvedilol 3.125 MG tablet  Commonly known as: COREG   3.125 mg, Oral, 2 Times Daily      furosemide 20 MG tablet  Commonly known as: LASIX   20 mg, Oral, Daily      magnesium gluconate 500 MG tablet  Commonly known as: MAGONATE   500 mg, Oral, Daily      MiraLax 17 g packet  Generic drug: polyethylene glycol   17 g, Oral, Daily      mirtazapine 7.5 MG tablet  Commonly known as: REMERON   7.5 mg, Oral, Nightly      multivitamin with minerals tablet tablet   1 tablet, Oral, Daily       potassium chloride 10 MEQ CR capsule  Commonly known as: MICRO-K   20 mEq, Oral, Daily      simvastatin 20 MG tablet  Commonly known as: ZOCOR   20 mg, Oral, Daily               Allergies   Allergen Reactions    Levofloxacin Swelling     Swelling in legs and ankle and tongue    Penicillins Hives       Discharge Disposition:  Skilled Nursing Facility (DC - External)      Discharge Diet:  Diet Order   Procedures    Diet: Regular/House; Texture: Soft to Chew (NDD 3); Soft to Chew: Ground Meat; Fluid Consistency: Thin (IDDSI 0)       Discharge Activity:   Activity Instructions       Other Activity Instructions      activity Instructions: Per facility    Up WIth Assist              CODE STATUS:    There are no questions and answers to display.       Future Appointments   Date Time Provider Department Center   9/16/2024  3:00 PM Matthieu Fleming MD [facility]   4/4/2025 10:40 AM Irene Earl MD [facility]     Additional Instructions for the Follow-ups that You Need to Schedule       Discharge Follow-up with PCP   As directed       Currently Documented PCP:    Paxton Nuno MD    PCP Phone Number:    [phone]     Follow Up Details: Within 1 week after discharge for reassessment, medication and symptom review, blood pressure check, BMP and CBC               Follow-up Information       Paxton Nuno MD .    Specialty: Family Medicine  Why: Within 1 week after discharge for reassessment, medication and symptom review, blood pressure check, BMP and CBC  Contact information:  [address] [place], [place] [address] [phone]                             Additional Instructions for the Follow-ups that You Need to Schedule       Discharge Follow-up with PCP   As directed       Currently Documented PCP:    Paxton Nuno MD    PCP Phone Number:    [phone]     Follow Up Details: Within 1 week after discharge for reassessment, medication and symptom review, blood pressure check, BMP and CBC             Time Spent on Discharge:  Greater than 30 minutes      DO [facility]  04/08/24  13:05 EDT

## 2024-04-08 NOTE — PLAN OF CARE
Goal Outcome Evaluation:  Plan of Care Reviewed With: (P) patient        Progress: (P) improving  Outcome Evaluation: (P) Pt agreeable to therapy this date. Pt supine with family present when PT/OT entered room. Supine -> sit with SBA and use of bed rails. Pt able to sit at EOB with CGA and no LOB noted. STS from EOB with CGA. Pt able to ambulate with HHAx2 and MinAx2 for 80'. No overt LOB noted during ambulation. Verbal cues needed to stay on task during ambulation secondary to confusion. Sit -> supine with MinAx1 with assistance needed at BLE. Continue to progress as indicated.

## 2024-04-08 NOTE — CASE MANAGEMENT/SOCIAL WORK
Case Management Discharge Note      Final Note: Returned to [facility]         Selected Continued Care - Discharged on 4/8/2024 Admission date: 4/6/2024 - Discharge disposition: Skilled Nursing Facility (DC - External)      Destination Coordination complete.      Service Provider Selected Services Address Phone Fax Patient Preferred    [facility] [address] , [facility] [place] [facility] [address] [phone] --              Durable Medical Equipment    No services have been selected for the patient.                Dialysis/Infusion    No services have been selected for the patient.                Home Medical Care    No services have been selected for the patient.                Therapy    No services have been selected for the patient.                Community Resources    No services have been selected for the patient.                Community & DME    No services have been selected for the patient.                    Transportation Services  Private: Car    Final Discharge Disposition Code: 03 - skilled nursing facility (SNF)

## 2024-04-08 NOTE — PROGRESS NOTES
Appreciate [facility] taking over.  Intensivist team will sign off.    Electronically signed by Mehran Jean Baptiste MD, 04/08/24, 8:18 AM EDT.

## 2024-04-08 NOTE — PLAN OF CARE
Goal Outcome Evaluation:  Plan of Care Reviewed With: patient, son        Progress: improving  Outcome Evaluation: pt seen for OT this AM, pt agreeable and eager to get out of bed. She is oriented to self only, requires cues for safety and sequencing. She completed bed mob with min A, socks with max A, STS with min A, and required minAx2 for func mob with HHAx2 for ambulation into hallway and to BR commode demo'ing household distances. able to complete toileting hygiene with CGA in static standing, max A for brief mgment. She returned to supine at end of session, son present. Plans to return to facility to skilled unit.      Anticipated Discharge Disposition (OT): skilled nursing facility

## 2024-04-08 NOTE — THERAPY TREATMENT NOTE
Patient Name: Radha Luke  : 3/14/1933    MRN: [id number]                              Today's Date: 2024       Admit Date: 2024    Visit Dx:     ICD-10-CM ICD-9-CM   1. Subarachnoid hemorrhage  I60.9 430   2. Fall, initial encounter  W19.XXXA E888.9   3. Intraparenchymal hematoma of left side of brain due to trauma, without loss of consciousness, initial encounter  S06.320A 853.01   4. Laceration of scalp, initial encounter  S01.01XA 873.0     Patient Active Problem List   Diagnosis    Disorder of aorta    Diverticulosis of intestine    Gastroesophageal reflux disease with esophagitis    Mixed hyperlipidemia    Primary hypertension    Age-related osteoporosis without current pathological fracture    Health care maintenance    History of left breast cancer    Malignant neoplasm of overlapping sites of left female breast    Pain of right sacroiliac joint    Impaired glucose tolerance    Left bundle branch block    Congestive heart failure due to valvular disease    Memory loss    Gout    Foot pain    Vertigo    Chronic combined systolic and diastolic heart failure    Nonrheumatic mitral valve regurgitation    Nonrheumatic aortic valve insufficiency    Aortic stenosis, moderate    TSH elevation    Breast cancer    Short-term memory loss    Confusion    Decreased appetite    Stage 3b chronic kidney disease    Acute UTI (urinary tract infection)    Hypercalcemia    Volume depletion    Dementia without behavioral disturbance    Severe malnutrition    Subarachnoid hematoma    Scalp laceration     Past Medical History:   Diagnosis Date    Acute systolic congestive heart failure     Allergic ?    Aortic stenosis     Arthritis     Breast cancer     Locally recurrent left breast    Cataract ?    CKD (chronic kidney disease), stage III     Colon polyp ?    Coronary artery disease 2018    Cough     Diverticulosis 2012    Dizziness     Drug therapy     Edema     GERD (gastroesophageal reflux disease)      History of breast cancer     LEFT    Hyperlipidemia     Hypertension     Hypokalemia     LBBB (left bundle branch block)     Mild tricuspid regurgitation     with a right ventricular systolic pressure of 62    Moderate mitral regurgitation     Pleural effusion     Pneumonia 2018     Past Surgical History:   Procedure Laterality Date    APPENDECTOMY  1943    BREAST BIOPSY      BREAST LUMPECTOMY Left 1995    COLONOSCOPY N/A 5/12/2016    Procedure: COLONOSCOPY;  Surgeon: Israel Vance MD;  Location: [facility];  Service:     HYSTERECTOMY  1964    MASTECTOMY Left 2011    THORACENTESIS Bilateral       General Information       Row Name 04/08/24 1115          Physical Therapy Time and Intention    Document Type evaluation  -CW (r) JG (t) CW (c)     Mode of Treatment co-treatment;physical therapy;occupational therapy  -CW (r) JG (t) CW (c)       Row Name 04/08/24 1115          General Information    Patient Profile Reviewed yes  -CW (r) JG (t) CW (c)     Existing Precautions/Restrictions fall  -CW (r) JG (t) CW (c)       Row Name 04/08/24 1115          Cognition    Orientation Status (Cognition) oriented to;person  -CW (r) JG (t) CW (c)       Row Name 04/08/24 1115          Safety Issues, Functional Mobility    Safety Issues Affecting Function (Mobility) safety precaution awareness;safety precautions follow-through/compliance;judgment  -CW (r) JG (t) CW (c)     Impairments Affecting Function (Mobility) balance;endurance/activity tolerance;strength;pain;cognition;range of motion (ROM)  -CW (r) JG (t) CW (c)     Cognitive Impairments, Mobility Safety/Performance attention;awareness, need for assistance;insight into deficits/self-awareness  -CW (r) JG (t) CW (c)               User Key  (r) = Recorded By, (t) = Taken By, (c) = Cosigned By      Initials Name Provider Type    CW Celia Humphries, PT Physical Therapist    Timmy Rojo, PT Student PT Student                   Mobility       Row Name 04/08/24 111           Bed Mobility    Bed Mobility supine-sit;sit-supine  -CW (r) JG (t) CW (c)     Supine-Sit Taylor (Bed Mobility) standby assist;verbal cues;nonverbal cues (demo/gesture)  -CW (r) JG (t) CW (c)     Sit-Supine Taylor (Bed Mobility) verbal cues;nonverbal cues (demo/gesture);minimum assist (75% patient effort);1 person assist  -CW (r) JG (t) CW (c)     Assistive Device (Bed Mobility) bed rails;head of bed elevated  -CW (r) JG (t) CW (c)     Comment, (Bed Mobility) assist with LE sit -> supine  -CW (r) JG (t) CW (c)       Row Name 04/08/24 1116          Sit-Stand Transfer    Sit-Stand Taylor (Transfers) contact guard  -CW (r) JG (t) CW (c)       Row Name 04/08/24 1116          Gait/Stairs (Locomotion)    Taylor Level (Gait) minimum assist (75% patient effort);2 person assist;verbal cues  -CW (r) JG (t) CW (c)     Assistive Device (Gait) other (see comments)  HHAx2  -CW (r) JG (t) CW (c)     Patient was able to Ambulate yes  -CW (r) JG (t) CW (c)     Distance in Feet (Gait) 80  -CW (r) JG (t) CW (c)     Deviations/Abnormal Patterns (Gait) lennox decreased;gait speed decreased;stride length decreased  -CW (r) JG (t) CW (c)     Bilateral Gait Deviations forward flexed posture  -CW (r) JG (t) CW (c)               User Key  (r) = Recorded By, (t) = Taken By, (c) = Cosigned By      Initials Name Provider Type    CW Celia Humphries PT Physical Therapist    Timmy Rojo PT Student PT Student                   Obj/Interventions       Row Name 04/08/24 1118          Balance    Balance Assessment sitting static balance;sitting dynamic balance;sit to stand dynamic balance;standing static balance;standing dynamic balance  -CW (r) JG (t) CW (c)     Static Sitting Balance contact guard;verbal cues;non-verbal cues (demo/gesture)  -CW (r) JG (t) CW (c)     Dynamic Sitting Balance contact guard;verbal cues;non-verbal cues (demo/gesture)  -CW (r) JG (t) CW (c)     Position, Sitting Balance  unsupported;sitting edge of bed  -CW (r) JG (t) CW (c)     Sit to Stand Dynamic Balance contact guard;non-verbal cues (demo/gesture);verbal cues  -CW (r) JG (t) CW (c)     Static Standing Balance minimal assist;2-person assist;verbal cues;non-verbal cues (demo/gesture)  -CW (r) JG (t) CW (c)     Dynamic Standing Balance minimal assist;2-person assist;verbal cues;non-verbal cues (demo/gesture)  -CW (r) JG (t) CW (c)     Position/Device Used, Standing Balance supported;other (see comments)  HHAx2  -CW (r) JG (t) CW (c)               User Key  (r) = Recorded By, (t) = Taken By, (c) = Cosigned By      Initials Name Provider Type    CW Celia Humphries, PT Physical Therapist    Timmy Rojo, PT Student PT Student                   Goals/Plan    No documentation.                  Clinical Impression       Row Name 04/08/24 1120          Pain    Pretreatment Pain Rating 0/10 - no pain  -CW (r) JG (t) CW (c)     Posttreatment Pain Rating 0/10 - no pain  -CW (r) JG (t) CW (c)       Row Name 04/08/24 1120          Plan of Care Review    Plan of Care Reviewed With patient  -CW (r) JG (t) CW (c)     Progress improving  -CW (r) JG (t) CW (c)     Outcome Evaluation Pt agreeable to therapy this date. Pt supine with family present when PT/OT entered room. Supine -> sit with SBA and use of bed rails. Pt able to sit at EOB with CGA and no LOB noted. STS from EOB with CGA. Pt able to ambulate with HHAx2 and MinAx2 for 80'. No overt LOB noted during ambulation. Verbal cues needed to stay on task during ambulation secondary to confusion. Sit -> supine with MinAx1 with assistance needed at BLE. Continue to progress as indicated.  -CW (r) JG (t) CW (c)       Row Name 04/08/24 1120          Positioning and Restraints    Pre-Treatment Position in bed  -CW (r) JG (t) CW (c)     Post Treatment Position bed  -CW (r) JG (t) CW (c)     In Bed supine;call light within reach;encouraged to call for assist;exit alarm on;side rails up x3;with  family/caregiver  -CW (r) JG (t) CW (c)               User Key  (r) = Recorded By, (t) = Taken By, (c) = Cosigned By      Initials Name Provider Type    Celia Bradley, PT Physical Therapist    Timmy Rojo, PT Student PT Student                   Outcome Measures       Row Name 04/08/24 1123 04/08/24 0800       How much help from another person do you currently need...    Turning from your back to your side while in flat bed without using bedrails? 3  -CW (r) JG (t) CW (c) 3  -MB    Moving from lying on back to sitting on the side of a flat bed without bedrails? 3  -CW (r) JG (t) CW (c) 2  -MB    Moving to and from a bed to a chair (including a wheelchair)? 3  -CW (r) JG (t) CW (c) 2  -MB    Standing up from a chair using your arms (e.g., wheelchair, bedside chair)? 3  -CW (r) JG (t) CW (c) 2  -MB    Climbing 3-5 steps with a railing? 2  -CW (r) JG (t) CW (c) 2  -MB    To walk in hospital room? 3  -CW (r) JG (t) CW (c) 2  -MB    AM-PAC 6 Clicks Score (PT) 17  -CW (r) JG (t) 13  -MB    Highest Level of Mobility Goal 5 --> Static standing  -CW (r) JG (t) 4 --> Transfer to chair/commode  -MB      Row Name 04/08/24 1204 04/08/24 1123       Functional Assessment    Outcome Measure Options AM-PAC 6 Clicks Daily Activity (OT)  -MW AM-PAC 6 Clicks Basic Mobility (PT)  -CW (r) JG (t) CW (c)              User Key  (r) = Recorded By, (t) = Taken By, (c) = Cosigned By      Initials Name Provider Type    Celia Bradley, PT Physical Therapist    Ramonita Gruber, OT Occupational Therapist    Ariella Marr, RN Registered Nurse    Timmy Rojo, PT Student PT Student                                 Physical Therapy Education       Title: PT OT SLP Therapies (Done)       Topic: Physical Therapy (Done)       Point: Mobility training (Done)       Learning Progress Summary             Patient Acceptance, E, VU by SEE at 4/8/2024 1123    Acceptance, E,TB, VU,NR by SAMINA at 4/7/2024 0819                          Point: Home exercise program (Done)       Learning Progress Summary             Patient Acceptance, E,TB, VU,NR by  at 4/7/2024 0819                         Point: Body mechanics (Done)       Learning Progress Summary             Patient Acceptance, E, VU by  at 4/8/2024 1123    Acceptance, E,TB, VU,NR by  at 4/7/2024 0819                         Point: Precautions (Done)       Learning Progress Summary             Patient Acceptance, E,TB, VU,NR by  at 4/7/2024 0819                                         User Key       Initials Effective Dates Name Provider Type Discipline    CS 07/11/23 -  Pop Lundberg, PT Physical Therapist PT     02/29/24 -  Timmy Chamorro, PT Student PT Student PT                  PT Recommendation and Plan     Plan of Care Reviewed With: patient  Progress: improving  Outcome Evaluation: Pt agreeable to therapy this date. Pt supine with family present when PT/OT entered room. Supine -> sit with SBA and use of bed rails. Pt able to sit at EOB with CGA and no LOB noted. STS from EOB with CGA. Pt able to ambulate with HHAx2 and MinAx2 for 80'. No overt LOB noted during ambulation. Verbal cues needed to stay on task during ambulation secondary to confusion. Sit -> supine with MinAx1 with assistance needed at BLE. Continue to progress as indicated.     Time Calculation:         PT Charges       Row Name 04/08/24 1124             Time Calculation    Start Time 1049  -CW (r) JG (t) CW (c)      Stop Time 1109  -CW (r) JG (t) CW (c)      Time Calculation (min) 20 min  -CW (r) JG (t)      PT Received On 04/08/24  -CW (r) JG (t) CW (c)      PT - Next Appointment 04/09/24  -CW (r) JG (t) CW (c)         Time Calculation- PT    Total Timed Code Minutes- PT 20 minute(s)  -CW (r) JG (t) CW (c)         Timed Charges    08868 - PT Therapeutic Activity Minutes 20  -CW (r) JG (t) CW (c)         Total Minutes    Timed Charges Total Minutes 20  -CW (r) JG (t)       Total Minutes 20  -CW (r) JG (t)                 User Key  (r) = Recorded By, (t) = Taken By, (c) = Cosigned By      Initials Name Provider Type    Celia Bradley, PT Physical Therapist    Timmy Rojo PT Student PT Student                  Therapy Charges for Today       Code Description Service Date Service Provider Modifiers Qty    [id number]  PT THERAPEUTIC ACT EA 15 MIN 4/8/2024 Timmy Chamorro PT Student GP 1            PT G-Codes  Outcome Measure Options: AM-PAC 6 Clicks Daily Activity (OT)  AM-PAC 6 Clicks Score (PT): 17  AM-PAC 6 Clicks Score (OT): 16  Modified [place] Scale: 5 - Severe disability.  Bedridden, incontinent, and requiring constant nursing care and attention.       Timmy Chamorro PT Student  4/8/2024

## 2024-04-08 NOTE — THERAPY TREATMENT NOTE
Patient Name: Radha Luke  : 3/14/1933    MRN: [id number]                              Today's Date: 2024       Admit Date: 2024    Visit Dx:     ICD-10-CM ICD-9-CM   1. Subarachnoid hemorrhage  I60.9 430   2. Fall, initial encounter  W19.XXXA E888.9   3. Intraparenchymal hematoma of left side of brain due to trauma, without loss of consciousness, initial encounter  S06.320A 853.01   4. Laceration of scalp, initial encounter  S01.01XA 873.0     Patient Active Problem List   Diagnosis    Disorder of aorta    Diverticulosis of intestine    Gastroesophageal reflux disease with esophagitis    Mixed hyperlipidemia    Primary hypertension    Age-related osteoporosis without current pathological fracture    Health care maintenance    History of left breast cancer    Malignant neoplasm of overlapping sites of left female breast    Pain of right sacroiliac joint    Impaired glucose tolerance    Left bundle branch block    Congestive heart failure due to valvular disease    Memory loss    Gout    Foot pain    Vertigo    Chronic combined systolic and diastolic heart failure    Nonrheumatic mitral valve regurgitation    Nonrheumatic aortic valve insufficiency    Aortic stenosis, moderate    TSH elevation    Breast cancer    Short-term memory loss    Confusion    Decreased appetite    Stage 3b chronic kidney disease    Acute UTI (urinary tract infection)    Hypercalcemia    Volume depletion    Dementia without behavioral disturbance    Severe malnutrition    Subarachnoid hematoma     Past Medical History:   Diagnosis Date    Acute systolic congestive heart failure     Allergic ?    Aortic stenosis     Arthritis     Breast cancer     Locally recurrent left breast    Cataract ?    CKD (chronic kidney disease), stage III     Colon polyp ?    Coronary artery disease 2018    Cough     Diverticulosis 2012    Dizziness     Drug therapy     Edema     GERD (gastroesophageal reflux disease)     History of breast  cancer     LEFT    Hyperlipidemia     Hypertension     Hypokalemia     LBBB (left bundle branch block)     Mild tricuspid regurgitation     with a right ventricular systolic pressure of 62    Moderate mitral regurgitation     Pleural effusion     Pneumonia 2018     Past Surgical History:   Procedure Laterality Date    APPENDECTOMY  1943    BREAST BIOPSY      BREAST LUMPECTOMY Left 1995    COLONOSCOPY N/A 5/12/2016    Procedure: COLONOSCOPY;  Surgeon: Israel Vance MD;  Location: [facility];  Service:     HYSTERECTOMY  1964    MASTECTOMY Left 2011    THORACENTESIS Bilateral       General Information       Row Name 04/08/24 1159          OT Time and Intention    Document Type therapy note (daily note)  -MW     Mode of Treatment occupational therapy;co-treatment  -MW       Row Name 04/08/24 1159          General Information    Patient Profile Reviewed yes  -MW     Existing Precautions/Restrictions fall  -MW       Row Name 04/08/24 1159          Living Environment    People in Home facility resident  -MW       Row Name 04/08/24 1159          Cognition    Orientation Status (Cognition) oriented to;person;verbal cues/prompts needed for orientation  -MW       Row Name 04/08/24 1159          Safety Issues, Functional Mobility    Safety Issues Affecting Function (Mobility) safety precautions follow-through/compliance;safety precaution awareness;sequencing abilities;judgment  -MW     Impairments Affecting Function (Mobility) balance;endurance/activity tolerance;strength;pain;cognition;range of motion (ROM)  -MW     Comment, Safety Issues/Impairments (Mobility) Co-treatment medically necessary and appropriate d/t pt's acuity level, decreased endurance and activity tolerance, and safety of patient and staff. Treatment focused on progression of care and goals established in POC. Gait belt and non-skid socks worn.  -MW               User Key  (r) = Recorded By, (t) = Taken By, (c) = Cosigned By      Initials Name  Provider Type     Ramonita Andino OT Occupational Therapist                     Mobility/ADL's       Row Name 04/08/24 1200          Bed Mobility    Bed Mobility supine-sit;sit-supine  -     Supine-Sit [place] (Bed Mobility) minimum assist (75% patient effort)  -     Sit-Supine [place] (Bed Mobility) minimum assist (75% patient effort)  -     Assistive Device (Bed Mobility) bed rails;head of bed elevated  -     Comment, (Bed Mobility) cues for sequencing, increased time required  -[facility] Name 04/08/24 1200          Transfers    Transfers sit-stand transfer;stand-sit transfer;toilet transfer  -MW       Row Name 04/08/24 1200          Sit-Stand Transfer    Sit-Stand [place] (Transfers) minimum assist (75% patient effort)  -     Assistive Device (Sit-Stand Transfers) other (see comments)  -     Comment, (Sit-Stand Transfer) A  -MW       Row Name 04/08/24 1200          Stand-Sit Transfer    Stand-Sit [place] (Transfers) minimum assist (75% patient effort)  -MW       Row Name 04/08/24 1200          Toilet Transfer    [place] Level (Toilet Transfer) minimum assist (75% patient effort)  -     Assistive Device (Toilet Transfer) commode;grab bars/safety frame  -     Comment, (Toilet Transfer) HHA  [facility] Name 04/08/24 1200          Functional Mobility    Functional Mobility- Ind. Level minimum assist (75% patient effort);2 person assist required  -     Functional Mobility- Comment to BR commode, cues for safety and use of grab bars  -[facility] Name 04/08/24 1200          Activities of Daily Living    BADL Assessment/Intervention lower body dressing;toileting  -MW       Row Name 04/08/24 1200          Toileting Assessment/Training    Comment, (Toileting) on commode, able to complete hygiene in static standing, brief mgment with max A in sitting/standing  -[facility] Name 04/08/24 1200          Lower Body Dressing Assessment/Training    [place] Level  (Lower Body Dressing) socks;maximum assist (25% patient effort)  -MW               User Key  (r) = Recorded By, (t) = Taken By, (c) = Cosigned By      Initials Name Provider Type    Ramonita Gruber OT Occupational Therapist                   Obj/Interventions       Row Name 04/08/24 1201          Motor Skills    Motor Skills functional endurance  -MW     Functional Endurance improving  -MW       Row Name 04/08/24 1201          Balance    Balance Assessment sitting static balance;sitting dynamic balance;sit to stand dynamic balance;standing static balance;standing dynamic balance  -MW     Static Sitting Balance standby assist  -MW     Dynamic Sitting Balance contact guard  -MW     Position, Sitting Balance sitting edge of bed;unsupported  -MW     Static Standing Balance minimal assist  -MW     Dynamic Standing Balance minimal assist;1-person assist;2-person assist  -MW     Position/Device Used, Standing Balance supported;other (see comments)  HHA  -MW     Balance Interventions sitting;standing;sit to stand;supported;static;dynamic;moderate challenge;occupation based/functional task;weight shifting activity  -MW     Comment, Balance occassional R lean with func mobility  -MW               User Key  (r) = Recorded By, (t) = Taken By, (c) = Cosigned By      Initials Name Provider Type    Ramonita Gruber OT Occupational Therapist                   Goals/Plan    No documentation.                  Clinical Impression       Row Name 04/08/24 1202          Pain Assessment    Pretreatment Pain Rating 0/10 - no pain  -MW     Posttreatment Pain Rating 0/10 - no pain  -MW       Row Name 04/08/24 1202          Plan of Care Review    Plan of Care Reviewed With patient;son  -MW     Progress improving  -     Outcome Evaluation pt seen for OT this AM, pt agreeable and eager to get out of bed. She is oriented to self only, requires cues for safety and sequencing. She completed bed mob with min A, socks with max A, STS  with min A, and required minAx2 for func mob with HHAx2 for ambulation into hallway and to BR commode demo'ing household distances. able to complete toileting hygiene with CGA in static standing, max A for brief mgment. She returned to supine at end of session, son present. Plans to return to facility to skilled unit.  -       Row Name 04/08/24 1202          Therapy Plan Review/Discharge Plan (OT)    Anticipated Discharge Disposition (OT) skilled nursing facility  -       Row Name 04/08/24 1202          Vital Signs    O2 Delivery Pre Treatment room air  -MW     O2 Delivery Post Treatment room air  -[facility] Name 04/08/24 1202          Positioning and Restraints    Pre-Treatment Position in bed  -MW     Post Treatment Position bed  -MW     In Bed notified nsg;fowlers;with family/caregiver;patient within staff view;exit alarm on;encouraged to call for assist;call light within reach;side rails up x3  -MW               User Key  (r) = Recorded By, (t) = Taken By, (c) = Cosigned By      Initials Name Provider Type    Ramonita Gruber, OT Occupational Therapist                   Outcome Measures       Row Name 04/08/24 1204          How much help from another is currently needed...    Putting on and taking off regular lower body clothing? 2  -MW     Bathing (including washing, rinsing, and drying) 2  -MW     Toileting (which includes using toilet bed pan or urinal) 3  -MW     Putting on and taking off regular upper body clothing 3  -MW     Taking care of personal grooming (such as brushing teeth) 3  -MW     Eating meals 3  -MW     AM-PAC 6 Clicks Score (OT) 16  -MW       Row Name 04/08/24 1123 04/08/24 0800       How much help from another person do you currently need...    Turning from your back to your side while in flat bed without using bedrails? 3 (P)   -JG 3  -MB    Moving from lying on back to sitting on the side of a flat bed without bedrails? 3 (P)   -JG 2  -MB    Moving to and from a bed to a chair  (including a wheelchair)? 3 (P)   -JG 2  -MB    Standing up from a chair using your arms (e.g., wheelchair, bedside chair)? 3 (P)   -JG 2  -MB    Climbing 3-5 steps with a railing? 2 (P)   -JG 2  -MB    To walk in hospital room? 3 (P)   -JG 2  -MB    AM-PAC 6 Clicks Score (PT) 17 (P)   -JG 13  -MB    Highest Level of Mobility Goal 5 --> Static standing (P)   -JG 4 --> Transfer to chair/commode  -MB      Row Name 04/08/24 1204 04/08/24 1123       Functional Assessment    Outcome Measure Options AM-PAC 6 Clicks Daily Activity (OT)  -MW AM-PAC 6 Clicks Basic Mobility (PT) (P)   -JG              User Key  (r) = Recorded By, (t) = Taken By, (c) = Cosigned By      Initials Name Provider Type    Ramonita Gruber OT Occupational Therapist    Ariella Marr, RN Registered Nurse    Timmy Rojo, PT Student PT Student                    Occupational Therapy Education       Title: PT OT SLP Therapies (Done)       Topic: Occupational Therapy (Done)       Point: ADL training (Done)       Description:   Instruct learner(s) on proper safety adaptation and remediation techniques during self care or transfers.   Instruct in proper use of assistive devices.                  Learning Progress Summary             Patient Acceptance, E, DU by PARVEEN at 4/7/2024 1026    Comment: Role of OT and safety techniques and cues                         Point: Home exercise program (Done)       Description:   Instruct learner(s) on appropriate technique for monitoring, assisting and/or progressing therapeutic exercises/activities.                  Learning Progress Summary             Patient Acceptance, E, DU by PARVEEN at 4/7/2024 1026    Comment: Role of OT and safety techniques and cues                         Point: Precautions (Done)       Description:   Instruct learner(s) on prescribed precautions during self-care and functional transfers.                  Learning Progress Summary             Patient Acceptance, E, DU by PARVEEN at  4/7/2024 1026    Comment: Role of OT and safety techniques and cues                         Point: Body mechanics (Done)       Description:   Instruct learner(s) on proper positioning and spine alignment during self-care, functional mobility activities and/or exercises.                  Learning Progress Summary             Patient Kizzy, ALTON DU by PARVEEN at 4/7/2024 1026    Comment: Role of OT and safety techniques and cues                                         User Key       Initials Effective Dates Name Provider Type Discipline    PARVEEN 10/12/23 -  Sabi Murdock OT Occupational Therapist OT                  OT Recommendation and Plan     Plan of Care Review  Plan of Care Reviewed With: patient, son  Progress: improving  Outcome Evaluation: pt seen for OT this AM, pt agreeable and eager to get out of bed. She is oriented to self only, requires cues for safety and sequencing. She completed bed mob with min A, socks with max A, STS with min A, and required minAx2 for func mob with HHAx2 for ambulation into hallway and to BR commode demo'ing household distances. able to complete toileting hygiene with CGA in static standing, max A for brief mgment. She returned to supine at end of session, son present. Plans to return to facility to skilled unit.     Time Calculation:         Time Calculation- OT       Row Name 04/08/24 1205             Time Calculation- OT    OT Start Time 1049  -MW      OT Stop Time 1107  -MW      OT Time Calculation (min) 18 min  -MW      Total Timed Code Minutes- OT 18 minute(s)  -MW      OT Received On 04/08/24  -MW      OT - Next Appointment 04/10/24  -MW         Timed Charges    59621 - OT Self Care/Mgmt Minutes 18  -MW         Total Minutes    Timed Charges Total Minutes 18  -MW       Total Minutes 18  -MW                User Key  (r) = Recorded By, (t) = Taken By, (c) = Cosigned By      Initials Name Provider Type    Ramonita Gruber OT Occupational Therapist                  Therapy  Charges for Today       Code Description Service Date Service Provider Modifiers Qty    [id number] HC OT SELF CARE/MGMT/TRAIN EA 15 MIN 4/8/2024 Ramonita Andino OT GO 1                 Ramonita Andino OT  4/8/2024

## 2024-04-08 NOTE — DISCHARGE PLACEMENT REQUEST
"Radha Luke (89 y.o. Female)       Date of Birth   03/14/1933    Social Security Number       Address   [address] [facility] [address]    Home Phone   [phone]    MRN   [id number]       [facility]    Marital Status                               Admission Date   4/6/24    Admission Type   Emergency    Admitting Provider   Mehran Jean Baptiste MD    Attending Provider   Mehran Jean Baptiste MD    Department, Room/Bed   [address], P595/1       Discharge Date       Discharge Disposition   Skilled Nursing Facility (DC - External)    Discharge Destination                                 Attending Provider: Mehran Jean Baptiste MD    Allergies: Levofloxacin, Penicillins    Isolation: None   Infection: None   Code Status: Prior    Ht: 152.4 cm (60\")   Wt: 45.8 kg (100 lb 15.5 oz)    Admission Cmt: None   Principal Problem: Subarachnoid hematoma [S06.6XAA]                   Active Insurance as of 4/6/2024       Primary Coverage       Payor Plan Insurance Group Employer/Plan Group    MEDICARE MEDICARE A & B        Payor Plan Address Payor Plan Phone Number Payor Plan Fax Number Effective Dates    [address] [phone]  3/1/1998 - None Entered    formerly [facility] [id number]         Subscriber Name Subscriber Birth Date Member ID       RADHA LUKE 3/14/1933 [id number]               Secondary Coverage       Payor Plan Insurance Group Employer/Plan Group    [employer] SUP AAR HEALTH CARE OPTIONS        Payor Plan Address Payor Plan Phone Number Payor Plan Fax Number Effective Dates    [facility] [phone]  1/1/2015 - None Entered    [address]       [facility] [id number]         Subscriber Name Subscriber Birth Date Member ID       RADHA LUKE 3/14/1933 [id number]                     Emergency Contacts        (Rel.) Home Phone Work Phone Mobile Phone    CORTEZ LUKE (Son) -- -- [phone]    Alfonzo Luke (Son) [phone] -- --    MARSHA LUKE (Son) " [phone] -- --

## 2024-04-15 ENCOUNTER — PATIENT OUTREACH (OUTPATIENT)
Dept: CASE MANAGEMENT | Facility: OTHER | Age: 89
End: 2024-04-15
Payer: MEDICARE

## 2024-04-15 NOTE — OUTREACH NOTE
Care Coordination    AMBULATORY CASE MANAGEMENT NOTE      Patient discharged from [facility] on 4/8/2024. Day7 follow up SNF discharge email sent  to [facility].       Leonie BLOCK  Ambulatory Case Management    4/15/2024, 08:39 EDT

## 2024-04-22 ENCOUNTER — PATIENT OUTREACH (OUTPATIENT)
Dept: CASE MANAGEMENT | Facility: OTHER | Age: 89
End: 2024-04-22
Payer: MEDICARE

## 2024-04-22 NOTE — OUTREACH NOTE
AMBULATORY CASE MANAGEMENT NOTE  Care Coordination    Sent day 14 survey to [facility] for discharge date and barriers.     Leonie BLOCK  Ambulatory Case Management    4/22/2024, 09:07 EDT

## 2024-04-29 ENCOUNTER — PATIENT OUTREACH (OUTPATIENT)
Dept: CASE MANAGEMENT | Facility: OTHER | Age: 89
End: 2024-04-29
Payer: MEDICARE

## 2024-04-29 NOTE — OUTREACH NOTE
AMBULATORY CASE MANAGEMENT NOTE    Care Coordination    Sent day 21 survey SNF discharge email.     Leonie BLOCK  Ambulatory Case Management    4/29/2024, 08:22 EDT

## 2024-05-06 ENCOUNTER — PATIENT OUTREACH (OUTPATIENT)
Dept: CASE MANAGEMENT | Facility: OTHER | Age: 89
End: 2024-05-06
Payer: MEDICARE

## 2024-05-09 ENCOUNTER — PATIENT OUTREACH (OUTPATIENT)
Dept: CASE MANAGEMENT | Facility: OTHER | Age: 89
End: 2024-05-09
Payer: MEDICARE